# Patient Record
Sex: MALE | Race: WHITE | NOT HISPANIC OR LATINO | Employment: OTHER | URBAN - METROPOLITAN AREA
[De-identification: names, ages, dates, MRNs, and addresses within clinical notes are randomized per-mention and may not be internally consistent; named-entity substitution may affect disease eponyms.]

---

## 2017-04-18 ENCOUNTER — LAB CONVERSION - ENCOUNTER (OUTPATIENT)
Dept: OTHER | Facility: OTHER | Age: 57
End: 2017-04-18

## 2017-04-24 ENCOUNTER — GENERIC CONVERSION - ENCOUNTER (OUTPATIENT)
Dept: OTHER | Facility: OTHER | Age: 57
End: 2017-04-24

## 2017-04-24 LAB
A/G RATIO (HISTORICAL): 1.7 (CALC) (ref 1–2.5)
ALBUMIN SERPL BCP-MCNC: 4.4 G/DL (ref 3.6–5.1)
ALP SERPL-CCNC: 78 U/L (ref 40–115)
AST SERPL W P-5'-P-CCNC: 28 U/L (ref 10–35)
BASOPHILS # BLD AUTO: 0.2 %
BASOPHILS # BLD AUTO: 11 CELLS/UL (ref 0–200)
BILIRUB SERPL-MCNC: 0.6 MG/DL (ref 0.2–1.2)
BUN SERPL-MCNC: 17 MG/DL (ref 7–25)
BUN/CREA RATIO (HISTORICAL): ABNORMAL (CALC) (ref 6–22)
CALCIUM SERPL-MCNC: 9 MG/DL (ref 8.6–10.3)
CHLORIDE SERPL-SCNC: 107 MMOL/L (ref 98–110)
CHOLEST SERPL-MCNC: 232 MG/DL (ref 125–200)
CHOLEST/HDLC SERPL: 6.1 (CALC)
CO2 SERPL-SCNC: 26 MMOL/L (ref 20–31)
CREAT SERPL-MCNC: 0.97 MG/DL (ref 0.7–1.33)
DEPRECATED RDW RBC AUTO: 15.6 % (ref 11–15)
EGFR AFRICAN AMERICAN (HISTORICAL): 101 ML/MIN/1.73M2
EGFR-AMERICAN CALC (HISTORICAL): 87 ML/MIN/1.73M2
EOSINOPHIL # BLD AUTO: 0.7 %
EOSINOPHIL # BLD AUTO: 38 CELLS/UL (ref 15–500)
GAMMA GLOBULIN (HISTORICAL): 2.6 G/DL (CALC) (ref 1.9–3.7)
GLUCOSE (HISTORICAL): 102 MG/DL (ref 65–99)
HCT VFR BLD AUTO: 38.5 % (ref 38.5–50)
HDLC SERPL-MCNC: 38 MG/DL
HGB BLD-MCNC: 12.8 G/DL (ref 13.2–17.1)
LDL CHOLESTEROL (HISTORICAL): 148 MG/DL (CALC)
LYMPHOCYTES # BLD AUTO: 1798 CELLS/UL (ref 850–3900)
LYMPHOCYTES # BLD AUTO: 33.3 %
MCH RBC QN AUTO: 35.3 PG (ref 27–33)
MCHC RBC AUTO-ENTMCNC: 33.2 G/DL (ref 32–36)
MCV RBC AUTO: 106.2 FL (ref 80–100)
MONOCYTES # BLD AUTO: 362 CELLS/UL (ref 200–950)
MONOCYTES (HISTORICAL): 6.7 %
NEUTROPHILS # BLD AUTO: 3191 CELLS/UL (ref 1500–7800)
NEUTROPHILS # BLD AUTO: 59.1 %
NON-HDL-CHOL (CHOL-HDL) (HISTORICAL): 194 MG/DL (CALC)
PLATELET # BLD AUTO: 75 THOUSAND/UL (ref 140–400)
PMV BLD AUTO: 8 FL (ref 7.5–12.5)
POTASSIUM SERPL-SCNC: 4 MMOL/L (ref 3.5–5.3)
PROSTATE SPECIFIC ANTIGEN TOTAL (HISTORICAL): 0.7 NG/ML
RBC # BLD AUTO: 3.63 MILLION/UL (ref 4.2–5.8)
RBC MORPHOLOGY (HISTORICAL): ABNORMAL
SODIUM SERPL-SCNC: 142 MMOL/L (ref 135–146)
TOTAL PROTEIN (HISTORICAL): 7 G/DL (ref 6.1–8.1)
TRIGL SERPL-MCNC: 232 MG/DL
TSH SERPL DL<=0.05 MIU/L-ACNC: 1.74 MIU/L (ref 0.4–4.5)
WBC # BLD AUTO: 5.4 THOUSAND/UL (ref 3.8–10.8)

## 2017-05-11 ENCOUNTER — ALLSCRIPTS OFFICE VISIT (OUTPATIENT)
Dept: OTHER | Facility: OTHER | Age: 57
End: 2017-05-11

## 2017-05-11 DIAGNOSIS — R16.1 SPLENOMEGALY, NOT ELSEWHERE CLASSIFIED: ICD-10-CM

## 2017-05-20 ENCOUNTER — HOSPITAL ENCOUNTER (OUTPATIENT)
Dept: ULTRASOUND IMAGING | Facility: HOSPITAL | Age: 57
Discharge: HOME | End: 2017-05-20
Payer: COMMERCIAL

## 2017-05-20 DIAGNOSIS — R16.1 SPLENOMEGALY, NOT ELSEWHERE CLASSIFIED: ICD-10-CM

## 2017-05-20 PROCEDURE — 76700 US EXAM ABDOM COMPLETE: CPT

## 2017-05-22 ENCOUNTER — GENERIC CONVERSION - ENCOUNTER (OUTPATIENT)
Dept: OTHER | Facility: OTHER | Age: 57
End: 2017-05-22

## 2017-06-14 ENCOUNTER — ALLSCRIPTS OFFICE VISIT (OUTPATIENT)
Dept: OTHER | Facility: OTHER | Age: 57
End: 2017-06-14

## 2017-07-31 ENCOUNTER — HOSPITAL ENCOUNTER (OUTPATIENT)
Dept: CT IMAGING | Facility: HOSPITAL | Age: 57
Discharge: HOME | End: 2017-07-31
Attending: INTERNAL MEDICINE | Admitting: RADIOLOGY
Payer: COMMERCIAL

## 2017-07-31 ENCOUNTER — LAB CONVERSION - ENCOUNTER (OUTPATIENT)
Dept: OTHER | Facility: OTHER | Age: 57
End: 2017-07-31

## 2017-07-31 ENCOUNTER — GENERIC CONVERSION - ENCOUNTER (OUTPATIENT)
Dept: OTHER | Facility: OTHER | Age: 57
End: 2017-07-31

## 2017-07-31 VITALS
RESPIRATION RATE: 16 BRPM | HEART RATE: 56 BPM | DIASTOLIC BLOOD PRESSURE: 80 MMHG | BODY MASS INDEX: 31.44 KG/M2 | SYSTOLIC BLOOD PRESSURE: 131 MMHG | OXYGEN SATURATION: 93 % | HEIGHT: 74 IN | TEMPERATURE: 98.8 F | WEIGHT: 245 LBS

## 2017-07-31 DIAGNOSIS — D64.9 ANEMIA, UNSPECIFIED: ICD-10-CM

## 2017-07-31 LAB
LYME IGG/IGM AB (HISTORICAL): <0.9 INDEX
TSH SERPL DL<=0.05 MIU/L-ACNC: 34.23 MIU/L (ref 0.4–4.5)

## 2017-07-31 PROCEDURE — 88280 CHROMOSOME KARYOTYPE STUDY: CPT | Performed by: INTERNAL MEDICINE

## 2017-07-31 PROCEDURE — 38221 DX BONE MARROW BIOPSIES: CPT

## 2017-07-31 PROCEDURE — 88342 IMHCHEM/IMCYTCHM 1ST ANTB: CPT | Performed by: INTERNAL MEDICINE

## 2017-07-31 PROCEDURE — 88237 TISSUE CULTURE BONE MARROW: CPT | Performed by: INTERNAL MEDICINE

## 2017-07-31 PROCEDURE — 81406 MOPATH PROCEDURE LEVEL 7: CPT | Performed by: INTERNAL MEDICINE

## 2017-07-31 PROCEDURE — 88305 TISSUE EXAM BY PATHOLOGIST: CPT | Performed by: INTERNAL MEDICINE

## 2017-07-31 PROCEDURE — 99152 MOD SED SAME PHYS/QHP 5/>YRS: CPT

## 2017-07-31 PROCEDURE — 88313 SPECIAL STAINS GROUP 2: CPT | Performed by: INTERNAL MEDICINE

## 2017-07-31 PROCEDURE — 85097 BONE MARROW INTERPRETATION: CPT | Performed by: INTERNAL MEDICINE

## 2017-07-31 PROCEDURE — 38220 DX BONE MARROW ASPIRATIONS: CPT

## 2017-07-31 PROCEDURE — 88311 DECALCIFY TISSUE: CPT | Performed by: INTERNAL MEDICINE

## 2017-07-31 PROCEDURE — 81450 HL NEO GSAP 5-50DNA/DNA&RNA: CPT | Performed by: INTERNAL MEDICINE

## 2017-07-31 PROCEDURE — 77012 CT SCAN FOR NEEDLE BIOPSY: CPT

## 2017-07-31 PROCEDURE — 88280 CHROMOSOME KARYOTYPE STUDY: CPT

## 2017-07-31 PROCEDURE — 99153 MOD SED SAME PHYS/QHP EA: CPT

## 2017-07-31 PROCEDURE — 88341 IMHCHEM/IMCYTCHM EA ADD ANTB: CPT | Performed by: INTERNAL MEDICINE

## 2017-07-31 PROCEDURE — 88237 TISSUE CULTURE BONE MARROW: CPT

## 2017-07-31 RX ORDER — FENTANYL CITRATE 50 UG/ML
INJECTION, SOLUTION INTRAMUSCULAR; INTRAVENOUS CODE/TRAUMA/SEDATION MEDICATION
Status: COMPLETED | OUTPATIENT
Start: 2017-07-31 | End: 2017-07-31

## 2017-07-31 RX ORDER — TRAMADOL HYDROCHLORIDE 50 MG/1
50 TABLET ORAL EVERY 6 HOURS PRN
Status: ON HOLD | COMMUNITY
End: 2017-11-06

## 2017-07-31 RX ORDER — MIDAZOLAM HYDROCHLORIDE 1 MG/ML
INJECTION INTRAMUSCULAR; INTRAVENOUS CODE/TRAUMA/SEDATION MEDICATION
Status: COMPLETED | OUTPATIENT
Start: 2017-07-31 | End: 2017-07-31

## 2017-07-31 RX ORDER — SODIUM CHLORIDE 9 MG/ML
75 INJECTION, SOLUTION INTRAVENOUS CONTINUOUS
Status: DISCONTINUED | OUTPATIENT
Start: 2017-07-31 | End: 2017-08-01 | Stop reason: HOSPADM

## 2017-07-31 RX ORDER — ASCORBIC ACID 500 MG
500 TABLET ORAL DAILY
COMMUNITY
End: 2020-01-08 | Stop reason: ALTCHOICE

## 2017-07-31 RX ADMIN — MIDAZOLAM HYDROCHLORIDE 2 MG: 1 INJECTION, SOLUTION INTRAMUSCULAR; INTRAVENOUS at 08:40

## 2017-07-31 RX ADMIN — FENTANYL CITRATE 50 MCG: 50 INJECTION, SOLUTION INTRAMUSCULAR; INTRAVENOUS at 08:40

## 2017-07-31 RX ADMIN — FENTANYL CITRATE 50 MCG: 50 INJECTION, SOLUTION INTRAMUSCULAR; INTRAVENOUS at 08:35

## 2017-07-31 RX ADMIN — MIDAZOLAM HYDROCHLORIDE 2 MG: 1 INJECTION, SOLUTION INTRAMUSCULAR; INTRAVENOUS at 08:35

## 2017-08-05 LAB — MISCELLANEOUS LAB TEST RESULT: NORMAL

## 2017-08-08 LAB
MISCELLANEOUS LAB TEST RESULT: NORMAL
SCAN RESULT: NORMAL
SCAN RESULT: NORMAL

## 2017-08-09 ENCOUNTER — LAB CONVERSION - ENCOUNTER (OUTPATIENT)
Dept: OTHER | Facility: OTHER | Age: 57
End: 2017-08-09

## 2017-08-09 LAB — FERRITIN SERPL-MCNC: 139 NG/ML (ref 20–380)

## 2017-08-11 ENCOUNTER — LAB CONVERSION - ENCOUNTER (OUTPATIENT)
Dept: OTHER | Facility: OTHER | Age: 57
End: 2017-08-11

## 2017-08-11 DIAGNOSIS — D64.9 ANEMIA: ICD-10-CM

## 2017-08-11 LAB
A/G RATIO (HISTORICAL): 1.8 (CALC) (ref 1–2.5)
ALBUMIN SERPL BCP-MCNC: 4.4 G/DL (ref 3.6–5.1)
ALP SERPL-CCNC: 63 U/L (ref 40–115)
ALT SERPL W P-5'-P-CCNC: 35 U/L (ref 9–46)
AST SERPL W P-5'-P-CCNC: 30 U/L (ref 10–35)
BASOPHILS # BLD AUTO: 0.2 %
BASOPHILS # BLD AUTO: 9 CELLS/UL (ref 0–200)
BILIRUB SERPL-MCNC: 0.4 MG/DL (ref 0.2–1.2)
BUN SERPL-MCNC: 17 MG/DL (ref 7–25)
BUN/CREA RATIO (HISTORICAL): ABNORMAL (CALC) (ref 6–22)
CALCIUM SERPL-MCNC: 9.5 MG/DL (ref 8.6–10.3)
CHLORIDE SERPL-SCNC: 107 MMOL/L (ref 98–110)
CO2 SERPL-SCNC: 27 MMOL/L (ref 20–31)
CREAT SERPL-MCNC: 1.12 MG/DL (ref 0.7–1.33)
DEPRECATED RDW RBC AUTO: 14.1 % (ref 11–15)
EGFR AFRICAN AMERICAN (HISTORICAL): 85 ML/MIN/1.73M2
EGFR-AMERICAN CALC (HISTORICAL): 73 ML/MIN/1.73M2
EOSINOPHIL # BLD AUTO: 0.4 %
EOSINOPHIL # BLD AUTO: 18 CELLS/UL (ref 15–500)
FERRITIN SERPL-MCNC: 139 NG/ML (ref 20–380)
GAMMA GLOBULIN (HISTORICAL): 2.5 G/DL (CALC) (ref 1.9–3.7)
GLUCOSE (HISTORICAL): 112 MG/DL (ref 65–99)
HCT VFR BLD AUTO: 35.3 % (ref 38.5–50)
HGB BLD-MCNC: 11.8 G/DL (ref 13.2–17.1)
IRON SATN MFR SERPL: 28 % (CALC) (ref 15–60)
IRON SERPL-MCNC: 101 MCG/DL (ref 50–180)
LYMPHOCYTES # BLD AUTO: 1886 CELLS/UL (ref 850–3900)
LYMPHOCYTES # BLD AUTO: 41.9 %
MCH RBC QN AUTO: 35.5 PG (ref 27–33)
MCHC RBC AUTO-ENTMCNC: 33.4 G/DL (ref 32–36)
MCV RBC AUTO: 106.3 FL (ref 80–100)
METHYLMALONIC ACID (HISTORICAL): 127 NMOL/L (ref 87–318)
MONOCYTES # BLD AUTO: 329 CELLS/UL (ref 200–950)
MONOCYTES (HISTORICAL): 7.3 %
NEUTROPHILS # BLD AUTO: 2259 CELLS/UL (ref 1500–7800)
NEUTROPHILS # BLD AUTO: 50.2 %
PLATELET # BLD AUTO: 84 THOUSAND/UL (ref 140–400)
PMV BLD AUTO: 10.2 FL (ref 7.5–12.5)
POTASSIUM SERPL-SCNC: 3.8 MMOL/L (ref 3.5–5.3)
RBC # BLD AUTO: 3.32 MILLION/UL (ref 4.2–5.8)
SODIUM SERPL-SCNC: 143 MMOL/L (ref 135–146)
TIBC SERPL-MCNC: 359 MCG/DL (CALC) (ref 250–425)
TOTAL PROTEIN (HISTORICAL): 6.9 G/DL (ref 6.1–8.1)
WBC # BLD AUTO: 4.5 THOUSAND/UL (ref 3.8–10.8)

## 2017-08-21 ENCOUNTER — ALLSCRIPTS OFFICE VISIT (OUTPATIENT)
Dept: OTHER | Facility: OTHER | Age: 57
End: 2017-08-21

## 2017-08-25 RX ORDER — SODIUM CHLORIDE 9 MG/ML
20 INJECTION, SOLUTION INTRAVENOUS ONCE
Status: COMPLETED | OUTPATIENT
Start: 2017-09-20 | End: 2017-09-20

## 2017-08-25 RX ORDER — SODIUM CHLORIDE 9 MG/ML
20 INJECTION, SOLUTION INTRAVENOUS ONCE
Status: COMPLETED | OUTPATIENT
Start: 2017-08-30 | End: 2017-08-30

## 2017-08-25 RX ORDER — SODIUM CHLORIDE 9 MG/ML
20 INJECTION, SOLUTION INTRAVENOUS ONCE
Status: DISCONTINUED | OUTPATIENT
Start: 2017-09-07 | End: 2017-09-06

## 2017-08-25 RX ORDER — SODIUM CHLORIDE 9 MG/ML
20 INJECTION, SOLUTION INTRAVENOUS ONCE
Status: COMPLETED | OUTPATIENT
Start: 2017-09-13 | End: 2017-09-13

## 2017-08-30 ENCOUNTER — HOSPITAL ENCOUNTER (OUTPATIENT)
Dept: INFUSION CENTER | Facility: HOSPITAL | Age: 57
Discharge: HOME | End: 2017-08-30
Payer: COMMERCIAL

## 2017-08-30 VITALS
OXYGEN SATURATION: 99 % | DIASTOLIC BLOOD PRESSURE: 81 MMHG | TEMPERATURE: 98 F | SYSTOLIC BLOOD PRESSURE: 139 MMHG | RESPIRATION RATE: 18 BRPM | HEART RATE: 67 BPM

## 2017-08-30 PROCEDURE — 96365 THER/PROPH/DIAG IV INF INIT: CPT

## 2017-08-30 PROCEDURE — 96372 THER/PROPH/DIAG INJ SC/IM: CPT

## 2017-08-30 RX ORDER — CYANOCOBALAMIN 1000 UG/ML
1000 INJECTION INTRAMUSCULAR; SUBCUTANEOUS ONCE
Status: COMPLETED | OUTPATIENT
Start: 2017-08-30 | End: 2017-08-30

## 2017-08-30 RX ADMIN — IRON SUCROSE 200 MG: 20 INJECTION, SOLUTION INTRAVENOUS at 14:54

## 2017-08-30 RX ADMIN — CYANOCOBALAMIN 1000 MCG: 1000 INJECTION, SOLUTION INTRAMUSCULAR at 15:22

## 2017-08-30 RX ADMIN — SODIUM CHLORIDE 20 ML/HR: 9 INJECTION, SOLUTION INTRAVENOUS at 14:50

## 2017-08-30 NOTE — PROGRESS NOTES
Rec'd pt in good spirits, VSS, c/o fatigue  Made comfortable in recliner for PIV, Venofer then infused and B12 inj to R deltoid both tolerated well  PIV then dc'd and pt d/c'd to home with steady gait

## 2017-09-06 RX ORDER — SODIUM CHLORIDE 9 MG/ML
20 INJECTION, SOLUTION INTRAVENOUS CONTINUOUS
Status: DISCONTINUED | OUTPATIENT
Start: 2017-09-07 | End: 2017-09-10 | Stop reason: HOSPADM

## 2017-09-06 RX ORDER — CYANOCOBALAMIN 1000 UG/ML
1000 INJECTION INTRAMUSCULAR; SUBCUTANEOUS ONCE
Status: COMPLETED | OUTPATIENT
Start: 2017-09-07 | End: 2017-09-07

## 2017-09-07 ENCOUNTER — HOSPITAL ENCOUNTER (OUTPATIENT)
Dept: INFUSION CENTER | Facility: HOSPITAL | Age: 57
Discharge: HOME | End: 2017-09-07
Payer: COMMERCIAL

## 2017-09-07 VITALS
TEMPERATURE: 97 F | SYSTOLIC BLOOD PRESSURE: 147 MMHG | DIASTOLIC BLOOD PRESSURE: 87 MMHG | HEART RATE: 75 BPM | RESPIRATION RATE: 18 BRPM | OXYGEN SATURATION: 97 %

## 2017-09-07 PROCEDURE — 96365 THER/PROPH/DIAG IV INF INIT: CPT

## 2017-09-07 PROCEDURE — 96372 THER/PROPH/DIAG INJ SC/IM: CPT

## 2017-09-07 RX ADMIN — CYANOCOBALAMIN 1000 MCG: 1000 INJECTION, SOLUTION INTRAMUSCULAR at 14:49

## 2017-09-07 RX ADMIN — IRON SUCROSE 200 MG: 20 INJECTION, SOLUTION INTRAVENOUS at 14:45

## 2017-09-07 NOTE — PROGRESS NOTES
Pt tolerated all treatment today with no adverse reactions  Left unit ambulatory with a steady gait

## 2017-09-13 ENCOUNTER — ALLSCRIPTS OFFICE VISIT (OUTPATIENT)
Dept: OTHER | Facility: OTHER | Age: 57
End: 2017-09-13

## 2017-09-13 ENCOUNTER — HOSPITAL ENCOUNTER (OUTPATIENT)
Dept: INFUSION CENTER | Facility: HOSPITAL | Age: 57
Discharge: HOME | End: 2017-09-13
Payer: COMMERCIAL

## 2017-09-13 VITALS
RESPIRATION RATE: 18 BRPM | DIASTOLIC BLOOD PRESSURE: 78 MMHG | SYSTOLIC BLOOD PRESSURE: 138 MMHG | TEMPERATURE: 97.7 F | HEART RATE: 65 BPM | OXYGEN SATURATION: 97 %

## 2017-09-13 DIAGNOSIS — K76.0 FATTY (CHANGE OF) LIVER, NOT ELSEWHERE CLASSIFIED: ICD-10-CM

## 2017-09-13 DIAGNOSIS — D64.9 ANEMIA: ICD-10-CM

## 2017-09-13 PROCEDURE — 96365 THER/PROPH/DIAG IV INF INIT: CPT

## 2017-09-13 PROCEDURE — 96372 THER/PROPH/DIAG INJ SC/IM: CPT

## 2017-09-13 RX ORDER — CYANOCOBALAMIN 1000 UG/ML
1000 INJECTION INTRAMUSCULAR; SUBCUTANEOUS ONCE
Status: COMPLETED | OUTPATIENT
Start: 2017-09-13 | End: 2017-09-13

## 2017-09-13 RX ADMIN — IRON SUCROSE 200 MG: 20 INJECTION, SOLUTION INTRAVENOUS at 15:04

## 2017-09-13 RX ADMIN — CYANOCOBALAMIN 1000 MCG: 1000 INJECTION, SOLUTION INTRAMUSCULAR at 16:12

## 2017-09-13 RX ADMIN — SODIUM CHLORIDE 20 ML/HR: 9 INJECTION, SOLUTION INTRAVENOUS at 15:04

## 2017-09-13 NOTE — PROGRESS NOTES
Pt tolerated venofer infusion and B12 injection without any adverse reactions  Pt left unit ambulating and with a steady gait

## 2017-09-19 RX ORDER — CYANOCOBALAMIN 1000 UG/ML
1000 INJECTION INTRAMUSCULAR; SUBCUTANEOUS ONCE
Status: COMPLETED | OUTPATIENT
Start: 2017-09-20 | End: 2017-09-20

## 2017-09-20 ENCOUNTER — HOSPITAL ENCOUNTER (OUTPATIENT)
Dept: INFUSION CENTER | Facility: HOSPITAL | Age: 57
Discharge: HOME | End: 2017-09-20
Payer: COMMERCIAL

## 2017-09-20 VITALS
SYSTOLIC BLOOD PRESSURE: 139 MMHG | DIASTOLIC BLOOD PRESSURE: 71 MMHG | HEART RATE: 60 BPM | RESPIRATION RATE: 18 BRPM | OXYGEN SATURATION: 97 % | TEMPERATURE: 97.6 F

## 2017-09-20 PROCEDURE — 96365 THER/PROPH/DIAG IV INF INIT: CPT

## 2017-09-20 PROCEDURE — 96372 THER/PROPH/DIAG INJ SC/IM: CPT

## 2017-09-20 RX ADMIN — SODIUM CHLORIDE 20 ML/HR: 9 INJECTION, SOLUTION INTRAVENOUS at 14:48

## 2017-09-20 RX ADMIN — IRON SUCROSE 200 MG: 20 INJECTION, SOLUTION INTRAVENOUS at 14:48

## 2017-09-20 RX ADMIN — CYANOCOBALAMIN 1000 MCG: 1000 INJECTION, SOLUTION INTRAMUSCULAR at 14:53

## 2017-10-19 NOTE — CONSULTS
Assessment  1  Anemia (285 9) (D64 9)   2  Blood in stool (578 1) (K92 1)   3  Encounter for screening colonoscopy (V76 51) (Z12 11)   4  Thrombocytopenia (287 5) (D69 6)   5  Fatty liver (571 8) (K76 0)    Plan  Anemia    · (1) COMPREHENSIVE METABOLIC PANEL; Status:Active; Requested for:82Rbo4366;    Perform:[facility]; Due:82Hkg0076; Ordered; Selene Richards; Ordered By:Kayla Hinton;  Blood in stool    · Suprep Bowel Prep Kit 17 5-3 13-1 6 GM/180ML Oral Solution; DILUTE CONTENTS  AND USE AS DIRECTED FOR BOWEL PREP   Rx By: Henry Jones; Dispense: 0 Days ; #:1 ML; Refill: 0;For: Blood in stool; STEVEN = N; Verified Transmission to [facility]/PHARMACY# [id number]; Last Updated By: System, [employer]; 9/13/2017 8:30:46 AM  Fatty liver    · (1) ALPHA 1 ANTITRYPSIN; Status:Active; Requested for:34Ruv9321;    Perform:[facility]; Due:96Ljg1804; Ordered; For:Fatty liver; Ordered By:Tavia Hinton;   · (1) CERULOPLASMIN; Status:Active; Requested for:06Mhc6166;    Perform:[facility]; Due:29Eea1218; Ordered; For:Fatty liver; Ordered By:Tavia Hinton;   · (1) CHRONIC HEPATITIS PANEL; Status:Active; Requested for:59Xlk8926;    Perform:[facility]; Due:99Lse0664; Ordered; For:Fatty liver; Ordered By:Tavia Hinton;   · (1) IRON PANEL; Status:Active; Requested for:09Bvv1389;    Perform:[facility]; Due:53Fmw9088; Ordered; For:Fatty liver; Ordered By:Tavia Hinton;   · (1) MITOCHONDRIAL ANTIBODY; Status:Active; Requested for:72Ojl6717;    Perform:[facility]; Due:15Izd6630; Ordered; For:Fatty liver; Ordered By:Tavia Hinton;   · (1) PT WITH INR; Status:Active; Requested for:17Jbm0961;    Perform:[facility]; Due:58Rtz8178; Ordered; For:Fatty liver; Ordered By:Tavia Hinton;   · (LC) TUNDE w/Reflex if Positive; Status:Active; Requested for:40Lhi3104;    Perform:[facility]; Due:81Vqy5010; Ordered; For:Fatty liver; Ordered By:Tavia Hinton;   · (LC) Anti-Smooth Muscle/Mitochond ; Status:Active; Requested for:50Vlf7550;    Perform:[facility]; Due:65Ruq9998; Ordered; For:Fatty liver; Ordered By:Tavia Hinton;   · (Q) ENDOMYSIAL ANTIBODY SCR (IGA) W/REFL TO TITER; Status:Active; Requested  for:23Qgv9325;    Perform:[facility]; Due:66Hnf1197; Ordered; For:Fatty liver; Ordered By:Tavia Hinton;   · * CT ABDOMEN PELVIS W CONTRAST; Status:Need Information - Financial Authorization; Requested for:80Kwo4921;    Perform:[facility]; Due:83Dsc8319; Ordered; For:Fatty liver; Ordered By:Lolis Hinton; Thrombocytopenia    · COLONOSCOPY (GI, SURG); Status:Hold For - Scheduling; Requested for:15Spp8055;    Perform:[facility]; Due:96Wii4297; Ordered; For:Thrombocytopenia; Ordered By:Lolis Hinton;   · EGD; Status:Hold For - Scheduling; Requested for:17Ucj6972;    Perform:[facility]; Due:07Ezi3835;Ordered; For:Thrombocytopenia; Ordered By:Tavia Hinton;    Discussion/Summary  Discussion Summary: 1  Fatty liver and hepatomegaly: normal liver function tests, although blood work shows thrombocytopenia, there is no evidence of cirrhosis on ultrasound or on exam  He is overweight, would recommend weight loss  We will check for other causes of chronic liver disease including autoimmune processes, check chronic hepatitis panel  Check TUNDE, AMA, anti-smooth muscle antibody, ceruloplasmin and alpha-1 antitrypsin antibody  We'll obtain CT of abdomen and pelvis with triple phase  He does get iron infusions, will plan for EGD and colonoscopy to rule out any GI causes of anemia  Colon cancer screening: as colonoscopy was in her early 45s, will repeat colonoscopy at this time  Patient was explained about the risks and benefits of the procedure  Risks including but not limited to bleeding, infection, perforation were explained in detail  Also explained about less than 100% sensitivity with the exam and other alternatives        Chief Complaint  Chief Complaint Free Text Note Form: Fatty liver and macrocytic anemia      History of Present Illness  HPI: This is a 71-year-old male with history of hypothyroidism, who presents for evaluation of fatty liver  Patient has been seeing hematology for thrombocytopenia and macrocytic anemia  He recently underwent abdominal ultrasound which showed diffuse increased echogenicity with smooth Echo texture and severe hepatic steatosis  Patient states that he drinks alcohol socially, denies any prior history of IV drug use, does not recall being diagnosed with hepatitis C, no family history of liver problems  He denies jaundice, pruritus, abdominal distention or lower extremity edema  He denies prior history of GI bleed, denies hematemesis, melena or hematochezia  He states that he had a colonoscopy in his early 45s, unsure of the results  Review of Systems  Complete-Male GI Adult:   Constitutional: No fever or chills, feels well, no tiredness, no recent weight gain or weight loss  Eyes: No complaints of eye pain, no red eyes, no discharge from eyes, no itchy eyes  ENT: no complaints of earache, no hearing loss, no nosebleeds, no nasal discharge, no sore throat, no hoarseness  Cardiovascular: No complaints of slow heart rate, no fast heart rate, no chest pain, no palpitations, no leg claudication, no lower extremity  Respiratory: No complaints of shortness of breath, no wheezing, no cough, no SOB on exertion, no orthopnea or PND  Gastrointestinal: as noted in HPI  Genitourinary: No complaints of dysuria, no incontinence, no hesitancy, no nocturia, no genital lesion, no testicular pain  Musculoskeletal: No complaints of arthralgia, no myalgias, no joint swelling or stiffness, no limb pain or swelling  Integumentary: No complaints of skin rash or skin lesions, no itching, no skin wound, no dry skin     Neurological: No compliants of headache, no confusion, no convulsions, no numbness or tingling, no dizziness or fainting, no limb weakness, no difficulty walking  Psychiatric: Is not suicidal, no sleep disturbances, no anxiety or depression, no change in personality, no emotional problems  Endocrine: No complaints of proptosis, no hot flashes, no muscle weakness, no erectile dysfunction, no deepening of the voice, no feelings of weakness  Hematologic/Lymphatic: No complaints of swollen glands, no swollen glands in the neck, does not bleed easily, no easy bruising  ROS Reviewed:   ROS reviewed  Active Problems  1  Acute pain (338 19) (R52)   2  Anemia (285 9) (D64 9)   3  Claudication (443 9) (I73 9)   4  Encounter for screening colonoscopy (V76 51) (Z12 11)   5  Hyperlipidemia (272 4) (E78 5)   6  Hypothyroidism (244 9) (E03 9)   7  Leucopenia (288 50) (D72 819)   8  Macrocytic anemia (281 9) (D53 9)   9  Myalgia And Myositis (729 1)   10  Need for diphtheria-tetanus-pertussis (Tdap) vaccine, adult/adolescent (V06 1) (Z23)   11  Need for prophylactic vaccination and inoculation against influenza (V04 81) (Z23)   12  Need for vaccination for DTP (V06 1) (Z23)   13  Osteoarthritis of both hips (715 95) (M16 0)   14  Screening for depression (V79 0) (Z13 89)   15  Splenomegaly (789 2) (R16 1)   16  Testicular hypogonadism (257 2) (E29 1)   17  Testicular hypogonadism (257 2) (E29 1)   18  Thrombocytopenia (287 5) (D69 6)   19  Ureteric stone (592 1) (N20 1)   20  Urinary tract infection (599 0) (N39 0)   21  Vitamin D deficiency (268 9) (E55 9)    Past Medical History  1  History of Bilateral hip pain (719 45) (M25 551,M25 552)   2  History of anemia (V12 3) (Z86 2)   3  History of cholelithiasis (V12 79) (Z87 19)   4  History of gastroesophageal reflux (GERD) (V12 79) (Z87 19)   5  History of hypersecretion of testicular hormones (V12 29) (Z86 39)   6  History of insomnia (V13 89) (Z87 898)   7  History of Male erectile dysfunction (607 84) (N52 9)   8  Need for prophylactic vaccination and inoculation against influenza (V04 81) (Z23)   9   Need for vaccination for DTP (V06 1) (Z23)   10  History of Thrombosed external hemorrhoids (455 4) (K64 5)  Active Problems And Past Medical History Reviewed: The active problems and past medical history were reviewed and updated today  Surgical History  1  History of Cholecystectomy  Surgical History Reviewed: The surgical history was reviewed and updated today  Family History  Father    1  Family history of Asthma (V17 5)   2  Family history of Diabetes Mellitus (V18 0)  Family History    3  Family history of Cancer   4  Family history of Heart Disease (V17 49)  Family History Reviewed: The family history was reviewed and updated today  Social History   · Being A Social Drinker   · Former smoker (B29 37) (L14 390)  Social History Reviewed: The social history was reviewed and updated today  Current Meds   1  EQL Fish Oil 1000 MG Oral Capsule; TAKE 1 CAPSULE DAILY; Therapy: 03Sep2015 to Recorded   2  Nature-Throid 130 MG Oral Tablet; TAKE 1 TABLET BY MOUTH EVERY MORNING   BEFORE BREAKFAST ON EMPTY STOMACH  Requested for: 04Aug2017; Last   Rx:04Aug2017 Ordered   3  Nature-Throid 81 25 MG Oral Tablet; TAKE 1 TABLET BY MOUTH EVERY MORNING   BEFORE BREAKFAST ON EMPTY STOMACH  Requested for: 04Aug2017; Last   Rx:04Aug2017 Ordered   4  TraMADol HCl - 50 MG Oral Tablet; TAKE 1 TO 2 TABLETS EVERY 6 HOURS AS NEEDED   FOR PAIN;   Therapy: 79BNZ8943 to (Evaluate:92Vye6854); Last Rx:09Jan2017; Status: ACTIVE -   Renewal Voided Ordered   5  Vitamin C 1000 MG Oral Tablet; Therapy: 03Sep2015 to Recorded   6  Vitamin D3 2000 UNIT Oral Tablet; Therapy: 41OPQ5059 to Recorded  Medication List Reviewed: The medication list was reviewed and updated today  Allergies  1   No Known Drug Allergies    Vitals  Vital Signs    Recorded: 13Sep2017 08:17AM   Temperature 97 9 F   Heart Rate 80   Systolic 615   Diastolic 74   Weight 832 lb    BMI Calculated 34 16   BSA Calculated 2 37   O2 Saturation 98 Physical Exam    Constitutional   General appearance: No acute distress, well appearing and well nourished  Eyes   Conjunctiva and lids: No swelling, erythema, or discharge  Ears, Nose, Mouth, and Throat   Oropharynx: Normal with no erythema, edema, exudate or lesions  Pulmonary   Respiratory effort: No increased work of breathing or signs of respiratory distress  Auscultation of lungs: Clear to auscultation, equal breath sounds bilaterally, no wheezes, no rales, no rhonci  Cardiovascular   Palpation of heart: Normal PMI, no thrills  Auscultation of heart: Normal rate and rhythm, normal S1 and S2, without murmurs  Examination of extremities for edema and/or varicosities: Normal     Abdomen   Abdomen: Non-tender, no masses  Liver and spleen: No hepatomegaly or splenomegaly  Lymphatic   Palpation of lymph nodes in neck: No lymphadenopathy  Skin   Skin and subcutaneous tissue: Normal without rashes or lesions      Psychiatric   Orientation to person, place and time: Normal     Mood and affect: Normal          Results/Data  (1) CBC/PLT/DIFF [id number] 02:45PM [facility]     Test Name Result Flag Reference   WHITE BLOOD CELL COUNT 4 5 Thousand/uL  3 8-10 8   RED BLOOD CELL COUNT 3 32 Million/uL L 4 20-5 80   HEMOGLOBIN 11 8 g/dL L 13 2-17 1   HEMATOCRIT 35 3 % L 38 5-50 0    3 fL H 80 0-100 0   MCH 35 5 pg H 27 0-33 0   MCHC 33 4 g/dL  32 0-36 0   RDW 14 1 %  11 0-15 0   PLATELET COUNT 84 Thousand/uL L 140-400   ABSOLUTE NEUTROPHILS 2259 cells/uL  4178-1186   ABSOLUTE LYMPHOCYTES 1886 cells/uL  850-3900   ABSOLUTE MONOCYTES 329 cells/uL  200-950   ABSOLUTE EOSINOPHILS 18 cells/uL     ABSOLUTE BASOPHILS 9 cells/uL  0-200   NEUTROPHILS 50 2 %     LYMPHOCYTES 41 9 %     MONOCYTES 7 3 %     EOSINOPHILS 0 4 %     BASOPHILS 0 2 %     MPV 10 2 fL  7 5-12 5     (1) COMPREHENSIVE METABOLIC PANEL [id number] 77:91ZH [facility]     Test Name Result Flag Reference   GLUCOSE 112 mg/dL H 65-99 Fasting reference interval     For someone without known diabetes, a glucose value  between 100 and 125 mg/dL is consistent with  prediabetes and should be confirmed with a  follow-up test    UREA NITROGEN (BUN) 17 mg/dL  7-25   CREATININE 1 12 mg/dL  0 70-1 33   For patients >52years of age, the reference limit  for Creatinine is approximately 13% higher for people  identified as -American  eGFR NON-AFR  AMERICAN 73 mL/min/1 73m2  > OR = 60   eGFR AFRICAN AMERICAN 85 mL/min/1 73m2  > OR = 60   BUN/CREATININE RATIO   7-70   NOT APPLICABLE (calc)   SODIUM 143 mmol/L  135-146   POTASSIUM 3 8 mmol/L  3 5-5 3   CHLORIDE 107 mmol/L     CARBON DIOXIDE 27 mmol/L  20-31   CALCIUM 9 5 mg/dL  8 6-10 3   PROTEIN, TOTAL 6 9 g/dL  6 1-8 1   ALBUMIN 4 4 g/dL  3 6-5 1   GLOBULIN 2 5 g/dL (calc)  1 9-3 7   ALBUMIN/GLOBULIN RATIO 1 8 (calc)  1 0-2 5   BILIRUBIN, TOTAL 0 4 mg/dL  0 2-1 2   ALKALINE PHOSPHATASE 63 U/L     AST 30 U/L  10-35   ALT 35 U/L  9-46     (1) IRON SATURATION %, TIBC [id number] 02:45PM Scott Santos     Test Name Result Flag Reference   IRON, TOTAL 101 mcg/dL     IRON BINDING CAPACITY 359 mcg/dL (calc)  250-425   % SATURATION 28 % (calc)  15-60     (Q) METHYLMALONIC ACID [id number] 02:45PM Scott Santos     Test Name Result Flag Reference   METHYLMALONIC ACID 127 nmol/L       (Q) TSH, 3RD GENERATION [id number] 11:31AM Yoav Howell     Test Name Result Flag Reference   TSH 34 23 mIU/L H 0 40-4 50     * US ABDOMEN COMPLETE [id number] 07:48AM [facility] Order Number: [id number]    - Patient Instructions: To schedule this appointment, please contact Central Scheduling at [phone]  Test Name Result Flag Reference   US ABDOMEN COMPLETE (Report)     ABDOMEN ULTRASOUND, COMPLETE     INDICATION: R16 1: Splenomegaly, not elsewhere classified (this clinical history is taken directly from the electronic ordering system)  COMPARISON: None       TECHNIQUE:  Real-time ultrasound of the abdomen was performed with a curvilinear transducer with both volumetric sweeps and still imaging techniques  FINDINGS:     PANCREAS: Portions of the pancreas are obscured by bowel gas  Visualized portions of the pancreas are unremarkable  AORTA AND IVC: Visualized portions are normal for patient age  LIVER:   Size: Enlarged  The liver measures 21 cm in the midclavicular line  Contour: Surface contour is smooth  Parenchyma: There is marked diffuse increased echogenicity with smooth echotexture and significant beam attenuation with loss of periportal echogenicity  Most consistent with severe hepatic steatosis  No evidence of suspicious sonographically detectable solid hepatic mass  The main portal vein is patent and hepatopetal       BILIARY:   The gallbladder is surgically absent  No intrahepatic biliary dilatation  CBD measures 8 mm which is within normal limits in a patient who has undergone prior cholecystectomy  No choledocholithiasis  KIDNEY:    Right kidney measures 12 7 cm  Within normal limits  Left kidney measures 12 6 cm  Within normal limits  SPLEEN:    Measures 13 4 cm  No sonographically detectable suspicious splenic lesion  There is a 13 mm splenule in the splenic hilum  ASCITES: None  IMPRESSION:     Hepatomegaly and profound hepatic steatosis  Mild splenomegaly  Workstation performed: [id number]     Signed by:   Zackary Pollard MD   5/22/17     Future Appointments    Date/Time Provider Specialty Site   11/06/2017 03:20 PM Lauren Eisenmenger, M D   Hematology Oncology [facility]   12/14/2017 01:00 PM [facility] Gastroenterology Adult [facility]     Signatures   Electronically signed by : DANIEL Fuentes ; Sep 13 2017  9:30AM EST                       (Author)

## 2017-10-20 DIAGNOSIS — D53.9 NUTRITIONAL ANEMIA: ICD-10-CM

## 2017-11-01 ENCOUNTER — GENERIC CONVERSION - ENCOUNTER (OUTPATIENT)
Dept: OTHER | Facility: OTHER | Age: 57
End: 2017-11-01

## 2017-11-01 ENCOUNTER — LAB CONVERSION - ENCOUNTER (OUTPATIENT)
Dept: OTHER | Facility: OTHER | Age: 57
End: 2017-11-01

## 2017-11-01 ENCOUNTER — ANESTHESIA EVENT (OUTPATIENT)
Dept: PERIOP | Facility: AMBULARY SURGERY CENTER | Age: 57
End: 2017-11-01
Payer: COMMERCIAL

## 2017-11-01 LAB — TSH SERPL DL<=0.05 MIU/L-ACNC: 9.49 MIU/L (ref 0.4–4.5)

## 2017-11-03 ENCOUNTER — GENERIC CONVERSION - ENCOUNTER (OUTPATIENT)
Dept: OTHER | Facility: OTHER | Age: 57
End: 2017-11-03

## 2017-11-03 ENCOUNTER — LAB CONVERSION - ENCOUNTER (OUTPATIENT)
Dept: OTHER | Facility: OTHER | Age: 57
End: 2017-11-03

## 2017-11-03 LAB
CONTACT: (HISTORICAL): NORMAL
HEPATITIS A IGM ANTIBODY (HISTORICAL): NORMAL
HEPATITIS B CORE TOTAL ANTIBODY (HISTORICAL): NORMAL
HEPATITIS B SURFACE ANTIGEN (HISTORICAL): NORMAL
HEPATITIS C ANTIBODY (HISTORICAL): NORMAL
SIGNAL TO CUT-OFF (HISTORICAL): 0.25
TEST INFORMATION (HISTORICAL): NORMAL
TEST NAME (HISTORICAL): NORMAL
TSH SERPL DL<=0.05 MIU/L-ACNC: 9.49 MIU/L (ref 0.4–4.5)

## 2017-11-06 ENCOUNTER — HOSPITAL ENCOUNTER (OUTPATIENT)
Facility: AMBULARY SURGERY CENTER | Age: 57
Setting detail: OUTPATIENT SURGERY
Discharge: HOME | End: 2017-11-06
Attending: INTERNAL MEDICINE | Admitting: INTERNAL MEDICINE
Payer: COMMERCIAL

## 2017-11-06 ENCOUNTER — GENERIC CONVERSION - ENCOUNTER (OUTPATIENT)
Dept: OTHER | Facility: OTHER | Age: 57
End: 2017-11-06

## 2017-11-06 ENCOUNTER — ANESTHESIA (OUTPATIENT)
Dept: PERIOP | Facility: AMBULARY SURGERY CENTER | Age: 57
End: 2017-11-06
Payer: COMMERCIAL

## 2017-11-06 VITALS
DIASTOLIC BLOOD PRESSURE: 70 MMHG | TEMPERATURE: 98 F | BODY MASS INDEX: 31.06 KG/M2 | HEIGHT: 74 IN | RESPIRATION RATE: 16 BRPM | SYSTOLIC BLOOD PRESSURE: 134 MMHG | OXYGEN SATURATION: 98 % | HEART RATE: 62 BPM | WEIGHT: 242 LBS

## 2017-11-06 DIAGNOSIS — K92.1 BLOOD IN STOOL: ICD-10-CM

## 2017-11-06 DIAGNOSIS — D69.6 THROMBOCYTOPENIA (HCC): ICD-10-CM

## 2017-11-06 DIAGNOSIS — K76.0 FATTY LIVER: ICD-10-CM

## 2017-11-06 DIAGNOSIS — Z12.11 ENCOUNTER FOR SCREENING FOR MALIGNANT NEOPLASM OF COLON: ICD-10-CM

## 2017-11-06 DIAGNOSIS — D64.9 ANEMIA: ICD-10-CM

## 2017-11-06 PROCEDURE — 88305 TISSUE EXAM BY PATHOLOGIST: CPT | Performed by: INTERNAL MEDICINE

## 2017-11-06 PROCEDURE — 88342 IMHCHEM/IMCYTCHM 1ST ANTB: CPT | Performed by: INTERNAL MEDICINE

## 2017-11-06 RX ORDER — SODIUM CHLORIDE, SODIUM LACTATE, POTASSIUM CHLORIDE, CALCIUM CHLORIDE 600; 310; 30; 20 MG/100ML; MG/100ML; MG/100ML; MG/100ML
125 INJECTION, SOLUTION INTRAVENOUS CONTINUOUS
Status: DISCONTINUED | OUTPATIENT
Start: 2017-11-06 | End: 2017-11-06 | Stop reason: HOSPADM

## 2017-11-06 RX ORDER — MEPERIDINE HYDROCHLORIDE 25 MG/ML
12.5 INJECTION INTRAMUSCULAR; INTRAVENOUS; SUBCUTANEOUS AS NEEDED
Status: CANCELLED | OUTPATIENT
Start: 2017-11-06

## 2017-11-06 RX ORDER — ONDANSETRON 2 MG/ML
4 INJECTION INTRAMUSCULAR; INTRAVENOUS ONCE AS NEEDED
Status: CANCELLED | OUTPATIENT
Start: 2017-11-06

## 2017-11-06 RX ORDER — SODIUM CHLORIDE, SODIUM LACTATE, POTASSIUM CHLORIDE, CALCIUM CHLORIDE 600; 310; 30; 20 MG/100ML; MG/100ML; MG/100ML; MG/100ML
INJECTION, SOLUTION INTRAVENOUS CONTINUOUS PRN
Status: DISCONTINUED | OUTPATIENT
Start: 2017-11-06 | End: 2017-11-06 | Stop reason: SURG

## 2017-11-06 RX ORDER — ALBUTEROL SULFATE 2.5 MG/3ML
2.5 SOLUTION RESPIRATORY (INHALATION) ONCE AS NEEDED
Status: CANCELLED | OUTPATIENT
Start: 2017-11-06

## 2017-11-06 RX ORDER — PROPOFOL 10 MG/ML
INJECTION, EMULSION INTRAVENOUS AS NEEDED
Status: DISCONTINUED | OUTPATIENT
Start: 2017-11-06 | End: 2017-11-06 | Stop reason: SURG

## 2017-11-06 RX ORDER — LIDOCAINE HYDROCHLORIDE 10 MG/ML
INJECTION, SOLUTION INFILTRATION; PERINEURAL AS NEEDED
Status: DISCONTINUED | OUTPATIENT
Start: 2017-11-06 | End: 2017-11-06 | Stop reason: SURG

## 2017-11-06 RX ADMIN — PROPOFOL 50 MG: 10 INJECTION, EMULSION INTRAVENOUS at 13:44

## 2017-11-06 RX ADMIN — LIDOCAINE HYDROCHLORIDE 50 MG: 10 INJECTION, SOLUTION INFILTRATION; PERINEURAL at 13:40

## 2017-11-06 RX ADMIN — PROPOFOL 100 MG: 10 INJECTION, EMULSION INTRAVENOUS at 13:40

## 2017-11-06 RX ADMIN — PROPOFOL 50 MG: 10 INJECTION, EMULSION INTRAVENOUS at 13:43

## 2017-11-06 RX ADMIN — PROPOFOL 50 MG: 10 INJECTION, EMULSION INTRAVENOUS at 13:52

## 2017-11-06 RX ADMIN — PROPOFOL 50 MG: 10 INJECTION, EMULSION INTRAVENOUS at 14:00

## 2017-11-06 RX ADMIN — PROPOFOL 50 MG: 10 INJECTION, EMULSION INTRAVENOUS at 13:55

## 2017-11-06 RX ADMIN — PROPOFOL 50 MG: 10 INJECTION, EMULSION INTRAVENOUS at 13:42

## 2017-11-06 RX ADMIN — PROPOFOL 50 MG: 10 INJECTION, EMULSION INTRAVENOUS at 14:04

## 2017-11-06 RX ADMIN — PROPOFOL 50 MG: 10 INJECTION, EMULSION INTRAVENOUS at 13:50

## 2017-11-06 RX ADMIN — PROPOFOL 50 MG: 10 INJECTION, EMULSION INTRAVENOUS at 14:08

## 2017-11-06 RX ADMIN — SODIUM CHLORIDE, SODIUM LACTATE, POTASSIUM CHLORIDE, AND CALCIUM CHLORIDE: .6; .31; .03; .02 INJECTION, SOLUTION INTRAVENOUS at 12:11

## 2017-11-06 RX ADMIN — PROPOFOL 50 MG: 10 INJECTION, EMULSION INTRAVENOUS at 13:48

## 2017-11-06 NOTE — OP NOTE
COLONOSCOPY    PROCEDURE: Colonoscopy/ Biopsy    INDICATIONS: Screening for Colon Cancer    POST-OP DIAGNOSIS: See the impression below    SEDATION: Monitored anesthesia care, check anesthesia records    PHYSICAL EXAM:    /78   Pulse 78 Comment: nsr  Temp 97 8 °F (36 6 °C) (Temporal)   Resp 18   Ht 6' 2" (1 88 m)   Wt 110 kg (242 lb)   BMI 31 07 kg/m²   Body mass index is 31 07 kg/m²  General: NAD  Heart: S1 & S2 normal, RRR  Lungs: CTA, No rales or rhonchi  Abdomen: Soft, nontender, nondistended, good bowel sounds    CONSENT:  Informed consent was obtained for the procedure, including sedation after explaining the risks and benefits of the procedure  Risks including but not limited to bleeding, perforation, infection, aspiration were discussed in detail  Also explained about less than 100%$ sensitivity with the exam and other alternatives  PREPARATION:   EKG tracing, pulse oximetry, blood pressure were monitored throughout the procedure  Patient was identified by myself both verbally and by visual inspection of ID band  DESCRIPTION:   Patient was placed in the left lateral decubitus position and was sedated with the above medication  Digital rectal examination was performed  The colonoscope was introduced in to the anal canal and advanced up to cecum, which was identified by the appendiceal orifice and IC valve  A careful inspection was made as the colonoscope was withdrawn, including a retroflexed view of the rectum; findings and interventions are described below  Appropriate photodocumentation was obtained  The quality of the colonic preparation was good  Start time 1:58 p m  Cecum time 2:01 p m  End time 2:17 p m  FINDINGS:    1   Mild diverticulosis within the sigmoid and descending colon  2   Diminutive polyp noted in the rectum, removed using biopsy forceps  3   Isolated Area of mild inflammation within the rectosigmoid region, could be prep related    3   Small internal hemorrhoids noted on retroflexion  5   Large external hemorrhoids  IMPRESSIONS:      1  Diminutive polyp  2   Mild diverticulosis noted in the mode and descending colon  3   Small internal hemorrhoids  4   Large external hemorrhoids  RECOMMENDATIONS:    1  Follow-up biopsy results in 2-3 weeks  2   If this polyp removed is hyperplastic, will need repeat colonoscopy in 5 years as right side of the colon had adherent thick material which could not be completely cleared, therefore any polyps less than 5 mm in the right-sided may have been missed  3   High-fiber diet  4   Discharge home when stable  5   Monitor hemoglobin and hematocrit, if patient continues to have iron deficiency anemia, will need PillCam for further evaluation    COMPLICATIONS:  None; patient tolerated the procedure well      DISPOSITION: PACU           CONDITION: Stable

## 2017-11-06 NOTE — OP NOTE
ESOPHAGOGASTRODUODENOSCOPY    PROCEDURE: EGD/ Biopsy    INDICATIONS: Iron Deficiency Anaemia    POST-OP DIAGNOSIS: See the impression below    SEDATION: Monitored anesthesia care, check anesthesia records    PHYSICAL EXAM:    Vitals:    11/06/17 1209   BP: 144/78   Pulse: 78   Resp: 18   Temp: 97 8 °F (36 6 °C)    Body mass index is 31 07 kg/m²  General: NAD  Heart: S1 & S2 normal, RRR  Lungs: CTA, No rales or rhonchi  Abdomen: Soft, nontender, nondistended, good bowel sounds    CONSENT:  Informed consent was obtained for the procedure, including sedation after explaining the risks and benefits of the procedure  Risks including but not limited to bleeding, perforation, infection, aspiration were discussed in detail  Also explained about less than 100% sensitivity with the exam and other alternatives  PREPARATION:   EKG tracing, pulse oximetry, blood pressure were monitored throughout the procedure  Patient was identified by myself both verbally and by visual inspection of ID band  DESCRIPTION:   Patient was placed in the left lateral decubitus position and was sedated with the above medication  The gastroscope was introduced in to the oropharynx and the esophagus was intubated under direct visualization  Scope was passed down the esophagus up to 2nd part of the duodenum  A careful inspection was made as the gastroscope was withdrawn, including a retroflexed view of the stomach; findings and interventions are described below  FINDINGS:    #1  Esophagus and GEJ-irregular squamocolumnar junction noted at 42-44 cm, suggestive of short-segment Owen's esophagus-random biopsies taken to assess for Owen's  No hiatal hernia noted  #2  Stomach-erythema noted within the gastric body and antrum  There was moderate cobblestoning within the gastric body and fundus is well, biopsies were taken to assess for H pylori    Retroflexion was performed and was normal     #3  Duodenum-normal appearing duodenal mucosa within the duodenal bulb, duodenal sweep and D2  Nonetheless biopsies were taken given history of iron deficiency anemia  IMPRESSIONS:      1  Irregular squamocolumnar junction suggestive of short-segment Owen's esophagus  2   Moderate gastritis  RECOMMENDATIONS:     1   Start on pantoprazole 40 mg on daily basis, given endoscopic suggestion of Owen's esophagus  2   Avoid NSAIDs  3   Follow up biopsy results in 2-3 weeks  Eradicate H pylori if positive  4   Avoid fatty foods, chocolates, caffeine, alcohol and any other triggering foods  Avoid eating for at least 3 hours before going to bed  5   Anti-reflux diet  6   Will proceed with colonoscopy  COMPLICATIONS:  None; patient tolerated the procedure well            DISPOSITION: PACU           CONDITION: Stable

## 2017-11-06 NOTE — ANESTHESIA POSTPROCEDURE EVALUATION
Post-Op Assessment Note      CV Status:  Stable    Mental Status:  Alert and awake    Hydration Status:  Euvolemic    PONV Controlled:  Controlled    Airway Patency:  Patent    Post Op Vitals Reviewed: Yes          Staff: CRNA           /73 (11/06/17 1419)    Temp      Pulse 89 (11/06/17 1419)   Resp 14 (11/06/17 1419)    SpO2 98 % (11/06/17 1419)

## 2017-11-06 NOTE — ANESTHESIA PREPROCEDURE EVALUATION
Review of Systems/Medical History  Patient summary reviewed        Cardiovascular  Negative cardio ROS    Pulmonary  Negative pulmonary ROS ,        GI/Hepatic  Negative GI/hepatic ROS   GERD well controlled,        Negative  ROS Kidney stones,        Endo/Other  Negative endo/other ROS History of thyroid disease , hypothyroidism, Arthritis     GYN  Negative gynecology ROS          Hematology  Negative hematology ROS      Musculoskeletal  Negative musculoskeletal ROS        Neurology  Negative neurology ROS      Psychology   Negative psychology ROS            Physical Exam    Airway    Mallampati score: II  TM Distance: >3 FB  Neck ROM: full     Dental   No notable dental hx     Cardiovascular  Comment: Negative ROS,     Pulmonary      Other Findings        Anesthesia Plan  ASA Score- 2       Anesthesia Type- IV sedation with anesthesia with ASA Monitors  Additional Monitors:   Airway Plan:     Comment: I have seen the patient and reviewed the history  Patient to receive IV sedation with full ASA monitors  Risks discussed with the patient, consent signed          Induction- intravenous  Informed Consent- Anesthetic plan and risks discussed with patient  I personally reviewed this patient with the CRNA  Discussed and agreed on the Anesthesia Plan with the CRNA  Nik Posada

## 2017-11-06 NOTE — H&P
History and Physical -  [facility]  Kia Will 65 y o  male MRN: [id number]    HPI: Kia Will is a 65y o  year old male who presents for evaluation of iron-deficiency anemia  Review of Systems    Historical Information   Past Medical History:   Diagnosis Date    Disease of thyroid gland     GERD (gastroesophageal reflux disease)     Kidney stone     Osteoarthritis, hip, bilateral      Past Surgical History:   Procedure Laterality Date    CHOLECYSTECTOMY       Social History   History   Alcohol Use    Yes     Comment: 6pack/month     History   Drug Use No     History   Smoking Status    Former Smoker   Smokeless Tobacco    Never Used     Family History   Problem Relation Age of Onset    Arthritis Mother     Heart disease Father     Hypertension Father     Diabetes Father     Cancer Paternal Grandmother        Meds/Allergies     Prescriptions Prior to Admission   Medication    ascorbic acid (VITAMIN C) 500 mg tablet    Omega-3 Fatty Acids (FISH OIL PO)       No Known Allergies    Objective     Blood pressure 144/78, pulse 78, temperature 97 8 °F (36 6 °C), temperature source Temporal, resp  rate 18, height 6' 2" (1 88 m), weight 110 kg (242 lb)  PHYSICAL EXAM    Gen: NAD  CV: RRR  CHEST: Clear  ABD: soft, NT/ND  EXT: no edema  Neuro: AAO      ASSESSMENT/PLAN:  This is a 65y o  year old male here for evaluation of iron deficiency anemia      PLAN:   Procedure:  EGD and colonoscopy

## 2017-11-20 ENCOUNTER — GENERIC CONVERSION - ENCOUNTER (OUTPATIENT)
Dept: OTHER | Facility: OTHER | Age: 57
End: 2017-11-20

## 2017-12-02 ENCOUNTER — LAB CONVERSION - ENCOUNTER (OUTPATIENT)
Dept: OTHER | Facility: OTHER | Age: 57
End: 2017-12-02

## 2017-12-02 LAB
A/G RATIO (HISTORICAL): 1.6 (CALC) (ref 1–2.5)
ALBUMIN SERPL BCP-MCNC: 4.1 G/DL (ref 3.6–5.1)
ALP SERPL-CCNC: 63 U/L (ref 40–115)
ALT SERPL W P-5'-P-CCNC: 36 U/L (ref 9–46)
AST SERPL W P-5'-P-CCNC: 24 U/L (ref 10–35)
BASOPHILS # BLD AUTO: 0.3 %
BASOPHILS # BLD AUTO: 11 CELLS/UL (ref 0–200)
BILIRUB SERPL-MCNC: 0.4 MG/DL (ref 0.2–1.2)
BUN SERPL-MCNC: 16 MG/DL (ref 7–25)
BUN/CREA RATIO (HISTORICAL): ABNORMAL (CALC) (ref 6–22)
CALCIUM SERPL-MCNC: 9.1 MG/DL (ref 8.6–10.3)
CHLORIDE SERPL-SCNC: 105 MMOL/L (ref 98–110)
CO2 SERPL-SCNC: 26 MMOL/L (ref 20–31)
CREAT SERPL-MCNC: 0.9 MG/DL (ref 0.7–1.33)
DEPRECATED RDW RBC AUTO: 13.2 % (ref 11–15)
EGFR AFRICAN AMERICAN (HISTORICAL): 110 ML/MIN/1.73M2
EGFR-AMERICAN CALC (HISTORICAL): 95 ML/MIN/1.73M2
EOSINOPHIL # BLD AUTO: 0.8 %
EOSINOPHIL # BLD AUTO: 28 CELLS/UL (ref 15–500)
FERRITIN SERPL-MCNC: 341 NG/ML (ref 20–380)
GAMMA GLOBULIN (HISTORICAL): 2.5 G/DL (CALC) (ref 1.9–3.7)
GLUCOSE (HISTORICAL): 162 MG/DL (ref 65–99)
HCT VFR BLD AUTO: 35.9 % (ref 38.5–50)
HGB BLD-MCNC: 12.2 G/DL (ref 13.2–17.1)
IRON SATN MFR SERPL: 45 % (CALC) (ref 15–60)
IRON SERPL-MCNC: 143 MCG/DL (ref 50–180)
LYMPHOCYTES # BLD AUTO: 1512 CELLS/UL (ref 850–3900)
LYMPHOCYTES # BLD AUTO: 43.2 %
MCH RBC QN AUTO: 35.8 PG (ref 27–33)
MCHC RBC AUTO-ENTMCNC: 34 G/DL (ref 32–36)
MCV RBC AUTO: 105.3 FL (ref 80–100)
METHYLMALONIC ACID (HISTORICAL): 143 NMOL/L (ref 87–318)
MONOCYTES # BLD AUTO: 277 CELLS/UL (ref 200–950)
MONOCYTES (HISTORICAL): 7.9 %
NEUTROPHILS # BLD AUTO: 1673 CELLS/UL (ref 1500–7800)
NEUTROPHILS # BLD AUTO: 47.8 %
PLATELET # BLD AUTO: 78 THOUSAND/UL (ref 140–400)
PMV BLD AUTO: 10.7 FL (ref 7.5–12.5)
POTASSIUM SERPL-SCNC: 3.7 MMOL/L (ref 3.5–5.3)
RBC # BLD AUTO: 3.41 MILLION/UL (ref 4.2–5.8)
RBC MORPHOLOGY (HISTORICAL): ABNORMAL
SODIUM SERPL-SCNC: 139 MMOL/L (ref 135–146)
TIBC SERPL-MCNC: 318 MCG/DL (CALC) (ref 250–425)
TOTAL PROTEIN (HISTORICAL): 6.6 G/DL (ref 6.1–8.1)
WBC # BLD AUTO: 3.5 THOUSAND/UL (ref 3.8–10.8)

## 2017-12-04 ENCOUNTER — GENERIC CONVERSION - ENCOUNTER (OUTPATIENT)
Dept: OTHER | Facility: OTHER | Age: 57
End: 2017-12-04

## 2017-12-08 RX ORDER — SODIUM CHLORIDE 9 MG/ML
20 INJECTION, SOLUTION INTRAVENOUS CONTINUOUS
Status: DISCONTINUED | OUTPATIENT
Start: 2017-12-12 | End: 2017-12-15 | Stop reason: HOSPADM

## 2017-12-08 RX ORDER — CYANOCOBALAMIN 1000 UG/ML
1000 INJECTION INTRAMUSCULAR; SUBCUTANEOUS ONCE
Status: COMPLETED | OUTPATIENT
Start: 2017-12-12 | End: 2017-12-12

## 2017-12-12 ENCOUNTER — HOSPITAL ENCOUNTER (OUTPATIENT)
Dept: INFUSION CENTER | Facility: HOSPITAL | Age: 57
Discharge: HOME | End: 2017-12-12
Payer: COMMERCIAL

## 2017-12-12 ENCOUNTER — GENERIC CONVERSION - ENCOUNTER (OUTPATIENT)
Dept: OTHER | Facility: OTHER | Age: 57
End: 2017-12-12

## 2017-12-12 VITALS
HEART RATE: 68 BPM | RESPIRATION RATE: 18 BRPM | DIASTOLIC BLOOD PRESSURE: 78 MMHG | SYSTOLIC BLOOD PRESSURE: 143 MMHG | TEMPERATURE: 97.4 F | OXYGEN SATURATION: 97 %

## 2017-12-12 PROCEDURE — 96365 THER/PROPH/DIAG IV INF INIT: CPT

## 2017-12-12 PROCEDURE — 96372 THER/PROPH/DIAG INJ SC/IM: CPT

## 2017-12-12 RX ADMIN — SODIUM CHLORIDE 20 ML/HR: 9 INJECTION, SOLUTION INTRAVENOUS at 10:26

## 2017-12-12 RX ADMIN — CYANOCOBALAMIN 1000 MCG: 1000 INJECTION, SOLUTION INTRAMUSCULAR at 10:29

## 2017-12-12 RX ADMIN — IRON SUCROSE 200 MG: 20 INJECTION, SOLUTION INTRAVENOUS at 10:26

## 2017-12-14 RX ORDER — SODIUM CHLORIDE 9 MG/ML
20 INJECTION, SOLUTION INTRAVENOUS CONTINUOUS
Status: DISCONTINUED | OUTPATIENT
Start: 2017-12-15 | End: 2017-12-18 | Stop reason: HOSPADM

## 2017-12-15 ENCOUNTER — HOSPITAL ENCOUNTER (OUTPATIENT)
Dept: INFUSION CENTER | Facility: HOSPITAL | Age: 57
Discharge: HOME | End: 2017-12-15
Payer: COMMERCIAL

## 2017-12-15 VITALS
SYSTOLIC BLOOD PRESSURE: 137 MMHG | RESPIRATION RATE: 18 BRPM | DIASTOLIC BLOOD PRESSURE: 49 MMHG | OXYGEN SATURATION: 99 % | HEART RATE: 67 BPM | TEMPERATURE: 95.7 F

## 2017-12-15 PROCEDURE — 96365 THER/PROPH/DIAG IV INF INIT: CPT

## 2017-12-15 RX ORDER — SODIUM CHLORIDE 9 MG/ML
20 INJECTION, SOLUTION INTRAVENOUS CONTINUOUS
Status: DISCONTINUED | OUTPATIENT
Start: 2017-12-18 | End: 2017-12-21 | Stop reason: HOSPADM

## 2017-12-15 RX ORDER — CYANOCOBALAMIN 1000 UG/ML
1000 INJECTION INTRAMUSCULAR; SUBCUTANEOUS ONCE
Status: COMPLETED | OUTPATIENT
Start: 2017-12-18 | End: 2017-12-18

## 2017-12-15 RX ADMIN — IRON SUCROSE 200 MG: 20 INJECTION, SOLUTION INTRAVENOUS at 13:04

## 2017-12-15 RX ADMIN — SODIUM CHLORIDE 20 ML/HR: 9 INJECTION, SOLUTION INTRAVENOUS at 13:04

## 2017-12-18 ENCOUNTER — HOSPITAL ENCOUNTER (OUTPATIENT)
Dept: INFUSION CENTER | Facility: HOSPITAL | Age: 57
Discharge: HOME | End: 2017-12-18
Payer: COMMERCIAL

## 2017-12-18 VITALS
HEART RATE: 64 BPM | DIASTOLIC BLOOD PRESSURE: 74 MMHG | SYSTOLIC BLOOD PRESSURE: 135 MMHG | RESPIRATION RATE: 18 BRPM | TEMPERATURE: 98.3 F

## 2017-12-18 PROCEDURE — 96365 THER/PROPH/DIAG IV INF INIT: CPT

## 2017-12-18 PROCEDURE — 96372 THER/PROPH/DIAG INJ SC/IM: CPT

## 2017-12-18 RX ADMIN — IRON SUCROSE 200 MG: 20 INJECTION, SOLUTION INTRAVENOUS at 10:35

## 2017-12-18 RX ADMIN — SODIUM CHLORIDE 20 ML/HR: 9 INJECTION, SOLUTION INTRAVENOUS at 10:35

## 2017-12-18 RX ADMIN — CYANOCOBALAMIN 1000 MCG: 1000 INJECTION, SOLUTION INTRAMUSCULAR at 10:38

## 2017-12-21 RX ORDER — SODIUM CHLORIDE 9 MG/ML
20 INJECTION, SOLUTION INTRAVENOUS CONTINUOUS
Status: DISCONTINUED | OUTPATIENT
Start: 2017-12-22 | End: 2017-12-25 | Stop reason: HOSPADM

## 2017-12-22 ENCOUNTER — HOSPITAL ENCOUNTER (OUTPATIENT)
Dept: INFUSION CENTER | Facility: HOSPITAL | Age: 57
Discharge: HOME | End: 2017-12-24
Payer: COMMERCIAL

## 2017-12-22 VITALS
RESPIRATION RATE: 18 BRPM | TEMPERATURE: 97.4 F | DIASTOLIC BLOOD PRESSURE: 76 MMHG | SYSTOLIC BLOOD PRESSURE: 136 MMHG | HEART RATE: 59 BPM

## 2017-12-22 PROCEDURE — 96365 THER/PROPH/DIAG IV INF INIT: CPT

## 2017-12-22 RX ADMIN — SODIUM CHLORIDE 20 ML/HR: 9 INJECTION, SOLUTION INTRAVENOUS at 10:31

## 2017-12-22 RX ADMIN — IRON SUCROSE 200 MG: 20 INJECTION, SOLUTION INTRAVENOUS at 10:31

## 2018-01-11 NOTE — RESULT NOTES
Discussion/Summary   Platelet count is low and lipids are elevated  Thyroid is stable  Continue current Rx and come see me to discuss workup  Verified Results  (Q) CBC (INCLUDES DIFF/PLT) (REFL) 22Apr2017 08:01AM Carito Go     Test Name Result Flag Reference   WHITE BLOOD CELL COUNT 5 4 Thousand/uL  3 8-10 8   RED BLOOD CELL COUNT 3 63 Million/uL L 4 20-5 80   HEMOGLOBIN 12 8 g/dL L 13 2-17 1   HEMATOCRIT 38 5 %  38 5-50 0    2 fL H 80 0-100 0   MCH 35 3 pg H 27 0-33 0   MCHC 33 2 g/dL  32 0-36 0   RDW 15 6 % H 11 0-15 0   PLATELET COUNT 75 Thousand/uL L 140-400   MPV 8 0 fL  7 5-12 5   ABSOLUTE NEUTROPHILS 3191 cells/uL  8541-5064   ABSOLUTE LYMPHOCYTES 1798 cells/uL  850-3900   ABSOLUTE MONOCYTES 362 cells/uL  200-950   ABSOLUTE EOSINOPHILS 38 cells/uL     ABSOLUTE BASOPHILS 11 cells/uL  0-200   NEUTROPHILS 59 1 %     LYMPHOCYTES 33 3 %     MONOCYTES 6 7 %     EOSINOPHILS 0 7 %     BASOPHILS 0 2 %     CBC MORPHOLOGY   NORMAL   Macrocytosis 1 +  Polychromasia 1 +     (Q) COMPREHENSIVE METABOLIC PANEL W/O ALT [id number] 08:01AM Carito Go     Test Name Result Flag Reference   GLUCOSE 102 mg/dL H 65-99   Fasting reference interval     For someone without known diabetes, a glucose value  between 100 and 125 mg/dL is consistent with  prediabetes and should be confirmed with a  follow-up test    UREA NITROGEN (BUN) 17 mg/dL  7-25   CREATININE 0 97 mg/dL  0 70-1 33   For patients >52years of age, the reference limit  for Creatinine is approximately 13% higher for people  identified as -American  eGFR NON-AFR   AMERICAN 87 mL/min/1 73m2  > OR = 60   eGFR AFRICAN AMERICAN 101 mL/min/1 73m2  > OR = 60   BUN/CREATININE RATIO   9-05   NOT APPLICABLE (calc)   SODIUM 142 mmol/L  135-146   POTASSIUM 4 0 mmol/L  3 5-5 3   CHLORIDE 107 mmol/L     CARBON DIOXIDE 26 mmol/L  20-31   CALCIUM 9 0 mg/dL  8 6-10 3   PROTEIN, TOTAL 7 0 g/dL  6 1-8 1   ALBUMIN 4 4 g/dL  3 6-5 1   GLOBULIN 2 6 g/dL (calc)  1 9-3 7   ALBUMIN/GLOBULIN RATIO 1 7 (calc)  1 0-2 5   BILIRUBIN, TOTAL 0 6 mg/dL  0 2-1 2   ALKALINE PHOSPHATASE 78 U/L     AST 28 U/L  10-35     (Q) LIPID PANEL WITH REFLEX TO DIRECT LDL 22Apr2017 08:01AM Tomasz Novak     Test Name Result Flag Reference   CHOLESTEROL, TOTAL 232 mg/dL H 125-200   HDL CHOLESTEROL 38 mg/dL L > OR = 40   TRIGLICERIDES 596 mg/dL H <150   LDL-CHOLESTEROL 148 mg/dL (calc) H <130   Desirable range <100 mg/dL for patients with CHD or  diabetes and <70 mg/dL for diabetic patients with  known heart disease  CHOL/HDLC RATIO 6 1 (calc) H < OR = 5 0   NON HDL CHOLESTEROL 194 mg/dL (calc) H    Target for non-HDL cholesterol is 30 mg/dL higher than   LDL cholesterol target  (Q) PSA, TOTAL WITH REFLEX TO PSA, FREE 22Apr2017 08:01AM Tomasz Novak     Test Name Result Flag Reference   TOTAL PSA 0 7 ng/mL  < OR = 4 0   This test was performed using the [employer]  immunoassay method  Values obtained with other assay  methods cannot be used interchangeably  PSA levels,  regardless of value, should not be interpreted as  absolute evidence of the presence or absence of disease       (Q) TSH, 3RD GENERATION W/REFLEX TO FT4 22Apr2017 08:01AM Tomasz Novak   REPORT COMMENT:  FASTING:YES     Test Name Result Flag Reference   TSH W/REFLEX TO FT4 1 74 mIU/L  0 40-4 50

## 2018-01-11 NOTE — PROGRESS NOTES
Assessment   1  Former smoker (V15 82) (F14 832)  2  Need for diphtheria-tetanus-pertussis (Tdap) vaccine, adult/adolescent (V06 1) (Z23)  3  Screening for depression (V79 0) (Z13 89)  4  Leucopenia (288 50) (D72 819)  5  Macrocytic anemia (281 9) (D53 9)  6  Thrombocytopenia (287 5) (D69 6)  7  Hypothyroidism (244 9) (E03 9)  8  Hyperlipidemia (272 4) (E78 5)  9  Splenomegaly (789 2) (R16 1)    Plan  Encounter for screening colonoscopy    · COLONOSCOPY; Status:Active - Retrospective By Protocol Authorization; Requested  for:[id number];   Hypothyroidism    · Start: Nature-Throid 260 MG Oral Tablet; TAKE 1 TABLET BY MOUTH EVERY MORNING  BEFORE BREAKFAST ON EMPTY STOMACH  Leucopenia, Macrocytic anemia, Thrombocytopenia    · 1 - Jonathon Tomlinson MD, Yoshi Chavez (Hematology/Oncology) Co-Management  *  Status: Active   Requested for: 02ZJJ7552  () Care Summary provided  : Yes  Need for diphtheria-tetanus-pertussis (Tdap) vaccine, adult/adolescent    · Administered: Tdap (Adacel)  Screening for depression    · *VB-Depression Screening; Status:Complete - Retrospective By Protocol Authorization;    Done: 53HMD9197 05:03PM   · *VB-Depression Screening ; every 1 year; Last 44ZOX6780; Next 01WIR3070;  Status:Active  Splenomegaly    · * US ABDOMEN COMPLETE; Status:Hold For - Scheduling; Requested for:89Trf6030;     Discussion/Summary  Impression: health maintenance visit  Currently, he eats a poor diet, has an inadequate exercise regimen and Due to pain from severe degenerative joint disease of the hip  Prostate cancer screening: prostate cancer screening is current  Testicular cancer screening: testicular cancer screening is not indicated  Colorectal cancer screening: colonoscopy has been ordered  The patient declines immunizations  He was advised to be evaluated by Hematology  Advice and education were given regarding weight loss  I've asked him to get an ultrasound of the abdomen to evaluate the splenomegaly   He is strongly encouraged to see the hematologist for further workup of his anemia thrombocytopenia and relative leukopenia  He wants to try a different preparation of the thyroid supplementation  He's been intolerant of multiple statins in the past  He's given an order to get his colonoscopy completed  He received an update of his Adacel here today  1        1 Amended By: Jose Miguel Huynh; May 11 2017 5:24 PM EST    Chief Complaint  PT HERE HIS YEARLY PE AND TO REVIEW HIS RECENT BW  PT WILL GET AN UPDATED ADACEL TODAY  Advance Directives  Advance Directive [facility]:   The patient is not in agreement to receive the Advance Care Planning service    NO - Patient does not have an advance health care directive  Summary of Advance Directive Conversation  Paperwork is given  History of Present Illness  HM, Adult Male: The patient is being seen for a health maintenance evaluation  General Health: The patient's health since the last visit is described as fair   He reports significant fatigue  He has regular dental visits  He denies vision problems  He denies hearing loss  Immunizations status: up to date  Lifestyle:  He consumes a diverse and healthy diet  He has weight concerns  He does not exercise regularly  He does not use tobacco  He consumes alcohol  He denies drug use  Screening: cancer screening reviewed and current  metabolic screening reviewed and current  risk screening reviewed and current  Additional History:  Needs colonoscopy  Recently had blood work  Never followed up on last year's recommendations about seeing the hematologist for thrombocytopenia and anemia and leukopenia  HPI: Here for well exam  Wants to review his labs  Has had significant amount of fatigue  Denies night sweats or weight loss  He does get some bruising on his legs but has no epistaxis or hematuria      Review of Systems    Constitutional: No fever or chills, feels well, no tiredness, no recent weight gain or weight loss     Eyes: No complaints of eye pain, no red eyes, no discharge from eyes, no itchy eyes  ENT: no complaints of earache, no hearing loss, no nosebleeds, no nasal discharge, no sore throat, no hoarseness  Cardiovascular: No complaints of slow heart rate, no fast heart rate, no chest pain, no palpitations, no leg claudication, no lower extremity  Respiratory: No complaints of shortness of breath, no wheezing, no cough, no SOB on exertion, no orthopnea or PND  Gastrointestinal: No complaints of abdominal pain, no constipation, no nausea or vomiting, no diarrhea or bloody stools  Genitourinary: No complaints of dysuria, no incontinence, no hesitancy, no nocturia, no genital lesion, no testicular pain  Musculoskeletal: No complaints of arthralgia, no myalgias, no joint swelling or stiffness, no limb pain or swelling  Integumentary: No complaints of skin rash or skin lesions, no itching, no skin wound, no dry skin  Neurological: No compliants of headache, no confusion, no convulsions, no numbness or tingling, no dizziness or fainting, no limb weakness, no difficulty walking  Psychiatric: Is not suicidal, no sleep disturbances, no anxiety or depression, no change in personality, no emotional problems  Endocrine: muscle weakness, erectile dysfunction and feelings of weakness  Hematologic/Lymphatic: No complaints of swollen glands, no swollen glands in the neck, does not bleed easily, no easy bruising  Active Problems   1  Acute pain (338 19) (R52)  2  Claudication (443 9) (I73 9)  3  Encounter for screening colonoscopy (V76 51) (Z12 11)  4  Hyperlipidemia (272 4) (E78 5)  5  Hypothyroidism (244 9) (E03 9)  6  Leucopenia (288 50) (D72 819)  7  Macrocytic anemia (281 9) (D53 9)  8  Myalgia And Myositis (729 1)  9  Need for prophylactic vaccination and inoculation against influenza (V04 81) (Z23)  10  Need for vaccination for DTP (V06 1) (Z23)  11  Osteoarthritis of both hips (715 95) (M16 0)  12   Testicular hypogonadism (257 2) (E29 1)  13  Testicular hypogonadism (257 2) (E29 1)  14  Thrombocytopenia (287 5) (D69 6)  15  Ureteric stone (592 1) (N20 1)  16  Urinary tract infection (599 0) (N39 0)  17  Vitamin D deficiency (268 9) (E55 9)    Past Medical History    · History of Bilateral hip pain (719 45) (M25 551,M25 552)   · History of anemia (V12 3) (Z86 2)   · History of cholelithiasis (V12 79) (Z87 19)   · History of gastroesophageal reflux (GERD) (V12 79) (Z87 19)   · History of hypersecretion of testicular hormones (V12 29) (Z86 39)   · History of insomnia (V13 89) (U42 180)   · History of Male erectile dysfunction (607 84) (N52 9)   · Need for prophylactic vaccination and inoculation against influenza (V04 81) (Z23)   · Need for vaccination for DTP (V06 1) (Z23)   · History of Thrombosed external hemorrhoids (455 4) (K64 5)    Surgical History    · History of Cholecystectomy    Family History  Father    · Family history of Asthma (V17 5)   · Family history of Diabetes Mellitus (V18 0)  Family History    · Family history of Cancer   · Family history of Heart Disease (V17 49)    Social History    · Being A Social Drinker   · Former smoker (V15 82) (Q11 926)    Current Meds  1  EQL Fish Oil 1000 MG Oral Capsule; TAKE 1 CAPSULE DAILY; Therapy: 39Blg4324 to Recorded  2  Levothyroxine Sodium 112 MCG Oral Tablet; Take 2 tablets daily; Therapy: 23QTG4028 to (Evaluate:09Oct2017)  Requested for: 14Oct2016; Last   Rx:14Oct2016 Ordered  3  TraMADol HCl - 50 MG Oral Tablet; TAKE 1 TO 2 TABLETS EVERY 6 HOURS AS   NEEDED FOR PAIN;   Therapy: 23FJS1600 to (Evaluate:11Apr2017); Last Rx:09Jan2017; Status: ACTIVE -   Renewal Voided Ordered  4  Vitamin C 1000 MG Oral Tablet; Therapy: 56Mmp0581 to Recorded  5  Vitamin D3 2000 UNIT Oral Tablet; Therapy: 72Scm0836 to Recorded    Allergies   1   No Known Drug Allergies    Vitals   Recorded: 11JNU5819 04:49PM   Temperature 97 7 F   Heart Rate 69   Systolic 235   Diastolic 84 Height 5 ft 1 5 in   Weight 246 lb    BMI Calculated 45 73   BSA Calculated 2 08   O2 Saturation 98     Physical Exam    Constitutional   General appearance: No acute distress, well appearing and well nourished  Eyes   Conjunctiva and lids: No swelling, erythema, or discharge  Pupils and irises: Equal, round and reactive to light  Ears, Nose, Mouth, and Throat   External inspection of ears and nose: Normal     Otoscopic examination: Tympanic membrance translucent with normal light reflex  Canals patent without erythema  Oropharynx: Normal with no erythema, edema, exudate or lesions  Pulmonary   Respiratory effort: No increased work of breathing or signs of respiratory distress  Auscultation of lungs: Clear to auscultation  Cardiovascular   Auscultation of heart: Normal rate and rhythm, normal S1 and S2, without murmurs  Examination of extremities for edema and/or varicosities: Normal     Abdomen   Abdomen: Abnormal   Palpable splenomegaly noted  Liver and spleen: No hepatomegaly or splenomegaly  Lymphatic   Palpation of lymph nodes in neck: No lymphadenopathy  Musculoskeletal   Gait and station: Normal     Digits and nails: Normal without clubbing or cyanosis  Inspection/palpation of joints, bones, and muscles: Normal     Skin   Skin and subcutaneous tissue: Normal without rashes or lesions  Neurologic   Cranial nerves: Cranial nerves 2-12 intact  Reflexes: 2+ and symmetric  Sensation: No sensory loss      Psychiatric   Orientation to person, place and time: Normal     Mood and affect: Normal        Signatures   Electronically signed by : Tawana Wilburn DO; May 11 2017  5:24PM EST                       (Author)

## 2018-01-12 NOTE — RESULT NOTES
Discussion/Summary   thyroid dose should increase   recheck 3 months     Verified Results  (Q) TSH, 3RD GENERATION 31Oct2017 01:41PM Fide Faulkner   REPORT COMMENT:  FASTING:NO     Test Name Result Flag Reference   TSH 9 49 mIU/L H 0 40-4 50

## 2018-01-12 NOTE — RESULT NOTES
Verified Results  (1) TISSUE EXAM [id number] 01:45PM Alexandra Gupta     Test Name Result Flag Reference   LAB AP CASE REPORT (Report)     Surgical Pathology Report             Case: [id number]                   Authorizing Provider: Sadiq Olivas MD    Collected:      11/06/2017 1345        Ordering Location:   [facility]    Received:      11/07/2017 1516 [facility]                            Pathologist:      Ilda Perez MD                                 Specimens:  A) - Duodenum, duodenum bx                                       B) - Stomach, gastric bx                                        C) - Esophagus, esophagus bx                                      D) - Polyp, Colorectal, rectal polyp cold forcep   LAB AP FINAL DIAGNOSIS (Report)     A  Duodenum (biopsy):  - Duodenal mucosa with focal gastric metaplasia and reactive changes  - No Chu lesion  - Negative for dysplasia     B  Stomach (biopsy):  - Chronic inactive oxyntic and antral gastritis  - Foveolar hyperplasia   - No H pylori identified on immunohistochemistry stain  - No intestinal metaplasia     C  Esophagus (biopsy):  - Cardiac-type mucosa with moderate chronic inflammation  - Esophageal mucosa with reactive changes  - No intestinal metaplasia     D  Rectal polyp (biopsy):  - Fragments of hyperplastic polyp  - Negative for dysplasia     Interpretation performed at , [address]     Electronically signed by Ilda Perez MD on 11/9/2017 at 11:06 AM   LAB AP SURGICAL ADDITIONAL INFORMATION (Report)     All controls performed with the immunohistochemical stains reported above   reacted appropriately  These tests were developed and their performance   characteristics determined by [facility] or   [facility]  They may not be cleared or approved by the [place]     Food and Drug Administration  The FDA has determined that such clearance   or approval is not necessary  These tests are used for clinical purposes  They should not be regarded as investigational or for research  This   laboratory has been approved by CLIA 88, designated as a high-complexity   laboratory and is qualified to perform these tests  LAB AP GROSS DESCRIPTION (Report)     A  The specimen is received in formalin, labeled with the patient's name   and medical record number, and is designated duodenum biopsy, are   multiple irregularly shaped fragments of tan soft tissue measuring 0 6 x   0 5 x 0 1 cm in aggregate  Entirely submitted  One cassette  B  The specimen is received in formalin, labeled with the patient's name   and medical recordl number, and is designated Gastric biopsy, are two   irregularly shaped fragments of tan soft tissue measuring 0 4 and 0 3 cm   in greatest dimension  Entirely submitted  One cassette  C  The specimen is received in formalin, labeled with the patient's name   and medical record number, and is designated Esophagus biopsy, are   multiple irregularly shaped fragments of tan soft tissue measuring 0 8 x   0 4 x 0 1 cm in aggregate  Entirely submitted  One cassette  D  The specimen is received in formalin, labeled with the patient's name   and medical record number, and is designated Rectal polyp cold forceps,   are two irregularly shaped fragments of tan soft tissue measuring 0 4 and   0 3 cm in greatest dimension  Entirely submitted  One cassette  Note: The estimated total formalin fixation time based upon information   provided by the submitting clinician and the standard processing schedule   is less than 72 hours  P[place]eto   LAB AP CLINICAL INFORMATION (Report)     R/o celiac    EGD  FINDINGS:    #1  Esophagus and GEJ-irregular squamocolumnar junction noted at 42-44 cm, suggestive of short-segment Owen's esophagus-random biopsies taken to assess for Owen's   No hiatal hernia noted  #2  Stomach-erythema noted within the gastric body and antrum  There was moderate cobblestoning within the gastric body and fundus is well, biopsies were taken to assess for H pylori  Retroflexion was performed and was normal     #3  Duodenum-normal appearing duodenal mucosa within the duodenal bulb, duodenal sweep and D2  Nonetheless biopsies were taken given history of iron deficiency anemia     R/o celiac

## 2018-01-13 VITALS
HEIGHT: 72 IN | DIASTOLIC BLOOD PRESSURE: 84 MMHG | OXYGEN SATURATION: 98 % | BODY MASS INDEX: 34.4 KG/M2 | WEIGHT: 254 LBS | SYSTOLIC BLOOD PRESSURE: 132 MMHG | TEMPERATURE: 97.6 F | RESPIRATION RATE: 16 BRPM | HEART RATE: 72 BPM

## 2018-01-13 VITALS
DIASTOLIC BLOOD PRESSURE: 74 MMHG | WEIGHT: 255 LBS | TEMPERATURE: 97.9 F | OXYGEN SATURATION: 98 % | HEART RATE: 80 BPM | BODY MASS INDEX: 34.16 KG/M2 | SYSTOLIC BLOOD PRESSURE: 128 MMHG

## 2018-01-13 VITALS
HEIGHT: 73 IN | DIASTOLIC BLOOD PRESSURE: 69 MMHG | WEIGHT: 251.38 LBS | SYSTOLIC BLOOD PRESSURE: 138 MMHG | OXYGEN SATURATION: 99 % | TEMPERATURE: 97.8 F | RESPIRATION RATE: 16 BRPM | BODY MASS INDEX: 33.31 KG/M2 | HEART RATE: 62 BPM

## 2018-01-13 NOTE — RESULT NOTES
Discussion/Summary   lyme is negative  Has he been taking his thyroid meds? Verified Results  (Q) LYME DISEASE AB, TOTAL W/REFL WB (IGG, IGM) 62VEC2174 11:31AM Denis Torres     Test Name Result Flag Reference   LYME AB SCREEN <0 90 index     Index                Interpretation                     -----                --------------                     < 0 90               Negative                     0  90-1 09            Equivocal                     > 1 09               Positive      As recommended by the Food and Drug Administration   (FDA), all samples with positive or equivocal   results in a Borrelia burgdorferi antibody screen  will be tested using a blot method  Positive or   equivocal screening test results should not be   interpreted as truly positive until verified as such   using a supplemental assay (e g , B  burgdorferi blot)  The screening test and/or blot for B  burgdorferi   antibodies may be falsely negative in early stages  of Lyme disease, including the period when erythema   migrans is apparent       (Q) TSH, 3RD GENERATION [id number] 11:31AM Denis Torres     Test Name Result Flag Reference   TSH 34 23 mIU/L H 0 40-4 50

## 2018-01-14 VITALS
DIASTOLIC BLOOD PRESSURE: 84 MMHG | SYSTOLIC BLOOD PRESSURE: 130 MMHG | OXYGEN SATURATION: 98 % | HEIGHT: 62 IN | WEIGHT: 246 LBS | HEART RATE: 69 BPM | TEMPERATURE: 97.7 F | BODY MASS INDEX: 45.27 KG/M2

## 2018-01-14 NOTE — RESULT NOTES
Discussion/Summary   Ultrasound shows very mild enlargement of the spleen and the presence of fatty infiltration of the liver  He should continue to be following a low-fat diet  Verified Results  * US ABDOMEN COMPLETE [id number] 07:48AM Sendy Nicole Order Number: [id number]    - Patient Instructions: To schedule this appointment, please contact Central Scheduling at [phone]  Test Name Result Flag Reference   US ABDOMEN COMPLETE (Report)     ABDOMEN ULTRASOUND, COMPLETE     INDICATION: R16 1: Splenomegaly, not elsewhere classified (this clinical history is taken directly from the electronic ordering system)  COMPARISON: None  TECHNIQUE:  Real-time ultrasound of the abdomen was performed with a curvilinear transducer with both volumetric sweeps and still imaging techniques  FINDINGS:     PANCREAS: Portions of the pancreas are obscured by bowel gas  Visualized portions of the pancreas are unremarkable  AORTA AND IVC: Visualized portions are normal for patient age  LIVER:   Size: Enlarged  The liver measures 21 cm in the midclavicular line  Contour: Surface contour is smooth  Parenchyma: There is marked diffuse increased echogenicity with smooth echotexture and significant beam attenuation with loss of periportal echogenicity  Most consistent with severe hepatic steatosis  No evidence of suspicious sonographically detectable solid hepatic mass  The main portal vein is patent and hepatopetal       BILIARY:   The gallbladder is surgically absent  No intrahepatic biliary dilatation  CBD measures 8 mm which is within normal limits in a patient who has undergone prior cholecystectomy  No choledocholithiasis  KIDNEY:    Right kidney measures 12 7 cm  Within normal limits  Left kidney measures 12 6 cm  Within normal limits  SPLEEN:    Measures 13 4 cm  No sonographically detectable suspicious splenic lesion   There is a 13 mm splenule in the splenic hilum  ASCITES: None  IMPRESSION:     Hepatomegaly and profound hepatic steatosis  Mild splenomegaly         Workstation performed: [id number]     Signed by:   Kim Cloud MD   5/22/17

## 2018-01-15 NOTE — RESULT NOTES
Discussion/Summary   hepatitis panel is ok     Verified Results  (Q) HEPATITIS PANEL, ACUTE W/REFLEX TO CONFIRMATION 31Oct2017 01:41PM Farideh Benítezbri     Test Name Result Flag Reference   HEPATITIS A IGM NON-REACTIVE  NON-REACTIVE   HEPATITIS B SURFACE ANTIGEN NON-REACTIVE  NON-REACTIVE   HEPATITIS B CORE$ANTIBODY (IGM) NON-REACTIVE  NON-REACTIVE   HEPATITIS C ANTIBODY NON-REACTIVE  NON-REACTIVE   SIGNAL TO CUT-OFF 0 25  <1 00     (Q) TEST AUTHORIZATION 31Oct2017 01:41PM Farideh Benítezbri     Test Name Result Flag Reference   TEST(S) ORDERED ON$REQUISITION      HEPATITIS PANEL, ACUTE W/REFL   TEST CODE: [id number]     CLIENT CONTACT: OG LORENZ     REPORT ALWAYS MESSAGE$SIGNATURE See Below     The laboratory testing on this patient was verbally requested  or confirmed by the ordering physician or his or her authorized  representative after contact with an employee of [employer]  Federal regulations require that we maintain on file written  authorization for all laboratory testing  Accordingly we are asking  that the ordering physician or his or her authorized representative  sign a copy of this report and promptly return it to the client            Signature:____________________________________________________     Shavon Arellano) TSH, 3RD GENERATION 31Oct2017 01:41PM Farideh Hernandezramon   REPORT COMMENT:  FASTING:NO     Test Name Result Flag Reference   TSH 9 49 mIU/L H 0 40-4 50

## 2018-01-22 VITALS
WEIGHT: 248 LBS | DIASTOLIC BLOOD PRESSURE: 86 MMHG | OXYGEN SATURATION: 98 % | HEART RATE: 77 BPM | BODY MASS INDEX: 33.59 KG/M2 | TEMPERATURE: 98.3 F | HEIGHT: 72 IN | RESPIRATION RATE: 18 BRPM | SYSTOLIC BLOOD PRESSURE: 150 MMHG

## 2018-01-23 ENCOUNTER — ALLSCRIPTS OFFICE VISIT (OUTPATIENT)
Dept: OTHER | Facility: OTHER | Age: 58
End: 2018-01-23

## 2018-01-23 NOTE — MISCELLANEOUS
Attempted to contact you regarding results  Please give the office a call at your convenience      We can be reached at [phone]    Thank you,    Dr Fern Zuniga and Staff       Electronically signed by:Sujey Barrientos   Dec 12 2017  8:23AM EST

## 2018-01-24 NOTE — PROGRESS NOTES
Assessment    1  CAP (community acquired pneumonia) (5) (J18 9)    Plan  CAP (community acquired pneumonia)    · Benzonatate 200 MG Oral Capsule; TAKE 1 CAPSULE 3 times daily PRN cough   · LevoFLOXacin 500 MG Oral Tablet; TAKE 1 TABLET DAILY AS DIRECTED   · Promethazine-Codeine 6 25-10 MG/5ML Oral Syrup; TAKE 5 ML EVERY 4 HOURS  AS NEEDED    The patient does not have asthma or emphysema and has no reason to be short of breath-he has abnormal findings at the right lower lung base-I think he has pneumonia  I am going to treat him with an antibiotic and I gave him 2 medications that he can take for cough  I advised that if he is not feeling better over the next 24-48 hours, or if at any point he gets worse especially with the shortness of breath, he should go to the emergency room  Discussion/Summary  Possible side effects of new medications were reviewed with the patient/guardian today  The treatment plan was reviewed with the patient/guardian  The patient/guardian understands and agrees with the treatment plan      Chief Complaint  PT STARTED 2 WEEKS AGO WITH HEAD / SINUS PRESSURE, CHEST CONGESTION , COUGH WITH COLORED, BLOODY MUCOUS AND NOW SOB  PT HAS DIFFICULTY TAKING A DEEP BREATH OR WALKING FAR  History of Present Illness  HPI: pt is here today c/o sinus sx and new onset SOB  he has been sick for 2 weeks  + cough  lots of congestion but can't get it up  started feeling SOB the past few days  + sinus pain/pressure  + headache  + fatigue  no f/c - felt like he had one but took his temp and it was normal  all is just getting worse  he feels terrible, run down         Active Problems    1  Anemia (285 9) (D64 9)   2  Blood in stool (578 1) (K92 1)   3  Claudication (443 9) (I73 9)   4  External hemorrhoids (455 3) (K64 4)   5  Fatty liver (571 8) (K76 0)   6  Hyperlipidemia (272 4) (E78 5)   7  Hypothyroidism (244 9) (E03 9)   8  Leucopenia (288 50) (D72 819)   9   Macrocytic anemia (281 9) (D53 9) 10  Osteoarthritis of both hips (715 95) (M16 0)   11  Splenomegaly (789 2) (R16 1)   12  Testicular hypogonadism (257 2) (E29 1)   13  Thrombocytopenia (287 5) (D69 6)   14  Vitamin D deficiency (268 9) (E55 9)    Past Medical History    1  History of Bilateral hip pain (719 45) (M25 551,M25 552)   2  History of anemia (V12 3) (Z86 2)   3  History of cholelithiasis (V12 79) (Z87 19)   4  History of gastroesophageal reflux (GERD) (V12 79) (Z87 19)   5  History of hypersecretion of testicular hormones (V12 29) (Z86 39)   6  History of insomnia (V13 89) (Z87 898)   7  History of Male erectile dysfunction (607 84) (N52 9)   8  History of Thrombosed external hemorrhoids (455 4) (K64 5)  Active Problems And Past Medical History Reviewed: The active problems and past medical history were reviewed and updated today  Family History  Mother    1  Denied: Family history of colon cancer   2  Denied: Family history of colonic polyps   3  Denied: Family history of liver disease  Father    4  Family history of Asthma (V17 5)   5  Family history of Diabetes Mellitus (V18 0)   6  Denied: Family history of colon cancer   7  Denied: Family history of colonic polyps   8  Denied: Family history of liver disease  Family History    9  Family history of Cancer   10  Family history of Heart Disease (V17 49)  Family History Reviewed: The family history was reviewed and updated today  Social History    · Being A Social Drinker   · Former smoker (G15 05) (Z87 884)  The social history was reviewed and updated today  Surgical History    1  History of Cholecystectomy  Surgical History Reviewed: The surgical history was reviewed and updated today  Current Meds   1  EQL Fish Oil 1000 MG Oral Capsule; TAKE 1 CAPSULE DAILY; Therapy: 29Rqp9715 to Recorded   2   Nature-Throid 260 MG Oral Tablet; TAKE 1 TABLET BY MOUTH EVERY MORNING   BEFORE BREAKFAST ON EMPTY STOMACH  Requested for: 31FTD7138; Last   Rx:10Nov2017 Ordered 3  Omeprazole 40 MG Oral Capsule Delayed Release; take 1 capsule daily; Therapy: 24MCV6896 to ([facility])  Requested for: 12MGU0244; Last   Rx:06Nov2017 Ordered   4  TraMADol HCl - 50 MG Oral Tablet; TAKE 1 TO 2 TABLETS EVERY 6 HOURS AS   NEEDED FOR PAIN;   Therapy: 44RBM7593 to (Evaluate:25Amn7870); Last Rx:09Jan2017; Status: ACTIVE -   Renewal Voided Ordered   5  Vitamin C 1000 MG Oral Tablet; Therapy: 92Rnl0905 to Recorded   6  Vitamin D3 2000 UNIT Oral Tablet; Therapy: 03Bkf2102 to Recorded    The medication list was reviewed and updated today  Allergies    1  No Known Drug Allergies    Vitals   Recorded: 46HDH4374 10:28AM   Temperature 97 3 F   Heart Rate 74   Systolic 658   Diastolic 78   Height 6 ft    Weight 250 lb    BMI Calculated 33 91   BSA Calculated 2 34   O2 Saturation 98     Physical Exam    Constitutional   General appearance: Abnormal   acutely ill, comfortable, appears tired and well hydrated  Eyes   Conjunctiva and lids: No swelling, erythema, or discharge  Ears, Nose, Mouth, and Throat   External inspection of ears and nose: Normal     Otoscopic examination: Tympanic membrance translucent with normal light reflex  Canals patent without erythema  Nasal mucosa, septum, and turbinates: Abnormal   There was clear rhinorrhea from both nares  The bilateral nasal mucosa was boggy, but not red  Oropharynx: Abnormal   The posterior pharynx was erythematous, but did not have an exudate  Pulmonary   Respiratory effort: No increased work of breathing or signs of respiratory distress  Auscultation of lungs: Abnormal   rales/crackles over the right base  rhonchi over the right base  no wheezing  Cardiovascular   Auscultation of heart: Normal rate and rhythm, normal S1 and S2, without murmurs  Lymphatic   Palpation of lymph nodes in neck: Abnormal   bilateral anterior cervical node enlargement  Skin   Skin and subcutaneous tissue: Normal without rashes or lesions  Psychiatric   Orientation to person, place and time: Normal     Mood and affect: Normal          Future Appointments    Date/Time Provider Specialty Site   04/16/2018 02:40 PM DANIEL Dang   Hematology Oncology [facility] MEDICAL ONCOLOGY     Signatures   Electronically signed by : DANIEL Philippe ; Jan 23 2018 10:56AM EST                       (Author)

## 2018-02-01 ENCOUNTER — TELEPHONE (OUTPATIENT)
Dept: FAMILY MEDICINE CLINIC | Facility: CLINIC | Age: 58
End: 2018-02-01

## 2018-02-01 DIAGNOSIS — R05.9 COUGH: Primary | ICD-10-CM

## 2018-02-01 PROBLEM — K92.1 HEMATOCHEZIA: Status: ACTIVE | Noted: 2017-09-13

## 2018-02-01 PROBLEM — R16.1 SPLENOMEGALY: Status: ACTIVE | Noted: 2017-05-11

## 2018-02-01 PROBLEM — K76.0 FATTY LIVER: Status: ACTIVE | Noted: 2017-09-13

## 2018-02-01 RX ORDER — PYRITHIONE ZINC 1 G/ML
1 SHAMPOO TOPICAL DAILY
COMMUNITY
Start: 2015-09-03 | End: 2020-05-15 | Stop reason: ALTCHOICE

## 2018-02-01 RX ORDER — ACETAMINOPHEN 160 MG
TABLET,DISINTEGRATING ORAL
COMMUNITY
Start: 2015-09-03 | End: 2020-01-08 | Stop reason: ALTCHOICE

## 2018-02-01 RX ORDER — OMEPRAZOLE 40 MG/1
1 CAPSULE, DELAYED RELEASE ORAL DAILY
COMMUNITY
Start: 2017-11-06 | End: 2018-02-02

## 2018-02-01 RX ORDER — MULTIVIT WITH MINERALS/LUTEIN
TABLET ORAL
COMMUNITY
Start: 2015-09-03 | End: 2020-01-08 | Stop reason: ALTCHOICE

## 2018-02-01 NOTE — TELEPHONE ENCOUNTER
He has not had an x-ray yet, correct? I am going to order 1  If he any better at all? I feel if the antibiotics did not make this better this was more than likely a viral illness and can take many weeks for the cough to completely resolve but he can certainly get the x-ray just to be sure there is no pneumonia

## 2018-02-01 NOTE — TELEPHONE ENCOUNTER
Patient is calling because antibiotic has finished and is still experiencing symptoms  Please advise on what to do next

## 2018-02-02 ENCOUNTER — APPOINTMENT (OUTPATIENT)
Dept: RADIOLOGY | Facility: CLINIC | Age: 58
End: 2018-02-02
Payer: COMMERCIAL

## 2018-02-02 ENCOUNTER — HOSPITAL ENCOUNTER (EMERGENCY)
Facility: HOSPITAL | Age: 58
Discharge: HOME | End: 2018-02-02
Attending: EMERGENCY MEDICINE
Payer: COMMERCIAL

## 2018-02-02 ENCOUNTER — OFFICE VISIT (OUTPATIENT)
Dept: URGENT CARE | Facility: CLINIC | Age: 58
End: 2018-02-02
Payer: COMMERCIAL

## 2018-02-02 ENCOUNTER — APPOINTMENT (EMERGENCY)
Dept: CT IMAGING | Facility: HOSPITAL | Age: 58
End: 2018-02-02
Payer: COMMERCIAL

## 2018-02-02 VITALS
RESPIRATION RATE: 18 BRPM | BODY MASS INDEX: 33.24 KG/M2 | HEIGHT: 73 IN | TEMPERATURE: 98.9 F | WEIGHT: 250.8 LBS | OXYGEN SATURATION: 98 % | SYSTOLIC BLOOD PRESSURE: 153 MMHG | DIASTOLIC BLOOD PRESSURE: 92 MMHG

## 2018-02-02 VITALS
OXYGEN SATURATION: 93 % | RESPIRATION RATE: 27 BRPM | TEMPERATURE: 98.5 F | WEIGHT: 250 LBS | SYSTOLIC BLOOD PRESSURE: 140 MMHG | BODY MASS INDEX: 33.13 KG/M2 | HEART RATE: 79 BPM | HEIGHT: 73 IN | DIASTOLIC BLOOD PRESSURE: 65 MMHG

## 2018-02-02 DIAGNOSIS — R06.02 SHORTNESS OF BREATH: Primary | ICD-10-CM

## 2018-02-02 DIAGNOSIS — N20.0 NEPHROLITHIASIS: ICD-10-CM

## 2018-02-02 DIAGNOSIS — R91.8 OPACITY OF LUNG ON IMAGING STUDY: ICD-10-CM

## 2018-02-02 DIAGNOSIS — J84.10 LUNG GRANULOMA (HCC): ICD-10-CM

## 2018-02-02 DIAGNOSIS — J90 PLEURAL EFFUSION ON LEFT: Primary | ICD-10-CM

## 2018-02-02 DIAGNOSIS — R07.89 CHEST TIGHTNESS: ICD-10-CM

## 2018-02-02 LAB
ALBUMIN SERPL BCP-MCNC: 3.8 G/DL (ref 3.5–5)
ALP SERPL-CCNC: 82 U/L (ref 46–116)
ALT SERPL W P-5'-P-CCNC: 48 U/L (ref 12–78)
ANION GAP SERPL CALCULATED.3IONS-SCNC: 10 MMOL/L (ref 4–13)
APTT PPP: 31 SECONDS (ref 23–35)
AST SERPL W P-5'-P-CCNC: 28 U/L (ref 5–45)
BASOPHILS # BLD AUTO: 0.01 THOUSANDS/ΜL (ref 0–0.1)
BASOPHILS NFR BLD AUTO: 0 % (ref 0–1)
BILIRUB SERPL-MCNC: 0.7 MG/DL (ref 0.2–1)
BUN SERPL-MCNC: 15 MG/DL (ref 5–25)
CALCIUM SERPL-MCNC: 8.8 MG/DL (ref 8.3–10.1)
CHLORIDE SERPL-SCNC: 102 MMOL/L (ref 100–108)
CO2 SERPL-SCNC: 29 MMOL/L (ref 21–32)
CREAT SERPL-MCNC: 0.94 MG/DL (ref 0.6–1.3)
EOSINOPHIL # BLD AUTO: 0.01 THOUSAND/ΜL (ref 0–0.61)
EOSINOPHIL NFR BLD AUTO: 0 % (ref 0–6)
ERYTHROCYTE [DISTWIDTH] IN BLOOD BY AUTOMATED COUNT: 14.6 % (ref 11.6–15.1)
GFR SERPL CREATININE-BSD FRML MDRD: 90 ML/MIN/1.73SQ M
GLUCOSE SERPL-MCNC: 131 MG/DL (ref 65–140)
HCT VFR BLD AUTO: 35.9 % (ref 36.5–49.3)
HGB BLD-MCNC: 12.7 G/DL (ref 12–17)
INR PPP: 1.03 (ref 0.86–1.16)
LACTATE SERPL-SCNC: 1.2 MMOL/L (ref 0.5–2)
LYMPHOCYTES # BLD AUTO: 1.77 THOUSANDS/ΜL (ref 0.6–4.47)
LYMPHOCYTES NFR BLD AUTO: 33 % (ref 14–44)
MCH RBC QN AUTO: 37.2 PG (ref 26.8–34.3)
MCHC RBC AUTO-ENTMCNC: 35.4 G/DL (ref 31.4–37.4)
MCV RBC AUTO: 105 FL (ref 82–98)
MONOCYTES # BLD AUTO: 0.67 THOUSAND/ΜL (ref 0.17–1.22)
MONOCYTES NFR BLD AUTO: 12 % (ref 4–12)
NEUTROPHILS # BLD AUTO: 2.93 THOUSANDS/ΜL (ref 1.85–7.62)
NEUTS SEG NFR BLD AUTO: 55 % (ref 43–75)
PLATELET # BLD AUTO: 74 THOUSANDS/UL (ref 149–390)
PMV BLD AUTO: 10.3 FL (ref 8.9–12.7)
POTASSIUM SERPL-SCNC: 3.6 MMOL/L (ref 3.5–5.3)
PROT SERPL-MCNC: 8 G/DL (ref 6.4–8.2)
PROTHROMBIN TIME: 13.3 SECONDS (ref 12.1–14.4)
RBC # BLD AUTO: 3.41 MILLION/UL (ref 3.88–5.62)
SODIUM SERPL-SCNC: 141 MMOL/L (ref 136–145)
TROPONIN I SERPL-MCNC: <0.02 NG/ML
WBC # BLD AUTO: 5.39 THOUSAND/UL (ref 4.31–10.16)

## 2018-02-02 PROCEDURE — 93005 ELECTROCARDIOGRAM TRACING: CPT

## 2018-02-02 PROCEDURE — 85730 THROMBOPLASTIN TIME PARTIAL: CPT

## 2018-02-02 PROCEDURE — 99285 EMERGENCY DEPT VISIT HI MDM: CPT

## 2018-02-02 PROCEDURE — 85610 PROTHROMBIN TIME: CPT

## 2018-02-02 PROCEDURE — 99203 OFFICE O/P NEW LOW 30 MIN: CPT | Performed by: FAMILY MEDICINE

## 2018-02-02 PROCEDURE — 87040 BLOOD CULTURE FOR BACTERIA: CPT

## 2018-02-02 PROCEDURE — 94640 AIRWAY INHALATION TREATMENT: CPT | Performed by: FAMILY MEDICINE

## 2018-02-02 PROCEDURE — 71046 X-RAY EXAM CHEST 2 VIEWS: CPT

## 2018-02-02 PROCEDURE — 96361 HYDRATE IV INFUSION ADD-ON: CPT

## 2018-02-02 PROCEDURE — 87798 DETECT AGENT NOS DNA AMP: CPT | Performed by: EMERGENCY MEDICINE

## 2018-02-02 PROCEDURE — 84484 ASSAY OF TROPONIN QUANT: CPT

## 2018-02-02 PROCEDURE — 80053 COMPREHEN METABOLIC PANEL: CPT

## 2018-02-02 PROCEDURE — 96365 THER/PROPH/DIAG IV INF INIT: CPT

## 2018-02-02 PROCEDURE — 36415 COLL VENOUS BLD VENIPUNCTURE: CPT

## 2018-02-02 PROCEDURE — 83605 ASSAY OF LACTIC ACID: CPT

## 2018-02-02 PROCEDURE — 71260 CT THORAX DX C+: CPT

## 2018-02-02 PROCEDURE — 85025 COMPLETE CBC W/AUTO DIFF WBC: CPT

## 2018-02-02 RX ORDER — ACETAMINOPHEN 325 MG/1
975 TABLET ORAL ONCE
Status: COMPLETED | OUTPATIENT
Start: 2018-02-02 | End: 2018-02-02

## 2018-02-02 RX ORDER — ALBUTEROL SULFATE 90 UG/1
2 AEROSOL, METERED RESPIRATORY (INHALATION) EVERY 6 HOURS PRN
Qty: 18 G | Refills: 0 | Status: SHIPPED | OUTPATIENT
Start: 2018-02-02 | End: 2020-01-08 | Stop reason: ALTCHOICE

## 2018-02-02 RX ORDER — IPRATROPIUM BROMIDE AND ALBUTEROL SULFATE 2.5; .5 MG/3ML; MG/3ML
3 SOLUTION RESPIRATORY (INHALATION)
Status: SHIPPED | OUTPATIENT
Start: 2018-02-02

## 2018-02-02 RX ORDER — AZITHROMYCIN 250 MG/1
TABLET, FILM COATED ORAL
Qty: 6 TABLET | Refills: 0 | Status: SHIPPED | OUTPATIENT
Start: 2018-02-02 | End: 2018-02-08

## 2018-02-02 RX ADMIN — IPRATROPIUM BROMIDE AND ALBUTEROL SULFATE 3 ML: 2.5; .5 SOLUTION RESPIRATORY (INHALATION) at 09:25

## 2018-02-02 RX ADMIN — ACETAMINOPHEN 975 MG: 325 TABLET, FILM COATED ORAL at 14:51

## 2018-02-02 RX ADMIN — IOHEXOL 85 ML: 350 INJECTION, SOLUTION INTRAVENOUS at 15:47

## 2018-02-02 RX ADMIN — SODIUM CHLORIDE 1000 ML: 0.9 INJECTION, SOLUTION INTRAVENOUS at 14:50

## 2018-02-02 RX ADMIN — CEFTRIAXONE 1000 MG: 1 INJECTION, SOLUTION INTRAVENOUS at 14:54

## 2018-02-02 NOTE — TELEPHONE ENCOUNTER
I reviewed the patient's CXR results - they are as follows:  Left basilar opacity partially obscuring the left hemidiaphragm  Subsegmental atelectasis versus scarring within the right lung base  So it looks like there is something in the left lower lung but since they're calling it an opacity they don't know what it is  It could be pneumonia  It could be a shadow  In the right lower lung it looks as if he has either scarring or he is not taking deep breaths (that's atelectasis)  He has already been treated with antibiotics for pneumonia so at this time I think he needs to go to the ER for a CT scan - we need to further characterize what's going on do that we can treat it and the ER is best place to do this so it can be figured out today  Thanks

## 2018-02-02 NOTE — PROGRESS NOTES
Assessment/Plan:    No problem-specific Assessment & Plan notes found for this encounter  Diagnoses and all orders for this visit:    Shortness of breath  -     ipratropium-albuterol (DUO-NEB) 0 5-2 5 mg/3 mL inhalation solution 3 mL; Take 3 mL by nebulization every 6 (six) hours   -     XR chest pa & lateral  -     albuterol (PROVENTIL HFA,VENTOLIN HFA) 90 mcg/act inhaler; Inhale 2 puffs every 6 (six) hours as needed for wheezing  -     azithromycin (ZITHROMAX) 250 mg tablet; 2 tablets today then 1 tablet for the next 4 days    Chest tightness  -     XR chest pa & lateral  -     albuterol (PROVENTIL HFA,VENTOLIN HFA) 90 mcg/act inhaler; Inhale 2 puffs every 6 (six) hours as needed for wheezing  -     azithromycin (ZITHROMAX) 250 mg tablet; 2 tablets today then 1 tablet for the next 4 days    Opacity of lung on imaging study          Subjective:      Patient ID: Kerry Gordillo is a 65 y o  male  Patient presents with chest tightness and shortness of breath over the past few days  He has had 3 weeks of symptoms of sinus congestion, cough, chest tightness  He was seen by his PCP and diagnosed with pneumonia and started on Levaquin which she completed yesterday  He has had some symptomatic improvement but continued with chest tightness difficulty taking a deep breath and feeling like he is unable to clear secretions  He denies fevers or chills  No chest pain or palpitations no lightheadedness, dizziness, syncope  He denies any lung disease history he is a former smoker  He denies calf tenderness or swelling  He has left-sided anterior chest wall discomfort and left side of posterior thoracic paravertebral muscle pain with deep inspiration    No hemoptysis        The following portions of the patient's history were reviewed and updated as appropriate: current medications, past family history, past medical history, past social history, past surgical history and problem list     Review of Systems Constitutional: Negative  HENT: Positive for congestion and rhinorrhea  Negative for sore throat  Eyes: Negative  Respiratory: Positive for cough, chest tightness and shortness of breath  Cardiovascular: Negative  Musculoskeletal: Positive for myalgias  Objective:     Physical Exam   Constitutional: He appears well-developed and well-nourished  HENT:   Head: Normocephalic  Mouth/Throat: No oropharyngeal exudate  Eyes: Conjunctivae are normal    Neck: Neck supple  Cardiovascular: Normal rate and regular rhythm  Pulmonary/Chest: Effort normal and breath sounds normal  No respiratory distress  He has no wheezes  He has no rales  Lymphadenopathy:     He has no cervical adenopathy

## 2018-02-02 NOTE — ED PROVIDER NOTES
History  Chief Complaint   Patient presents with    Chest Pain     Pt sent by PCP for abnormal chest xray  Pt c/o chest pain and SOB that started 3 weeks ago  Patient complains of sob cough, left chest pain 2 weeks duration  Had course of levaquin without relief  Took first does of azithromycin today  Associated runny nose sore throat but no fevers  Pain worse with movement and deep breaths  Prior to Admission Medications   Prescriptions Last Dose Informant Patient Reported? Taking? Ascorbic Acid (VITAMIN C) 1000 MG tablet   Yes Yes   Sig: Take by mouth   Cholecalciferol (VITAMIN D3) 2000 units capsule   Yes Yes   Sig: Take by mouth   Omega-3 Fatty Acids (EQL FISH OIL) 1000 MG CAPS   Yes Yes   Sig: Take 1 capsule by mouth daily   Omega-3 Fatty Acids (FISH OIL PO)   Yes Yes   Sig: Take by mouth   Thyroid (NATURE-THROID) 260 MG TABS 2018 at Unknown time  Yes Yes   Sig: Take by mouth Daily   albuterol (PROVENTIL HFA,VENTOLIN HFA) 90 mcg/act inhaler   No Yes   Sig: Inhale 2 puffs every 6 (six) hours as needed for wheezing   ascorbic acid (VITAMIN C) 500 mg tablet   Yes Yes   Sig: Take 500 mg by mouth daily   azithromycin (ZITHROMAX) 250 mg tablet   No Yes   Si tablets today then 1 tablet for the next 4 days      Facility-Administered Medications Last Administration Doses Remaining   ipratropium-albuterol (DUO-NEB) 0 5-2 5 mg/3 mL inhalation solution 3 mL 2018  9:25 AM           Past Medical History:   Diagnosis Date    Disease of thyroid gland     GERD (gastroesophageal reflux disease)     Kidney stone     Osteoarthritis, hip, bilateral        Past Surgical History:   Procedure Laterality Date    CHOLECYSTECTOMY      TX COLONOSCOPY FLX DX W/COLLJ SPEC WHEN PFRMD N/A 2017    Procedure: EGD AND COLONOSCOPY;  Surgeon: Rigo Pinto MD;  [facility];   Service: Gastroenterology       Family History   Problem Relation Age of Onset    Arthritis Mother     Heart disease Father     Hypertension Father     Diabetes Father     Cancer Paternal Grandmother      I have reviewed and agree with the history as documented  Social History   Substance Use Topics    Smoking status: Former Smoker    Smokeless tobacco: Never Used    Alcohol use No        Review of Systems   All other systems reviewed and are negative  Physical Exam  ED Triage Vitals [02/02/18 1419]   Temperature Pulse Respirations Blood Pressure SpO2   98 5 °F (36 9 °C) 103 18 (!) 181/76 94 %      Temp Source Heart Rate Source Patient Position - Orthostatic VS BP Location FiO2 (%)   Temporal Monitor Sitting Right arm --      Pain Score       7           Orthostatic Vital Signs  Vitals:    02/02/18 1600 02/02/18 1615 02/02/18 1630 02/02/18 1645   BP: 158/87 (!) 179/79 149/70 140/65   Pulse: 79 83 78 79   Patient Position - Orthostatic VS:           Physical Exam   Constitutional: He is oriented to person, place, and time  He appears well-developed and well-nourished  HENT:   Mouth/Throat: Oropharynx is clear and moist    Eyes: Conjunctivae and EOM are normal  Pupils are equal, round, and reactive to light  Neck: Normal range of motion  Neck supple  No spinous process tenderness present  Cardiovascular: Normal rate, regular rhythm, normal heart sounds and intact distal pulses  Pulmonary/Chest: Effort normal  No respiratory distress  He has no wheezes  He has rhonchi in the left lower field  He exhibits tenderness  Abdominal: Soft  Bowel sounds are normal  He exhibits no distension  There is no tenderness  Musculoskeletal: Normal range of motion  Neurological: He is alert and oriented to person, place, and time  He has normal strength  No sensory deficit  GCS eye subscore is 4  GCS verbal subscore is 5  GCS motor subscore is 6  Skin: Skin is warm and dry  No rash noted  Psychiatric: He has a normal mood and affect  Nursing note and vitals reviewed        ED Medications  Medications   sodium chloride 0 9 % bolus 1,000 mL (0 mL Intravenous Stopped 2/2/18 1556)   cefTRIAXone (ROCEPHIN) IVPB (premix) 1,000 mg (0 mg Intravenous Stopped 2/2/18 1521)   acetaminophen (TYLENOL) tablet 975 mg (975 mg Oral Given 2/2/18 1451)   iohexol (OMNIPAQUE) 350 MG/ML injection (MULTI-DOSE) 85 mL (85 mL Intravenous Given 2/2/18 1547)       Diagnostic Studies  Results Reviewed     Procedure Component Value Units Date/Time    Comprehensive metabolic panel [[id number]] Collected:  02/02/18 1434    Lab Status:  Final result Specimen:  Blood from Arm, Left Updated:  02/02/18 1525     Sodium 141 mmol/L      Potassium 3 6 mmol/L      Chloride 102 mmol/L      CO2 29 mmol/L      Anion Gap 10 mmol/L      BUN 15 mg/dL      Creatinine 0 94 mg/dL      Glucose 131 mg/dL      Calcium 8 8 mg/dL      AST 28 U/L      ALT 48 U/L      Alkaline Phosphatase 82 U/L      Total Protein 8 0 g/dL      Albumin 3 8 g/dL      Total Bilirubin 0 70 mg/dL      eGFR 90 ml/min/1 73sq m     Narrative:         National Kidney Disease Education Program recommendations are as follows:  GFR calculation is accurate only with a steady state creatinine  Chronic Kidney disease less than 60 ml/min/1 73 sq  meters  Kidney failure less than 15 ml/min/1 73 sq  meters      CBC and differential [[id number]]  (Abnormal) Collected:  02/02/18 1434    Lab Status:  Final result Specimen:  Blood from Arm, Left Updated:  02/02/18 1521     WBC 5 39 Thousand/uL      RBC 3 41 (L) Million/uL      Hemoglobin 12 7 g/dL      Hematocrit 35 9 (L) %       (H) fL      MCH 37 2 (H) pg      MCHC 35 4 g/dL      RDW 14 6 %      MPV 10 3 fL      Platelets 74 (L) Thousands/uL      Neutrophils Relative 55 %      Lymphocytes Relative 33 %      Monocytes Relative 12 %      Eosinophils Relative 0 %      Basophils Relative 0 %      Neutrophils Absolute 2 93 Thousands/µL      Lymphocytes Absolute 1 77 Thousands/µL      Monocytes Absolute 0 67 Thousand/µL      Eosinophils Absolute 0 01 Thousand/µL Basophils Absolute 0 01 Thousands/µL     APTT [[id number]]  (Normal) Collected:  02/02/18 1436    Lab Status:  Final result Specimen:  Blood from Arm, Left Updated:  02/02/18 1518     PTT 31 seconds     Narrative: Therapeutic Heparin Range = 60-90 seconds    Protime-INR [[id number]]  (Normal) Collected:  02/02/18 1436    Lab Status:  Final result Specimen:  Blood from Arm, Left Updated:  02/02/18 1518     Protime 13 3 seconds      INR 1 03    Troponin I [[id number]]  (Normal) Collected:  02/02/18 1434    Lab Status:  Final result Specimen:  Blood from Arm, Left Updated:  02/02/18 1518     Troponin I <0 02 ng/mL     Narrative:         [employer] Chemistry analyzer 99% cutoff is > 0 04 ng/mL in network labs    o cTnI 99% cutoff is useful only when applied to patients in the clinical setting of myocardial ischemia  o cTnI 99% cutoff should be interpreted in the context of clinical history, ECG findings and possibly cardiac imaging to establish correct diagnosis  o cTnI 99% cutoff may be suggestive but clearly not indicative of a coronary event without the clinical setting of myocardial ischemia  Lactic Acid x2 [[id number]]  (Normal) Collected:  02/02/18 1436    Lab Status:  Final result Specimen:  Blood from Arm, Left Updated:  02/02/18 1518     LACTIC ACID 1 2 mmol/L     Narrative:         Result may be elevated if tourniquet was used during collection  Influenza A/B and RSV by PCR (indicated for patients >2 mo of age) [[id number]] Collected:  02/02/18 1445    Lab Status: In process Specimen:  Nasopharyngeal from Nasopharyngeal Swab Updated:  02/02/18 1456    Blood culture #2 [[id number]] Collected:  02/02/18 1436    Lab Status: In process Specimen:  Blood from Arm, Left Updated:  02/02/18 1455    Blood culture #1 [[id number]] Collected:  02/02/18 1436    Lab Status:   In process Specimen:  Blood from Arm, Left Updated:  02/02/18 1455                 CT chest with contrast   Final Result by Tessy Romero MD (02/02 1601)   Small left layering pleural effusion with associated atelectasis  No evidence for loculation           Workstation performed: [id number]                    Procedures  ECG 12 Lead Documentation  Date/Time: 2/2/2018 2:31 PM  Performed by: Topher Hardy by: Dennis Baker     Indications / Diagnosis:  Sob  ECG reviewed by me, the ED Provider: yes    Patient location:  ED  Previous ECG:     Previous ECG:  Unavailable  Interpretation:     Interpretation: normal    Rate:     ECG rate:  89    ECG rate assessment: normal    Rhythm:     Rhythm: sinus rhythm    Ectopy:     Ectopy: none    QRS:     QRS axis:  Normal    QRS intervals:  Normal  Conduction:     Conduction: abnormal      Abnormal conduction: 1st degree    ST segments:     ST segments:  Normal  T waves:     T waves: normal               Phone Contacts  ED Phone Contact    ED Course  ED Course                          Wells' Criteria for PE    Flowsheet Row Most Recent Value   Wells' Criteria for PE   Clinical signs and symptoms of DVT  0 Filed at: 02/02/2018 1449   PE is primary diagnosis or equally likely  0 Filed at: 02/02/2018 1449   HR >100  1 5 Filed at: 02/02/2018 1449   Immobilization at least 3 days or Surgery in the previous 4 weeks  0 Filed at: 02/02/2018 1449   Previous, objectively diagnosed PE or DVT  0 Filed at: 02/02/2018 1449   Hemoptysis  0 Filed at: 02/02/2018 1449   Malignancy with treatment within 6 months or palliative  0 Filed at: 02/02/2018 1449   Wells' Criteria Total  1 5 Filed at: 02/02/2018 1449            MDM  Number of Diagnoses or Management Options  Lung granuloma ([facility] [address] ): new and requires workup  Nephrolithiasis: new and requires workup  Pleural effusion on left: new and requires workup     Amount and/or Complexity of Data Reviewed  Clinical lab tests: ordered and reviewed  Tests in the radiology section of CPT®: ordered and reviewed  Obtain history from someone other than the patient: yes    Patient Progress  Patient progress: improved    CritCare Time    Disposition  Final diagnoses:   Pleural effusion on left - acute small with atelectasis   Nephrolithiasis - acute right   Lung granuloma ([address] )     Time reflects when diagnosis was documented in both MDM as applicable and the Disposition within this note     Time User Action Codes Description Comment    2/2/2018  4:16 PM Arslan Reel Add [J90] Pleural effusion on left     2/2/2018  4:16 PM Arslan Reel Modify [J90] Pleural effusion on left acute small with atelectasis    2/2/2018  4:16 PM Arslan Reel Add [N20 0] Nephrolithiasis     2/2/2018  4:16 PM Arslan Reel Modify [N20 0] Nephrolithiasis acute right    2/2/2018  4:17 PM Arslan Reel Add [J84 10] Lung granuloma [facility])       ED Disposition     ED Disposition Condition Comment    Discharge  Wil Christiansen discharge to home/self care  Condition at discharge: Good        Follow-up Information     Follow up With Specialties Details Why Contact Info    Lenord Claude, DO Pulmonary Disease, Pulmonology In 3 days  [address] [address]  [phone]          Discharge Medication List as of 2/2/2018  4:18 PM      CONTINUE these medications which have NOT CHANGED    Details   albuterol (PROVENTIL HFA,VENTOLIN HFA) 90 mcg/act inhaler Inhale 2 puffs every 6 (six) hours as needed for wheezing, Starting Fri 2/2/2018, Normal      !!  Ascorbic Acid (VITAMIN C) 1000 MG tablet Take by mouth, Starting Thu 9/3/2015, Historical Med      !! ascorbic acid (VITAMIN C) 500 mg tablet Take 500 mg by mouth daily, Historical Med      azithromycin (ZITHROMAX) 250 mg tablet 2 tablets today then 1 tablet for the next 4 days, Normal      Cholecalciferol (VITAMIN D3) 2000 units capsule Take by mouth, Starting Thu 9/3/2015, Historical Med      !! Omega-3 Fatty Acids (EQL FISH OIL) 1000 MG CAPS Take 1 capsule by mouth daily, Starting Thu 9/3/2015, Historical Med      !! Omega-3 Fatty Acids (FISH OIL PO) Take by mouth, Historical Med Thyroid (NATURE-THROID) 260 MG TABS Take by mouth Daily, Historical Med       !! - Potential duplicate medications found  Please discuss with provider  No discharge procedures on file      ED Provider  Electronically Signed by           Elle Bullock DO  02/02/18 1944

## 2018-02-02 NOTE — TELEPHONE ENCOUNTER
Patient did get X-ray today 2/2/2018 and he was informed as soon as results are available a call back would be given if appointment needed but he may be experiencing a viral illness

## 2018-02-02 NOTE — PATIENT INSTRUCTIONS
Patient with opacity left lower lung field obscuring the diaphragm, recommend follow-up with PCP 1-2 days for evaluation and need for chest CT  Ventolin inhaler as needed for chest tightness  Chest and back pain likely strain from coughing  The Mucinex twice daily over the counter

## 2018-02-02 NOTE — ED NOTES
Pt reports discomfort when breathing  Feels chest tightness related to possible case of pneumonia  L  Chest feels more tender  Increased discomfort on inspiration       Radha Max RN  02/02/18 [address], RN  02/02/18 9916

## 2018-02-02 NOTE — DISCHARGE INSTRUCTIONS
Kidney Stones   AMBULATORY CARE:   Kidney stones  form in the urinary system when the water and waste in your urine are out of balance  When this happens, certain types of waste crystals separate from the urine  The crystals build up and form kidney stones  Kidney stones can be made of uric acid, calcium, phosphate, or oxalate crystals  You may have 1 or more kidney stones  Common symptoms include the following:   · Pain in the middle of your back that moves across to your side or that may spread to your groin    · Nausea and vomiting    · Urge to urinate often, burning feeling when you urinate, or pink or red urine    · Tenderness in your lower back, side, or stomach  Seek care immediately if:   · You have vomiting that is not relieved by medicine  Contact your healthcare provider if:   · You have a fever  · You have trouble passing urine  · You see blood in your urine  · You have severe pain  · You have any questions or concerns about your condition or care  Treatment for kidney stones  may include any of the following:  · NSAIDs , such as ibuprofen, help decrease swelling, pain, and fever  This medicine is available with or without a doctor's order  NSAIDs can cause stomach bleeding or kidney problems in certain people  If you take blood thinner medicine, always ask your healthcare provider if NSAIDs are safe for you  Always read the medicine label and follow directions  · Prescription medicine  may be given  Ask how to take this medicine safely  · Medicines  to balance your electrolytes may be needed  · A procedure or surgery  to remove the kidney stones may be needed if they do not pass on their own  Your treatment will depend on the size and location of your kidney stones  Manage your symptoms:   · Drink plenty of liquids  Your healthcare provider may tell you to drink at least 8 to 12 (eight-ounce) cups of liquids each day   This helps flush out the kidney stones when you urinate  Water is the best liquid to drink  · Strain your urine every time you go to the bathroom  Urinate through a strainer or a piece of thin cloth to catch the stones  Take the stones to your healthcare provider so they can be sent to the lab for tests  This will help your healthcare providers plan the best treatment for you  · Eat a variety of healthy foods  Healthy foods include fruits, vegetables, whole-grain breads, low-fat dairy products, beans, and fish  You may need to limit how much sodium (salt) or protein you eat  Ask for information about the best foods for you  · Exercise regularly  Your stones may pass more easily if you stay active  Ask about the best activities for you  After you pass your kidney stones:  Once you have passed your kidney stones, your healthcare provider may  order a 24-hour urine test  Results from a 24-hour urine test will help your healthcare provider plan ways to prevent more stones from forming  If you are told to do a 24-hour test, your healthcare provider will give you more instructions  Follow up with your healthcare provider as directed:  Write down your questions so you remember to ask them during your visits  © 2017 [address] [facility] Information is for End User's use only and may not be sold, redistributed or otherwise used for commercial purposes  All illustrations and images included in CareNotes® are the copyrighted property of A D A M [employer]  or [employer]  The above information is an  only  It is not intended as medical advice for individual conditions or treatments  Talk to your doctor, nurse or pharmacist before following any medical regimen to see if it is safe and effective for you  Pleural Effusion   WHAT YOU NEED TO KNOW:   What is pleural effusion? Pleural effusion is fluid buildup in the space between the layers of the pleura   The pleura are thin layers of tissue that form a 2-layered lining around the lungs  One layer of the pleura rests directly on the lungs  The other layer rests on the chest wall  There is normally a small amount of fluid called pleural fluid between these layers  This fluid helps your lungs move easily when you breathe  What causes pleural effusion? · Heart failure or other heart and lung problems such as a pulmonary embolism (blockage of a blood vessel in the lungs)    · Lung infections such as pneumonia or tuberculosis (TB)    · Inflammation of the pleura, called pleurisy    · Cancer, injury, or problems with other organs in your chest or abdomen, such as cirrhosis or pancreatitis  What are the signs and symptoms of pleural effusion? You may have no symptoms  A pleural effusion may cause you to cough or feel short of breath  You may breathe faster than usual  You may have mild to severe chest pain that starts or gets worse when you breathe in or cough  Depending on the cause of your pleural effusion, you may have other symptoms, such as a fever  How is pleural effusion diagnosed? Your healthcare provider will examine you and listen to your heart and lungs through a stethoscope  You may need any of the following:  · Blood tests  may show infection, or they may provide information about your overall health  · A chest x-ray  may show fluid around your lungs or signs of infection  · A CT scan , or CAT scan, takes pictures of your lungs  The pictures may show the cause of your pleural effusion  You may be given a dye before the pictures are taken to help healthcare providers see your lungs better  Tell the healthcare provider if you have ever had an allergic reaction to contrast dye  · An ultrasound of the chest  uses sound waves to show pictures of your lungs on a monitor  An ultrasound may help healthcare providers find extra pleural fluid or the cause of it       · A thoracentesis  is a procedure to take fluid out of your chest  You are given numbing medicine, and then a needle is put between your ribs  The extra pleural fluid is removed through the needle  This fluid may be sent to a lab for tests  These tests may help healthcare providers find the cause of your pleural effusion and the best way to treat it  You may need a thoracentesis more than once  How is pleural effusion treated? Treatment depends on the cause of your pleural effusion and how bad your symptoms are  You may need any of the following:  · Diuretics  may help you lose extra fluid caused by heart failure or other problems  · Antibiotics  help prevent or treat an infection caused by bacteria  · Prescription pain medicine  may be given  Ask your healthcare provider how to take this medicine safely  · NSAIDs  help decrease swelling and pain or fever  This medicine is available with or without a doctor's order  NSAIDs can cause stomach bleeding or kidney problems in certain people  If you take blood thinner medicine, always ask your healthcare provider if NSAIDs are safe for you  Always read the medicine label and follow directions  · Steroids  or other types of medicines may be given to decrease swelling  · Drainage  of extra pleural fluid may be done using thoracentesis or a chest tube  A chest tube may stay in your chest for days or weeks  This allows the extra fluid around your lungs to drain over time  You may need medicines put directly into your chest if the fluid does not drain out easily  · Surgery  may be needed if your pleural effusion keeps coming back or if it increases your risk for other problems  When should I contact my healthcare provider? · You have a fever  · Your breathing problems do not go away or get worse  · Your pain does not go away or gets worse  · You cough up yellow, green, gray, or bloody mucus  · You have questions or concerns about your condition or care  When should I seek immediate care or call 911?    · You feel faint, or you cannot think clearly  · Your lips or fingernails turn blue  · You find it very hard to breathe  CARE AGREEMENT:   You have the right to help plan your care  Learn about your health condition and how it may be treated  Discuss treatment options with your caregivers to decide what care you want to receive  You always have the right to refuse treatment  The above information is an  only  It is not intended as medical advice for individual conditions or treatments  Talk to your doctor, nurse or pharmacist before following any medical regimen to see if it is safe and effective for you  © 2017 [facility] Information is for End User's use only and may not be sold, redistributed or otherwise used for commercial purposes  All illustrations and images included in CareNotes® are the copyrighted property of A D A M , Inc  or [employer]re Dumont

## 2018-02-03 LAB
FLUAV AG SPEC QL: NORMAL
FLUBV AG SPEC QL: NORMAL
RSV B RNA SPEC QL NAA+PROBE: NORMAL

## 2018-02-05 LAB
ATRIAL RATE: 89 BPM
P AXIS: 27 DEGREES
PR INTERVAL: 226 MS
QRS AXIS: 2 DEGREES
QRSD INTERVAL: 108 MS
QT INTERVAL: 364 MS
QTC INTERVAL: 442 MS
T WAVE AXIS: 58 DEGREES
VENTRICULAR RATE: 89 BPM

## 2018-02-05 PROCEDURE — 93010 ELECTROCARDIOGRAM REPORT: CPT | Performed by: INTERNAL MEDICINE

## 2018-02-07 LAB
BACTERIA BLD CULT: NORMAL
BACTERIA BLD CULT: NORMAL

## 2018-02-19 ENCOUNTER — TELEPHONE (OUTPATIENT)
Dept: FAMILY MEDICINE CLINIC | Facility: CLINIC | Age: 58
End: 2018-02-19

## 2018-02-19 ENCOUNTER — TRANSCRIBE ORDERS (OUTPATIENT)
Dept: FAMILY MEDICINE CLINIC | Facility: CLINIC | Age: 58
End: 2018-02-19

## 2018-02-19 DIAGNOSIS — E03.9 HYPOTHYROIDISM, UNSPECIFIED TYPE: Primary | ICD-10-CM

## 2018-02-24 LAB
T4 FREE SERPL-MCNC: 1.3 NG/DL (ref 0.8–1.8)
TSH SERPL-ACNC: 0.02 MIU/L (ref 0.4–4.5)

## 2018-03-08 ENCOUNTER — OFFICE VISIT (OUTPATIENT)
Dept: FAMILY MEDICINE CLINIC | Facility: CLINIC | Age: 58
End: 2018-03-08
Payer: COMMERCIAL

## 2018-03-08 VITALS
SYSTOLIC BLOOD PRESSURE: 142 MMHG | WEIGHT: 253 LBS | DIASTOLIC BLOOD PRESSURE: 72 MMHG | TEMPERATURE: 97.5 F | BODY MASS INDEX: 33.53 KG/M2 | HEART RATE: 67 BPM | HEIGHT: 73 IN | OXYGEN SATURATION: 98 %

## 2018-03-08 DIAGNOSIS — E03.9 HYPOTHYROIDISM, UNSPECIFIED TYPE: Primary | ICD-10-CM

## 2018-03-08 PROCEDURE — 99213 OFFICE O/P EST LOW 20 MIN: CPT | Performed by: FAMILY MEDICINE

## 2018-03-08 RX ORDER — LEVOFLOXACIN 500 MG/1
TABLET, FILM COATED ORAL
COMMUNITY
Start: 2018-01-23 | End: 2020-01-08 | Stop reason: ALTCHOICE

## 2018-03-08 RX ORDER — LIOTHYRONINE SODIUM 5 UG/1
5 TABLET ORAL DAILY
Qty: 60 TABLET | Refills: 0 | Status: SHIPPED | OUTPATIENT
Start: 2018-03-08 | End: 2018-05-05 | Stop reason: SDUPTHER

## 2018-03-08 RX ORDER — LEVOTHYROXINE SODIUM 0.2 MG/1
200 TABLET ORAL DAILY
Qty: 60 TABLET | Refills: 0 | Status: SHIPPED | OUTPATIENT
Start: 2018-03-08 | End: 2018-05-05 | Stop reason: SDUPTHER

## 2018-03-08 RX ORDER — PROMETHAZINE HYDROCHLORIDE AND CODEINE PHOSPHATE 6.25; 1 MG/5ML; MG/5ML
SYRUP ORAL
COMMUNITY
Start: 2018-01-23 | End: 2020-01-08 | Stop reason: ALTCHOICE

## 2018-03-08 RX ORDER — BENZONATATE 200 MG/1
CAPSULE ORAL
COMMUNITY
Start: 2018-01-23 | End: 2020-01-08 | Stop reason: ALTCHOICE

## 2018-03-08 NOTE — PATIENT INSTRUCTIONS
Fatigue   WHAT YOU NEED TO KNOW:   Fatigue is mental and physical exhaustion that does not get better with rest  Fatigue may make daily activities difficult or cause extreme sleepiness  It is normal to feel tired sometimes, but long-term fatigue may be a sign of serious illness  DISCHARGE INSTRUCTIONS:   Return to the emergency department if:   · You have chest pain  · You have difficulty breathing  Contact your healthcare provider if:   · You have a cough that gets worse, or does not go away  · You see blood in your urine or bowel movement  · You have numbness or tingling around your mouth or in an arm or leg  · You faint, feel dizzy, or have vision changes  · You have swelling in your lymph nodes  · You are a woman and have vaginal bleeding that is not normal for you, or is not expected  · You lose weight without trying, or you have trouble eating  · You feel weak or have muscle pain  · You have pain or swelling in your joints  · You have questions or concerns about your condition or care  Follow up with your healthcare provider as directed: You may need more tests  Your healthcare provider may also refer you to a specialist  Write down your questions so you remember to ask them during your visits  Manage fatigue:   · Keep a fatigue diary  Include anything that makes you feel more tired or less tired  Bring the diary with you to follow-up visits with your provider  · Exercise as directed  Exercise can help you feel more alert  Exercise can also help you manage stress or relieve depression  Try to get at least 30 minutes of exercise most days of the week  · Keep a regular sleep schedule  Go to bed and wake up at the same times every day  Limit naps to 1 hour each day  A nap can improve fatigue, but a long nap may make it harder to go to sleep at night  · Plan and limit your activities    Limit the number of activities such as shopping and cleaning you do each day  If possible, try to spread out your trips throughout the week  Plan ahead so you are not rushing to get something done  Only do activities that you have the energy to complete  Take breaks between activities  Ask for help if you need it  Another person may be able to drive you or help with daily activities  · Eat a variety of healthy foods  Healthy foods include fruits, vegetables, whole-grain breads, low-fat dairy products, beans, lean meats, and fish  Good nutrition can help manage fatigue  · Limit caffeine and alcohol  These can make it difficult to fall or stay asleep  Women should limit alcohol to 1 drink a day  Men should limit alcohol to 2 drinks a day  A drink of alcohol is 12 ounces of beer, 5 ounces of wine, or 1½ ounces of liquor  Ask our healthcare provider how much caffeine is safe for you  · Do not smoke  Nicotine and other chemicals in cigarettes and cigars can cause lung damage and increase fatigue  Ask your healthcare provider for information if you currently smoke and need help to quit  E-cigarettes or smokeless tobacco still contain nicotine  Talk to your healthcare provider before you use these products  © 2017 [facility] Information is for End User's use only and may not be sold, redistributed or otherwise used for commercial purposes  All illustrations and images included in CareNotes® are the copyrighted property of [employer]  or [address]  The above information is an  only  It is not intended as medical advice for individual conditions or treatments  Talk to your doctor, nurse or pharmacist before following any medical regimen to see if it is safe and effective for you

## 2018-03-08 NOTE — PROGRESS NOTES
Assessment/Plan:    No problem-specific Assessment & Plan notes found for this encounter  Diagnoses and all orders for this visit:    Hypothyroidism, unspecified type  -     levothyroxine 200 mcg tablet; Take 1 tablet (200 mcg total) by mouth daily  -     liothyronine (CYTOMEL) 5 mcg tablet; Take 1 tablet (5 mcg total) by mouth daily  -     T4, free; Future  -     TSH, 3rd generation; Future  -     T4, free  -     TSH, 3rd generation         WE HAVE STARTED LEVOTHYROXINE AND T3   HE WILL HAVE BLOOD WORK AGAIN IN 6 WEEKS  Subjective:      Patient ID: Ioana Davis is a 65 y o  male  PATIENT IS HERE TO REVIEW THE RESULTS OF HIS THYROID TESTING  HE REPORTS HE FEELS FATIGUED AND SLUGGISH  HE HAS NO BAKER TO HIS MUSCLE TONE   HE HAS PREVIOUSLY TRIED LEVOTHYROXINE  MCG WITHOUT SIDE EFFECTS  HE TRIED NATURE THYROID BUT DID NOT FEEL LIKE IT WAS WORKING FOR HIM  HE NOTES DRY MOUTH DRY HAIR COURSE SKIN  DENIES CONSTIPATION        The following portions of the patient's history were reviewed and updated as appropriate: allergies, current medications, past family history, past medical history, past social history, past surgical history and problem list     Review of Systems   Constitutional: Positive for fatigue and unexpected weight change  Negative for activity change, appetite change, chills and diaphoresis  HENT: Negative for congestion, ear discharge, ear pain, hearing loss, nosebleeds and rhinorrhea  Eyes: Negative for pain, redness, itching and visual disturbance  Respiratory: Negative for cough, choking, chest tightness and shortness of breath  Cardiovascular: Negative for chest pain and leg swelling  Gastrointestinal: Positive for constipation  Negative for abdominal pain, blood in stool, diarrhea and nausea  Endocrine: Negative for cold intolerance, polydipsia and polyphagia  Genitourinary: Negative for dysuria, frequency, hematuria and urgency     Musculoskeletal: Negative for arthralgias, back pain, gait problem, joint swelling, neck pain and neck stiffness  Skin: Positive for rash  Negative for color change  Allergic/Immunologic: Negative for environmental allergies and food allergies  Neurological: Negative for dizziness, tremors, seizures, speech difficulty, numbness and headaches  Hematological: Negative for adenopathy  Does not bruise/bleed easily  Psychiatric/Behavioral: Negative for behavioral problems, dysphoric mood, hallucinations and self-injury  Objective:  Vitals:    03/08/18 1608   BP: 142/72   Pulse: 67   Temp: 97 5 °F (36 4 °C)   SpO2: 98%   Weight: 115 kg (253 lb)   Height: 6' 1" (1 854 m)      Physical Exam   Constitutional: He is oriented to person, place, and time  He appears well-developed and well-nourished  No distress  HENT:   Head: Normocephalic and atraumatic  Right Ear: External ear normal    Left Ear: External ear normal    Nose: Nose normal    Mouth/Throat: Oropharynx is clear and moist  No oropharyngeal exudate  Eyes: Conjunctivae and EOM are normal  Pupils are equal, round, and reactive to light  Right eye exhibits no discharge  Left eye exhibits no discharge  No scleral icterus  Neck: Normal range of motion  Neck supple  No thyromegaly present  Cardiovascular: Normal rate, regular rhythm and normal heart sounds  No murmur heard  Pulmonary/Chest: Effort normal and breath sounds normal  He has no wheezes  He has no rales  Abdominal: Soft  Bowel sounds are normal  He exhibits no mass  There is no tenderness  There is no guarding  Musculoskeletal: Normal range of motion  He exhibits no edema or tenderness  Lymphadenopathy:     He has no cervical adenopathy  Neurological: He is alert and oriented to person, place, and time  He has normal reflexes  Skin: Skin is warm and dry  He is not diaphoretic  Psychiatric: He has a normal mood and affect   Judgment and thought content normal

## 2018-04-02 DIAGNOSIS — D64.9 ANEMIA: ICD-10-CM

## 2018-04-12 LAB
T4 FREE SERPL-MCNC: 1.39 NG/DL (ref 0.82–1.77)
TSH SERPL DL<=0.005 MIU/L-ACNC: 4.48 UIU/ML (ref 0.45–4.5)

## 2018-04-13 LAB
ALBUMIN SERPL-MCNC: 4.4 G/DL (ref 3.5–5.5)
ALBUMIN/GLOB SERPL: 1.8 {RATIO} (ref 1.2–2.2)
ALP SERPL-CCNC: 72 IU/L (ref 39–117)
ALT SERPL-CCNC: 44 IU/L (ref 0–44)
AMBIG ABBREV DEFAULT: NORMAL
AST SERPL-CCNC: 36 IU/L (ref 0–40)
BASOPHILS # BLD AUTO: 0 X10E3/UL (ref 0–0.2)
BASOPHILS NFR BLD AUTO: 0 %
BILIRUB SERPL-MCNC: 0.3 MG/DL (ref 0–1.2)
BUN SERPL-MCNC: 20 MG/DL (ref 6–24)
BUN/CREAT SERPL: 20 (ref 9–20)
CALCIUM SERPL-MCNC: 8.8 MG/DL (ref 8.7–10.2)
CHLORIDE SERPL-SCNC: 103 MMOL/L (ref 96–106)
CO2 SERPL-SCNC: 23 MMOL/L (ref 18–29)
CREAT SERPL-MCNC: 1 MG/DL (ref 0.76–1.27)
EOSINOPHIL # BLD AUTO: 0 X10E3/UL (ref 0–0.4)
EOSINOPHIL NFR BLD AUTO: 0 %
ERYTHROCYTE [DISTWIDTH] IN BLOOD BY AUTOMATED COUNT: 16 % (ref 12.3–15.4)
FERRITIN SERPL-MCNC: 640 NG/ML (ref 30–400)
GLOBULIN SER-MCNC: 2.5 G/DL (ref 1.5–4.5)
GLUCOSE SERPL-MCNC: 164 MG/DL (ref 65–99)
HCT VFR BLD AUTO: 36.1 % (ref 37.5–51)
HGB BLD-MCNC: 12.6 G/DL (ref 13–17.7)
IMM GRANULOCYTES # BLD: 0 X10E3/UL (ref 0–0.1)
IMM GRANULOCYTES NFR BLD: 0 %
IRON SATN MFR SERPL: 39 % (ref 15–55)
IRON SERPL-MCNC: 113 UG/DL (ref 38–169)
LYMPHOCYTES # BLD AUTO: 2.2 X10E3/UL (ref 0.7–3.1)
LYMPHOCYTES NFR BLD AUTO: 51 %
MCH RBC QN AUTO: 37 PG (ref 26.6–33)
MCHC RBC AUTO-ENTMCNC: 34.9 G/DL (ref 31.5–35.7)
MCV RBC AUTO: 106 FL (ref 79–97)
METHYLMALONATE SERPL-SCNC: 193 NMOL/L (ref 0–378)
MONOCYTES # BLD AUTO: 0.2 X10E3/UL (ref 0.1–0.9)
MONOCYTES NFR BLD AUTO: 5 %
MORPHOLOGY BLD-IMP: ABNORMAL
NEUTROPHILS # BLD AUTO: 1.9 X10E3/UL (ref 1.4–7)
NEUTROPHILS NFR BLD AUTO: 44 %
PLATELET # BLD AUTO: 68 X10E3/UL (ref 150–379)
POTASSIUM SERPL-SCNC: 3.4 MMOL/L (ref 3.5–5.2)
PROT SERPL-MCNC: 6.9 G/DL (ref 6–8.5)
RBC # BLD AUTO: 3.41 X10E6/UL (ref 4.14–5.8)
SL AMB EGFR AFRICAN AMERICAN: 96 ML/MIN/1.73
SL AMB EGFR NON AFRICAN AMERICAN: 83 ML/MIN/1.73
SODIUM SERPL-SCNC: 142 MMOL/L (ref 134–144)
TIBC SERPL-MCNC: 289 UG/DL (ref 250–450)
UIBC SERPL-MCNC: 176 UG/DL (ref 111–343)
WBC # BLD AUTO: 4.3 X10E3/UL (ref 3.4–10.8)

## 2018-04-16 ENCOUNTER — OFFICE VISIT (OUTPATIENT)
Dept: HEMATOLOGY ONCOLOGY | Facility: HOSPITAL | Age: 58
End: 2018-04-16
Payer: COMMERCIAL

## 2018-04-16 VITALS
HEART RATE: 64 BPM | RESPIRATION RATE: 16 BRPM | DIASTOLIC BLOOD PRESSURE: 84 MMHG | WEIGHT: 263 LBS | OXYGEN SATURATION: 97 % | HEIGHT: 73 IN | TEMPERATURE: 97.8 F | SYSTOLIC BLOOD PRESSURE: 144 MMHG | BODY MASS INDEX: 34.85 KG/M2

## 2018-04-16 DIAGNOSIS — D69.6 THROMBOCYTOPENIA (HCC): ICD-10-CM

## 2018-04-16 DIAGNOSIS — D50.9 IRON DEFICIENCY ANEMIA, UNSPECIFIED: Primary | ICD-10-CM

## 2018-04-16 PROCEDURE — 99214 OFFICE O/P EST MOD 30 MIN: CPT | Performed by: INTERNAL MEDICINE

## 2018-04-16 NOTE — PROGRESS NOTES
Hematology/Oncology Outpatient Follow- up Note  Florina Steiner 63 y o  male MRN: @ Encounter: [id number]        Date:  4/16/2018    Presenting Complaint/Diagnosis : Macrocytosis and anemia and thrombocytopenia for at least 4 years      Previous Hematologic/ Oncologic History:    Venofer and B12    Current Hematologic/ Oncologic Treatment:    Observation    Interval History:      Patient returns for follow-up visit  His hemoglobin is 12 6  He still has macrocytosis  Iron studies and B12 studies showed adequate stores  Platelet count is 68 IE still running above 50  As far as symptoms are concerned he still feels a little fatigued  His thyroid medication is being adjusted  The rest of his 14 point review of systems today was negative  Test Results:    Imaging: No results found  Labs:   Lab Results   Component Value Date    WBC 5 39 02/02/2018    HGB 12 6 (L) 04/11/2018    HCT 36 1 (L) 04/11/2018     (H) 04/11/2018    PLT 68 (LL) 04/11/2018     Lab Results   Component Value Date     02/02/2018    K 3 6 02/02/2018     02/02/2018    CO2 29 02/02/2018    ANIONGAP 10 02/02/2018    BUN 20 04/11/2018    CREATININE 1 00 04/11/2018    GLUCOSE 131 02/02/2018    CALCIUM 8 8 02/02/2018    AST 28 02/02/2018    ALT 48 02/02/2018    ALKPHOS 82 02/02/2018    PROT 8 0 02/02/2018    BILITOT 0 70 02/02/2018    EGFR 90 02/02/2018         Lab Results   Component Value Date    IRON 113 04/11/2018    TIBC 289 04/11/2018    FERRITIN 640 (H) 04/11/2018       No results found for: [id number]      ROS: As stated in the history of present illness otherwise his 14 point review of systems today was negative        Active Problems:   Patient Active Problem List   Diagnosis    Hematochezia    Fatty liver    Hyperlipidemia    Hypothyroidism    Macrocytic anemia    Osteoarthritis of both hips    Splenomegaly    Testicular hypogonadism    Thrombocytopenia ([address] )    Ureteric stone    Vitamin D deficiency       Past Medical History:   Past Medical History:   Diagnosis Date    Anemia     last assessed: 09/03/15    Cholelithiasis     Disease of thyroid gland     GERD (gastroesophageal reflux disease)     Hypersecretion of testicular hormones     Insomnia     Kidney stone     Male erectile dysfunction     Osteoarthritis, hip, bilateral     Thrombosed external hemorrhoids        Surgical History:   Past Surgical History:   Procedure Laterality Date    CHOLECYSTECTOMY      WA COLONOSCOPY FLX DX W/COLLJ SPEC WHEN PFRMD N/A 11/6/2017    Procedure: EGD AND COLONOSCOPY;  Surgeon: Sarah Carranza MD;  Location[facility]; Service: Gastroenterology       Family History:    Family History   Problem Relation Age of Onset    Arthritis Mother     Heart disease Father     Hypertension Father     Diabetes Father     Asthma Father     Cancer Paternal Grandmother     Cancer Family     Heart disease Family        Cancer-related family history includes Cancer in his family and paternal grandmother  Social History:   Social History     Social History    Marital status: /Civil Union     Spouse name: N/A    Number of children: N/A    Years of education: N/A     Occupational History    Not on file       Social History Main Topics    Smoking status: Former Smoker    Smokeless tobacco: Never Used      Comment: QUIT 2002    Alcohol use No    Drug use: No    Sexual activity: Not on file     Other Topics Concern    Not on file     Social History Narrative    No narrative on file       Current Medications:   Current Outpatient Prescriptions   Medication Sig Dispense Refill    albuterol (PROVENTIL HFA,VENTOLIN HFA) 90 mcg/act inhaler Inhale 2 puffs every 6 (six) hours as needed for wheezing 18 g 0    Ascorbic Acid (VITAMIN C) 1000 MG tablet Take by mouth      ascorbic acid (VITAMIN C) 500 mg tablet Take 500 mg by mouth daily      benzonatate (TESSALON) 200 MG capsule       Cholecalciferol (VITAMIN D3) 2000 units capsule Take by mouth      levofloxacin (LEVAQUIN) 500 mg tablet       levothyroxine 200 mcg tablet Take 1 tablet (200 mcg total) by mouth daily 60 tablet 0    liothyronine (CYTOMEL) 5 mcg tablet Take 1 tablet (5 mcg total) by mouth daily 60 tablet 0    Omega-3 Fatty Acids (EQL FISH OIL) 1000 MG CAPS Take 1 capsule by mouth daily      Omega-3 Fatty Acids (FISH OIL PO) Take by mouth      promethazine-codeine (PHENERGAN WITH CODEINE) 6 25-10 mg/5 mL syrup        Current Facility-Administered Medications   Medication Dose Route Frequency Provider Last Rate Last Dose    ipratropium-albuterol (DUO-NEB) 0 5-2 5 mg/3 mL inhalation solution 3 mL  3 mL Nebulization Q6H Anna Marie Ponce, DO   3 mL at 02/02/18 5072       Allergies: No Known Allergies    Physical Exam:    Body surface area is 2 41 meters squared  Wt Readings from Last 3 Encounters:   04/16/18 119 kg (263 lb)   03/08/18 115 kg (253 lb)   02/02/18 113 kg (250 lb)        Temp Readings from Last 3 Encounters:   04/16/18 97 8 °F (36 6 °C) (Tympanic)   03/08/18 97 5 °F (36 4 °C)   02/02/18 98 5 °F (36 9 °C) (Temporal)        BP Readings from Last 3 Encounters:   04/16/18 144/84   03/08/18 142/72   02/02/18 140/65         Pulse Readings from Last 3 Encounters:   04/16/18 64   03/08/18 67   02/02/18 79       Physical Exam     Constitutional   General appearance: No acute distress, well appearing and well nourished  Eyes   Conjunctiva and lids: No swelling, erythema or discharge  Pupils and irises: Equal, round and reactive to light  Ears, Nose, Mouth, and Throat   External inspection of ears and nose: Normal     Nasal mucosa, septum, and turbinates: Normal without edema or erythema  Oropharynx: Normal with no erythema, edema, exudate or lesions  Pulmonary   Respiratory effort: No increased work of breathing or signs of respiratory distress  Auscultation of lungs: Clear to auscultation      Cardiovascular   Palpation of heart: Normal PMI, no thrills  Auscultation of heart: Normal rate and rhythm, normal S1 and S2, without murmurs  Examination of extremities for edema and/or varicosities: Normal     Carotid pulses: Normal     Abdomen   Abdomen: Non-tender, no masses  Liver and spleen: No hepatomegaly or splenomegaly  Lymphatic   Palpation of lymph nodes in neck: No lymphadenopathy  Musculoskeletal   Gait and station: Normal     Digits and nails: Normal without clubbing or cyanosis  Inspection/palpation of joints, bones, and muscles: Normal     Skin   Skin and subcutaneous tissue: Normal without rashes or lesions  Neurologic   Cranial nerves: Cranial nerves 2-12 intact  Sensation: No sensory loss  Psychiatric   Orientation to person, place, and time: Normal     Mood and affect: Normal         Assessment / Plan:      The patient is a pleasant 50-year-old male who has had macrocytosis and thrombocytopenia varying degree over the last 4 years  I suspect this thrombocytopenia is secondary to portal hypertension from his fatty liver  His bone marrow biopsy did not show any ring sideroblasts or any evidence of MDS  It was a normocellular marrow  I suspect his blood counts are related to his fatty liver  We have given him Venofer in the past for low iron levels  His most recent blood work shows a hemoglobin of 12 6 platelet count 68 white count was normal at4 3  He has macrocytic indices with an MCV of 36  1  MMA is normal  Iron studies are normal  I will just continue him on observation  I'll see him back in 6 months  Goals and Barriers:  Current Goal:  Prolong Survival from Thrombocytopenia and iron deficiency anemia  Barriers: None  Patient's Capacity to Self Care:  Patient able to self care      Portions of the record may have been created with voice recognition software   Occasional wrong word or "sound a like" substitutions may have occurred due to the inherent limitations of voice recognition software   Read the chart carefully and recognize, using context, where substitutions have occurred

## 2018-05-05 DIAGNOSIS — E03.9 HYPOTHYROIDISM, UNSPECIFIED TYPE: ICD-10-CM

## 2018-05-05 RX ORDER — LIOTHYRONINE SODIUM 5 UG/1
5 TABLET ORAL DAILY
Qty: 90 TABLET | Refills: 1 | Status: SHIPPED | OUTPATIENT
Start: 2018-05-05 | End: 2018-11-03 | Stop reason: SDUPTHER

## 2018-05-05 RX ORDER — LEVOTHYROXINE SODIUM 0.2 MG/1
200 TABLET ORAL DAILY
Qty: 90 TABLET | Refills: 1 | Status: SHIPPED | OUTPATIENT
Start: 2018-05-05 | End: 2018-05-24 | Stop reason: DRUGHIGH

## 2018-05-24 ENCOUNTER — OFFICE VISIT (OUTPATIENT)
Dept: FAMILY MEDICINE CLINIC | Facility: CLINIC | Age: 58
End: 2018-05-24
Payer: COMMERCIAL

## 2018-05-24 VITALS
TEMPERATURE: 97.1 F | OXYGEN SATURATION: 96 % | SYSTOLIC BLOOD PRESSURE: 122 MMHG | BODY MASS INDEX: 33.33 KG/M2 | DIASTOLIC BLOOD PRESSURE: 74 MMHG | HEIGHT: 73 IN | WEIGHT: 251.5 LBS | HEART RATE: 62 BPM

## 2018-05-24 DIAGNOSIS — E03.9 HYPOTHYROIDISM, UNSPECIFIED TYPE: Primary | ICD-10-CM

## 2018-05-24 PROCEDURE — 99213 OFFICE O/P EST LOW 20 MIN: CPT | Performed by: FAMILY MEDICINE

## 2018-05-24 PROCEDURE — 3008F BODY MASS INDEX DOCD: CPT | Performed by: FAMILY MEDICINE

## 2018-05-24 PROCEDURE — 1036F TOBACCO NON-USER: CPT | Performed by: FAMILY MEDICINE

## 2018-05-24 RX ORDER — LEVOTHYROXINE SODIUM 112 UG/1
TABLET ORAL
Qty: 180 TABLET | Refills: 1 | Status: SHIPPED | OUTPATIENT
Start: 2018-05-24 | End: 2018-11-20 | Stop reason: SDUPTHER

## 2018-05-24 NOTE — PROGRESS NOTES
Assessment/Plan:    No problem-specific Assessment & Plan notes found for this encounter  Diagnoses and all orders for this visit:    Hypothyroidism, unspecified type  -     levothyroxine 112 mcg tablet; TAKE 2 TABLETS DAILY         RECHECK TSH AND FREE T4 IN 6 MONTHS  Subjective:      Patient ID: Florina Steiner is a 63 y o  male  PATIENT IS HERE FOR FOLLOW-UP REGARDING HIS THYROID  HE HAS BEEN TAKING 200 MCG OF LEVOTHYROXINE ALONG WITH 5 OF T3 AS LIE 0 THYROID KNEE  HE HAS RECENTLY LOST 12 LB BECAUSE OF A CARBOHYDRATE RESTRICTED DIET AND EXERCISE PROGRAM   HE IS FEELING MUCH BETTER  HE WANTED TO KNOW IF THERE WAS ANYTHING THAT COULD BE DONE WITH HIS MEDICATION  HIS TSH IS MARGINALLY ELEVATED SO WE WILL INCREASE HIM  MCG DAILY        The following portions of the patient's history were reviewed and updated as appropriate: allergies, current medications, past family history, past medical history, past social history, past surgical history and problem list     Review of Systems   Constitutional: Negative for activity change, appetite change, chills, diaphoresis, fatigue and unexpected weight change  HENT: Negative for congestion, ear discharge, ear pain, hearing loss, nosebleeds and rhinorrhea  Eyes: Negative for pain, redness, itching and visual disturbance  Respiratory: Negative for cough, choking, chest tightness and shortness of breath  Cardiovascular: Negative for chest pain and leg swelling  Gastrointestinal: Negative for abdominal pain, blood in stool, constipation, diarrhea and nausea  Endocrine: Negative for cold intolerance, polydipsia and polyphagia  Genitourinary: Negative for dysuria, frequency, hematuria and urgency  Musculoskeletal: Negative for arthralgias, back pain, gait problem, joint swelling, neck pain and neck stiffness  Skin: Negative for color change and rash  Allergic/Immunologic: Negative for environmental allergies and food allergies     Neurological: Negative for dizziness, tremors, seizures, speech difficulty, numbness and headaches  Hematological: Negative for adenopathy  Does not bruise/bleed easily  Psychiatric/Behavioral: Negative for behavioral problems, dysphoric mood, hallucinations and self-injury  Objective:  Vitals:    05/24/18 1651   BP: 122/74   Pulse: 62   Temp: (!) 97 1 °F (36 2 °C)   SpO2: 96%   Weight: 114 kg (251 lb 8 oz)   Height: 6' 1" (1 854 m)      Physical Exam   Constitutional: He is oriented to person, place, and time  He appears well-developed and well-nourished  No distress  HENT:   Head: Normocephalic and atraumatic  Right Ear: External ear normal    Left Ear: External ear normal    Nose: Nose normal    Mouth/Throat: Oropharynx is clear and moist  No oropharyngeal exudate  Eyes: Conjunctivae and EOM are normal  Pupils are equal, round, and reactive to light  Right eye exhibits no discharge  Left eye exhibits no discharge  No scleral icterus  Neck: Normal range of motion  Neck supple  No thyromegaly present  Cardiovascular: Normal rate, regular rhythm and normal heart sounds  No murmur heard  Pulmonary/Chest: Effort normal and breath sounds normal  He has no wheezes  He has no rales  Abdominal: Soft  Bowel sounds are normal  He exhibits no mass  There is no tenderness  There is no guarding  Musculoskeletal: Normal range of motion  He exhibits no edema or tenderness  Lymphadenopathy:     He has no cervical adenopathy  Neurological: He is alert and oriented to person, place, and time  He has normal reflexes  Skin: Skin is warm and dry  He is not diaphoretic  Psychiatric: He has a normal mood and affect   Judgment and thought content normal

## 2018-05-24 NOTE — PATIENT INSTRUCTIONS
Hypothyroidism   AMBULATORY CARE:   Hypothyroidism  is a condition that develops when the thyroid gland does not make enough thyroid hormone  Thyroid hormones help control body temperature, heart rate, growth, and weight  Common signs and symptoms include the following: The signs and symptoms may develop slowly, sometimes over several years  · Exhaustion    · Sensitivity to cold    · Headaches or decreased concentration    · Muscle aches or weakness    · Constipation     · Dry, flaky skin or brittle nails    · Thinning hair    · Heavy or irregular monthly periods    · Depression or irritability  Call 911 for any of the following:   · You have sudden chest pain or shortness of breath  · You have a seizure  · You feel like you are going to faint  Seek care immediately if:   · You have diarrhea, tremors, or trouble sleeping  · Your legs, ankles, or feet are swollen  Contact your healthcare provider if:   · You have a fever  · You have chills, a cough, or feel weak and achy  · You have pain and swelling in your muscles and joints  · Your skin is itchy, swollen, or you have a rash  · Your signs and symptoms return or get worse, even after treatment  · You have questions or concerns about your condition or care  Treatment:  Thyroid hormone replacement medicine may bring your thyroid hormone level back to normal  Ask your healthcare provider for more information on other medicines you may need  Follow up with your healthcare provider as directed: You may need to return for more blood tests to check your thyroid hormone level  This will show if you are getting the right amount of thyroid medicine  Write down your questions so you remember to ask them during your visits  © 2017 [facility] Information is for End User's use only and may not be sold, redistributed or otherwise used for commercial purposes   All illustrations and images included in CareNotes® are the copyrighted property of [employer]  or Jere Dumont  The above information is an  only  It is not intended as medical advice for individual conditions or treatments  Talk to your doctor, nurse or pharmacist before following any medical regimen to see if it is safe and effective for you

## 2018-11-03 DIAGNOSIS — E03.9 HYPOTHYROIDISM, UNSPECIFIED TYPE: ICD-10-CM

## 2018-11-03 RX ORDER — LIOTHYRONINE SODIUM 5 UG/1
5 TABLET ORAL DAILY
Qty: 90 TABLET | Refills: 1 | Status: SHIPPED | OUTPATIENT
Start: 2018-11-03 | End: 2019-04-23 | Stop reason: SDUPTHER

## 2018-11-13 ENCOUNTER — APPOINTMENT (OUTPATIENT)
Dept: LAB | Facility: HOSPITAL | Age: 58
End: 2018-11-13
Attending: INTERNAL MEDICINE
Payer: COMMERCIAL

## 2018-11-13 LAB
ALBUMIN SERPL BCP-MCNC: 3.9 G/DL (ref 3.5–5)
ALP SERPL-CCNC: 71 U/L (ref 46–116)
ALT SERPL W P-5'-P-CCNC: 58 U/L (ref 12–78)
ANION GAP SERPL CALCULATED.3IONS-SCNC: 7 MMOL/L (ref 4–13)
AST SERPL W P-5'-P-CCNC: 37 U/L (ref 5–45)
BASOPHILS # BLD AUTO: 0.01 THOUSANDS/ΜL (ref 0–0.1)
BASOPHILS NFR BLD AUTO: 0 % (ref 0–1)
BILIRUB SERPL-MCNC: 0.3 MG/DL (ref 0.2–1)
BUN SERPL-MCNC: 18 MG/DL (ref 5–25)
CALCIUM SERPL-MCNC: 9 MG/DL (ref 8.3–10.1)
CHLORIDE SERPL-SCNC: 106 MMOL/L (ref 100–108)
CO2 SERPL-SCNC: 29 MMOL/L (ref 21–32)
CREAT SERPL-MCNC: 0.96 MG/DL (ref 0.6–1.3)
EOSINOPHIL # BLD AUTO: 0.02 THOUSAND/ΜL (ref 0–0.61)
EOSINOPHIL NFR BLD AUTO: 1 % (ref 0–6)
ERYTHROCYTE [DISTWIDTH] IN BLOOD BY AUTOMATED COUNT: 13.7 % (ref 11.6–15.1)
FERRITIN SERPL-MCNC: 403 NG/ML (ref 8–388)
GFR SERPL CREATININE-BSD FRML MDRD: 87 ML/MIN/1.73SQ M
GLUCOSE SERPL-MCNC: 103 MG/DL (ref 65–140)
HCT VFR BLD AUTO: 35.1 % (ref 36.5–49.3)
HGB BLD-MCNC: 12 G/DL (ref 12–17)
IMM GRANULOCYTES # BLD AUTO: 0 THOUSAND/UL (ref 0–0.2)
IMM GRANULOCYTES NFR BLD AUTO: 0 % (ref 0–2)
IRON SATN MFR SERPL: 39 %
IRON SERPL-MCNC: 123 UG/DL (ref 65–175)
LYMPHOCYTES # BLD AUTO: 1.83 THOUSANDS/ΜL (ref 0.6–4.47)
LYMPHOCYTES NFR BLD AUTO: 44 % (ref 14–44)
MCH RBC QN AUTO: 37 PG (ref 26.8–34.3)
MCHC RBC AUTO-ENTMCNC: 34.2 G/DL (ref 31.4–37.4)
MCV RBC AUTO: 108 FL (ref 82–98)
MONOCYTES # BLD AUTO: 0.38 THOUSAND/ΜL (ref 0.17–1.22)
MONOCYTES NFR BLD AUTO: 9 % (ref 4–12)
NEUTROPHILS # BLD AUTO: 1.97 THOUSANDS/ΜL (ref 1.85–7.62)
NEUTS SEG NFR BLD AUTO: 46 % (ref 43–75)
NRBC BLD AUTO-RTO: 0 /100 WBCS
PLATELET # BLD AUTO: 65 THOUSANDS/UL (ref 149–390)
PMV BLD AUTO: 11 FL (ref 8.9–12.7)
POTASSIUM SERPL-SCNC: 3.9 MMOL/L (ref 3.5–5.3)
PROT SERPL-MCNC: 7.8 G/DL (ref 6.4–8.2)
RBC # BLD AUTO: 3.24 MILLION/UL (ref 3.88–5.62)
SODIUM SERPL-SCNC: 142 MMOL/L (ref 136–145)
TIBC SERPL-MCNC: 317 UG/DL (ref 250–450)
WBC # BLD AUTO: 4.21 THOUSAND/UL (ref 4.31–10.16)

## 2018-11-13 PROCEDURE — 82728 ASSAY OF FERRITIN: CPT | Performed by: INTERNAL MEDICINE

## 2018-11-13 PROCEDURE — 83540 ASSAY OF IRON: CPT | Performed by: INTERNAL MEDICINE

## 2018-11-13 PROCEDURE — 83550 IRON BINDING TEST: CPT | Performed by: INTERNAL MEDICINE

## 2018-11-13 PROCEDURE — 80053 COMPREHEN METABOLIC PANEL: CPT | Performed by: INTERNAL MEDICINE

## 2018-11-13 PROCEDURE — 83918 ORGANIC ACIDS TOTAL QUANT: CPT | Performed by: INTERNAL MEDICINE

## 2018-11-13 PROCEDURE — 85025 COMPLETE CBC W/AUTO DIFF WBC: CPT | Performed by: INTERNAL MEDICINE

## 2018-11-13 PROCEDURE — 36415 COLL VENOUS BLD VENIPUNCTURE: CPT | Performed by: INTERNAL MEDICINE

## 2018-11-14 ENCOUNTER — OFFICE VISIT (OUTPATIENT)
Dept: HEMATOLOGY ONCOLOGY | Facility: HOSPITAL | Age: 58
End: 2018-11-14
Payer: COMMERCIAL

## 2018-11-14 VITALS
SYSTOLIC BLOOD PRESSURE: 132 MMHG | HEART RATE: 79 BPM | TEMPERATURE: 98.8 F | OXYGEN SATURATION: 97 % | HEIGHT: 73 IN | WEIGHT: 262 LBS | RESPIRATION RATE: 16 BRPM | BODY MASS INDEX: 34.72 KG/M2 | DIASTOLIC BLOOD PRESSURE: 84 MMHG

## 2018-11-14 DIAGNOSIS — D69.6 THROMBOCYTOPENIA (HCC): Primary | ICD-10-CM

## 2018-11-14 PROCEDURE — 99214 OFFICE O/P EST MOD 30 MIN: CPT | Performed by: INTERNAL MEDICINE

## 2018-11-14 NOTE — PROGRESS NOTES
Hematology/Oncology Outpatient Follow- up Note  Dede Lundborg 63 y o  male MRN: @ Encounter: [id number]        Date:  11/14/2018    Presenting Complaint/Diagnosis :     Macrocytosis and anemia and thrombocytopenia for at least 4 years      Previous Hematologic/ Oncologic History:      Venofer and B12    Current Hematologic/ Oncologic Treatment:      Observation    Interval History:      The patient returns for follow-up visit  His hemoglobin is still normal at 12  He still has macrocytosis  Iron studies were normal  Platelet count is running in the same range I e  50-75  As far as symptoms are concerned he is at baseline  Denies any nausea denies any vomiting denies any diarrhea  Denies any bleeding  The rest of his 14 point review of systems today was negative  Test Results:    Imaging: No results found  Labs:   Lab Results   Component Value Date    WBC 4 21 (L) 11/13/2018    HGB 12 0 11/13/2018    HCT 35 1 (L) 11/13/2018     (H) 11/13/2018    PLT 65 (L) 11/13/2018     Lab Results   Component Value Date     11/29/2017    K 3 9 11/13/2018     11/13/2018    CO2 29 11/13/2018    BUN 18 11/13/2018    CREATININE 0 96 11/13/2018    CALCIUM 9 0 11/13/2018    AST 37 11/13/2018    ALT 58 11/13/2018    ALKPHOS 71 11/13/2018    PROT 6 6 11/29/2017    BILITOT 0 4 11/29/2017    EGFR 87 11/13/2018       Lab Results   Component Value Date    IRON 123 11/13/2018    TIBC 317 11/13/2018    FERRITIN 403 (H) 11/13/2018       ROS: As stated in the history of present illness otherwise his 14 point review of systems today was negative        Active Problems:   Patient Active Problem List   Diagnosis    Hematochezia    Fatty liver    Hyperlipidemia    Hypothyroidism    Macrocytic anemia    Osteoarthritis of both hips    Splenomegaly    Testicular hypogonadism    Thrombocytopenia ([address] )    Ureteric stone    Vitamin D deficiency       Past Medical History:   Past Medical History:   Diagnosis Date    Anemia     last assessed: 09/03/15    Cholelithiasis     Disease of thyroid gland     GERD (gastroesophageal reflux disease)     Hypersecretion of testicular hormones     Insomnia     Kidney stone     Male erectile dysfunction     Osteoarthritis, hip, bilateral     Thrombosed external hemorrhoids        Surgical History:   Past Surgical History:   Procedure Laterality Date    CHOLECYSTECTOMY      AL COLONOSCOPY FLX DX W/COLLJ SPEC WHEN PFRMD N/A 11/6/2017    Procedure: EGD AND COLONOSCOPY;  Surgeon: Tara Condon MD;  Location: [facility]; Service: Gastroenterology       Family History:    Family History   Problem Relation Age of Onset    Arthritis Mother     Heart disease Father     Hypertension Father     Diabetes Father     Asthma Father     Cancer Paternal Grandmother     Cancer Family     Heart disease Family        Cancer-related family history includes Cancer in his family and paternal grandmother  Social History:   Social History     Social History    Marital status: /Civil Union     Spouse name: N/A    Number of children: N/A    Years of education: N/A     Occupational History    Not on file       Social History Main Topics    Smoking status: Former Smoker    Smokeless tobacco: Never Used      Comment: QUIT 2002    Alcohol use No    Drug use: No    Sexual activity: Not on file     Other Topics Concern    Not on file     Social History Narrative    No narrative on file       Current Medications:   Current Outpatient Prescriptions   Medication Sig Dispense Refill    albuterol (PROVENTIL HFA,VENTOLIN HFA) 90 mcg/act inhaler Inhale 2 puffs every 6 (six) hours as needed for wheezing 18 g 0    Ascorbic Acid (VITAMIN C) 1000 MG tablet Take by mouth      ascorbic acid (VITAMIN C) 500 mg tablet Take 500 mg by mouth daily      benzonatate (TESSALON) 200 MG capsule       Cholecalciferol (VITAMIN D3) 2000 units capsule Take by mouth      levofloxacin (LEVAQUIN) 500 mg tablet       levothyroxine 112 mcg tablet TAKE 2 TABLETS DAILY 180 tablet 1    liothyronine (CYTOMEL) 5 mcg tablet TAKE 1 TABLET (5 MCG TOTAL) BY MOUTH DAILY 90 tablet 1    Omega-3 Fatty Acids (EQL FISH OIL) 1000 MG CAPS Take 1 capsule by mouth daily      Omega-3 Fatty Acids (FISH OIL PO) Take by mouth      promethazine-codeine (PHENERGAN WITH CODEINE) 6 25-10 mg/5 mL syrup        Current Facility-Administered Medications   Medication Dose Route Frequency Provider Last Rate Last Dose    ipratropium-albuterol (DUO-NEB) 0 5-2 5 mg/3 mL inhalation solution 3 mL  3 mL Nebulization Q6H [facility] [place]   3 mL at 02/02/18 7960       Allergies: No Known Allergies    Physical Exam:    There is no height or weight on file to calculate BSA  Wt Readings from Last 3 Encounters:   05/24/18 114 kg (251 lb 8 oz)   04/16/18 119 kg (263 lb)   03/08/18 115 kg (253 lb)        Temp Readings from Last 3 Encounters:   05/24/18 (!) 97 1 °F (36 2 °C)   04/16/18 97 8 °F (36 6 °C) (Tympanic)   03/08/18 97 5 °F (36 4 °C)        BP Readings from Last 3 Encounters:   05/24/18 122/74   04/16/18 144/84   03/08/18 142/72         Pulse Readings from Last 3 Encounters:   05/24/18 62   04/16/18 64   03/08/18 67        Physical Exam     Constitutional   General appearance: No acute distress, well appearing and well nourished  Eyes   Conjunctiva and lids: No swelling, erythema or discharge  Pupils and irises: Equal, round and reactive to light  Ears, Nose, Mouth, and Throat   External inspection of ears and nose: Normal     Nasal mucosa, septum, and turbinates: Normal without edema or erythema  Oropharynx: Normal with no erythema, edema, exudate or lesions  Pulmonary   Respiratory effort: No increased work of breathing or signs of respiratory distress  Auscultation of lungs: Clear to auscultation  Cardiovascular   Palpation of heart: Normal PMI, no thrills      Auscultation of heart: Normal rate and rhythm, normal S1 and S2, without murmurs  Examination of extremities for edema and/or varicosities: Normal     Carotid pulses: Normal     Abdomen   Abdomen: Non-tender, no masses  Liver and spleen: No hepatomegaly or splenomegaly  Lymphatic   Palpation of lymph nodes in neck: No lymphadenopathy  Musculoskeletal   Gait and station: Normal     Digits and nails: Normal without clubbing or cyanosis  Inspection/palpation of joints, bones, and muscles: Normal     Skin   Skin and subcutaneous tissue: Normal without rashes or lesions  Neurologic   Cranial nerves: Cranial nerves 2-12 intact  Sensation: No sensory loss  Psychiatric   Orientation to person, place, and time: Normal     Mood and affect: Normal         Assessment / Plan:      The patient is a pleasant 36-year-old male who has had macrocytosis and thrombocytopenia varying degree over the last 4 years  I suspect this thrombocytopenia is secondary to portal hypertension from his fatty liver  His bone marrow biopsy did not show any ring sideroblasts or any evidence of MDS  It was a normocellular marrow  I suspect his blood counts are related to his fatty liver  We have given him Venofer in the past for low iron levels  His most recent blood work shows a hemoglobin of 12 platelet count 65 white count was Slightly low at 4 2  He has macrocytic indices with an MCV of 35  1Iron studies are normal  I will continue him on observation  See him back in 6 months  I advised him he does need to go back to see our colleagues in GI so they can follow him for his fatty liver  He has obvious portal hypertension which is leading to these low platelets  We will set him up to see them back as he did have a colonoscopy but for some reason he states his appointment for follow-up was canceled and he never went back to see them  Goals and Barriers:  Current Goal:  Prolong Survival from Low platelet count  Barriers: None        Patient's Capacity to Self Care:  Patient able to self care     Portions of the record may have been created with voice recognition software   Occasional wrong word or "sound a like" substitutions may have occurred due to the inherent limitations of voice recognition software   Read the chart carefully and recognize, using context, where substitutions have occurred

## 2018-11-15 LAB
METHYLMALONATE SERPL-SCNC: 219 NMOL/L (ref 0–378)
SL AMB DISCLAIMER: NORMAL

## 2018-11-20 DIAGNOSIS — E03.9 HYPOTHYROIDISM, UNSPECIFIED TYPE: ICD-10-CM

## 2018-11-20 RX ORDER — LEVOTHYROXINE SODIUM 112 UG/1
TABLET ORAL
Qty: 180 TABLET | Refills: 1 | Status: SHIPPED | OUTPATIENT
Start: 2018-11-20 | End: 2019-05-12 | Stop reason: SDUPTHER

## 2019-04-23 DIAGNOSIS — E03.9 HYPOTHYROIDISM, UNSPECIFIED TYPE: ICD-10-CM

## 2019-04-23 RX ORDER — LIOTHYRONINE SODIUM 5 UG/1
5 TABLET ORAL DAILY
Qty: 90 TABLET | Refills: 1 | Status: SHIPPED | OUTPATIENT
Start: 2019-04-23 | End: 2019-10-13 | Stop reason: SDUPTHER

## 2019-05-12 DIAGNOSIS — E03.9 HYPOTHYROIDISM, UNSPECIFIED TYPE: ICD-10-CM

## 2019-05-12 RX ORDER — LEVOTHYROXINE SODIUM 112 UG/1
TABLET ORAL
Qty: 180 TABLET | Refills: 1 | Status: SHIPPED | OUTPATIENT
Start: 2019-05-12 | End: 2019-11-03 | Stop reason: SDUPTHER

## 2019-05-13 ENCOUNTER — TELEPHONE (OUTPATIENT)
Dept: HEMATOLOGY ONCOLOGY | Facility: HOSPITAL | Age: 59
End: 2019-05-13

## 2019-06-20 ENCOUNTER — APPOINTMENT (OUTPATIENT)
Dept: LAB | Facility: HOSPITAL | Age: 59
End: 2019-06-20
Attending: INTERNAL MEDICINE
Payer: COMMERCIAL

## 2019-06-20 ENCOUNTER — TELEPHONE (OUTPATIENT)
Dept: HEMATOLOGY ONCOLOGY | Facility: CLINIC | Age: 59
End: 2019-06-20

## 2019-06-20 DIAGNOSIS — D69.6 THROMBOCYTOPENIA (HCC): ICD-10-CM

## 2019-06-20 LAB
ALBUMIN SERPL BCP-MCNC: 3.8 G/DL (ref 3.5–5)
ALP SERPL-CCNC: 77 U/L (ref 46–116)
ALT SERPL W P-5'-P-CCNC: 44 U/L (ref 12–78)
ANION GAP SERPL CALCULATED.3IONS-SCNC: 8 MMOL/L (ref 4–13)
AST SERPL W P-5'-P-CCNC: 31 U/L (ref 5–45)
BASOPHILS # BLD AUTO: 0.01 THOUSANDS/ΜL (ref 0–0.1)
BASOPHILS NFR BLD AUTO: 0 % (ref 0–1)
BILIRUB SERPL-MCNC: 0.3 MG/DL (ref 0.2–1)
BUN SERPL-MCNC: 24 MG/DL (ref 5–25)
CALCIUM SERPL-MCNC: 9.2 MG/DL (ref 8.3–10.1)
CHLORIDE SERPL-SCNC: 108 MMOL/L (ref 100–108)
CO2 SERPL-SCNC: 28 MMOL/L (ref 21–32)
CREAT SERPL-MCNC: 1.13 MG/DL (ref 0.6–1.3)
EOSINOPHIL # BLD AUTO: 0.01 THOUSAND/ΜL (ref 0–0.61)
EOSINOPHIL NFR BLD AUTO: 0 % (ref 0–6)
ERYTHROCYTE [DISTWIDTH] IN BLOOD BY AUTOMATED COUNT: 14.4 % (ref 11.6–15.1)
FERRITIN SERPL-MCNC: 334 NG/ML (ref 8–388)
GFR SERPL CREATININE-BSD FRML MDRD: 71 ML/MIN/1.73SQ M
GLUCOSE SERPL-MCNC: 104 MG/DL (ref 65–140)
HCT VFR BLD AUTO: 33.7 % (ref 36.5–49.3)
HGB BLD-MCNC: 11.5 G/DL (ref 12–17)
IMM GRANULOCYTES # BLD AUTO: 0.01 THOUSAND/UL (ref 0–0.2)
IMM GRANULOCYTES NFR BLD AUTO: 0 % (ref 0–2)
IRON SATN MFR SERPL: 29 %
IRON SERPL-MCNC: 92 UG/DL (ref 65–175)
LYMPHOCYTES # BLD AUTO: 2.37 THOUSANDS/ΜL (ref 0.6–4.47)
LYMPHOCYTES NFR BLD AUTO: 52 % (ref 14–44)
MCH RBC QN AUTO: 37.3 PG (ref 26.8–34.3)
MCHC RBC AUTO-ENTMCNC: 34.1 G/DL (ref 31.4–37.4)
MCV RBC AUTO: 109 FL (ref 82–98)
MONOCYTES # BLD AUTO: 0.4 THOUSAND/ΜL (ref 0.17–1.22)
MONOCYTES NFR BLD AUTO: 9 % (ref 4–12)
NEUTROPHILS # BLD AUTO: 1.82 THOUSANDS/ΜL (ref 1.85–7.62)
NEUTS SEG NFR BLD AUTO: 39 % (ref 43–75)
NRBC BLD AUTO-RTO: 0 /100 WBCS
PLATELET # BLD AUTO: 68 THOUSANDS/UL (ref 149–390)
PMV BLD AUTO: 11 FL (ref 8.9–12.7)
POTASSIUM SERPL-SCNC: 3.8 MMOL/L (ref 3.5–5.3)
PROT SERPL-MCNC: 7.6 G/DL (ref 6.4–8.2)
RBC # BLD AUTO: 3.08 MILLION/UL (ref 3.88–5.62)
SODIUM SERPL-SCNC: 144 MMOL/L (ref 136–145)
TIBC SERPL-MCNC: 320 UG/DL (ref 250–450)
WBC # BLD AUTO: 4.62 THOUSAND/UL (ref 4.31–10.16)

## 2019-06-20 PROCEDURE — 83550 IRON BINDING TEST: CPT

## 2019-06-20 PROCEDURE — 83540 ASSAY OF IRON: CPT

## 2019-06-20 PROCEDURE — 85025 COMPLETE CBC W/AUTO DIFF WBC: CPT

## 2019-06-20 PROCEDURE — 80053 COMPREHEN METABOLIC PANEL: CPT

## 2019-06-20 PROCEDURE — 82728 ASSAY OF FERRITIN: CPT

## 2019-06-20 PROCEDURE — 83918 ORGANIC ACIDS TOTAL QUANT: CPT

## 2019-06-20 PROCEDURE — 36415 COLL VENOUS BLD VENIPUNCTURE: CPT

## 2019-06-24 ENCOUNTER — TELEPHONE (OUTPATIENT)
Dept: GASTROENTEROLOGY | Facility: MEDICAL CENTER | Age: 59
End: 2019-06-24

## 2019-06-24 ENCOUNTER — OFFICE VISIT (OUTPATIENT)
Dept: HEMATOLOGY ONCOLOGY | Facility: HOSPITAL | Age: 59
End: 2019-06-24
Payer: COMMERCIAL

## 2019-06-24 VITALS
BODY MASS INDEX: 34.33 KG/M2 | SYSTOLIC BLOOD PRESSURE: 130 MMHG | WEIGHT: 259 LBS | RESPIRATION RATE: 16 BRPM | HEIGHT: 73 IN | OXYGEN SATURATION: 98 % | TEMPERATURE: 97.8 F | DIASTOLIC BLOOD PRESSURE: 82 MMHG | HEART RATE: 67 BPM

## 2019-06-24 DIAGNOSIS — K76.0 FATTY LIVER: ICD-10-CM

## 2019-06-24 DIAGNOSIS — K76.0 FATTY LIVER: Primary | ICD-10-CM

## 2019-06-24 DIAGNOSIS — R16.1 SPLENOMEGALY: ICD-10-CM

## 2019-06-24 DIAGNOSIS — D69.6 THROMBOCYTOPENIA (HCC): Primary | ICD-10-CM

## 2019-06-24 DIAGNOSIS — D53.9 MACROCYTIC ANEMIA: ICD-10-CM

## 2019-06-24 DIAGNOSIS — D69.6 THROMBOCYTOPENIA (HCC): ICD-10-CM

## 2019-06-24 LAB
METHYLMALONATE SERPL-SCNC: 254 NMOL/L (ref 0–378)
SL AMB DISCLAIMER: NORMAL

## 2019-06-24 PROCEDURE — 99214 OFFICE O/P EST MOD 30 MIN: CPT | Performed by: INTERNAL MEDICINE

## 2019-07-08 ENCOUNTER — HOSPITAL ENCOUNTER (OUTPATIENT)
Dept: ULTRASOUND IMAGING | Facility: HOSPITAL | Age: 59
Discharge: HOME | End: 2019-07-08
Attending: INTERNAL MEDICINE
Payer: COMMERCIAL

## 2019-07-08 DIAGNOSIS — D69.6 THROMBOCYTOPENIA (HCC): ICD-10-CM

## 2019-07-08 DIAGNOSIS — R16.1 SPLENOMEGALY: ICD-10-CM

## 2019-07-08 DIAGNOSIS — D53.9 MACROCYTIC ANEMIA: ICD-10-CM

## 2019-07-08 DIAGNOSIS — K76.0 FATTY LIVER: ICD-10-CM

## 2019-07-08 PROCEDURE — 76700 US EXAM ABDOM COMPLETE: CPT

## 2019-10-13 DIAGNOSIS — E03.9 HYPOTHYROIDISM, UNSPECIFIED TYPE: ICD-10-CM

## 2019-10-14 ENCOUNTER — TELEPHONE (OUTPATIENT)
Dept: HEMATOLOGY ONCOLOGY | Facility: HOSPITAL | Age: 59
End: 2019-10-14

## 2019-10-14 RX ORDER — LIOTHYRONINE SODIUM 5 UG/1
5 TABLET ORAL DAILY
Qty: 90 TABLET | Refills: 1 | Status: SHIPPED | OUTPATIENT
Start: 2019-10-14 | End: 2020-04-09

## 2019-10-14 NOTE — TELEPHONE ENCOUNTER
Called and [facility] for pt to see Darrall Brothers on 12/30 instead of Dr Trino Suazo because he will not be in that day

## 2019-11-03 DIAGNOSIS — E03.9 HYPOTHYROIDISM, UNSPECIFIED TYPE: ICD-10-CM

## 2019-11-03 RX ORDER — LEVOTHYROXINE SODIUM 112 UG/1
TABLET ORAL
Qty: 180 TABLET | Refills: 1 | Status: SHIPPED | OUTPATIENT
Start: 2019-11-03 | End: 2020-05-14 | Stop reason: SDUPTHER

## 2020-01-07 PROBLEM — IMO0001 MYALGIA AND MYOSITIS: Status: ACTIVE | Noted: 2020-01-07

## 2020-01-07 PROBLEM — D72.819 LEUCOPENIA: Status: ACTIVE | Noted: 2020-01-07

## 2020-01-08 ENCOUNTER — OFFICE VISIT (OUTPATIENT)
Dept: FAMILY MEDICINE CLINIC | Facility: CLINIC | Age: 60
End: 2020-01-08
Payer: COMMERCIAL

## 2020-01-08 VITALS
WEIGHT: 255 LBS | HEART RATE: 76 BPM | SYSTOLIC BLOOD PRESSURE: 124 MMHG | HEIGHT: 73 IN | BODY MASS INDEX: 33.8 KG/M2 | TEMPERATURE: 97.7 F | DIASTOLIC BLOOD PRESSURE: 80 MMHG | OXYGEN SATURATION: 95 %

## 2020-01-08 DIAGNOSIS — J01.00 ACUTE NON-RECURRENT MAXILLARY SINUSITIS: Primary | ICD-10-CM

## 2020-01-08 PROCEDURE — 3008F BODY MASS INDEX DOCD: CPT | Performed by: FAMILY MEDICINE

## 2020-01-08 PROCEDURE — 1036F TOBACCO NON-USER: CPT | Performed by: FAMILY MEDICINE

## 2020-01-08 PROCEDURE — 99213 OFFICE O/P EST LOW 20 MIN: CPT | Performed by: FAMILY MEDICINE

## 2020-01-08 RX ORDER — CEFUROXIME AXETIL 250 MG/1
250 TABLET ORAL EVERY 12 HOURS SCHEDULED
Qty: 20 TABLET | Refills: 0 | Status: SHIPPED | OUTPATIENT
Start: 2020-01-08 | End: 2020-01-18

## 2020-01-08 NOTE — PATIENT INSTRUCTIONS
Sinusitis   WHAT YOU NEED TO KNOW:   Sinusitis is inflammation or infection of your sinuses  It is most often caused by a virus  Acute sinusitis may last up to 12 weeks  Chronic sinusitis lasts longer than 12 weeks  Recurrent sinusitis means you have 4 or more times in 1 year  DISCHARGE INSTRUCTIONS:   Return to the emergency department if:   · Your eye and eyelid are red, swollen, and painful  · You cannot open your eye  · You have vision changes, such as double vision  · Your eyeball bulges out or you cannot move your eye  · You are more sleepy than normal, or you notice changes in your ability to think, move, or talk  · You have a stiff neck, a fever, or a bad headache  · You have swelling of your forehead or scalp  Contact your healthcare provider if:   · Your symptoms do not improve after 3 days  · Your symptoms do not go away after 10 days  · You have nausea and are vomiting  · Your nose is bleeding  · You have questions or concerns about your condition or care  Medicines: Your symptoms may go away on their own  Your healthcare provider may recommend watchful waiting for up to 10 days before starting antibiotics  You may  need any of the following:  · Acetaminophen  decreases pain and fever  It is available without a doctor's order  Ask how much to take and how often to take it  Follow directions  Read the labels of all other medicines you are using to see if they also contain acetaminophen, or ask your doctor or pharmacist  Acetaminophen can cause liver damage if not taken correctly  Do not use more than 4 grams (4,000 milligrams) total of acetaminophen in one day  · NSAIDs , such as ibuprofen, help decrease swelling, pain, and fever  This medicine is available with or without a doctor's order  NSAIDs can cause stomach bleeding or kidney problems in certain people  If you take blood thinner medicine, always ask your healthcare provider if NSAIDs are safe for you  Always read the medicine label and follow directions  · Nasal steroid sprays  may help decrease inflammation in your nose and sinuses  · Decongestants  help reduce swelling and drain mucus in the nose and sinuses  They may help you breathe easier  · Antihistamines  help dry mucus in the nose and relieve sneezing  · Antibiotics  help treat or prevent a bacterial infection  · Take your medicine as directed  Contact your healthcare provider if you think your medicine is not helping or if you have side effects  Tell him or her if you are allergic to any medicine  Keep a list of the medicines, vitamins, and herbs you take  Include the amounts, and when and why you take them  Bring the list or the pill bottles to follow-up visits  Carry your medicine list with you in case of an emergency  Self-care:   · Rinse your sinuses  Use a sinus rinse device to rinse your nasal passages with a saline (salt water) solution or distilled water  Do not use tap water  This will help thin the mucus in your nose and rinse away pollen and dirt  It will also help reduce swelling so you can breathe normally  Ask your healthcare provider how often to do this  · Breathe in steam   Heat a bowl of water until you see steam  Lean over the bowl and make a tent over your head with a large towel  Breathe deeply for about 20 minutes  Be careful not to get too close to the steam or burn yourself  Do this 3 times a day  You can also breathe deeply when you take a hot shower  · Sleep with your head elevated  Place an extra pillow under your head before you go to sleep to help your sinuses drain  · Drink liquids as directed  Ask your healthcare provider how much liquid to drink each day and which liquids are best for you  Liquids will thin the mucus in your nose and help it drain  Avoid drinks that contain alcohol or caffeine  · Do not smoke, and avoid secondhand smoke    Nicotine and other chemicals in cigarettes and cigars can make your symptoms worse  Ask your healthcare provider for information if you currently smoke and need help to quit  E-cigarettes or smokeless tobacco still contain nicotine  Talk to your healthcare provider before you use these products  Prevent the spread of germs that cause sinusitis:  Wash your hands often with soap and water  Wash your hands after you use the bathroom, change a child's diaper, or sneeze  Wash your hands before you prepare or eat food  Follow up with your healthcare provider as directed: You may be referred to an ear, nose, and throat specialist  Write down your questions so you remember to ask them during your visits  © 2017 [address] Information is for End User's use only and may not be sold, redistributed or otherwise used for commercial purposes  All illustrations and images included in CareNotes® are the copyrighted property of A D A M [employer]  or [employer]re Dum[employer]  The above information is an  only  It is not intended as medical advice for individual conditions or treatments  Talk to your doctor, nurse or pharmacist before following any medical regimen to see if it is safe and effective for you  Weight Management   AMBULATORY CARE:   Why it is important to manage your weight:  Being overweight increases your risk of health conditions such as heart disease, high blood pressure, type 2 diabetes, and certain types of cancer  It can also increase your risk for osteoarthritis, sleep apnea, and other respiratory problems  Aim for a slow, steady weight loss  Even a small amount of weight loss can lower your risk of health problems  How to lose weight safely:  A safe and healthy way to lose weight is to eat fewer calories and get regular exercise  You can lose up about 1 pound a week by decreasing the number of calories you eat by 500 calories each day   You can decrease calories by eating smaller portion sizes or by cutting out high-calorie foods  Read labels to find out how many calories are in the foods you eat  You can also burn calories with exercise such as walking, swimming, or biking  You will be more likely to keep weight off if you make these changes part of your lifestyle  Healthy meal plan for weight management:  A healthy meal plan includes a variety of foods, contains fewer calories, and helps you stay healthy  A healthy meal plan includes the following:  · Eat whole-grain foods more often  A healthy meal plan should contain fiber  Fiber is the part of grains, fruits, and vegetables that is not broken down by your body  Whole-grain foods are healthy and provide extra fiber in your diet  Some examples of whole-grain foods are whole-wheat breads and pastas, oatmeal, brown rice, and bulgur  · Eat a variety of vegetables every day  Include dark, leafy greens such as spinach, kale, ricardo greens, and mustard greens  Eat yellow and orange vegetables such as carrots, sweet potatoes, and winter squash  · Eat a variety of fruits every day  Choose fresh or canned fruit (canned in its own juice or light syrup) instead of juice  Fruit juice has very little or no fiber  · Eat low-fat dairy foods  Drink fat-free (skim) milk or 1% milk  Eat fat-free yogurt and low-fat cottage cheese  Try low-fat cheeses such as mozzarella and other reduced-fat cheeses  · Choose meat and other protein foods that are low in fat  Choose beans or other legumes such as split peas or lentils  Choose fish, skinless poultry (chicken or turkey), or lean cuts of red meat (beef or pork)  Before you cook meat or poultry, cut off any visible fat  · Use less fat and oil  Try baking foods instead of frying them  Add less fat, such as margarine, sour cream, regular salad dressing and mayonnaise to foods  Eat fewer high-fat foods  Some examples of high-fat foods include french fries, doughnuts, ice cream, and cakes  · Eat fewer sweets    Limit foods and drinks that are high in sugar  This includes candy, cookies, regular soda, and sweetened drinks  Ways to decrease calories:   · Eat smaller portions  ¨ Use a small plate with smaller servings  ¨ Do not eat second helpings  ¨ When you eat at a restaurant, ask for a box and place half of your meal in the box before you eat  ¨ Share an entrée with someone else  · Replace high-calorie snacks with healthy, low-calorie snacks  ¨ Choose fresh fruit, vegetables, fat-free rice cakes, or air-popped popcorn instead of potato chips, nuts, or chocolate  ¨ Choose water or calorie-free drinks instead of soda or sweetened drinks  · Eat regular meals  Skipping meals can lead to overeating later in the day  Eat a healthy snack in place of a meal if you do not have time to eat a regular meal      · Do not shop for groceries when you are hungry  You may be more likely to make unhealthy food choices  Take a grocery list of healthy foods and shop after you have eaten  Exercise:  Exercise at least 30 minutes per day on most days of the week  Some examples of exercise include walking, biking, dancing, and swimming  You can also fit in more physical activity by taking the stairs instead of the elevator or parking farther away from stores  Ask your healthcare provider about the best exercise plan for you  Other things to consider as you try to lose weight:   · Be aware of situations that may give you the urge to overeat, such as eating while watching television  Find ways to avoid these situations  For example, read a book, go for a walk, or do crafts  · Meet with a weight loss support group or friends who are also trying to lose weight  This may help you stay motivated to continue working on your weight loss goals  © 2017 [employer] Information is for End User's use only and may not be sold, redistributed or otherwise used for commercial purposes   All illustrations and images included in fsboWOW 605 are the copyrighted property of [employer]  or Jere Dumont  The above information is an  only  It is not intended as medical advice for individual conditions or treatments  Talk to your doctor, nurse or pharmacist before following any medical regimen to see if it is safe and effective for you

## 2020-01-08 NOTE — PROGRESS NOTES
Subjective:   Chief Complaint   Patient presents with    Cold Like Symptoms     pt c/o head pressure , HA,  cough,  & chest & head congestion, pt stated it started ryan day and lasted untill new years, pt was taking advil cold and sinus that helped,  pt states his symptoms went away for a couple days and now are back         Patient ID: Maynor Ellison is a 63 y o  male  Patient is here with a 2 week history of sinus pressure and headache  He has discomfort into his teeth and gums  It is draining into his chest causing him to cough  He has had flushing and night sweats  The following portions of the patient's history were reviewed and updated as appropriate: allergies, current medications, past family history, past medical history, past social history, past surgical history and problem list     Review of Systems   Constitutional: Negative for activity change, appetite change, chills, diaphoresis, fatigue and unexpected weight change  HENT: Positive for postnasal drip, sinus pressure and sinus pain  Negative for congestion, ear discharge, ear pain, hearing loss, nosebleeds and rhinorrhea  Eyes: Negative for pain, redness, itching and visual disturbance  Respiratory: Positive for cough  Negative for choking, chest tightness and shortness of breath  Cardiovascular: Negative for chest pain and leg swelling  Gastrointestinal: Negative for abdominal pain, blood in stool, constipation, diarrhea and nausea  Endocrine: Negative for cold intolerance, polydipsia and polyphagia  Genitourinary: Negative for dysuria, frequency, hematuria and urgency  Musculoskeletal: Negative for arthralgias, back pain, gait problem, joint swelling, neck pain and neck stiffness  Skin: Negative for color change and rash  Allergic/Immunologic: Negative for environmental allergies and food allergies  Neurological: Negative for dizziness, tremors, seizures, speech difficulty, numbness and headaches  Hematological: Negative for adenopathy  Does not bruise/bleed easily  Psychiatric/Behavioral: Negative for behavioral problems, dysphoric mood, hallucinations and self-injury  Objective:  Vitals:    01/08/20 0949   BP: 124/80   Pulse: 76   Temp: 97 7 °F (36 5 °C)   SpO2: 95%   Weight: 116 kg (255 lb)   Height: 6' 1" (1 854 m)      Physical Exam   Constitutional: He is oriented to person, place, and time  He appears well-developed and well-nourished  No distress  HENT:   Head: Normocephalic and atraumatic  Right Ear: External ear normal    Left Ear: External ear normal    Nose: Nose normal    Mouth/Throat: Oropharynx is clear and moist  No oropharyngeal exudate  Sinus tenderness   Eyes: Pupils are equal, round, and reactive to light  Conjunctivae and EOM are normal  Right eye exhibits no discharge  Left eye exhibits no discharge  No scleral icterus  Neck: Normal range of motion  Neck supple  No thyromegaly present  Cardiovascular: Normal rate, regular rhythm and normal heart sounds  No murmur heard  Pulmonary/Chest: Effort normal and breath sounds normal  He has no wheezes  He has no rales  Abdominal: Soft  Bowel sounds are normal  He exhibits no mass  There is no tenderness  There is no guarding  Musculoskeletal: Normal range of motion  He exhibits no edema or tenderness  Lymphadenopathy:     He has no cervical adenopathy  Neurological: He is alert and oriented to person, place, and time  He has normal reflexes  Skin: Skin is warm and dry  He is not diaphoretic  Psychiatric: He has a normal mood and affect  Judgment and thought content normal          Assessment/Plan:    No problem-specific Assessment & Plan notes found for this encounter  Diagnoses and all orders for this visit:    Acute non-recurrent maxillary sinusitis  -     cefuroxime (CEFTIN) 250 mg tablet;  Take 1 tablet (250 mg total) by mouth every 12 (twelve) hours for 10 days    BMI 33 0-33 9,adult Patient is also given a weaning dose of prednisone provided as samples  BMI Counseling: Body mass index is 33 64 kg/m²  The BMI is above normal  Nutrition recommendations include reducing fast food intake and consuming healthier snacks  Exercise recommendations include exercising 3-5 times per week

## 2020-01-15 DIAGNOSIS — J01.00 ACUTE NON-RECURRENT MAXILLARY SINUSITIS: Primary | ICD-10-CM

## 2020-01-15 RX ORDER — LEVOFLOXACIN 500 MG/1
500 TABLET, FILM COATED ORAL EVERY 24 HOURS
Qty: 10 TABLET | Refills: 0 | Status: SHIPPED | OUTPATIENT
Start: 2020-01-15 | End: 2020-01-25

## 2020-01-15 RX ORDER — LEVOFLOXACIN 500 MG/1
500 TABLET, FILM COATED ORAL EVERY 24 HOURS
Qty: 10 TABLET | Refills: 0 | Status: SHIPPED | OUTPATIENT
Start: 2020-01-15 | End: 2020-01-15 | Stop reason: SDUPTHER

## 2020-01-15 NOTE — TELEPHONE ENCOUNTER
Saw you last wed ? His sinus infection is not getting any better with the meds you RX for him  Can you change the medication?     [phone]

## 2020-01-16 NOTE — TELEPHONE ENCOUNTER
Wife called because patient still did not get rx and is concerned regarding repeat pneumo  I cancelled at [facility] and phoned into [facility]

## 2020-04-09 ENCOUNTER — TELEPHONE (OUTPATIENT)
Dept: FAMILY MEDICINE CLINIC | Facility: CLINIC | Age: 60
End: 2020-04-09

## 2020-04-09 DIAGNOSIS — E29.1 HYPOGONADISM IN MALE: Primary | ICD-10-CM

## 2020-04-09 DIAGNOSIS — E03.9 HYPOTHYROIDISM, UNSPECIFIED TYPE: ICD-10-CM

## 2020-04-09 RX ORDER — LIOTHYRONINE SODIUM 5 UG/1
5 TABLET ORAL DAILY
Qty: 90 TABLET | Refills: 1 | Status: SHIPPED | OUTPATIENT
Start: 2020-04-09 | End: 2020-05-14 | Stop reason: SDUPTHER

## 2020-05-14 RX ORDER — LIOTHYRONINE SODIUM 5 UG/1
5 TABLET ORAL DAILY
Qty: 90 TABLET | Refills: 1 | Status: SHIPPED | OUTPATIENT
Start: 2020-05-14 | End: 2021-03-15

## 2020-05-14 RX ORDER — LEVOTHYROXINE SODIUM 112 UG/1
224 TABLET ORAL DAILY
Qty: 180 TABLET | Refills: 1 | Status: SHIPPED | OUTPATIENT
Start: 2020-05-14 | End: 2020-11-06

## 2020-05-15 ENCOUNTER — TELEMEDICINE (OUTPATIENT)
Dept: FAMILY MEDICINE CLINIC | Facility: CLINIC | Age: 60
End: 2020-05-15
Payer: COMMERCIAL

## 2020-05-15 VITALS — BODY MASS INDEX: 33.8 KG/M2 | HEIGHT: 73 IN | WEIGHT: 255 LBS

## 2020-05-15 DIAGNOSIS — H60.332 ACUTE SWIMMER'S EAR OF LEFT SIDE: ICD-10-CM

## 2020-05-15 DIAGNOSIS — H65.02 NON-RECURRENT ACUTE SEROUS OTITIS MEDIA OF LEFT EAR: Primary | ICD-10-CM

## 2020-05-15 PROBLEM — I73.9 CLAUDICATION (HCC): Status: ACTIVE | Noted: 2020-05-15

## 2020-05-15 PROCEDURE — 99213 OFFICE O/P EST LOW 20 MIN: CPT | Performed by: FAMILY MEDICINE

## 2020-05-15 RX ORDER — NEOMYCIN SULFATE, POLYMYXIN B SULFATE AND HYDROCORTISONE 10; 3.5; 1 MG/ML; MG/ML; [USP'U]/ML
4 SUSPENSION/ DROPS AURICULAR (OTIC) EVERY 8 HOURS SCHEDULED
Qty: 10 ML | Refills: 0 | Status: SHIPPED | OUTPATIENT
Start: 2020-05-15 | End: 2021-03-19 | Stop reason: ALTCHOICE

## 2020-05-15 RX ORDER — CEFUROXIME AXETIL 250 MG/1
250 TABLET ORAL EVERY 12 HOURS SCHEDULED
Qty: 14 TABLET | Refills: 0 | Status: SHIPPED | OUTPATIENT
Start: 2020-05-15 | End: 2020-05-22

## 2020-05-16 PROBLEM — H60.332 ACUTE SWIMMER'S EAR OF LEFT SIDE: Status: ACTIVE | Noted: 2020-05-16

## 2020-05-16 PROBLEM — H65.02 NON-RECURRENT ACUTE SEROUS OTITIS MEDIA OF LEFT EAR: Status: ACTIVE | Noted: 2020-05-16

## 2020-06-09 LAB
ALBUMIN SERPL-MCNC: 4.2 G/DL (ref 3.6–5.1)
ALBUMIN/GLOB SERPL: 1.7 (CALC) (ref 1–2.5)
ALP SERPL-CCNC: 64 U/L (ref 35–144)
ALT SERPL-CCNC: 28 U/L (ref 9–46)
AST SERPL-CCNC: 22 U/L (ref 10–35)
BASOPHILS # BLD AUTO: 11 CELLS/UL (ref 0–200)
BASOPHILS NFR BLD AUTO: 0.3 %
BILIRUB SERPL-MCNC: 0.5 MG/DL (ref 0.2–1.2)
BUN SERPL-MCNC: 14 MG/DL (ref 7–25)
BUN/CREAT SERPL: NORMAL (CALC) (ref 6–22)
CALCIUM SERPL-MCNC: 9.1 MG/DL (ref 8.6–10.3)
CHLORIDE SERPL-SCNC: 108 MMOL/L (ref 98–110)
CO2 SERPL-SCNC: 26 MMOL/L (ref 20–32)
CREAT SERPL-MCNC: 0.89 MG/DL (ref 0.7–1.33)
EOSINOPHIL # BLD AUTO: 21 CELLS/UL (ref 15–500)
EOSINOPHIL NFR BLD AUTO: 0.6 %
ERYTHROCYTE [DISTWIDTH] IN BLOOD BY AUTOMATED COUNT: 14 % (ref 11–15)
GLOBULIN SER CALC-MCNC: 2.5 G/DL (CALC) (ref 1.9–3.7)
GLUCOSE SERPL-MCNC: 96 MG/DL (ref 65–99)
HCT VFR BLD AUTO: 36 % (ref 38.5–50)
HGB BLD-MCNC: 12 G/DL (ref 13.2–17.1)
LYMPHOCYTES # BLD AUTO: 1435 CELLS/UL (ref 850–3900)
LYMPHOCYTES NFR BLD AUTO: 41 %
MCH RBC QN AUTO: 35.9 PG (ref 27–33)
MCHC RBC AUTO-ENTMCNC: 33.3 G/DL (ref 32–36)
MCV RBC AUTO: 107.8 FL (ref 80–100)
MONOCYTES # BLD AUTO: 270 CELLS/UL (ref 200–950)
MONOCYTES NFR BLD AUTO: 7.7 %
NEUTROPHILS # BLD AUTO: 1764 CELLS/UL (ref 1500–7800)
NEUTROPHILS NFR BLD AUTO: 50.4 %
PLATELET # BLD AUTO: 87 THOUSAND/UL (ref 140–400)
PMV BLD REES-ECKER: 10.8 FL (ref 7.5–12.5)
POTASSIUM SERPL-SCNC: 4.1 MMOL/L (ref 3.5–5.3)
PROT SERPL-MCNC: 6.7 G/DL (ref 6.1–8.1)
RBC # BLD AUTO: 3.34 MILLION/UL (ref 4.2–5.8)
SL AMB EGFR AFRICAN AMERICAN: 108 ML/MIN/1.73M2
SL AMB EGFR NON AFRICAN AMERICAN: 94 ML/MIN/1.73M2
SODIUM SERPL-SCNC: 142 MMOL/L (ref 135–146)
WBC # BLD AUTO: 3.5 THOUSAND/UL (ref 3.8–10.8)

## 2020-06-11 ENCOUNTER — OFFICE VISIT (OUTPATIENT)
Dept: FAMILY MEDICINE CLINIC | Facility: CLINIC | Age: 60
End: 2020-06-11
Payer: COMMERCIAL

## 2020-06-11 VITALS
BODY MASS INDEX: 34.06 KG/M2 | DIASTOLIC BLOOD PRESSURE: 72 MMHG | TEMPERATURE: 98.6 F | WEIGHT: 257 LBS | HEIGHT: 73 IN | SYSTOLIC BLOOD PRESSURE: 136 MMHG | OXYGEN SATURATION: 97 % | HEART RATE: 63 BPM

## 2020-06-11 DIAGNOSIS — M16.0 PRIMARY OSTEOARTHRITIS OF BOTH HIPS: ICD-10-CM

## 2020-06-11 DIAGNOSIS — E03.9 HYPOTHYROIDISM, UNSPECIFIED TYPE: ICD-10-CM

## 2020-06-11 DIAGNOSIS — Z00.00 WELL ADULT EXAM: Primary | ICD-10-CM

## 2020-06-11 DIAGNOSIS — E29.1 TESTICULAR HYPOGONADISM: ICD-10-CM

## 2020-06-11 LAB
T4 FREE SERPL-MCNC: 1.5 NG/DL (ref 0.8–1.8)
TESTOST FREE SERPL-MCNC: 45.2 PG/ML (ref 35–155)
TESTOST SERPL-MCNC: 266 NG/DL (ref 250–1100)
TSH SERPL-ACNC: 1.36 MIU/L (ref 0.4–4.5)

## 2020-06-11 PROCEDURE — 3008F BODY MASS INDEX DOCD: CPT | Performed by: FAMILY MEDICINE

## 2020-06-11 PROCEDURE — 99396 PREV VISIT EST AGE 40-64: CPT | Performed by: FAMILY MEDICINE

## 2020-10-17 ENCOUNTER — IMMUNIZATIONS (OUTPATIENT)
Dept: FAMILY MEDICINE CLINIC | Facility: CLINIC | Age: 60
End: 2020-10-17
Payer: COMMERCIAL

## 2020-10-17 DIAGNOSIS — Z23 NEED FOR INFLUENZA VACCINATION: Primary | ICD-10-CM

## 2020-10-17 PROCEDURE — 90682 RIV4 VACC RECOMBINANT DNA IM: CPT

## 2020-10-17 PROCEDURE — 90471 IMMUNIZATION ADMIN: CPT

## 2020-10-26 ENCOUNTER — OFFICE VISIT (OUTPATIENT)
Dept: FAMILY MEDICINE CLINIC | Facility: CLINIC | Age: 60
End: 2020-10-26
Payer: COMMERCIAL

## 2020-10-26 VITALS
DIASTOLIC BLOOD PRESSURE: 81 MMHG | OXYGEN SATURATION: 98 % | BODY MASS INDEX: 34.59 KG/M2 | TEMPERATURE: 97.5 F | HEART RATE: 42 BPM | SYSTOLIC BLOOD PRESSURE: 140 MMHG | WEIGHT: 261 LBS | HEIGHT: 73 IN

## 2020-10-26 DIAGNOSIS — H61.22 HEARING LOSS OF LEFT EAR DUE TO CERUMEN IMPACTION: Primary | ICD-10-CM

## 2020-10-26 PROCEDURE — 99213 OFFICE O/P EST LOW 20 MIN: CPT | Performed by: FAMILY MEDICINE

## 2020-10-26 PROCEDURE — 69210 REMOVE IMPACTED EAR WAX UNI: CPT | Performed by: FAMILY MEDICINE

## 2020-11-04 ENCOUNTER — TELEPHONE (OUTPATIENT)
Dept: HEMATOLOGY ONCOLOGY | Facility: CLINIC | Age: 60
End: 2020-11-04

## 2020-11-06 ENCOUNTER — TELEPHONE (OUTPATIENT)
Dept: HEMATOLOGY ONCOLOGY | Facility: HOSPITAL | Age: 60
End: 2020-11-06

## 2020-11-06 ENCOUNTER — TELEPHONE (OUTPATIENT)
Dept: HEMATOLOGY ONCOLOGY | Facility: CLINIC | Age: 60
End: 2020-11-06

## 2020-11-06 ENCOUNTER — APPOINTMENT (OUTPATIENT)
Dept: LAB | Facility: HOSPITAL | Age: 60
End: 2020-11-06
Attending: INTERNAL MEDICINE
Payer: COMMERCIAL

## 2020-11-06 DIAGNOSIS — D50.9 IRON DEFICIENCY ANEMIA, UNSPECIFIED IRON DEFICIENCY ANEMIA TYPE: ICD-10-CM

## 2020-11-06 DIAGNOSIS — E03.9 HYPOTHYROIDISM, UNSPECIFIED TYPE: ICD-10-CM

## 2020-11-06 DIAGNOSIS — D69.6 THROMBOCYTOPENIA (HCC): ICD-10-CM

## 2020-11-06 DIAGNOSIS — D69.6 THROMBOCYTOPENIA (HCC): Primary | ICD-10-CM

## 2020-11-06 LAB
BASOPHILS # BLD AUTO: 0.01 THOUSANDS/ΜL (ref 0–0.1)
BASOPHILS NFR BLD AUTO: 0 % (ref 0–1)
EOSINOPHIL # BLD AUTO: 0.02 THOUSAND/ΜL (ref 0–0.61)
EOSINOPHIL NFR BLD AUTO: 0 % (ref 0–6)
ERYTHROCYTE [DISTWIDTH] IN BLOOD BY AUTOMATED COUNT: 14.4 % (ref 11.6–15.1)
FERRITIN SERPL-MCNC: 315 NG/ML (ref 8–388)
HCT VFR BLD AUTO: 32.7 % (ref 36.5–49.3)
HGB BLD-MCNC: 11.1 G/DL (ref 12–17)
IMM GRANULOCYTES # BLD AUTO: 0.01 THOUSAND/UL (ref 0–0.2)
IMM GRANULOCYTES NFR BLD AUTO: 0 % (ref 0–2)
IRON SATN MFR SERPL: 13 %
IRON SERPL-MCNC: 40 UG/DL (ref 65–175)
LYMPHOCYTES # BLD AUTO: 2 THOUSANDS/ΜL (ref 0.6–4.47)
LYMPHOCYTES NFR BLD AUTO: 40 % (ref 14–44)
MCH RBC QN AUTO: 36.5 PG (ref 26.8–34.3)
MCHC RBC AUTO-ENTMCNC: 33.9 G/DL (ref 31.4–37.4)
MCV RBC AUTO: 108 FL (ref 82–98)
MONOCYTES # BLD AUTO: 0.42 THOUSAND/ΜL (ref 0.17–1.22)
MONOCYTES NFR BLD AUTO: 8 % (ref 4–12)
NEUTROPHILS # BLD AUTO: 2.59 THOUSANDS/ΜL (ref 1.85–7.62)
NEUTS SEG NFR BLD AUTO: 52 % (ref 43–75)
NRBC BLD AUTO-RTO: 0 /100 WBCS
PLATELET # BLD AUTO: 69 THOUSANDS/UL (ref 149–390)
PMV BLD AUTO: 11.6 FL (ref 8.9–12.7)
RBC # BLD AUTO: 3.04 MILLION/UL (ref 3.88–5.62)
TIBC SERPL-MCNC: 319 UG/DL (ref 250–450)
WBC # BLD AUTO: 5.05 THOUSAND/UL (ref 4.31–10.16)

## 2020-11-06 PROCEDURE — 83550 IRON BINDING TEST: CPT

## 2020-11-06 PROCEDURE — 83540 ASSAY OF IRON: CPT

## 2020-11-06 PROCEDURE — 83918 ORGANIC ACIDS TOTAL QUANT: CPT

## 2020-11-06 PROCEDURE — 36415 COLL VENOUS BLD VENIPUNCTURE: CPT

## 2020-11-06 PROCEDURE — 82728 ASSAY OF FERRITIN: CPT

## 2020-11-06 PROCEDURE — 85025 COMPLETE CBC W/AUTO DIFF WBC: CPT

## 2020-11-06 RX ORDER — LEVOTHYROXINE SODIUM 112 UG/1
224 TABLET ORAL DAILY
Qty: 180 TABLET | Refills: 1 | Status: SHIPPED | OUTPATIENT
Start: 2020-11-06 | End: 2021-05-03

## 2020-11-11 ENCOUNTER — TELEPHONE (OUTPATIENT)
Dept: HEMATOLOGY ONCOLOGY | Facility: CLINIC | Age: 60
End: 2020-11-11

## 2020-11-11 ENCOUNTER — OFFICE VISIT (OUTPATIENT)
Dept: HEMATOLOGY ONCOLOGY | Facility: HOSPITAL | Age: 60
End: 2020-11-11
Payer: COMMERCIAL

## 2020-11-11 VITALS
TEMPERATURE: 98.7 F | WEIGHT: 262 LBS | HEART RATE: 79 BPM | DIASTOLIC BLOOD PRESSURE: 78 MMHG | RESPIRATION RATE: 16 BRPM | HEIGHT: 73 IN | OXYGEN SATURATION: 99 % | BODY MASS INDEX: 34.72 KG/M2 | SYSTOLIC BLOOD PRESSURE: 148 MMHG

## 2020-11-11 DIAGNOSIS — R16.1 SPLENOMEGALY: ICD-10-CM

## 2020-11-11 DIAGNOSIS — D50.0 IRON DEFICIENCY ANEMIA DUE TO CHRONIC BLOOD LOSS: Primary | ICD-10-CM

## 2020-11-11 DIAGNOSIS — D69.6 THROMBOCYTOPENIA (HCC): ICD-10-CM

## 2020-11-11 DIAGNOSIS — D50.9 IRON DEFICIENCY ANEMIA, UNSPECIFIED IRON DEFICIENCY ANEMIA TYPE: ICD-10-CM

## 2020-11-11 PROCEDURE — 1036F TOBACCO NON-USER: CPT | Performed by: INTERNAL MEDICINE

## 2020-11-11 PROCEDURE — 99214 OFFICE O/P EST MOD 30 MIN: CPT | Performed by: INTERNAL MEDICINE

## 2020-11-11 PROCEDURE — 3008F BODY MASS INDEX DOCD: CPT | Performed by: INTERNAL MEDICINE

## 2020-11-11 RX ORDER — SODIUM CHLORIDE 9 MG/ML
20 INJECTION, SOLUTION INTRAVENOUS ONCE
Status: CANCELLED | OUTPATIENT
Start: 2021-01-04

## 2020-11-13 LAB
METHYLMALONATE SERPL-SCNC: 198 NMOL/L (ref 0–378)
SL AMB DISCLAIMER: NORMAL

## 2020-11-23 ENCOUNTER — TELEPHONE (OUTPATIENT)
Dept: FAMILY MEDICINE CLINIC | Facility: CLINIC | Age: 60
End: 2020-11-23

## 2021-01-26 ENCOUNTER — EVALUATION (OUTPATIENT)
Dept: PHYSICAL THERAPY | Facility: CLINIC | Age: 61
End: 2021-01-26
Payer: COMMERCIAL

## 2021-01-26 ENCOUNTER — TRANSCRIBE ORDERS (OUTPATIENT)
Dept: PHYSICAL THERAPY | Facility: CLINIC | Age: 61
End: 2021-01-26

## 2021-01-26 DIAGNOSIS — M25.552 LEFT HIP PAIN: ICD-10-CM

## 2021-01-26 DIAGNOSIS — Z96.642 HISTORY OF TOTAL LEFT HIP REPLACEMENT: ICD-10-CM

## 2021-01-26 DIAGNOSIS — M25.551 RIGHT HIP PAIN: Primary | ICD-10-CM

## 2021-01-26 DIAGNOSIS — M62.81 MUSCLE WEAKNESS (GENERALIZED): ICD-10-CM

## 2021-01-26 DIAGNOSIS — R26.2 DIFFICULTY WALKING: ICD-10-CM

## 2021-01-26 PROCEDURE — 97110 THERAPEUTIC EXERCISES: CPT | Performed by: PHYSICAL THERAPIST

## 2021-01-26 PROCEDURE — 97140 MANUAL THERAPY 1/> REGIONS: CPT | Performed by: PHYSICAL THERAPIST

## 2021-01-26 PROCEDURE — 97162 PT EVAL MOD COMPLEX 30 MIN: CPT | Performed by: PHYSICAL THERAPIST

## 2021-01-26 NOTE — LETTER
2021    Loni Burt PA-C  [address] [facility] [address]    Patient: Florin Alexander   YOB: 1960   Date of Visit: 2021     Encounter Diagnosis     ICD-10-CM    1  Right hip pain  M25 551    2  Left hip pain  M25 552    3  Difficulty walking  R26 2    4  Muscle weakness (generalized)  M62 81    5  History of total left hip replacement  [id number] [id number]        Dear Dr Joshua Harley:    Thank you for your recent referral of Florin Alexander  Please review the attached evaluation summary from Nacho's recent visit  Please verify that you agree with the plan of care by signing the attached order  If you have any questions or concerns, please do not hesitate to call  I sincerely appreciate the opportunity to share in the care of one of your patients and hope to have another opportunity to work with you in the near future  Sincerely,    Valentino Sarkar, PT      Referring Provider:      I certify that I have read the below Plan of Care and certify the need for these services furnished under this plan of treatment while under my care  Loni Burt PA-C  [address] [facility] [address]  Via Fax: [phone]          PT Evaluation     Today's date: 2021  Patient name: Florin Alexander  : 1960  MRN: [id number]  Referring provider: Shanice Roblero PA-C  Dx:   Encounter Diagnosis     ICD-10-CM    1  Right hip pain  M25 551    2  Left hip pain  M25 552    3  Difficulty walking  R26 2    4  Muscle weakness (generalized)  M62 81    5   History of total left hip replacement  [id number]          Assessment  Assessment details: Florin Alexander is a 62 y o  male presenting to outpatient physical therapy at [place] [address] with complaints of L hip pain s/p THR on 2020, right hip pain, and left knee pain   He presents with decreased range of motion, decreased strength, limited flexibility, poor postural awareness, poor body mechanics, poor balance, decreased tolerance to activity and decreased functional mobility due to Right hip pain  (primary encounter diagnosis), left hip pain, difficulty walking, muscle weakness (generalized), history of total left hip replacement  Therapist reviewed proper responses to exercises, diagnosis, prognosis, plan of care, HEP, and modalities to use at home   Dave Kumar would benefit from skilled PT services in order to address these deficits and reach maximum level of function   Thank you for the referral!  Impairments: abnormal coordination, abnormal gait, abnormal muscle firing, abnormal muscle tone, abnormal or restricted ROM, abnormal movement, activity intolerance, impaired balance, impaired physical strength, lacks appropriate home exercise program, pain with function, poor posture  and poor body mechanics    Symptom irritability: moderateUnderstanding of Dx/Px/POC: good   Prognosis: good    Goals  STGs (in 4 weeks):  1  Pt will report having at least a 50% improvement since I E   2  Pt will report having at most a 2/10 pain level with functional mobility  3  Pt will be independent with HEP  4  Pt will demonstrate full AROM of L hip without onset of pain  LTGs (in 12 weeks):  1  Pt will report having at least a 75% improvement since I E   2  Pt will demonstrate symmetrical gait pattern without AD over even and uneven terrain  3  Pt will be able to ascend/descend stairs reciprocally with good eccentric quad control       Plan  Patient would benefit from: PT eval  Planned modality interventions: unattended electrical stimulation, ultrasound and TENS  Planned therapy interventions: joint mobilization, manual therapy, neuromuscular re-education, patient education, self care, balance, balance/weight bearing training, home exercise program, therapeutic exercise, therapeutic activities, stretching, strengthening and flexibility  Frequency: 3x week  Plan of Care beginning date: 1/26/2021  Plan of Care expiration date: 2021  Treatment plan discussed with: patient        Subjective Evaluation    History of Present Illness  Mechanism of injury: Pt is a 62year old male who had his L THR 2020  He reports that he did not receive physical therapy pos op however he has experienced progressive bilateral hip pain as well as left knee pain  He reports that he is very limited functionally  Now referred to skilled OP PT     Quality of life: good    Pain  Current pain ratin  At best pain ratin  At worst pain ratin  Quality: tight, throbbing, squeezing, discomfort, cramping and dull ache  Relieving factors: relaxation, rest and medications  Aggravating factors: lifting, stair climbing, standing and walking (performing transfers after prolonged sitting, unable to kneel)  Progression: worsening    Patient Goals  Patient goals for therapy: decreased pain, improved balance, increased motion, increased strength, independence with ADLs/IADLs, return to sport/leisure activities and return to work          Objective    Flowsheet Rows      Most Recent Value   PT/OT G-Codes   Current Score  37   Projected Score  58        Observation: incision intact, atrophy noted in L glutes and L quadriceps especially distally, L knee bursitis noted anteriorly     Hip A/PROM: deg     R  L  IR (sitting):    ER (sitting):    Flexion:  60  105  Extension:  TBA  TBA  Abduction:  10*/15* 15/18    Knee A/PROM: deg     R  L  Extension:  TBA  TBA  Flexion:  TBA  TBA    Lumbar Screen  AROM: TBA    Strength: MMT  Hip   R  L  IR:   3-/5  3-/5  ER:   3-/5  3-/5  Flexion:  3-/5  3-/5  Extension:  2+/5  2+/5  Abduction:  2+/5  3-/5    Knee   R  L  Extension:  3-/5  3/5  Flexion:  3-/5  3/5    Flexibility: decreased bilateral hamstring, hip flexor, TFL, glute/piriformis, quadriceps    Special Tests  VIPUL: (+) R hip  FADDIR: (+) R hip  Scour: (R) R hip    Tenderness/Palpation: TTP along incision, L TFL/gluet med, proximal quad, ITB    Function  Gait: Pt demonstrates trendelenburg gait pattern (L worse than R) with lateral trunk flexion to the left with hyperextension of LLE     Squat: unable to perform  Stairs: non reciprocal leading with RLE      Precautions: standard, post op L THR on 11/20/2020    HEP: LTR, PPT, quad sets    Specialty Daily Treatment Diary       Manual 1/26       L Hip: IASTM around incision/ITB SMF       Tiger tail SMF       Cupping        L H' johnny stretch, HS stretch, piriformis stretch SMF                       R H: LAD        R H' lateral glides using blue strap                Exercise Diary         Quad sets c towel underneath knee 5 sec x 10 (MH on L hip       Ecc SAQ        SLR (flex)        Sidelying H' ABD        Prone H' Ext        Prone quad stretch c SOS        Ball Squeezes in hooklying -> Ball c Bridge        B H' ABD in hooklying -> TB c Bridge        Clamshell        LTR 5 sec x 10 ea       PPT 5 sec x 10       Johnny stretch                LAQ c ecc        Seated H' IR/ER ecc c towel roll underneath knee        Seated HS stretch        Piriformis stretch                Mini Squats -> Chair Squats c TB around knees        3 way H' TB        Lat Band Walks        Step ups        Lat Step ups        Step Downs        Gastroc stretch on Slant Board        Airex: SLS        TKE        Hip Hikes        Lunges                                HEP/EDU 25 mins       Modalities         10 mins                          Skin checks performed pre and post application: intact

## 2021-01-26 NOTE — PROGRESS NOTES
PT Evaluation     Today's date: 2021  Patient name: Jace Rodrigez  : 1960  MRN: [id number]  Referring provider: Tres Dean PA-C  Dx:   Encounter Diagnosis     ICD-10-CM    1  Right hip pain  M25 551    2  Left hip pain  M25 552    3  Difficulty walking  R26 2    4  Muscle weakness (generalized)  M62 81    5  History of total left hip replacement  Z96 642          Assessment  Assessment details: Jace Rodrigez is a 62 y o  male presenting to outpatient physical therapy at [place] [address] with complaints of L hip pain s/p THR on 2020, right hip pain, and left knee pain   He presents with decreased range of motion, decreased strength, limited flexibility, poor postural awareness, poor body mechanics, poor balance, decreased tolerance to activity and decreased functional mobility due to Right hip pain  (primary encounter diagnosis), left hip pain, difficulty walking, muscle weakness (generalized), history of total left hip replacement  Therapist reviewed proper responses to exercises, diagnosis, prognosis, plan of care, HEP, and modalities to use at home   Greta Portillo would benefit from skilled PT services in order to address these deficits and reach maximum level of function   Thank you for the referral!  Impairments: abnormal coordination, abnormal gait, abnormal muscle firing, abnormal muscle tone, abnormal or restricted ROM, abnormal movement, activity intolerance, impaired balance, impaired physical strength, lacks appropriate home exercise program, pain with function, poor posture  and poor body mechanics    Symptom irritability: moderateUnderstanding of Dx/Px/POC: good   Prognosis: good    Goals  STGs (in 4 weeks):  1  Pt will report having at least a 50% improvement since I E   2  Pt will report having at most a 2/10 pain level with functional mobility  3  Pt will be independent with HEP  4  Pt will demonstrate full AROM of L hip without onset of pain  LTGs (in 12 weeks):  1   Pt will report having at least a 75% improvement since I E   2  Pt will demonstrate symmetrical gait pattern without AD over even and uneven terrain  3  Pt will be able to ascend/descend stairs reciprocally with good eccentric quad control  Plan  Patient would benefit from: PT eval  Planned modality interventions: unattended electrical stimulation, ultrasound and TENS  Planned therapy interventions: joint mobilization, manual therapy, neuromuscular re-education, patient education, self care, balance, balance/weight bearing training, home exercise program, therapeutic exercise, therapeutic activities, stretching, strengthening and flexibility  Frequency: 3x week  Plan of Care beginning date: 2021  Plan of Care expiration date: 2021  Treatment plan discussed with: patient        Subjective Evaluation    History of Present Illness  Mechanism of injury: Pt is a 62year old male who had his L THR 2020  He reports that he did not receive physical therapy pos op however he has experienced progressive bilateral hip pain as well as left knee pain  He reports that he is very limited functionally  Now referred to skilled OP PT     Quality of life: good    Pain  Current pain ratin  At best pain ratin  At worst pain ratin  Quality: tight, throbbing, squeezing, discomfort, cramping and dull ache  Relieving factors: relaxation, rest and medications  Aggravating factors: lifting, stair climbing, standing and walking (performing transfers after prolonged sitting, unable to kneel)  Progression: worsening    Patient Goals  Patient goals for therapy: decreased pain, improved balance, increased motion, increased strength, independence with ADLs/IADLs, return to sport/leisure activities and return to work          Objective    Flowsheet Rows      Most Recent Value   PT/OT G-Codes   Current Score  37   Projected Score  58        Observation: incision intact, atrophy noted in L glutes and L quadriceps especially distally, L knee bursitis noted anteriorly     Hip A/PROM: deg     R  L  IR (sitting):  12/18 20/25  ER (sitting):  22/25 25/27  Flexion:  60  105  Extension:  TBA  TBA  Abduction:  10*/15* 15/18    Knee A/PROM: deg     R  L  Extension:  TBA  TBA  Flexion:  TBA  TBA    Lumbar Screen  AROM: TBA    Strength: MMT  Hip   R  L  IR:   3-/5  3-/5  ER:   3-/5  3-/5  Flexion:  3-/5  3-/5  Extension:  2+/5  2+/5  Abduction:  2+/5  3-/5    Knee   R  L  Extension:  3-/5  3/5  Flexion:  3-/5  3/5    Flexibility: decreased bilateral hamstring, hip flexor, TFL, glute/piriformis, quadriceps    Special Tests  VIPUL: (+) R hip  FADDIR: (+) R hip  Scour: (R) R hip    Tenderness/Palpation: TTP along incision, L TFL/gluet med, proximal quad, ITB    Function  Gait: Pt demonstrates trendelenburg gait pattern (L worse than R) with lateral trunk flexion to the left with hyperextension of LLE     Squat: unable to perform  Stairs: non reciprocal leading with RLE      Precautions: standard, post op L THR on 11/20/2020    HEP: LTR, PPT, quad sets    Specialty Daily Treatment Diary       Manual 1/26       L Hip: IASTM around incision/ITB SMF       Tiger tail SMF       Cupping        L H' johnny stretch, HS stretch, piriformis stretch SMF                       R H: LAD        R H' lateral glides using blue strap                Exercise Diary         Quad sets c towel underneath knee 5 sec x 10 (MH on L hip       Ecc SAQ        SLR (flex)        Sidelying H' ABD        Prone H' Ext        Prone quad stretch c SOS        Ball Squeezes in hooklying -> Ball c Bridge        B H' ABD in hooklying -> TB c Bridge        Clamshell        LTR 5 sec x 10 ea       PPT 5 sec x 10       Johnny stretch                LAQ c ecc        Seated H' IR/ER ecc c towel roll underneath knee        Seated HS stretch        Piriformis stretch                Mini Squats -> Chair Squats c TB around knees        3 way H' TB        Lat Band Walks        Step ups        Lat Step ups Step Downs        Gastroc stretch on Slant Board        Airex: SLS        TKE        Hip Hikes        Lunges                                HEP/EDU 25 mins       Modalities        MH 10 mins                          Skin checks performed pre and post application: intact

## 2021-01-29 ENCOUNTER — OFFICE VISIT (OUTPATIENT)
Dept: PHYSICAL THERAPY | Facility: CLINIC | Age: 61
End: 2021-01-29
Payer: COMMERCIAL

## 2021-01-29 DIAGNOSIS — M25.552 LEFT HIP PAIN: Primary | ICD-10-CM

## 2021-01-29 DIAGNOSIS — M62.81 MUSCLE WEAKNESS (GENERALIZED): ICD-10-CM

## 2021-01-29 DIAGNOSIS — R26.2 DIFFICULTY WALKING: ICD-10-CM

## 2021-01-29 DIAGNOSIS — M25.551 RIGHT HIP PAIN: ICD-10-CM

## 2021-01-29 DIAGNOSIS — Z96.642 HISTORY OF TOTAL LEFT HIP REPLACEMENT: ICD-10-CM

## 2021-01-29 PROCEDURE — 97110 THERAPEUTIC EXERCISES: CPT

## 2021-01-29 PROCEDURE — 97112 NEUROMUSCULAR REEDUCATION: CPT

## 2021-01-29 PROCEDURE — 97140 MANUAL THERAPY 1/> REGIONS: CPT

## 2021-01-29 NOTE — PROGRESS NOTES
Daily Note     Today's date: 2021  Patient name: Lasha Zhao  : 1960  MRN: [id number]  Referring provider: Helen Almaraz PA-C  Dx:   Encounter Diagnosis     ICD-10-CM    1  Left hip pain  M25 552    2  Right hip pain  M25 551    3  Difficulty walking  R26 2    4  Muscle weakness (generalized)  M62 81    5  History of total left hip replacement  Z96 642                   Subjective: Patient states he is feeling okay today  Reports that he didn't have any soreness from I E        Objective: See treatment diary below      Assessment: Tolerated treatment well  Initiated POC in supine with MHP while performing exercises  Introduced exercises as able  He demonstrates fatigue quickly but is able to complete all reps  Only able to complete 10 reps of SLR secondary to fatigue  He responded well to manuals  Patient demonstrated fatigue post treatment and would benefit from continued PT      Plan: Continue per plan of care        Precautions: standard, post op L THR on 2020    HEP: LTR, PPT, quad sets    Specialty Daily Treatment Diary       Manual       L Hip: IASTM around incision/ITB SMF       Tiger tail SMF       Cupping        L H' johnny stretch, HS stretch, piriformis stretch SMF RA                      R H: LAD        R H' lateral glides using blue strap                Exercise Diary         Quad sets c towel underneath knee 5 sec x 10 (MH on L hip 5 sec x 15 (MH on L hip)       Ecc SAQ        SLR (flex)  10x       Sidelying H' ABD        Prone H' Ext        Prone quad stretch c SOS        Ball Squeezes in hooklying -> Ball c Bridge  5" x 15       B H' ABD in hooklying -> TB c Bridge  5" x 15      Clamshell        LTR 5 sec x 10 ea 5 sec x 10 ea       PPT 5 sec x 10 5 sec x 10      Johnny stretch                LAQ c ecc        Seated H' IR/ER ecc c towel roll underneath knee        Seated HS stretch        Piriformis stretch                Mini Squats -> Chair Squats c TB around knees 3 way H' TB        Lat Band Walks        Step ups        Lat Step ups        Step Downs        Gastroc stretch on Slant Board        Airex: SLS        TKE        Hip Hikes        Lunges                                HEP/EDU 25 mins       Modalities        MH 10 mins 10 min with stretching                         Skin checks performed pre and post application: intact

## 2021-02-01 ENCOUNTER — APPOINTMENT (OUTPATIENT)
Dept: PHYSICAL THERAPY | Facility: CLINIC | Age: 61
End: 2021-02-01
Payer: COMMERCIAL

## 2021-02-03 ENCOUNTER — OFFICE VISIT (OUTPATIENT)
Dept: PHYSICAL THERAPY | Facility: CLINIC | Age: 61
End: 2021-02-03
Payer: COMMERCIAL

## 2021-02-03 DIAGNOSIS — M25.551 RIGHT HIP PAIN: ICD-10-CM

## 2021-02-03 DIAGNOSIS — M25.552 LEFT HIP PAIN: ICD-10-CM

## 2021-02-03 DIAGNOSIS — M62.81 MUSCLE WEAKNESS (GENERALIZED): ICD-10-CM

## 2021-02-03 DIAGNOSIS — Z96.642 HISTORY OF TOTAL LEFT HIP REPLACEMENT: Primary | ICD-10-CM

## 2021-02-03 DIAGNOSIS — R26.2 DIFFICULTY WALKING: ICD-10-CM

## 2021-02-03 PROCEDURE — 97110 THERAPEUTIC EXERCISES: CPT | Performed by: PHYSICAL THERAPIST

## 2021-02-03 PROCEDURE — 97140 MANUAL THERAPY 1/> REGIONS: CPT | Performed by: PHYSICAL THERAPIST

## 2021-02-03 PROCEDURE — 97112 NEUROMUSCULAR REEDUCATION: CPT | Performed by: PHYSICAL THERAPIST

## 2021-02-03 NOTE — PROGRESS NOTES
Daily Note     Today's date: 2/3/2021  Patient name: Miguel Angel Barnhart  : 1960  MRN: [id number]  Referring provider: Ceci Felix*  Dx:   Encounter Diagnosis     ICD-10-CM    1  History of total left hip replacement  Z96 642    2  Left hip pain  M25 552    3  Right hip pain  M25 551    4  Difficulty walking  R26 2    5  Muscle weakness (generalized)  M62 81                   Subjective: Pt reports that he feels okay today; denies having pain however continues to be very stiff  Objective: See treatment diary below      Assessment: Tolerated treatment well; initiated POC with MT after receiving consent from pt  Tried performing cups along his L ITB in sidelying  He ambulates with decreased hip extension and decreased TKE (R worse than L)  Due to his continued hip tightness, session focused on initiating prone exercises  Encouraged patient to lie in prone with heat on his hips with performing glute sets  Patient demonstrated fatigue post treatment and would benefit from continued PT      Plan: Continue per plan of care        Precautions: standard, post op L THR on 2020    HEP: LTR, PPT, quad sets    Specialty Daily Treatment Diary       Manual 1/26 1/29 2/3     L Hip: IASTM around incision/ITB SMF  SMF also along quad     Tiger tail SMF       Cupping   SMF along ITB     L H' laura stretch, HS stretch, piriformis stretch SMF RA                      R H: LAD        R H' lateral glides using blue strap                Exercise Diary         Quad sets c towel underneath knee 5 sec x 10 (MH on L hip 5 sec x 15 (MH on L hip)  5 sec x 20 (ea)     Ecc SAQ        SLR (flex)  10x       Sidelying H' ABD        Prone Glute sets c MH   5 sec x 20     Prone H' Ext   0# 10 ea c MH on quads     Prone quad stretch c SOS   20 sec x 5 ea     MARIS c MH on B quads   5 sec x 10      Ball Squeezes in hooklying -> Ball c Bridge  5" x 15  10 sec x 10; 5 sec x 20 (bridge)     B H' ABD in hooklying -> TB c Bridge  5" x 15 BTB 10"x20 (hooklying H' ABD)     Clamshell        LTR 5 sec x 10 ea 5 sec x 10 ea  NV     PPT 5 sec x 10 5 sec x 10 NV     Johnny stretch                LAQ c ecc        Seated H' IR/ER ecc c towel roll underneath knee        Seated HS stretch        Piriformis stretch                Mini Squats -> Chair Squats c TB around knees        3 way H' TB        Lat Band Walks        Step ups        Lat Step ups        Step Downs        Gastroc stretch on Slant Board        Airex: SLS        TKE        Hip Hikes        Lunges                                HEP/EDU 25 mins       Modalities        MH 10 mins 10 min with stretching 10 min with stretching                        Skin checks performed pre and post application: intact

## 2021-02-05 ENCOUNTER — OFFICE VISIT (OUTPATIENT)
Dept: PHYSICAL THERAPY | Facility: CLINIC | Age: 61
End: 2021-02-05
Payer: COMMERCIAL

## 2021-02-05 DIAGNOSIS — M25.552 LEFT HIP PAIN: ICD-10-CM

## 2021-02-05 DIAGNOSIS — M62.81 MUSCLE WEAKNESS (GENERALIZED): ICD-10-CM

## 2021-02-05 DIAGNOSIS — M25.551 RIGHT HIP PAIN: ICD-10-CM

## 2021-02-05 DIAGNOSIS — Z96.642 HISTORY OF TOTAL LEFT HIP REPLACEMENT: Primary | ICD-10-CM

## 2021-02-05 DIAGNOSIS — R26.2 DIFFICULTY WALKING: ICD-10-CM

## 2021-02-05 PROCEDURE — 97112 NEUROMUSCULAR REEDUCATION: CPT

## 2021-02-05 PROCEDURE — 97110 THERAPEUTIC EXERCISES: CPT

## 2021-02-05 PROCEDURE — 97140 MANUAL THERAPY 1/> REGIONS: CPT

## 2021-02-05 NOTE — PROGRESS NOTES
Daily Note     Today's date: 2021  Patient name: Romulo Banda  : 1960  MRN: [id number]  Referring provider: Miranda Corral*  Dx:   Encounter Diagnosis     ICD-10-CM    1  History of total left hip replacement  Z96 642    2  Left hip pain  M25 552    3  Right hip pain  M25 551    4  Difficulty walking  R26 2    5  Muscle weakness (generalized)  M62 81                   Subjective: Patient states he is doing well today  Reports he felt relief for last visit  Objective: See treatment diary below      Assessment: Tolerated treatment well  Continued with POC as able this visit  He continued to respond well to therapy and noted relief leaving  He did demonstrate fatigue with prone hip extension requiring breaks in between reps  Continue to progress as able  Patient demonstrated fatigue post treatment and would benefit from continued PT      Plan: Continue per plan of care        Precautions: standard, post op L THR on 2020    HEP: LTR, PPT, quad sets    Specialty Daily Treatment Diary       Manual 1/26 1/29 2/3 2/4    L Hip: IASTM around incision/ITB SMF  SMF also along quad RA also along quad    Tiger tail SMF       Cupping   SMF along ITB RA along ITB    L H' laura stretch, HS stretch, piriformis stretch SMF RA                      R H: LAD        R H' lateral glides using blue strap                Exercise Diary         Quad sets c towel underneath knee 5 sec x 10 (MH on L hip 5 sec x 15 (MH on L hip)  5 sec x 20 (ea) 5 sec x 20 ea    Ecc SAQ        SLR (flex)  10x       Sidelying H' ABD        Prone Glute sets c MH   5 sec x 20 5 sec x 20    Prone H' Ext   0# 10 ea c MH on quads 0# 10 ea c MH on quads    Prone quad stretch c SOS   20 sec x 5 ea 20 sec x 5 ea    MARIS c MH on B quads   5 sec x 10  5 sec x 20    Ball Squeezes in hooklying -> Ball c Bridge  5" x 15  10 sec x 10; 5 sec x 20 (bridge) 10 sec x 10; 5 sec x 20 (bridge)    B H' ABD in hooklying -> TB c Bridge  5" x 15 BTB 10"x20 (hooklying H' ABD) BTB 10"x20 (hooklying H' ABD)    Clamshell        LTR 5 sec x 10 ea 5 sec x 10 ea  NV NV    PPT 5 sec x 10 5 sec x 10 NV NV    Johnny stretch                LAQ c ecc        Seated H' IR/ER ecc c towel roll underneath knee        Seated HS stretch        Piriformis stretch                Mini Squats -> Chair Squats c TB around knees        3 way H' TB        Lat Band Walks        Step ups        Lat Step ups        Step Downs        Gastroc stretch on Slant Board        Airex: SLS        TKE        Hip Hikes        Lunges                                HEP/EDU 25 mins       Modalities        MH 10 mins 10 min with stretching 10 min with stretching 10 min with stretching                       Skin checks performed pre and post application: intact

## 2021-02-08 ENCOUNTER — OFFICE VISIT (OUTPATIENT)
Dept: PHYSICAL THERAPY | Facility: CLINIC | Age: 61
End: 2021-02-08
Payer: COMMERCIAL

## 2021-02-08 DIAGNOSIS — M25.551 RIGHT HIP PAIN: ICD-10-CM

## 2021-02-08 DIAGNOSIS — M25.552 LEFT HIP PAIN: ICD-10-CM

## 2021-02-08 DIAGNOSIS — R26.2 DIFFICULTY WALKING: ICD-10-CM

## 2021-02-08 DIAGNOSIS — Z96.642 HISTORY OF TOTAL LEFT HIP REPLACEMENT: Primary | ICD-10-CM

## 2021-02-08 DIAGNOSIS — M62.81 MUSCLE WEAKNESS (GENERALIZED): ICD-10-CM

## 2021-02-08 PROCEDURE — 97110 THERAPEUTIC EXERCISES: CPT

## 2021-02-08 PROCEDURE — 97112 NEUROMUSCULAR REEDUCATION: CPT

## 2021-02-08 PROCEDURE — 97140 MANUAL THERAPY 1/> REGIONS: CPT

## 2021-02-08 NOTE — PROGRESS NOTES
Daily Note     Today's date: 2021  Patient name: Miguel Angel Barnhart  : 1960  MRN: [id number]  Referring provider: Ceci Felix*  Dx:   Encounter Diagnosis     ICD-10-CM    1  History of total left hip replacement  Z96 642    2  Left hip pain  M25 552    3  Right hip pain  M25 551    4  Difficulty walking  R26 2    5  Muscle weakness (generalized)  M62 81                   Subjective: Patient states he is feeling soreness today  Objective: See treatment diary below      Assessment: Tolerated treatment well  Initiated POC in supine with MHP while performing TE  He is demonstrating improvements with tolerance to exercises  He requires cuing for correct sequencing with exercises  Noted relief following cupping today  Added SLR back into POC with moderate fatigue  Patient demonstrated fatigue post treatment and would benefit from continued PT      Plan: Continue per plan of care        Precautions: standard, post op L THR on 2020    HEP: LTR, PPT, quad sets    Specialty Daily Treatment Diary       Manual 1/26 1/29 2/3 2/4 2/8   L Hip: IASTM around incision/ITB SMF  SMF also along quad RA also along quad RA    [place] tail SMF       Cupping   SMF along ITB RA along ITB RA along ITB   L H' laura stretch, HS stretch, piriformis stretch SMF RA                      R H: LAD        R H' lateral glides using blue strap                Exercise Diary         Quad sets c towel underneath knee 5 sec x 10 (MH on L hip 5 sec x 15 (MH on L hip)  5 sec x 20 (ea) 5 sec x 20 ea 5 sec x 20   Ecc SAQ        SLR (flex)  10x    10x    Sidelying H' ABD        Prone Glute sets c MH   5 sec x 20 5 sec x 20 5 sec x 20   Prone H' Ext   0# 10 ea c MH on quads 0# 10 ea c MH on quads 0# 10 ea   Prone quad stretch c SOS   20 sec x 5 ea 20 sec x 5 ea 20 sec x 5    MARIS c MH on B quads   5 sec x 10  5 sec x 20 5 sec x 20    Ball Squeezes in hooklying -> Ball c Bridge  5" x 15  10 sec x 10; 5 sec x 20 (bridge) 10 sec x 10; 5 sec x 20 (bridge) 10 sec x 10; 5 sec x 20 (bridge)   B H' ABD in hooklying -> TB c Bridge  5" x 15 BTB 10"x20 (hooklying H' ABD) BTB 10"x20 (hooklying H' ABD) BTB 10"x20 (hooklying H' ABD)   Clamshell        LTR 5 sec x 10 ea 5 sec x 10 ea  NV NV 5 sec x 10   PPT 5 sec x 10 5 sec x 10 NV NV 5 sec x 10   Johnny stretch                LAQ c ecc        Seated H' IR/ER ecc c towel roll underneath knee        Seated HS stretch        Piriformis stretch                Mini Squats -> Chair Squats c TB around knees        3 way H' TB        Lat Band Walks        Step ups        Lat Step ups        Step Downs        Gastroc stretch on Slant Board        Airex: SLS        TKE        Hip Hikes        Lunges                                HEP/EDU 25 mins       Modalities        MH 10 mins 10 min with stretching 10 min with stretching 10 min with stretching 10 min with stretching                      Skin checks performed pre and post application: intact

## 2021-02-10 ENCOUNTER — OFFICE VISIT (OUTPATIENT)
Dept: PHYSICAL THERAPY | Facility: CLINIC | Age: 61
End: 2021-02-10
Payer: COMMERCIAL

## 2021-02-10 DIAGNOSIS — Z96.642 HISTORY OF TOTAL LEFT HIP REPLACEMENT: Primary | ICD-10-CM

## 2021-02-10 DIAGNOSIS — M25.551 RIGHT HIP PAIN: ICD-10-CM

## 2021-02-10 DIAGNOSIS — R26.2 DIFFICULTY WALKING: ICD-10-CM

## 2021-02-10 DIAGNOSIS — M25.552 LEFT HIP PAIN: ICD-10-CM

## 2021-02-10 PROCEDURE — 97112 NEUROMUSCULAR REEDUCATION: CPT

## 2021-02-10 PROCEDURE — 97110 THERAPEUTIC EXERCISES: CPT

## 2021-02-10 PROCEDURE — 97140 MANUAL THERAPY 1/> REGIONS: CPT

## 2021-02-10 NOTE — PROGRESS NOTES
Daily Note     Today's date: 2/10/2021  Patient name: Maynor Ellison  : 1960  MRN: [id number]  Referring provider: Jesus Maharaj*  Dx:   Encounter Diagnosis     ICD-10-CM    1  History of total left hip replacement  Z96 642    2  Left hip pain  M25 552    3  Right hip pain  M25 551    4  Difficulty walking  R26 2                   Subjective: Patient states he is doing well today  Reports cupping has been really working well for him  Objective: See treatment diary below      Assessment: Tolerated treatment well  Initiated POC in supine with MHP while performing stretches and TE  Progressed patient through reps this visit with good tolerance  Added in manual stretching and LAD and noted relief  He responds well to manual therapy  Continue to add in new exercises next visit  Patient demonstrated fatigue post treatment and would benefit from continued PT      Plan: Continue per plan of care        Precautions: standard, post op L THR on 2020    HEP: LTR, PPT, quad sets    Specialty Daily Treatment Diary       Manual 2/10 1/29 2/3 2/4 2/8   L Hip: IASTM around incision/ITB RA  SMF also along quad RA also along quad RA    Tiger tail        Cupping RA along ITB  SMF along ITB RA along ITB RA along ITB   L H' laura stretch, HS stretch, piriformis stretch RA RA                      R H: LAD RA       R H' lateral glides using blue strap                Exercise Diary         Quad sets c towel underneath knee 5 sec x 20 5 sec x 15 (MH on L hip)  5 sec x 20 (ea) 5 sec x 20 ea 5 sec x 20   Ecc SAQ        SLR (flex) 15x  10x    10x    Sidelying H' ABD        Prone Glute sets c MH 5 sec x 20  5 sec x 20 5 sec x 20 5 sec x 20   Prone H' Ext 0# 15 ea  0# 10 ea c MH on quads 0# 10 ea c MH on quads 0# 10 ea   Prone quad stretch c SOS 20 sec x 5   20 sec x 5 ea 20 sec x 5 ea 20 sec x 5    MARIS c MH on B quads 5 sec x 20  5 sec x 10  5 sec x 20 5 sec x 20    Ball Squeezes in hooklying -> Ball c Bridge 5 sec x 30 5" x 15  10 sec x 10; 5 sec x 20 (bridge) 10 sec x 10; 5 sec x 20 (bridge) 10 sec x 10; 5 sec x 20 (bridge)   B H' ABD in hooklying -> TB c Bridge BTB 10"x20 (hooklying H' ABD) 5" x 15 BTB 10"x20 (hooklying H' ABD) BTB 10"x20 (hooklying H' ABD) BTB 10"x20 (hooklying H' ABD)   Clamshell        LTR 5 sec x 15  5 sec x 10 ea  NV NV 5 sec x 10   PPT 5 sec x 15 5 sec x 10 NV NV 5 sec x 10   Johnny stretch                LAQ c ecc        Seated H' IR/ER ecc c towel roll underneath knee        Seated HS stretch        Piriformis stretch                Mini Squats -> Chair Squats c TB around knees        3 way H' TB        Lat Band Walks        Step ups        Lat Step ups        Step Downs        Gastroc stretch on Slant Board        Airex: SLS        TKE        Hip Hikes        Lunges                                HEP/EDU        Modalities        MH 10 min stretching  10 min with stretching 10 min with stretching 10 min with stretching 10 min with stretching                      Skin checks performed pre and post application: intact

## 2021-02-12 ENCOUNTER — OFFICE VISIT (OUTPATIENT)
Dept: PHYSICAL THERAPY | Facility: CLINIC | Age: 61
End: 2021-02-12
Payer: COMMERCIAL

## 2021-02-12 DIAGNOSIS — Z96.642 HISTORY OF TOTAL LEFT HIP REPLACEMENT: Primary | ICD-10-CM

## 2021-02-12 DIAGNOSIS — M25.552 LEFT HIP PAIN: ICD-10-CM

## 2021-02-12 DIAGNOSIS — M25.551 RIGHT HIP PAIN: ICD-10-CM

## 2021-02-12 DIAGNOSIS — M62.81 MUSCLE WEAKNESS (GENERALIZED): ICD-10-CM

## 2021-02-12 DIAGNOSIS — R26.2 DIFFICULTY WALKING: ICD-10-CM

## 2021-02-12 PROCEDURE — 97110 THERAPEUTIC EXERCISES: CPT

## 2021-02-12 PROCEDURE — 97140 MANUAL THERAPY 1/> REGIONS: CPT

## 2021-02-12 NOTE — PROGRESS NOTES
Daily Note     Today's date: 2021  Patient name: Michael Zamudio  : 1960  MRN: [id number]  Referring provider: Ulisses Crandall*  Dx:   Encounter Diagnosis     ICD-10-CM    1  History of total left hip replacement  Z96 642    2  Left hip pain  M25 552    3  Right hip pain  M25 551    4  Difficulty walking  R26 2    5  Muscle weakness (generalized)  M62 81                   Subjective: Patient states he is feeling good this AM        Objective: See treatment diary below      Assessment: Tolerated treatment well  Continued to progress patient through exercises this visit  Added in seated LAQ, seated IR, and standing gastroc stretch  He continues to get fatigued with current POC  Provided patient with updated HEP  Patient continues to note that he feels much better leaving therapy  Patient demonstrated fatigue post treatment and would benefit from continued PT      Plan: Continue per plan of care        Precautions: standard, post op L THR on 2020    HEP: LTR, PPT, quad sets    Specialty Daily Treatment Diary       Manual 2/10 2/12 2/3 2/4 2/8   L Hip: IASTM around incision/ITB RA RA SMF also along quad RA also along quad RA    Tiger tail        Cupping RA along ITB RA along ITB  SMF along ITB RA along ITB RA along ITB   L H' laura stretch, HS stretch, piriformis stretch RA RA                      R H: LAD RA RA      R H' lateral glides using blue strap                Exercise Diary         Quad sets c towel underneath knee 5 sec x 20 5 sec x 20  5 sec x 20 (ea) 5 sec x 20 ea 5 sec x 20   Ecc SAQ        SLR (flex) 15x  15x    10x    Sidelying H' ABD        Prone Glute sets c MH 5 sec x 20 DC to HEP 5 sec x 20 5 sec x 20 5 sec x 20   Prone H' Ext 0# 15 ea 0# 15 ea  0# 10 ea c MH on quads 0# 10 ea c MH on quads 0# 10 ea   Prone quad stretch c SOS 20 sec x 5  20 sec x  20 sec x 5 ea 20 sec x 5 ea 20 sec x 5    MARIS c MH on B quads 5 sec x 20 5 sec x 20  5 sec x 10  5 sec x 20 5 sec x 20    Ball Squeezes in hooklying -> Ball c Bridge 5 sec x 30 5 sec x 30  10 sec x 10; 5 sec x 20 (bridge) 10 sec x 10; 5 sec x 20 (bridge) 10 sec x 10; 5 sec x 20 (bridge)   B H' ABD in hooklying -> TB c Bridge BTB 10"x20 (hooklying H' ABD) BTB x 30 hooklying  BTB 10"x20 (hooklying H' ABD) BTB 10"x20 (hooklying H' ABD) BTB 10"x20 (hooklying H' ABD)   Bridge  BTB 15x       LTR 5 sec x 15  5 sec x 20 NV NV 5 sec x 10   PPT 5 sec x 15 DC to HEP NV NV 5 sec x 10   Johnny stretch                LAQ c ecc  20x ea       Seated H' IR/ER ecc c towel roll underneath knee  20x ea       Seated HS stretch        Piriformis stretch                Mini Squats -> Chair Squats c TB around knees        3 way H' TB        Lat Band Walks        Step ups        Lat Step ups        Step Downs        Gastroc stretch on Slant Board        Airex: SLS        TKE        Hip Hikes        Lunges                                HEP/EDU        Modalities        MH 10 min stretching  10 min with stretching 10 min with stretching 10 min with stretching 10 min with stretching                      Skin checks performed pre and post application: intact

## 2021-02-15 ENCOUNTER — OFFICE VISIT (OUTPATIENT)
Dept: PHYSICAL THERAPY | Facility: CLINIC | Age: 61
End: 2021-02-15
Payer: COMMERCIAL

## 2021-02-15 DIAGNOSIS — M25.552 LEFT HIP PAIN: ICD-10-CM

## 2021-02-15 DIAGNOSIS — Z96.642 HISTORY OF TOTAL LEFT HIP REPLACEMENT: Primary | ICD-10-CM

## 2021-02-15 DIAGNOSIS — R26.2 DIFFICULTY WALKING: ICD-10-CM

## 2021-02-15 DIAGNOSIS — M62.81 MUSCLE WEAKNESS (GENERALIZED): ICD-10-CM

## 2021-02-15 DIAGNOSIS — M25.551 RIGHT HIP PAIN: ICD-10-CM

## 2021-02-15 PROCEDURE — 97110 THERAPEUTIC EXERCISES: CPT

## 2021-02-15 PROCEDURE — 97140 MANUAL THERAPY 1/> REGIONS: CPT

## 2021-02-15 NOTE — PROGRESS NOTES
Daily Note     Today's date: 2/15/2021  Patient name: Narda Jeronimo  : 1960  MRN: [id number]  Referring provider: Flip Lanza*  Dx:   Encounter Diagnosis     ICD-10-CM    1  History of total left hip replacement  Z96 642    2  Left hip pain  M25 552    3  Right hip pain  M25 551    4  Difficulty walking  R26 2    5  Muscle weakness (generalized)  M62 81                   Subjective: Patient states he continues to feel good  Objective: See treatment diary below      Assessment: Tolerated treatment well  Initiated POC in supine with MHP while performing TE  Progressed patient through reps this visit with good tolerance  Introduced mini squats to POC and demonstrated good form  He continues to demonstrate strides toward goals and making improvements with strength  Patient demonstrated fatigue post treatment, exhibited good technique with therapeutic exercises and would benefit from continued PT      Plan: Continue per plan of care        Precautions: standard, post op L THR on 2020    HEP: LTR, PPT, quad sets    Specialty Daily Treatment Diary       Manual 2/10 2/12 2/15 2/4 2/8   L Hip: IASTM around incision/ITB RA RA RA RA also along quad RA    Tiger tail        Cupping RA along ITB RA along ITB  RA along ITB RA along ITB RA along ITB   L H' laura stretch, HS stretch, piriformis stretch RA RA                      R H: LAD RA RA RA     R H' lateral glides using blue strap                Exercise Diary         Quad sets c towel underneath knee 5 sec x 20 5 sec x 20  5 sec x 20  5 sec x 20 ea 5 sec x 20   Ecc SAQ        SLR (flex) 15x  15x  20x   10x    Sidelying H' ABD        Prone Glute sets c MH 5 sec x 20 DC to HEP  5 sec x 20 5 sec x 20   Prone H' Ext 0# 15 ea 0# 15 ea  0# 20 0# 10 ea c MH on quads 0# 10 ea   Prone quad stretch c SOS 20 sec x 5  20 sec x  20 sec x 5 20 sec x 5 ea 20 sec x 5    MARIS c MH on B quads 5 sec x 20 5 sec x 20  5 sec x 20 5 sec x 20 5 sec x 20    Ball Squeezes in hooklying -> Ball c Bridge 5 sec x 30 5 sec x 30  5 sec x 30  10 sec x 10; 5 sec x 20 (bridge) 10 sec x 10; 5 sec x 20 (bridge)   B H' ABD in hooklying -> TB c Bridge BTB 10"x20 (hooklying H' ABD) BTB x 30 hooklying  BTB x 30 hooklying  BTB 10"x20 (hooklying H' ABD) BTB 10"x20 (hooklying H' ABD)   Bridge  BTB 15x  BTB 15x      LTR 5 sec x 15  5 sec x 20 5 sec x 20  NV 5 sec x 10   PPT 5 sec x 15 DC to HEP  NV 5 sec x 10   Johnny stretch                LAQ c ecc  20x ea  20 ea     Seated H' IR/ER ecc c towel roll underneath knee  20x ea  20 ea      Seated HS stretch        Piriformis stretch                Mini Squats -> Chair Squats c TB around knees   15x      3 way H' TB        Lat Band Walks        Step ups        Lat Step ups        Step Downs        Gastroc stretch on Slant Board        Airex: SLS        TKE        Hip Hikes        Lunges                                HEP/EDU        Modalities        MH 10 min stretching  10 min with stretching 10 min with stretching 10 min with stretching 10 min with stretching                      Skin checks performed pre and post application: intact

## 2021-02-17 ENCOUNTER — OFFICE VISIT (OUTPATIENT)
Dept: PHYSICAL THERAPY | Facility: CLINIC | Age: 61
End: 2021-02-17
Payer: COMMERCIAL

## 2021-02-17 DIAGNOSIS — R26.2 DIFFICULTY WALKING: ICD-10-CM

## 2021-02-17 DIAGNOSIS — Z96.642 HISTORY OF TOTAL LEFT HIP REPLACEMENT: Primary | ICD-10-CM

## 2021-02-17 DIAGNOSIS — M25.552 LEFT HIP PAIN: ICD-10-CM

## 2021-02-17 DIAGNOSIS — M25.551 RIGHT HIP PAIN: ICD-10-CM

## 2021-02-17 DIAGNOSIS — M62.81 MUSCLE WEAKNESS (GENERALIZED): ICD-10-CM

## 2021-02-17 PROCEDURE — 97140 MANUAL THERAPY 1/> REGIONS: CPT

## 2021-02-17 PROCEDURE — 97110 THERAPEUTIC EXERCISES: CPT

## 2021-02-17 NOTE — PROGRESS NOTES
Daily Note     Today's date: 2021  Patient name: Kevin Aguilar  : 1960  MRN: [id number]  Referring provider: Jelly Quesada*  Dx:   Encounter Diagnosis     ICD-10-CM    1  History of total left hip replacement  Z96 642    2  Left hip pain  M25 552    3  Right hip pain  M25 551    4  Difficulty walking  R26 2    5  Muscle weakness (generalized)  M62 81                   Subjective: Patient states he continues to feel good  Objective: See treatment diary below      Assessment: Tolerated treatment well  Continued to make progressions this visit  Introduced standing TE, theraband 3 way, sidestepping, monster walks with good tolerance  Patient demonstrate fatigue with new exercises  Encouraged patient to stretch at home and ice if he is feeling sore  Patient demonstrated fatigue post treatment and would benefit from continued PT      Plan: Continue per plan of care        Precautions: standard, post op L THR on 2020    HEP: LTR, PPT, quad sets    Specialty Daily Treatment Diary       Manual 2/10 2/12 2/15 2/17 2/8   L Hip: IASTM around incision/ITB RA RA RA  RA    Bowers tail        Cupping RA along ITB RA along ITB  RA along ITB RA along ITB RA along ITB   L H' laura stretch, HS stretch, piriformis stretch RA RA                      R H: LAD RA RA RA     R H' lateral glides using blue strap                Exercise Diary         Quad sets c towel underneath knee 5 sec x 20 5 sec x 20  5 sec x 20  5 sec x 20 ea 5 sec x 20   Ecc SAQ        SLR (flex) 15x  15x  20x  30x  10x    Sidelying H' ABD        Prone Glute sets c MH 5 sec x 20 DC to HEP   5 sec x 20   Prone H' Ext 0# 15 ea 0# 15 ea  0# 20  0# 10 ea   Prone quad stretch c SOS 20 sec x 5  20 sec x  20 sec x 5 20 sec x 5 ea 20 sec x 5    MARIS c MH on B quads 5 sec x 20 5 sec x 20  5 sec x 20 5 sec x 20 5 sec x 20    Ball Squeezes in hooklying -> Ball c Bridge 5 sec x 30 5 sec x 30  5 sec x 30  5 sec x 30  10 sec x 10; 5 sec x 20 (bridge) B H' ABD in hooklying -> TB c Bridge BTB 10"x20 (hooklying H' ABD) BTB x 30 hooklying  BTB x 30 hooklying  BTB 30 hooklying BTB 10"x20 (hooklying H' ABD)   Bridge  BTB 15x  BTB 15x  BTB 30x    LTR 5 sec x 15  5 sec x 20 5 sec x 20  5 sec x 30  5 sec x 10   PPT 5 sec x 15 DC to HEP   5 sec x 10   Johnny stretch                LAQ c ecc  20x ea  20 ea 30 2#    Seated H' IR/ER ecc c towel roll underneath knee  20x ea  20 ea  30 2#    Seated HS stretch        Piriformis stretch                Mini Squats -> Chair Squats c TB around knees   15x  20     3 way H' TB    OTB 2x10 ea way     Lat Band Walks    OTB 2x10     Step ups        Lat Step ups        Step Downs        Step Ups        Gastroc stretch on Slant Board    30 sec x 3    Airex: SLS        TKE        Hip Hikes        Lunges                                HEP/EDU        Modalities        MH 10 min stretching  10 min with stretching 10 min with stretching 10 min with stretching 10 min with stretching                      Skin checks performed pre and post application: intact

## 2021-02-19 ENCOUNTER — OFFICE VISIT (OUTPATIENT)
Dept: PHYSICAL THERAPY | Facility: CLINIC | Age: 61
End: 2021-02-19
Payer: COMMERCIAL

## 2021-02-19 DIAGNOSIS — M62.81 MUSCLE WEAKNESS (GENERALIZED): ICD-10-CM

## 2021-02-19 DIAGNOSIS — Z96.642 HISTORY OF TOTAL LEFT HIP REPLACEMENT: Primary | ICD-10-CM

## 2021-02-19 DIAGNOSIS — M25.551 RIGHT HIP PAIN: ICD-10-CM

## 2021-02-19 DIAGNOSIS — R26.2 DIFFICULTY WALKING: ICD-10-CM

## 2021-02-19 DIAGNOSIS — M25.552 LEFT HIP PAIN: ICD-10-CM

## 2021-02-19 PROCEDURE — 97140 MANUAL THERAPY 1/> REGIONS: CPT

## 2021-02-19 PROCEDURE — 97110 THERAPEUTIC EXERCISES: CPT

## 2021-02-19 NOTE — PROGRESS NOTES
Daily Note     Today's date: 2021  Patient name: Namrata Fofana  : 1960  MRN: [id number]  Referring provider: Matthew Stratton*  Dx:   Encounter Diagnosis     ICD-10-CM    1  History of total left hip replacement  Z96 642    2  Left hip pain  M25 552    3  Right hip pain  M25 551    4  Difficulty walking  R26 2    5  Muscle weakness (generalized)  M62 81                   Subjective: Patient states he is doing well  Objective: See treatment diary below  3  Assessment: Tolerated treatment well  Continued to progress patient through exercises, provided MTB with good tolerance  Demonstrated petichi with cupping  He continues to walk with abnormal gait, encouraged patient to walk with straight trunk  Patient demonstrated fatigue post treatment and would benefit from continued PT      Plan: Continue per plan of care        Precautions: standard, post op L THR on 2020    HEP: LTR, PPT, quad sets    Specialty Daily Treatment Diary       Manual 2/10 2/12 2/15 2/17 2/19   L Hip: IASTM around incision/ITB RA RA RA     Steen tail        Cupping RA along ITB RA along ITB  RA along ITB RA along ITB RA along ITB    L H' laura stretch, HS stretch, piriformis stretch RA RA                      R H: LAD RA RA RA     R H' lateral glides using blue strap                Exercise Diary         Quad sets c towel underneath knee 5 sec x 20 5 sec x 20  5 sec x 20  5 sec x 20 ea 5 sec x 30   Ecc SAQ        SLR (flex) 15x  15x  20x  30x  30    Sidelying H' ABD        Prone Glute sets c MH 5 sec x 20 DC to HEP      Prone H' Ext 0# 15 ea 0# 15 ea  0# 20     Prone quad stretch c SOS 20 sec x 5  20 sec x  20 sec x 5 20 sec x 5 ea 20 sec x 5   MARIS c MH on B quads 5 sec x 20 5 sec x 20  5 sec x 20 5 sec x 20 5 sec x 20   Ball Squeezes in hooklying -> Ball c Bridge 5 sec x 30 5 sec x 30  5 sec x 30  5 sec x 30  5 sec x 30    B H' ABD in hooklying -> TB c Bridge BTB 10"x20 (hooklying H' ABD) BTB x 30 hooklying  BTB x 30 hooklying  BTB 30 hooklying MTB 30 hooklying    Bridge  BTB 15x  BTB 15x  BTB 30x MTB 30   LTR 5 sec x 15  5 sec x 20 5 sec x 20  5 sec x 30  5 sec x 30   PPT 5 sec x 15 DC to HEP      Johnny stretch                LAQ c ecc  20x ea  20 ea 30 2# 30 2#   Seated H' IR/ER ecc c towel roll underneath knee  20x ea  20 ea  30 2# 30 2#    Seated HS stretch        Piriformis stretch                Mini Squats -> Chair Squats c TB around knees   15x  20  20    3 way H' TB    OTB 2x10 ea way  GTB 2x10 ea way   Lat Band Walks    OTB 2x10  GTB 3 laps    Step ups        Lat Step ups        Step Downs        Step Ups        Gastroc stretch on Slant Board    30 sec x 3 30 sec x 3    Airex: SLS        TKE        Hip Hikes        Lunges                                HEP/EDU        Modalities        MH 10 min stretching  10 min with stretching 10 min with stretching 10 min with stretching 10 min with stretching                      Skin checks performed pre and post application: intact

## 2021-02-22 ENCOUNTER — APPOINTMENT (OUTPATIENT)
Dept: PHYSICAL THERAPY | Facility: CLINIC | Age: 61
End: 2021-02-22
Payer: COMMERCIAL

## 2021-02-24 ENCOUNTER — OFFICE VISIT (OUTPATIENT)
Dept: PHYSICAL THERAPY | Facility: CLINIC | Age: 61
End: 2021-02-24
Payer: COMMERCIAL

## 2021-02-24 DIAGNOSIS — M62.81 MUSCLE WEAKNESS (GENERALIZED): ICD-10-CM

## 2021-02-24 DIAGNOSIS — R26.2 DIFFICULTY WALKING: ICD-10-CM

## 2021-02-24 DIAGNOSIS — M25.551 RIGHT HIP PAIN: ICD-10-CM

## 2021-02-24 DIAGNOSIS — M25.552 LEFT HIP PAIN: ICD-10-CM

## 2021-02-24 DIAGNOSIS — Z96.642 HISTORY OF TOTAL LEFT HIP REPLACEMENT: Primary | ICD-10-CM

## 2021-02-24 PROCEDURE — 97530 THERAPEUTIC ACTIVITIES: CPT

## 2021-02-24 PROCEDURE — 97110 THERAPEUTIC EXERCISES: CPT

## 2021-02-24 PROCEDURE — 97112 NEUROMUSCULAR REEDUCATION: CPT

## 2021-02-24 PROCEDURE — 97140 MANUAL THERAPY 1/> REGIONS: CPT

## 2021-02-24 NOTE — PROGRESS NOTES
Daily Note     Today's date: 2021  Patient name: Cristian Humphrey  : 1960  MRN: [id number]  Referring provider: Karol Harley  Dx:   Encounter Diagnosis     ICD-10-CM    1  History of total left hip replacement  Z96 642    2  Left hip pain  M25 552    3  Right hip pain  M25 551    4  Difficulty walking  R26 2    5  Muscle weakness (generalized)  M62 81                   Subjective: Patient states he is still doing well  Objective: See treatment diary below      Assessment: Tolerated treatment well  Initiated POC in supine with performing TE  He is demonstrated good tolerance to progressions made  Increase weights with SLR, noted increase in challenge  He continues to present with difficulty with standing exercises due to having lengthier L leg  Encouraged patient to see the doctor about potential orthotic in order to improve, demonstrated understanding  Add in step ups next visit  Patient demonstrated fatigue post treatment, exhibited good technique with therapeutic exercises and would benefit from continued PT      Plan: Continue per plan of care        Precautions: standard, post op L THR on 2020    HEP: LTR, PPT, quad sets    Specialty Daily Treatment Diary       Manual 2/24 2/12 2/15 2/17 2/19   L Hip: IASTM around incision/ITB  RA RA     Tiger tail        Cupping RA along ITB RA along ITB  RA along ITB RA along ITB RA along ITB    L H' laura stretch, HS stretch, piriformis stretch  RA                      R H: LAD  RA RA     R H' lateral glides using blue strap                Exercise Diary         Quad sets c towel underneath knee 5 sec x 30  5 sec x 20  5 sec x 20  5 sec x 20 ea 5 sec x 30   Ecc SAQ        SLR (flex) 1 5# 15x b/l  15x  20x  30x  30    Sidelying H' ABD        Prone Glute sets c MH  DC to HEP      Prone H' Ext 0# 20  0# 15 ea  0# 20     Prone quad stretch c SOS 20 sec x 3  20 sec x  20 sec x 5 20 sec x 5 ea 20 sec x 5   MARIS c MH on B quads 5 sec x 20  5 sec x 20 5 sec x 20 5 sec x 20 5 sec x 20   Ball Squeezes in hooklying -> Ball c Bridge 5 sec x 30  5 sec x 30  5 sec x 30  5 sec x 30  5 sec x 30    B H' ABD in hooklying -> TB c Bridge MTB 30 hooklying  BTB x 30 hooklying  BTB x 30 hooklying  BTB 30 hooklying MTB 30 hooklying    Bridge MTB 30  BTB 15x  BTB 15x  BTB 30x MTB 30   LTR 5 sec 30  5 sec x 20 5 sec x 20  5 sec x 30  5 sec x 30   PPT  DC to HEP      Johnny stretch                LAQ c ecc 2 5# 30  20x ea  20 ea 30 2# 30 2#   Seated H' IR/ER ecc c towel roll underneath knee 2 5# 30 20x ea  20 ea  30 2# 30 2#    Seated HS stretch        Piriformis stretch                Mini Squats -> Chair Squats c TB around knees 30   15x  20  20    3 way H' TB GTB 2x10 ea way   OTB 2x10 ea way  GTB 2x10 ea way   Lat Band Walks GTB 3 laps   OTB 2x10  GTB 3 laps    Monster walks  GTB 2 laps               Lat Step ups NV       Step Downs  NV       Step Ups NV       Gastroc stretch on Slant Board 30 sec x 3    30 sec x 3 30 sec x 3    Airex: SLS        TKE        Hip Hikes        Lunges                                HEP/EDU        Modalities        MH 10 min stretching  10 min with stretching 10 min with stretching 10 min with stretching 10 min with stretching                      Skin checks performed pre and post application: intact

## 2021-02-26 ENCOUNTER — OFFICE VISIT (OUTPATIENT)
Dept: PHYSICAL THERAPY | Facility: CLINIC | Age: 61
End: 2021-02-26
Payer: COMMERCIAL

## 2021-02-26 DIAGNOSIS — R26.2 DIFFICULTY WALKING: ICD-10-CM

## 2021-02-26 DIAGNOSIS — M25.552 LEFT HIP PAIN: ICD-10-CM

## 2021-02-26 DIAGNOSIS — Z96.642 HISTORY OF TOTAL LEFT HIP REPLACEMENT: Primary | ICD-10-CM

## 2021-02-26 DIAGNOSIS — M62.81 MUSCLE WEAKNESS (GENERALIZED): ICD-10-CM

## 2021-02-26 DIAGNOSIS — M25.551 RIGHT HIP PAIN: ICD-10-CM

## 2021-02-26 PROCEDURE — 97140 MANUAL THERAPY 1/> REGIONS: CPT

## 2021-02-26 PROCEDURE — 97110 THERAPEUTIC EXERCISES: CPT

## 2021-02-26 NOTE — PROGRESS NOTES
Daily Note     Today's date: 2021  Patient name: Maty Crouch  : 1960  MRN: [id number]  Referring provider: Aida Bauer*  Dx:   Encounter Diagnosis     ICD-10-CM    1  History of total left hip replacement  Z96 642    2  Left hip pain  M25 552    3  Right hip pain  M25 551    4  Difficulty walking  R26 2    5  Muscle weakness (generalized)  M62 81                   Subjective: Priya Cochran reports increased tightness and pain present in L and R hip  He states he has been somewhat complaint with HEP and but not as often as he should be  Objective: See treatment diary below      Assessment: Nacho tolerated treatment well  Pt arrived to clinic displaying antalgic gait and rigid posture  PT session focused primarily on mobility and manuals today  Initiated session with  in prone c stretching  Manual stretch performed to b/l LEs, significant tightness present in b/l quads, hip flexors, and hamstrings  Pt noted improved mobility post manual work  Added PB to LTR and LINA to program to address lumbar tightness  Updated HEP and educated pt on performing 2x daily  Continue to focus on flexibility and progress strength training as able  Pt would benefit from continued PT to further address impairments and maximize functional level  Plan: Continue per plan of care        Precautions: standard, post op L THR on 2020    HEP: LTR, PPT, quad sets    Specialty Daily Treatment Diary       Manual    L Hip: IASTM around incision/ITB        Tiger tail        Cupping RA along ITB   RA along ITB RA along ITB    L H' laura stretch, HS stretch, piriformis stretch  JW                      R H: LAD        R H' lateral glides using blue strap                Exercise Diary         Quad sets c towel underneath knee 5 sec x 30    5 sec x 20 ea 5 sec x 30   Ecc SAQ        SLR (flex) 1 5# 15x b/l    30x  30    Sidelying H' ABD        Prone Glute sets c MH  20 sec x 3       Prone H' Ext 0# 20 Prone quad stretch c SOS 20 sec x 3  20 sec x 3   20 sec x 5 ea 20 sec x 5   MARIS c MH on B quads 5 sec x 20    5 sec x 20 5 sec x 20   Ball Squeezes in hooklying -> Ball c Bridge 5 sec x 30    5 sec x 30  5 sec x 30    B H' ABD in hooklying -> TB c Bridge MTB 30 hooklying    BTB 30 hooklying MTB 30 hooklying    Bridge MTB 30  MTB 30x  BTB 30x MTB 30   LTR 5 sec 30  PB 5 sec 30x  5 sec x 30  5 sec x 30   PPT        Johnny stretch                LAQ c ecc 2 5# 30    30 2# 30 2#   Seated H' IR/ER ecc c towel roll underneath knee 2 5# 30   30 2# 30 2#    Seated HS stretch        Piriformis stretch                Mini Squats -> Chair Squats c TB around knees 30    20  20    3 way H' TB GTB 2x10 ea way   OTB 2x10 ea way  GTB 2x10 ea way   Lat Band Walks GTB 3 laps   OTB 2x10  GTB 3 laps    Monster walks  GTB 2 laps               Lat Step ups NV       Step Downs  NV       Step Ups NV       Gastroc stretch on Slant Board 30 sec x 3  30 sec x 3   30 sec x 3 30 sec x 3    Airex: SLS        TKE        Hip Hikes        Lunges        LINA  5"x20                      HEP/EDU        Modalities        MH 10 min stretching  10 min c stretching  10 min with stretching 10 min with stretching                      Skin checks performed pre and post application: intact

## 2021-03-01 ENCOUNTER — OFFICE VISIT (OUTPATIENT)
Dept: PHYSICAL THERAPY | Facility: CLINIC | Age: 61
End: 2021-03-01
Payer: COMMERCIAL

## 2021-03-01 DIAGNOSIS — M25.551 RIGHT HIP PAIN: ICD-10-CM

## 2021-03-01 DIAGNOSIS — M62.81 MUSCLE WEAKNESS (GENERALIZED): ICD-10-CM

## 2021-03-01 DIAGNOSIS — R26.2 DIFFICULTY WALKING: ICD-10-CM

## 2021-03-01 DIAGNOSIS — M25.552 LEFT HIP PAIN: ICD-10-CM

## 2021-03-01 DIAGNOSIS — Z96.642 HISTORY OF TOTAL LEFT HIP REPLACEMENT: Primary | ICD-10-CM

## 2021-03-01 PROCEDURE — 97110 THERAPEUTIC EXERCISES: CPT

## 2021-03-01 PROCEDURE — 97530 THERAPEUTIC ACTIVITIES: CPT

## 2021-03-01 PROCEDURE — 97112 NEUROMUSCULAR REEDUCATION: CPT

## 2021-03-01 NOTE — PROGRESS NOTES
Daily Note     Today's date: 3/1/2021  Patient name: Sheela Lopez  : 1960  MRN: [id number]  Referring provider: Ac Moy*  Dx:   Encounter Diagnosis     ICD-10-CM    1  History of total left hip replacement  Z96 642    2  Left hip pain  M25 552    3  Right hip pain  M25 551    4  Difficulty walking  R26 2    5  Muscle weakness (generalized)  M62 81                   Subjective: Patient states he is feeling less stiffness today  Objective: See treatment diary below      Assessment: Tolerated treatment well  Initiated POC with MHP while performing stretching  Focused mainly on stretching as patient demonstrates stiffness  Introduced hamstring stretch and ball roll outs with good tolerance  Resumed with some strength trainining  Patient demonstrated fatigue post treatment and would benefit from continued PT      Plan: Continue per plan of care        Precautions: standard, post op L THR on 2020    HEP: LTR, PPT, quad sets    Specialty Daily Treatment Diary       Manual 2/24 2/26 3/1 2/17 2/19   L Hip: IASTM around incision/ITB        Tiger tail        Cupping RA along ITB   RA along ITB RA along ITB    L H' laura stretch, HS stretch, piriformis stretch  JW RA                      R H: LAD        R H' lateral glides using blue strap                Exercise Diary         Quad sets c towel underneath knee 5 sec x 30    5 sec x 20 ea 5 sec x 30   Ecc SAQ        SLR (flex) 1 5# 15x b/l   0# 3x10  30x  30    Sidelying H' ABD        Prone Glute sets c MH  20 sec x 3  20 sec x 3      Prone H' Ext 0# 20        Prone quad stretch c SOS 20 sec x 3  20 sec x 3  20 sec x 3  20 sec x 5 ea 20 sec x 5   MARIS c MH on B quads 5 sec x 20   5 sec x 20  5 sec x 20 5 sec x 20   Ball Squeezes in hooklying -> Ball c Bridge 5 sec x 30    5 sec x 30  5 sec x 30    B H' ABD in hooklying -> TB c Bridge MTB 30 hooklying   MTB 30 hooklying  BTB 30 hooklying MTB 30 hooklying    Bridge MTB 30  MTB 30x MTB 30  BTB 30x MTB 30   LTR 5 sec 30  PB 5 sec 30x 5 sec x 30  5 sec x 30  5 sec x 30   PPT        Johnny stretch                LAQ c ecc 2 5# 30   2 5# 30 30 2# 30 2#   Seated H' IR/ER ecc c towel roll underneath knee 2 5# 30  2 5# 30  30 2# 30 2#    Seated HS stretch   30 sec x 3      Piriformis stretch        Ball rollouts   10"x10      Mini Squats -> Chair Squats c TB around knees 30   30x  20  20    3 way H' TB GTB 2x10 ea way   OTB 2x10 ea way  GTB 2x10 ea way   Lat Band Walks GTB 3 laps   OTB 2x10  GTB 3 laps    Monster walks  GTB 2 laps               Lat Step ups NV       Step Downs  NV       Step Ups NV       Gastroc stretch on Slant Board 30 sec x 3  30 sec x 3   30 sec x 3 30 sec x 3    Airex: SLS        TKE        Hip Hikes        Lunges        LINA  5"x20                      HEP/EDU        Modalities        MH 10 min stretching  10 min c stretching 10 min stretching 10 min with stretching 10 min with stretching                      Skin checks performed pre and post application: intact

## 2021-03-03 ENCOUNTER — OFFICE VISIT (OUTPATIENT)
Dept: PHYSICAL THERAPY | Facility: CLINIC | Age: 61
End: 2021-03-03
Payer: COMMERCIAL

## 2021-03-03 DIAGNOSIS — M62.81 MUSCLE WEAKNESS (GENERALIZED): ICD-10-CM

## 2021-03-03 DIAGNOSIS — M25.552 LEFT HIP PAIN: ICD-10-CM

## 2021-03-03 DIAGNOSIS — R26.2 DIFFICULTY WALKING: ICD-10-CM

## 2021-03-03 DIAGNOSIS — Z96.642 HISTORY OF TOTAL LEFT HIP REPLACEMENT: Primary | ICD-10-CM

## 2021-03-03 DIAGNOSIS — M25.551 RIGHT HIP PAIN: ICD-10-CM

## 2021-03-03 PROCEDURE — 97110 THERAPEUTIC EXERCISES: CPT

## 2021-03-03 PROCEDURE — 97112 NEUROMUSCULAR REEDUCATION: CPT

## 2021-03-03 PROCEDURE — 97140 MANUAL THERAPY 1/> REGIONS: CPT

## 2021-03-03 NOTE — PROGRESS NOTES
Daily Note     Today's date: 3/3/2021  Patient name: Sheela Lopez  : 1960  MRN: [id number]  Referring provider: Ac Moy*  Dx:   Encounter Diagnosis     ICD-10-CM    1  History of total left hip replacement  Z96 642    2  Left hip pain  M25 552    3  Right hip pain  M25 551    4  Difficulty walking  R26 2    5  Muscle weakness (generalized)  M62 81                   Subjective: Patient states he is feeling good  Objective: See treatment diary below      Assessment: Tolerated treatment well  Patient was able to complete 3x10 of SLR for the first time since IE without fatigue  He is demonstrating improvements with quad strength  Resumed with cupping with good tolerance  Continue to add back in standing exercises as able  Patient demonstrated fatigue post treatment and would benefit from continued PT      Plan: Continue per plan of care        Precautions: standard, post op L THR on 2020    HEP: LTR, PPT, quad sets    Specialty Daily Treatment Diary       Manual 2/24 2/26 3/1 3/3 2/19   L Hip: IASTM around incision/ITB        Tiger tail        Cupping RA along ITB    RA along ITB    L H' laura stretch, HS stretch, piriformis stretch  JW RA  RA                    R H: LAD        R H' lateral glides using blue strap                Exercise Diary         Quad sets c towel underneath knee 5 sec x 30     5 sec x 30   Ecc SAQ        SLR (flex) 1 5# 15x b/l   0# 3x10  0# 3x10 30    Sidelying H' ABD        Prone Glute sets c MH  20 sec x 3  20 sec x 3  20 sec x 3    Prone H' Ext 0# 20        Prone quad stretch c SOS 20 sec x 3  20 sec x 3  20 sec x 3  20 sec x 3  20 sec x 5   MARIS c MH on B quads 5 sec x 20   5 sec x 20  5 sec x 20 5 sec x 20   Ball Squeezes in hooklying -> Ball c Bridge 5 sec x 30     5 sec x 30    B H' ABD in hooklying -> TB c Bridge MTB 30 hooklying   MTB 30 hooklying  MTB 30 hookyling MTB 30 hooklying    Bridge MTB 30  MTB 30x MTB 30  MTB 30 MTB 30   LTR 5 sec 30  PB 5 sec 30x 5 sec x 30  5 sec x 30 5 sec x 30   PPT        Johnny stretch                LAQ c ecc 2 5# 30   2 5# 30 3# 30 30 2#   Seated H' IR/ER ecc c towel roll underneath knee 2 5# 30  2 5# 30  # 30  30 2#    Seated HS stretch   30 sec x 3      Piriformis stretch        Ball rollouts   10"x10      Mini Squats -> Chair Squats c TB around knees 30   30x  30x  20    3 way H' TB GTB 2x10 ea way    GTB 2x10 ea way   Lat Band Walks GTB 3 laps    GTB 3 laps    Monster walks  GTB 2 laps               Lat Step ups NV       Step Downs  NV       Step Ups NV       Gastroc stretch on Slant Board 30 sec x 3  30 sec x 3    30 sec x 3    Airex: SLS        TKE        Hip Hikes        Lunges        LINA  5"x20                      HEP/EDU        Modalities        MH 10 min stretching  10 min c stretching 10 min stretching 10 min with stretching 10 min with stretching                      Skin checks performed pre and post application: intact

## 2021-03-05 ENCOUNTER — OFFICE VISIT (OUTPATIENT)
Dept: PHYSICAL THERAPY | Facility: CLINIC | Age: 61
End: 2021-03-05
Payer: COMMERCIAL

## 2021-03-05 DIAGNOSIS — M62.81 MUSCLE WEAKNESS (GENERALIZED): ICD-10-CM

## 2021-03-05 DIAGNOSIS — R26.2 DIFFICULTY WALKING: ICD-10-CM

## 2021-03-05 DIAGNOSIS — M25.552 LEFT HIP PAIN: ICD-10-CM

## 2021-03-05 DIAGNOSIS — M25.551 RIGHT HIP PAIN: ICD-10-CM

## 2021-03-05 DIAGNOSIS — Z96.642 HISTORY OF TOTAL LEFT HIP REPLACEMENT: Primary | ICD-10-CM

## 2021-03-05 PROCEDURE — 97140 MANUAL THERAPY 1/> REGIONS: CPT

## 2021-03-05 PROCEDURE — 97110 THERAPEUTIC EXERCISES: CPT

## 2021-03-05 NOTE — PROGRESS NOTES
Daily Note     Today's date: 3/5/2021  Patient name: Sheela Lopez  : 1960  MRN: [id number]  Referring provider: Ac Moy*  Dx:   Encounter Diagnosis     ICD-10-CM    1  History of total left hip replacement  Z96 642    2  Left hip pain  M25 552    3  Right hip pain  M25 551    4  Difficulty walking  R26 2    5  Muscle weakness (generalized)  M62 81                   Subjective: patient states he is doing well today  Objective: See treatment diary below      Assessment: Tolerated treatment well  Continued with POC as able  Patient continues to demonstrate improvements with strength  Patient did experience cramping with PQS and had to get up to walk around, diminished  Performed tiger tail to bilateral hamstrings  Encouraged patient to increase water and potassium intake  Patient demonstrated fatigue post treatment and would benefit from continued PT      Plan: Continue per plan of care        Precautions: standard, post op L THR on 2020    HEP: LTR, PPT, quad sets    Specialty Daily Treatment Diary       Manual 2/24 2/26 3/1 3/3 3   L Hip: IASTM around incision/ITB        Tiger tail        Cupping RA along ITB   RA RA   L H' laura stretch, HS stretch, piriformis stretch  JW RA  RA RA   [place] tail to Hamstring     RA           R H: LAD        R H' lateral glides using blue strap                Exercise Diary         Quad sets c towel underneath knee 5 sec x 30        Ecc SAQ        SLR (flex) 1 5# 15x b/l   0# 3x10  0# 3x10 0# 3x10    Sidelying H' ABD        Prone Glute sets c MH  20 sec x 3  20 sec x 3  20 sec x 3 20 sec x 3    Prone H' Ext 0# 20        Prone quad stretch c SOS 20 sec x 3  20 sec x 3  20 sec x 3  20 sec x 3  20 sec x 3   MARIS c MH on B quads 5 sec x 20   5 sec x 20  5 sec x 20 5 sec x 20   Ball Squeezes in hooklying -> [place] c Bridge 5 sec x 30        B H' ABD in hooklying -> TB c Bridge MTB 30 hooklying   MTB 30 hooklying  MTB 30 hookyling MTB 30 hookyling   Bridge MTB 30  MTB 30x MTB 30  MTB 30 MTB 30   LTR 5 sec 30  PB 5 sec 30x 5 sec x 30  5 sec x 30 5 sec x 30   PPT        Johnny stretch                LAQ c ecc 2 5# 30   2 5# 30 3# 30 3# 30   Seated H' IR/ER ecc c towel roll underneath knee 2 5# 30  2 5# 30  # 30  3# 30   Seated HS stretch   30 sec x 3   20 sec x 3    Piriformis stretch        Ball rollouts   10"x10      Mini Squats -> Chair Squats c TB around knees 30   30x  30x  30    3 way H' TB GTB 2x10 ea way    GTB 15x b/l abd only    Lat Band Walks GTB 3 laps       Monster walks  GTB 2 laps               Lat Step ups NV       Step Downs  NV       Step Ups NV       Gastroc stretch on Slant Board 30 sec x 3  30 sec x 3       Airex: SLS        TKE        Hip Hikes        Lunges        LINA  5"x20                      HEP/EDU        Modalities        MH 10 min stretching  10 min c stretching 10 min stretching 10 min with stretching 10 min with stretching                      Skin checks performed pre and post application: intact

## 2021-03-08 ENCOUNTER — OFFICE VISIT (OUTPATIENT)
Dept: PHYSICAL THERAPY | Facility: CLINIC | Age: 61
End: 2021-03-08
Payer: COMMERCIAL

## 2021-03-08 DIAGNOSIS — M25.551 RIGHT HIP PAIN: ICD-10-CM

## 2021-03-08 DIAGNOSIS — R26.2 DIFFICULTY WALKING: ICD-10-CM

## 2021-03-08 DIAGNOSIS — M25.552 LEFT HIP PAIN: ICD-10-CM

## 2021-03-08 DIAGNOSIS — M62.81 MUSCLE WEAKNESS (GENERALIZED): ICD-10-CM

## 2021-03-08 DIAGNOSIS — Z96.642 HISTORY OF TOTAL LEFT HIP REPLACEMENT: Primary | ICD-10-CM

## 2021-03-08 PROCEDURE — 97110 THERAPEUTIC EXERCISES: CPT

## 2021-03-08 PROCEDURE — 97140 MANUAL THERAPY 1/> REGIONS: CPT

## 2021-03-08 NOTE — PROGRESS NOTES
Daily Note     Today's date: 3/8/2021  Patient name: William Crenshaw  : 1960  MRN: [id number]  Referring provider: Amadeo Aguilera*  Dx:   Encounter Diagnosis     ICD-10-CM    1  History of total left hip replacement  Z96 642    2  Left hip pain  M25 552    3  Right hip pain  M25 551    4  Difficulty walking  R26 2    5  Muscle weakness (generalized)  M62 81                   Subjective: Patient states he is feeling good  Objective: See treatment diary below      Assessment: Tolerated treatment well  Patient continues to demonstrate better ambulation and less antalgic gait  He reports that his coworkers are noticing he is walking better  He is still lacking strength and full ROM, but overall is improving  Patient demonstrated fatigue post treatment and would benefit from continued PT      Plan: Continue per plan of care        Precautions: standard, post op L THR on 2020    HEP: LTR, PPT, quad sets    Specialty Daily Treatment Diary       Manual 3/8 2/26 3/1 3/3 3   L Hip: IASTM around incision/ITB        Tiger tail        Cupping RA   RA RA   L H' laura stretch, HS stretch, piriformis stretch RA JW RA  RA RA   [place] tail to Hamstring     RA           R H: LAD        R H' lateral glides using blue strap                Exercise Diary         Quad sets c towel underneath knee 5 sec x 30        Ecc SAQ        SLR (flex) 0# 3x10  0# 3x10  0# 3x10 0# 3x10    Sidelying H' ABD        Prone Glute sets c MH 20 sec x 3 20 sec x 3  20 sec x 3  20 sec x 3 20 sec x 3    Prone H' Ext        Prone quad stretch c SOS 20 sec x 3 20 sec x 3  20 sec x 3  20 sec x 3  20 sec x 3   MARIS c MH on B quads 5 sec x 20   5 sec x 20  5 sec x 20 5 sec x 20   Ball Squeezes in hooklying -> [place] c Bridge        B H' ABD in hooklying -> TB c Bridge MTB 30 ho  MTB 30 hooklying  MTB 30 hookyling MTB 30 hookyling   Bridge MTB 30 MTB 30x MTB 30  MTB 30 MTB 30   LTR 5 sec x 30 PB 5 sec 30x 5 sec x 30  5 sec x 30 5 sec x 30   PPT Johnny stretch                LAQ c ecc 3# 30  2 5# 30 3# 30 3# 30   Seated H' IR/ER ecc c towel roll underneath knee 3# 30  2 5# 30  # 30  3# 30   Seated HS stretch 30 sec x 3  30 sec x 3   20 sec x 3    Piriformis stretch        Ball rollouts   10"x10      Mini Squats -> Chair Squats c TB around knees 30x   30x  30x  30    3 way H' TB GTB 15x b/l abd and ext    GTB 15x b/l abd only    Lat Band Walks        Monster walks                 Lat Step ups        Step Downs        Step Ups        Gastroc stretch on Slant Board  30 sec x 3       Airex: SLS        TKE        Hip Hikes        Lunges        LINA  5"x20                      HEP/EDU        Modalities        MH 10 min stretching  10 min c stretching 10 min stretching 10 min with stretching 10 min with stretching                      Skin checks performed pre and post application: intact

## 2021-03-10 ENCOUNTER — OFFICE VISIT (OUTPATIENT)
Dept: PHYSICAL THERAPY | Facility: CLINIC | Age: 61
End: 2021-03-10
Payer: COMMERCIAL

## 2021-03-10 DIAGNOSIS — M62.81 MUSCLE WEAKNESS (GENERALIZED): ICD-10-CM

## 2021-03-10 DIAGNOSIS — R26.2 DIFFICULTY WALKING: ICD-10-CM

## 2021-03-10 DIAGNOSIS — Z96.642 HISTORY OF TOTAL LEFT HIP REPLACEMENT: Primary | ICD-10-CM

## 2021-03-10 DIAGNOSIS — M25.552 LEFT HIP PAIN: ICD-10-CM

## 2021-03-10 DIAGNOSIS — M25.551 RIGHT HIP PAIN: ICD-10-CM

## 2021-03-10 PROCEDURE — 97112 NEUROMUSCULAR REEDUCATION: CPT

## 2021-03-10 PROCEDURE — 97110 THERAPEUTIC EXERCISES: CPT

## 2021-03-10 NOTE — PROGRESS NOTES
Daily Note     Today's date: 3/10/2021  Patient name: Maty Crouch  : 1960  MRN: [id number]  Referring provider: Aida Lainez  Dx:   Encounter Diagnosis     ICD-10-CM    1  History of total left hip replacement  Z96 642    2  Left hip pain  M25 552    3  Right hip pain  M25 551    4  Difficulty walking  R26 2    5  Muscle weakness (generalized)  M62 81                   Subjective: Patient states he is feeling good today  Reports he always feels good after therapy  He also reports some days are better than others  Objective: See treatment diary below      Assessment: Tolerated treatment well  Progressed patient through exercises this visit  Introduced sidelying hip abduction with increase in challenge  Also introduced TKE with good tolerance  He noted he felt less stiff leaving therapy  Patient demonstrated fatigue post treatment and would benefit from continued PT      Plan: Continue per plan of care        Precautions: standard, post op L THR on 2020    HEP: LTR, PPT, quad sets    Specialty Daily Treatment Diary       Manual 3/8 3/10 3/1 3/3 3/4   L Hip: IASTM around incision/ITB        Tiger tail        Cupping RA   RA RA   L H' laura stretch, HS stretch, piriformis stretch RA  RA  RA RA   [place] tail to Hamstring     RA           R H: LAD        R H' lateral glides using blue strap                Exercise Diary         Quad sets c towel underneath knee 5 sec x 30        Ecc SAQ        SLR (flex) 0# 3x10 0# 3x10 0# 3x10  0# 3x10 0# 3x10    Sidelying H' ABD        Prone Glute sets c MH 20 sec x 3 20 sec x 3  20 sec x 3  20 sec x 3 20 sec x 3    Prone H' Ext        Prone quad stretch c SOS 20 sec x 3 20 sec x 3  20 sec x 3  20 sec x 3  20 sec x 3   MARIS c MH on B quads 5 sec x 20  5 sec x 20  5 sec x 20  5 sec x 20 5 sec x 20   Ball Squeezes in hooklying -> [place] c Bridge        B H' ABD in hooklying -> TB c Bridge MTB 30 ho MTB sidelying  MTB 30 hooklying  MTB 30 hookyling MTB 30 hookyling   Bridge MTB 30 MTB 30x MTB 30  MTB 30 MTB 30   LTR 5 sec x 30 PB 5 sec 30x 5 sec x 30  5 sec x 30 5 sec x 30   PPT        Johnny stretch                LAQ c ecc 3# 30 4# 30 2 5# 30 3# 30 3# 30   Seated H' IR/ER ecc c towel roll underneath knee 3# 30 4# 30 2 5# 30  # 30  3# 30   Seated HS stretch 30 sec x 3  30 sec x 3   20 sec x 3    Piriformis stretch        Ball rollouts   10"x10      Mini Squats -> Chair Squats c TB around knees 30x   30x  30x  30    3 way H' TB GTB 15x b/l abd and ext GTB 30x b/l  abd and ext    GTB 15x b/l abd only    Lat Band [employer] walks                 Lat Step ups        Step Downs        Step Ups        Gastroc stretch on Slant Board  30 sec x 3       Airex: SLS        TKE  GTB 5" x 10      Hip Hikes        Lunges        LINA  5"x20                      HEP/EDU        Modalities        MH 10 min stretching  10 min c stretching 10 min stretching 10 min with stretching 10 min with stretching                      Skin checks performed pre and post application: intact

## 2021-03-12 ENCOUNTER — OFFICE VISIT (OUTPATIENT)
Dept: PHYSICAL THERAPY | Facility: CLINIC | Age: 61
End: 2021-03-12
Payer: COMMERCIAL

## 2021-03-12 DIAGNOSIS — M25.552 LEFT HIP PAIN: ICD-10-CM

## 2021-03-12 DIAGNOSIS — Z96.642 HISTORY OF TOTAL LEFT HIP REPLACEMENT: Primary | ICD-10-CM

## 2021-03-12 DIAGNOSIS — M25.551 RIGHT HIP PAIN: ICD-10-CM

## 2021-03-12 DIAGNOSIS — R26.2 DIFFICULTY WALKING: ICD-10-CM

## 2021-03-12 DIAGNOSIS — M62.81 MUSCLE WEAKNESS (GENERALIZED): ICD-10-CM

## 2021-03-12 PROCEDURE — 97530 THERAPEUTIC ACTIVITIES: CPT

## 2021-03-12 PROCEDURE — 97110 THERAPEUTIC EXERCISES: CPT

## 2021-03-12 NOTE — PROGRESS NOTES
Daily Note     Today's date: 3/12/2021  Patient name: Aura South  : 1960  MRN: [id number]  Referring provider: Pa Sebastian  Dx:   Encounter Diagnosis     ICD-10-CM    1  History of total left hip replacement  Z96 642    2  Left hip pain  M25 552    3  Right hip pain  M25 551    4  Difficulty walking  R26 2    5  Muscle weakness (generalized)  M62 81                   Subjective: Patient states he is feeling good  Reports that he continues to notice improvements with therapy  Objective: See treatment diary below      Assessment: Tolerated treatment well  Initiated POC on treadmill this visit to further improve patients endurance as well as focus on straight posture with ambulation, demonstrated good tolerance  He is demonstrating improvements with strength with each visit  Continue to progress as able  Patient demonstrated fatigue post treatment and would benefit from continued PT      Plan: Continue per plan of care        Precautions: standard, post op L THR on 2020    HEP: LTR, PPT, quad sets    Specialty Daily Treatment Diary       Manual 3/8 3/10 3/12 3/3 3   L Hip: IASTM around incision/ITB        Tiger tail        Cupping RA   RA RA   L H' laura stretch, HS stretch, piriformis stretch RA   RA RA   [place] tail to Hamstring     RA           R H: LAD        R H' lateral glides using blue strap                Exercise Diary         TM   5 mins      Quad sets c towel underneath knee 5 sec x 30        Ecc SAQ        SLR (flex) 0# 3x10 0# 3x10 0# 3x10 0# 3x10 0# 3x10    Sidelying H' ABD        Prone Glute sets c MH 20 sec x 3 20 sec x 3  DC  20 sec x 3 20 sec x 3    Prone H' Ext        Prone quad stretch c SOS 20 sec x 3 20 sec x 3  20 sec x 3  20 sec x 3  20 sec x 3   MARIS c MH on B quads 5 sec x 20  5 sec x 20  5 sec x 20 5 sec x 20 5 sec x 20   Ball Squeezes in hooklying -> [place] c Bridge        B H' ABD in hooklying -> TB c Bridge MTB 30 ho MTB sidelying  MTB sidelying 30 MTB 30 hookyling MTB 30 hookyling   Bridge MTB 30 MTB 30x MTB 30 MTB 30 MTB 30   LTR 5 sec x 30 PB 5 sec 30x  5 sec x 30 5 sec x 30   PPT        Johnny stretch                LAQ c ecc 3# 30 4# 30 4# 30 3# 30 3# 30   Seated H' IR/ER ecc c towel roll underneath knee 3# 30 4# 30 4# 30 # 30  3# 30   Seated HS stretch 30 sec x 3    20 sec x 3    Piriformis stretch        Ball rollouts        Mini Squats -> Chair Squats c TB around knees 30x   30x  30x  30    3 way H' TB GTB 15x b/l abd and ext GTB 30x b/l  abd and ext  GTB 30x b/labd and ext  GTB 15x b/l abd only    Lat Band [employer] walks                 Lat Step ups        Step Downs        Step Ups        Gastroc stretch on Slant Board  30 sec x 3  30 secx 3      Airex: SLS        TKE  GTB 5" x 10 GTB 5" x 10     Hip Hikes        Lunges        LINA  5"x20                      HEP/EDU        Modalities        MH 10 min stretching  10 min c stretching DC  10 min with stretching 10 min with stretching                      Skin checks performed pre and post application: intact

## 2021-03-14 DIAGNOSIS — E03.9 HYPOTHYROIDISM, UNSPECIFIED TYPE: ICD-10-CM

## 2021-03-15 ENCOUNTER — OFFICE VISIT (OUTPATIENT)
Dept: PHYSICAL THERAPY | Facility: CLINIC | Age: 61
End: 2021-03-15
Payer: COMMERCIAL

## 2021-03-15 DIAGNOSIS — M62.81 MUSCLE WEAKNESS (GENERALIZED): ICD-10-CM

## 2021-03-15 DIAGNOSIS — M25.552 LEFT HIP PAIN: ICD-10-CM

## 2021-03-15 DIAGNOSIS — M25.551 RIGHT HIP PAIN: ICD-10-CM

## 2021-03-15 DIAGNOSIS — R26.2 DIFFICULTY WALKING: ICD-10-CM

## 2021-03-15 DIAGNOSIS — Z96.642 HISTORY OF TOTAL LEFT HIP REPLACEMENT: Primary | ICD-10-CM

## 2021-03-15 PROCEDURE — 97110 THERAPEUTIC EXERCISES: CPT

## 2021-03-15 PROCEDURE — 97112 NEUROMUSCULAR REEDUCATION: CPT

## 2021-03-15 RX ORDER — LIOTHYRONINE SODIUM 5 UG/1
TABLET ORAL
Qty: 90 TABLET | Refills: 1 | Status: SHIPPED | OUTPATIENT
Start: 2021-03-15 | End: 2021-09-10

## 2021-03-15 NOTE — PROGRESS NOTES
Daily Note     Today's date: 3/15/2021  Patient name: Ted Murphy  : 1960  MRN: [id number]  Referring provider: Chantel Lynn*  Dx:   Encounter Diagnosis     ICD-10-CM    1  History of total left hip replacement  Z96 642    2  Left hip pain  M25 552    3  Right hip pain  M25 551    4  Difficulty walking  R26 2    5  Muscle weakness (generalized)  M62 81                   Subjective: Patient states he is feeling good today  Objective: See treatment diary below      Assessment: Tolerated treatment well  Continued to progress patient through exercises this visit  He is demonstrating improvements with strength  Progressed to 5# with LAQ and ER  Patient is demonstrating great strides toward goals  Patient demonstrated fatigue post treatment and would benefit from continued PT      Plan: Continue per plan of care        Precautions: standard, post op L THR on 2020    HEP: LTR, PPT, quad sets    Specialty Daily Treatment Diary       Manual 3/8 3/10 3/12 3/15 3/4   L Hip: IASTM around incision/ITB        Tiger tail        Cupping RA    RA   L H' laura stretch, HS stretch, piriformis stretch RA    RA   [place] tail to Hamstring     RA           R H: LAD        R H' lateral glides using blue strap                Exercise Diary         TM   5 mins  5 mins    Quad sets c towel underneath knee 5 sec x 30        Ecc SAQ        SLR (flex) 0# 3x10 0# 3x10 0# 3x10 1# 3x10 0# 3x10    Sidelying H' ABD        Prone Glute sets c MH 20 sec x 3 20 sec x 3  DC   20 sec x 3    Prone H' Ext        Prone quad stretch c SOS 20 sec x 3 20 sec x 3  20 sec x 3  20 sec x 3 20 sec x 3   MARIS c MH on B quads 5 sec x 20  5 sec x 20  5 sec x 20 5 sec x 20 5 sec x 20   Ball Squeezes in hooklying -> [place] c Bridge        B H' ABD in hooklying -> TB c Bridge MTB 30 ho MTB sidelying  MTB sidelying 30 MTB sidelying 30  MTB 30 hookyling   Bridge MTB 30 MTB 30x MTB 30 MTB 30  MTB 30   LTR 5 sec x 30 PB 5 sec 30x   5 sec x 30   PPT Johnny stretch                LAQ c ecc 3# 30 4# 30 4# 30 5# 30 3# 30   Seated H' IR/ER ecc c towel roll underneath knee 3# 30 4# 30 4# 30 5# 30 3# 30   Seated HS stretch 30 sec x 3    20 sec x 3    Piriformis stretch        Ball rollouts        Mini Squats -> Chair Squats c TB around knees 30x   30x  30x  30    3 way H' TB GTB 15x b/l abd and ext GTB 30x b/l  abd and ext  GTB 30x b/labd and ext GTB 30 b/l abd and ext GTB 15x b/l abd only    Lat Band [employer] walks                 Lat Step ups        Step Downs        Step Ups        Gastroc stretch on Slant Board  30 sec x 3  30 secx 3  30 sec x 3     Airex: SLS        TKE  GTB 5" x 10 GTB 5" x 10 NV    Hip Hikes        Lunges        LINA  5"x20                      HEP/EDU        Modalities        MH 10 min stretching  10 min c stretching DC   10 min with stretching                      Skin checks performed pre and post application: intact

## 2021-03-17 ENCOUNTER — OFFICE VISIT (OUTPATIENT)
Dept: PHYSICAL THERAPY | Facility: CLINIC | Age: 61
End: 2021-03-17
Payer: COMMERCIAL

## 2021-03-17 DIAGNOSIS — R26.2 DIFFICULTY WALKING: ICD-10-CM

## 2021-03-17 DIAGNOSIS — Z96.642 HISTORY OF TOTAL LEFT HIP REPLACEMENT: Primary | ICD-10-CM

## 2021-03-17 DIAGNOSIS — M62.81 MUSCLE WEAKNESS (GENERALIZED): ICD-10-CM

## 2021-03-17 DIAGNOSIS — M25.551 RIGHT HIP PAIN: ICD-10-CM

## 2021-03-17 DIAGNOSIS — M25.552 LEFT HIP PAIN: ICD-10-CM

## 2021-03-17 PROCEDURE — 97110 THERAPEUTIC EXERCISES: CPT

## 2021-03-17 PROCEDURE — 97112 NEUROMUSCULAR REEDUCATION: CPT

## 2021-03-17 NOTE — PROGRESS NOTES
Daily Note     Today's date: 3/17/2021  Patient name: Narda Jeronimo  : 1960  MRN: [id number]  Referring provider: Flip Lanza*  Dx:   Encounter Diagnosis     ICD-10-CM    1  History of total left hip replacement  Z96 642    2  Left hip pain  M25 552    3  Right hip pain  M25 551    4  Difficulty walking  R26 2    5  Muscle weakness (generalized)  M62 81                   Subjective: Patient states he is doing well today  Reports he continues to feel soreness after exercises  Objective: See treatment diary below      Assessment: Tolerated treatment well  Patient self progressed on treadmill and increase incline he demonstrated good tolerance with no increase in any symptoms  Progressed through all other TE today, and continues to demonstrate strength improvements  Patient demonstrated fatigue post treatment and would benefit from continued PT      Plan: Continue per plan of care  Precautions: standard, post op L THR on 2020    HEP: LTR, PPT, quad sets    Specialty Daily Treatment Diary       Manual 3/8 3/10 3/12 3/15 3/17   L Hip: IASTM around incision/ITB        Tiger tail        Cupping RA       L H' laura stretch, HS stretch, piriformis stretch RA       [place] tail to Hamstring                R H: LAD        R H' lateral glides using blue strap                Exercise Diary         TM   5 mins  5 mins 5 min 4 incline 2  2mph   Quad sets c towel underneath knee 5 sec x 30        Ecc SAQ        SLR (flex) 0# 3x10 0# 3x10 0# 3x10 1# 3x10 1 5# 3x10   Sidelying H' ABD        Prone Glute sets c MH 20 sec x 3 20 sec x 3  DC      Prone H' Ext        Prone quad stretch c SOS 20 sec x 3 20 sec x 3  20 sec x 3  20 sec x 3 NV   MARIS c MH on B quads 5 sec x 20  5 sec x 20  5 sec x 20 5 sec x 20 5 sec x 20    Ball Squeezes in hooklying -> [place] c Bridge        B H' ABD in hooklying -> TB c [place] MTB 30 ho MTB sidelying  MTB sidelying 30 MTB sidelying 30  MTB sidelying 30   Bridge MTB 30 MTB 30x MTB 30 MTB 30  MTB 30   LTR 5 sec x 30 PB 5 sec 30x      PPT        Johnny stretch                LAQ c ecc 3# 30 4# 30 4# 30 5# 30 5# 30   Seated H' IR/ER ecc c towel roll underneath knee 3# 30 4# 30 4# 30 5# 30 5# 30   Seated HS stretch 30 sec x 3    20 sec x 3    Piriformis stretch        Ball rollouts        Mini Squats -> Chair Squats c TB around knees 30x   30x  30x  30    3 way H' TB GTB 15x b/l abd and ext GTB 30x b/l  abd and ext  GTB 30x b/labd and ext GTB 30 b/l abd and ext BTB 30x b/l abd and ext    Lat Band Walks        [place]ster walks                 Lat Step ups        Step Downs        Step Ups        Gastroc stretch on Slant Board  30 sec x 3  30 secx 3  30 sec x 3  30 sec x 3    Airex: SLS        TKE  GTB 5" x 10 GTB 5" x 10 NV BTB 5" x 15   Hip Hikes        Lunges        LINA  5"x20                      HEP/EDU        Modalities        MH 10 min stretching  10 min c stretching DC   10 min with stretching                      Skin checks performed pre and post application: intact

## 2021-03-19 ENCOUNTER — OFFICE VISIT (OUTPATIENT)
Dept: FAMILY MEDICINE CLINIC | Facility: CLINIC | Age: 61
End: 2021-03-19
Payer: COMMERCIAL

## 2021-03-19 ENCOUNTER — APPOINTMENT (OUTPATIENT)
Dept: PHYSICAL THERAPY | Facility: CLINIC | Age: 61
End: 2021-03-19
Payer: COMMERCIAL

## 2021-03-19 VITALS
BODY MASS INDEX: 36.45 KG/M2 | DIASTOLIC BLOOD PRESSURE: 72 MMHG | HEART RATE: 60 BPM | WEIGHT: 275 LBS | TEMPERATURE: 97.2 F | OXYGEN SATURATION: 98 % | SYSTOLIC BLOOD PRESSURE: 124 MMHG | HEIGHT: 73 IN

## 2021-03-19 DIAGNOSIS — M79.89 PAIN AND SWELLING OF TOE OF RIGHT FOOT: Primary | ICD-10-CM

## 2021-03-19 DIAGNOSIS — L03.031 CELLULITIS OF GREAT TOE OF RIGHT FOOT: ICD-10-CM

## 2021-03-19 DIAGNOSIS — M79.674 PAIN AND SWELLING OF TOE OF RIGHT FOOT: Primary | ICD-10-CM

## 2021-03-19 PROBLEM — H65.02 NON-RECURRENT ACUTE SEROUS OTITIS MEDIA OF LEFT EAR: Status: RESOLVED | Noted: 2020-05-16 | Resolved: 2021-03-19

## 2021-03-19 PROBLEM — IMO0001 MYALGIA AND MYOSITIS: Status: RESOLVED | Noted: 2020-01-07 | Resolved: 2021-03-19

## 2021-03-19 PROBLEM — H60.332 ACUTE SWIMMER'S EAR OF LEFT SIDE: Status: RESOLVED | Noted: 2020-05-16 | Resolved: 2021-03-19

## 2021-03-19 PROCEDURE — 3008F BODY MASS INDEX DOCD: CPT | Performed by: FAMILY MEDICINE

## 2021-03-19 PROCEDURE — 36415 COLL VENOUS BLD VENIPUNCTURE: CPT | Performed by: FAMILY MEDICINE

## 2021-03-19 PROCEDURE — 99214 OFFICE O/P EST MOD 30 MIN: CPT | Performed by: FAMILY MEDICINE

## 2021-03-19 PROCEDURE — 1036F TOBACCO NON-USER: CPT | Performed by: FAMILY MEDICINE

## 2021-03-19 RX ORDER — SULFAMETHOXAZOLE AND TRIMETHOPRIM 800; 160 MG/1; MG/1
1 TABLET ORAL EVERY 12 HOURS SCHEDULED
Qty: 14 TABLET | Refills: 0 | Status: SHIPPED | OUTPATIENT
Start: 2021-03-19 | End: 2021-03-26

## 2021-03-19 NOTE — PROGRESS NOTES
Subjective:   Chief Complaint   Patient presents with    Insect Bite     PT WOKE UP WEDNESDAY MORNING WITH PAIN IN RIGHT FOOT  OK YESTERDAY  TODAY  SEVERE PAIN, SWELLING AND FAISAL FROM BITE  RECENT LEFT HIP REPLACEMENT   CONCERNED OVER INFECTION  Patient ID: Romulo Banda is a 62 y o  male  The pt is here today because of pain, swelling and redness in his right foot  2 days ago it hurt  Catrina Mujica was a little worse  Today it is much worse, swollen, you can see a hole  He's pretty sure something bit him  It is so painful he can barely put any weight on it     He does not remember being bitten by anything but now that he sees a faisal on great toe he thinks it is a bite   He has never had gout before as far as he knows   He has never had anything like this before   He did recently have a right hip replacement   He is concerned about infection because of the right hip   no fevers or chills      The following portions of the patient's history were reviewed and updated as appropriate: allergies, current medications, past family history, past medical history, past social history, past surgical history and problem list     Review of Systems          Objective:  Vitals:    03/19/21 1122   BP: 124/72   Pulse: 60   Temp: (!) 97 2 °F (36 2 °C)   SpO2: 98%   Weight: 125 kg (275 lb)   Height: 6' 1" (1 854 m)      Physical Exam  Constitutional:       General: He is not in acute distress  Appearance: Normal appearance  He is not ill-appearing  Musculoskeletal:      Comments: The right great toe is inflamed with mild erythema, there is a darker area on the medial aspect of the toe, no fluctuance, not an abscess; the entire right foot is edematous but not erythematous, no warmth, significant decreased range of motion in the toe and foot   Skin:     General: Skin is warm and dry  Findings: No erythema or rash  Neurological:      General: No focal deficit present        Mental Status: He is alert and oriented to person, place, and time  Cranial Nerves: No cranial nerve deficit  Gait: Gait abnormal (can't bear weight on the right foot normally)  Psychiatric:         Mood and Affect: Mood normal          Behavior: Behavior normal          Thought Content: Thought content normal          Judgment: Judgment normal            Assessment/Plan:    No problem-specific Assessment & Plan notes found for this encounter  Diagnoses and all orders for this visit:    Pain and swelling of toe of right foot  -     Uric acid    Cellulitis of great toe of right foot  -     sulfamethoxazole-trimethoprim (BACTRIM DS) 800-160 mg per tablet; Take 1 tablet by mouth every 12 (twelve) hours for 7 days        The patient is concerned about infection, bite, cellulitis -I am going to treat with antibiotics just to be on the safe side but I suspect this might be gout as opposed to an infection  There is no warmth or redness  I am going to check a uric acid today  If the antibiotic is not working on the uric acid comes back elevated we will change treatment course to treat for gout

## 2021-03-19 NOTE — PATIENT INSTRUCTIONS
Gout   WHAT YOU NEED TO KNOW:   What is gout? Gout is a form of arthritis that causes severe joint pain and stiffness  Acute gout pain starts suddenly, gets worse quickly, and stops on its own  Acute gout can become chronic and cause permanent damage to your joints  What causes gout? Gout develops when uric acid builds up in your joints  Uric acid is made when your body breaks down purines  Purines are found in some medicines and foods  Your body gets rid of most uric acid through your urine  When your body cannot get rid of enough uric acid, it can build up and form crystals in your joints  The crystals cause your joints to become swollen and painful  This is called a gout attack  What increases my risk for gout? You may have been born with a decreased ability to break down and get rid of purines  Your body's ability to break down purines may be very slow  Gout is more common in men than in women  Any of the following can also increase your risk:  · A family history of gout    · Kidney disease or problems with how your kidneys work     · Eating foods that are high in purines, such as red meat    · Alcohol or tobacco use    · Diuretic medicine (water pills), or aspirin    · A medical condition such as diabetes, high blood pressure, or high cholesterol    · A condition such as an irregular heartbeat or a blood clot in your lungs    What are the stages of gout? · Hyperuricemia  is a high level of uric acid  Hyperuricemia is not gout, but it increases your risk for gout  You may have no symptoms at this stage, and it usually does not need treatment  · Acute gouty arthritis  starts with a sudden attack of pain and swelling, usually in 1 joint  This may be in your big toe  The attack may last from a few days to 2 weeks  · Intercritical gout  is the time between attacks  You may go months or years without another attack  You will not have joint pain or stiffness, but this does not mean your gout is cured  You will still need treatment to prevent chronic gout  · Chronic tophaceous gout  develops if gout is not treated  Large amounts of uric acid crystals, called tophi, collect around your joints  The crystals can destroy or deform the joints  Gout attacks occur more often, and last hours to weeks  More than 1 joint may be painful and swollen  At this stage, gout symptoms do not go away on their own  How is gout diagnosed? Your healthcare provider will ask about your medicines, health problems, and allergies  Tell him or her when your joint pain and swelling started  He or she will need to know if the swelling and pain were worst within 1 day or if got worse over time  He or she will check the skin over your joints for redness  You may also need any of the following:  · Blood tests  are used to check the level of uric acid  You may need to have blood tested more than once  · A synovial fluid test  is used to collect a sample of fluid from around your painful joint  The fluid is sent to a lab to check for uric acid crystals  Synovial fluid surrounds and protects your joints  · An x-ray, ultrasound, CT, or MRI  may show the gout or damage to bones caused by gout  You may be given contrast liquid to help your joints show up better in the pictures  Tell the healthcare provider if you have ever had an allergic reaction to contrast liquid  Do not enter the MRI room with anything metal  Metal can cause serious injury  Tell the healthcare provider if you have any metal in or on your body  How is gout treated? The following can make your symptoms stop sooner, prevent attacks, and decrease your risk for joint damage:  · Medicines:      ? NSAIDs , such as ibuprofen, help decrease swelling, pain, and fever  This medicine is available with or without a doctor's order  NSAIDs can cause stomach bleeding or kidney problems in certain people   If you take blood thinner medicine, always ask your healthcare provider if NSAIDs are safe for you  Always read the medicine label and follow directions  ? Gout medicine  decreases joint pain and swelling  It may also be given to prevent new gout attacks  ? Steroids  reduce inflammation and can help your joint stiffness and pain during gout attacks  ? Uric acid medicine  may be given to reduce the amount of uric acid your body makes  Some medicines may help you pass more uric acid when you urinate  · Surgery  called a bone graft may be needed for tophi that are painful or infected  Bone in the joint may be replaced with bone taken from another place in your body  Ask your healthcare provider for more information about bone graft surgery  How can I manage my symptoms? · Rest your painful joint so it can heal   Your healthcare provider may recommend crutches or a walker if the affected joint is in a leg  · Apply ice to your joint  Ice decreases pain and swelling  Use an ice pack, or put crushed ice in a plastic bag  Cover the ice pack or bag with a towel before you apply it to your painful joint  Apply ice for 15 to 20 minutes every hour, or as directed  · Elevate your joint  Elevation helps reduce swelling and pain  Raise your joint above the level of your heart as often as you can  Prop your painful joint on pillows to keep it above your heart comfortably  · Go to physical therapy if directed  A physical therapist can teach you exercises to improve flexibility and range of motion  How can I help prevent gout attacks? · Do not eat high-purine foods  These foods include meats, seafood, asparagus, spinach, cauliflower, and some types of beans  Healthcare providers may tell you to eat more low-fat milk products, such as yogurt  Milk products may decrease your risk for gout attacks  Vitamin C and coffee may also help  Your healthcare provider or dietitian can help you create a meal plan  · Drink liquids as directed    Liquids such as water help remove uric acid from your body  Ask how much liquid to drink each day and which liquids are best for you  · Maintain a healthy weight  Weight loss may decrease the amount of uric acid in your body  Ask your healthcare provider how much you should weigh  Ask him to help you create a weight loss plan if you are overweight  · Control your blood sugar level if you have diabetes  Keep your blood sugar level in a normal range  This can help prevent gout attacks  · Limit or do not drink alcohol as directed  Alcohol can trigger a gout attack  Alcohol also increases your risk for dehydration  Ask your healthcare provider if alcohol is safe for you  When should I seek immediate care? · You have severe joint pain that you cannot tolerate  · You have a fever or redness that spreads beyond the joint area  When should I call my doctor? · You have a fever, chills, or body aches  · You are confused or more tired than usual      · You have new symptoms, such as a rash, after you start gout treatment  · Your joint pain and swelling do not go away, even after treatment  · You are not urinating as much or as often as you usually do  · You have trouble taking your gout medicines  CARE AGREEMENT:   You have the right to help plan your care  Learn about your health condition and how it may be treated  Discuss treatment options with your healthcare providers to decide what care you want to receive  You always have the right to refuse treatment  The above information is an  only  It is not intended as medical advice for individual conditions or treatments  Talk to your doctor, nurse or pharmacist before following any medical regimen to see if it is safe and effective for you  © Copyright [address] Information is for End User's use only and may not be sold, redistributed or otherwise used for commercial purposes   All illustrations and images included in CareNotes® are the copyrighted property of [employer]  or [address] [place], [place] [address]

## 2021-03-20 LAB — URATE SERPL-MCNC: 7.5 MG/DL (ref 3.8–8.4)

## 2021-03-22 ENCOUNTER — OFFICE VISIT (OUTPATIENT)
Dept: PHYSICAL THERAPY | Facility: CLINIC | Age: 61
End: 2021-03-22
Payer: COMMERCIAL

## 2021-03-22 ENCOUNTER — TELEPHONE (OUTPATIENT)
Dept: FAMILY MEDICINE CLINIC | Facility: CLINIC | Age: 61
End: 2021-03-22

## 2021-03-22 DIAGNOSIS — Z96.642 HISTORY OF TOTAL LEFT HIP REPLACEMENT: Primary | ICD-10-CM

## 2021-03-22 DIAGNOSIS — M25.552 LEFT HIP PAIN: ICD-10-CM

## 2021-03-22 DIAGNOSIS — M25.551 RIGHT HIP PAIN: ICD-10-CM

## 2021-03-22 DIAGNOSIS — R26.2 DIFFICULTY WALKING: ICD-10-CM

## 2021-03-22 DIAGNOSIS — M62.81 MUSCLE WEAKNESS (GENERALIZED): ICD-10-CM

## 2021-03-22 PROCEDURE — 97112 NEUROMUSCULAR REEDUCATION: CPT

## 2021-03-22 PROCEDURE — 97110 THERAPEUTIC EXERCISES: CPT

## 2021-03-22 NOTE — TELEPHONE ENCOUNTER
----- Message from Jean-Claude Floyd MD sent at 3/22/2021 10:30 AM EDT -----  Please check and see how the patient is doing, his uric acid is within normal limits but definitely on the high end of normal

## 2021-03-22 NOTE — PROGRESS NOTES
Daily Note     Today's date: 3/22/2021  Patient name: Kevin Aguilar  : 1960  MRN: [id number]  Referring provider: Jelly Quesada*  Dx:   Encounter Diagnosis     ICD-10-CM    1  History of total left hip replacement  Z96 642    2  Left hip pain  M25 552    3  Right hip pain  M25 551    4  Difficulty walking  R26 2    5  Muscle weakness (generalized)  M62 81                   Subjective: Patient states he continues to get better  Reports that he is able to go down the stairs reciprocal without difficulty  Objective: See treatment diary below      Assessment: Tolerated treatment well  Continued to progress patient through TE  Introduced monster walks and step ups this visit  He noted that he was fatigued with lateral step ups  He continues to be challenged with quad strength and still fatigues quickly with SLR  Overall continues to make improvements with ambulation  Patient demonstrated fatigue post treatment and would benefit from continued PT      Plan: Continue per plan of care  Precautions: standard, post op L THR on 2020    HEP: LTR, PPT, quad sets    Specialty Daily Treatment Diary       Manual 3/22 3/10 3/12 3/15 3/17   Cupping        L H' laura stretch, HS stretch, piriformis stretch        Tiger tail to Hamstring                        Exercise Diary         TM 5 min 4 incline 2  4mph  5 mins  5 mins 5 min 4 incline 2  2mph   Quad sets c towel underneath knee        Ecc SAQ        SLR (flex) 1 5# 3x10 0# 3x10 0# 3x10 1# 3x10 1 5# 3x10   Sidelying H' ABD        Prone Glute sets c MH  20 sec x 3  DC      Prone H' Ext        Prone quad stretch c SOS 20 sec x  3 20 sec x 3  20 sec x 3  20 sec x 3 NV   MARIS c MH on B quads 5 sec x 20 5 sec x 20  5 sec x 20 5 sec x 20 5 sec x 20    Ball Squeezes in hooklying -> Ball c Bridge        B H' ABD in hooklying -> TB c Bridge MTB sidelying  MTB sidelying  MTB sidelying 30 MTB sidelying 30  MTB sidelying 30   Bridge MTB 30 MTB 30x MTB 30 MTB 30 MTB 30   LTR  PB 5 sec 30x      PPT        Johnny stretch                LAQ c ecc 5# 30 4# 30 4# 30 5# 30 5# 30   Seated H' IR/ER ecc c towel roll underneath knee 5# 30 4# 30 4# 30 5# 30 5# 30   Seated HS stretch 20 sec x 3     20 sec x 3    Piriformis stretch        Ball rollouts        Mini Squats -> Chair Squats c TB around knees 30x   30x  30x  30    3 way H' TB BTB 30 b/l abd and ext GTB 30x b/l  abd and ext  GTB 30x b/labd and ext GTB 30 b/l abd and ext BTB 30x b/l abd and ext    Lat Band Walks 4 laps        Monster walks  3 laps                Lat Step ups 8" x 15       Step Downs 8"x15        Step Ups        Gastroc stretch on Slant Board 30"x3  30 sec x 3  30 secx 3  30 sec x 3  30 sec x 3    Airex: SLS 20"x3       TKE BTB 5" x 15  GTB 5" x 10 GTB 5" x 10 NV BTB 5" x 15   Hip Hikes        Lunges        LINA  5"x20                      HEP/EDU        Modalities        MH                           Skin checks performed pre and post application: intact

## 2021-03-24 ENCOUNTER — OFFICE VISIT (OUTPATIENT)
Dept: PHYSICAL THERAPY | Facility: CLINIC | Age: 61
End: 2021-03-24
Payer: COMMERCIAL

## 2021-03-24 DIAGNOSIS — M25.551 RIGHT HIP PAIN: ICD-10-CM

## 2021-03-24 DIAGNOSIS — M25.552 LEFT HIP PAIN: ICD-10-CM

## 2021-03-24 DIAGNOSIS — R26.2 DIFFICULTY WALKING: ICD-10-CM

## 2021-03-24 DIAGNOSIS — Z96.642 HISTORY OF TOTAL LEFT HIP REPLACEMENT: Primary | ICD-10-CM

## 2021-03-24 DIAGNOSIS — M62.81 MUSCLE WEAKNESS (GENERALIZED): ICD-10-CM

## 2021-03-24 PROCEDURE — 97110 THERAPEUTIC EXERCISES: CPT

## 2021-03-24 PROCEDURE — 97112 NEUROMUSCULAR REEDUCATION: CPT

## 2021-03-24 NOTE — PROGRESS NOTES
Daily Note     Today's date: 3/24/2021  Patient name: Aura South  : 1960  MRN: [id number]  Referring provider: Pa Sebastian  Dx:   Encounter Diagnosis     ICD-10-CM    1  History of total left hip replacement  Z96 642    2  Left hip pain  M25 552    3  Right hip pain  M25 551    4  Difficulty walking  R26 2    5  Muscle weakness (generalized)  M62 81                   Subjective: Patient states he is really happy that he can finally go up an down the stairs with reciprocating gait  He is happy with his improvements with PT        Objective: See treatment diary below      Assessment: Tolerated treatment well  Continued to progress patient through exercises  Introduced sidelying hip abduction and prone hip extension, he demonstrated moderate challenge with this  He has good stablitiy with SLS and requires minimal HHA  Patient demonstrated fatigue post treatment and would benefit from continued PT      Plan: Continue per plan of care  Precautions: standard, post op L THR on 2020    HEP: LTR, PPT, quad sets    Specialty Daily Treatment Diary       Manual 3/22 3/24 3/12 3/15 3/17   Cupping        L H' johnny stretch, HS stretch, piriformis stretch        Tiger tail to Hamstring                        Exercise Diary         TM 5 min 4 incline 2  4mph 5 min 4 incline 2  7mph  5 mins  5 mins 5 min 4 incline 2  2mph           Ecc SAQ        SLR (flex) 1 5# 3x10 1 5# 3x10 0# 3x10 1# 3x10 1 5# 3x10   Sidelying H' ABD  15x b/l       Prone H' Ext        Prone quad stretch c SOS 20 sec x  3 20 sec x 3  20 sec x 3  20 sec x 3 NV   MARIS c MH on B quads 5 sec x 20 5 sec x 20  5 sec x 20 5 sec x 20 5 sec x 20    Ball Squeezes in hooklying -> Ball c Bridge        B H' ABD in hooklying -> TB c Bridge MTB sidelying  MTB sidelying 30 MTB sidelying 30 MTB sidelying 30  MTB sidelying 30   Bridge MTB 30 MTB 30x MTB 30 MTB 30  MTB 30   LTR  PB 5 sec 30x      PPT        Johnny stretch                LAQ c ecc 5# 30 5# 30 4# 30 5# 30 5# 30   Seated H' IR/ER ecc c towel roll underneath knee 5# 30 5# 30 4# 30 5# 30 5# 30   Seated HS stretch 20 sec x 3  20 sec x 3    20 sec x 3    Piriformis stretch        Ball rollouts        Mini Squats -> Chair Squats c TB around knees 30x  30x  30x  30x  30    3 way H' TB BTB 30 b/l abd and ext BTB 30x b/l  abd and ext  GTB 30x b/labd and ext GTB 30 b/l abd and ext BTB 30x b/l abd and ext    Lat Band Walks 4 laps  BTB 4 laps      Monster walks  3 laps  BTB 4 laps              Lat Step ups 8" x 15 8"x30      Step Downs 8"x15  8"x30      Step Ups        Gastroc stretch on Slant Board 30"x3  30 sec x 3  30 secx 3  30 sec x 3  30 sec x 3    Airex: SLS 20"x3 20"x3      TKE BTB 5" x 15  MTB 5" x 20 GTB 5" x 10 NV BTB 5" x 15   Hip Hikes        Lunges        LINA  5"x20                      HEP/EDU        Modalities                                   Skin checks performed pre and post application: intact

## 2021-03-26 ENCOUNTER — OFFICE VISIT (OUTPATIENT)
Dept: PHYSICAL THERAPY | Facility: CLINIC | Age: 61
End: 2021-03-26
Payer: COMMERCIAL

## 2021-03-26 DIAGNOSIS — M25.552 LEFT HIP PAIN: ICD-10-CM

## 2021-03-26 DIAGNOSIS — R26.2 DIFFICULTY WALKING: ICD-10-CM

## 2021-03-26 DIAGNOSIS — Z96.642 HISTORY OF TOTAL LEFT HIP REPLACEMENT: Primary | ICD-10-CM

## 2021-03-26 DIAGNOSIS — M62.81 MUSCLE WEAKNESS (GENERALIZED): ICD-10-CM

## 2021-03-26 DIAGNOSIS — M25.551 RIGHT HIP PAIN: ICD-10-CM

## 2021-03-26 PROCEDURE — 97112 NEUROMUSCULAR REEDUCATION: CPT

## 2021-03-26 PROCEDURE — 97110 THERAPEUTIC EXERCISES: CPT

## 2021-03-26 NOTE — PROGRESS NOTES
Daily Note     Today's date: 3/26/2021  Patient name: Natali Mcclelland  : 1960  MRN: [id number]  Referring provider: Rafael Short*  Dx:   Encounter Diagnosis     ICD-10-CM    1  History of total left hip replacement  Z96 642    2  Left hip pain  M25 552    3  Right hip pain  M25 551    4  Difficulty walking  R26 2    5  Muscle weakness (generalized)  M62 81                   Subjective: Patient states he is doing well today  He does report that when he leaves therapy he feels great, but when he gets to work 20 min later he feels stiff again  He believes that it could be his RBC count  Objective: See treatment diary below      Assessment: Tolerated treatment well  Encouraged patient to see doctor about blood levels  Continued to progress patient through exercises  Introduced leg press with good tolerance  He continues to demonstrate strides toward improvement  Patient demonstrated fatigue post treatment and would benefit from continued PT      Plan: Continue per plan of care  Precautions: standard, post op L THR on 2020    HEP: LTR, PPT, quad sets    Specialty Daily Treatment Diary       Manual 3/22 3/24 3/26 3/15 3/17   Cupping        L H' laura stretch, HS stretch, piriformis stretch        Tiger tail to Hamstring                        Exercise Diary         TM 5 min 4 incline 2  4mph 5 min 4 incline 2  7mph  5 mins  4 incline 2  7mph 5 mins 5 min 4 incline 2  2mph           Ecc SAQ        SLR (flex) 1 5# 3x10 1 5# 3x10 2# 3x10  1# 3x10 1 5# 3x10   Sidelying H' ABD  15x b/l  15x b/l      Prone H' Ext        Prone quad stretch c SOS 20 sec x  3 20 sec x 3  20 sec x 3 20 sec x 3 NV   MARIS c MH on B quads 5 sec x 20 5 sec x 20  5 sec x 30  5 sec x 20 5 sec x 20    Ball Squeezes in hooklying -> Ball c Bridge        B H' ABD in hooklying -> TB c Bridge MTB sidelying  MTB sidelying 30 MTB sidelying 30 MTB sidelying 30  MTB sidelying 30   Bridge MTB 30 MTB 30x MTB 30 MTB 30  MTB 30   LTR  PB 5 sec 30x      PPT        Johnny stretch        Leg Press   130# 3x10      LAQ c ecc 5# 30 5# 30 5# 30 5# 30 5# 30   Seated H' IR/ER ecc c towel roll underneath knee 5# 30 5# 30 5# 30 5# 30 5# 30   Seated HS stretch 20 sec x 3  20 sec x 3  HEP  20 sec x 3    Piriformis stretch        Ball rollouts        Mini Squats -> Chair Squats c TB around knees 30x  30x  30x  30x  30    3 way H' TB BTB 30 b/l abd and ext BTB 30x b/l  abd and ext  BTB 30x b/l  abd and ext  GTB 30 b/l abd and ext BTB 30x b/l abd and ext    Lat Band Walks 4 laps  BTB 4 laps BTB 4 laps     Monster walks  3 laps  BTB 4 laps BTB 4 laps             Lat Step ups 8" x 15 8"x30 8"x30     Step Downs 8"x15  8"x30 8"x30     Step Ups        Gastroc stretch on Slant Board 30"x3  30 sec x 3  HEP 30 sec x 3  30 sec x 3    Airex: SLS 20"x3 20"x3 20"x3     TKE BTB 5" x 15  MTB 5" x 20 NV NV BTB 5" x 15   Hip Hikes        Lunges                                HEP/EDU        Modalities                                   Skin checks performed pre and post application: intact

## 2021-03-29 ENCOUNTER — OFFICE VISIT (OUTPATIENT)
Dept: PHYSICAL THERAPY | Facility: CLINIC | Age: 61
End: 2021-03-29
Payer: COMMERCIAL

## 2021-03-29 DIAGNOSIS — Z96.642 HISTORY OF TOTAL LEFT HIP REPLACEMENT: Primary | ICD-10-CM

## 2021-03-29 DIAGNOSIS — R26.2 DIFFICULTY WALKING: ICD-10-CM

## 2021-03-29 DIAGNOSIS — M62.81 MUSCLE WEAKNESS (GENERALIZED): ICD-10-CM

## 2021-03-29 DIAGNOSIS — M25.551 RIGHT HIP PAIN: ICD-10-CM

## 2021-03-29 DIAGNOSIS — M25.552 LEFT HIP PAIN: ICD-10-CM

## 2021-03-29 PROCEDURE — 97112 NEUROMUSCULAR REEDUCATION: CPT

## 2021-03-29 PROCEDURE — 97110 THERAPEUTIC EXERCISES: CPT

## 2021-03-29 PROCEDURE — 97140 MANUAL THERAPY 1/> REGIONS: CPT

## 2021-03-29 NOTE — PROGRESS NOTES
Daily Note     Today's date: 3/29/2021  Patient name: Maynor Ellison  : 1960  MRN: [id number]  Referring provider: Jesus Maharaj*  Dx:   Encounter Diagnosis     ICD-10-CM    1  History of total left hip replacement  Z96 642    2  Left hip pain  M25 552    3  Right hip pain  M25 551    4  Difficulty walking  R26 2    5  Muscle weakness (generalized)  M62 81                   Subjective: Patient states he is feeling good today  Objective: See treatment diary below      Assessment: Tolerated treatment well  Initiated POC on treadmill for warm up  Continued to progress patient through exercises this visit  Introduced SL on leg press with notable challenge  Progressed in weight with SLR, and added in additional reps to sidelying hip abduction  He requires tactile cuing for correct form for sidelying abduction, demonstrates better form with correction  Patient has made improvements with flexibility especially into quad stretching  Encouraged patient to continue to stretch daily at home  Patient demonstrated fatigue post treatment, exhibited good technique with therapeutic exercises and would benefit from continued PT      Plan: Continue per plan of care  Precautions: standard, post op L THR on 2020    HEP: LTR, PPT, quad sets    Specialty Daily Treatment Diary       Manual 3/22 3/24 3/26 3/15 3/17   Cupping        L H' laura stretch, HS stretch, piriformis stretch        Tiger tail to Hamstring                        Exercise Diary         TM 5 min 4 incline 2  4mph 5 min 4 incline 2  7mph  5 mins  4 incline 2  7mph 5 mins 4 incline 2 7 mph 5 min 4 incline 2  2mph           Ecc SAQ        SLR (flex) 1 5# 3x10 1 5# 3x10 2# 3x10  2# 3x10 1 5# 3x10   Sidelying H' ABD  15x b/l  15x b/l  20x     Prone H' Ext        Prone quad stretch c SOS 20 sec x  3 20 sec x 3  20 sec x 3 20 sec x 3  NV   MARIS c MH on B quads 5 sec x 20 5 sec x 20  5 sec x 30   5 sec x 20    Ball Squeezes in hooklying -> [facility] Bridge        B H' ABD in hooklying -> TB c Bridge MTB sidelying  MTB sidelying 30 MTB sidelying 30 MTB sidelying 30  MTB sidelying 30   Bridge MTB 30 MTB 30x MTB 30 MTB 30  MTB 30   LTR  PB 5 sec 30x      PPT        SL leg press     70# 3x10    Leg Press   130# 3x10  130# 3x10    LAQ c ecc 5# 30 5# 30 5# 30 6# 30 5# 30   Seated H' IR/ER ecc c towel roll underneath knee 5# 30 5# 30 5# 30 6# 30 5# 30   Seated HS stretch 20 sec x 3  20 sec x 3  HEP  20 sec x 3    Piriformis stretch        Ball rollouts        Mini Squats -> Chair Squats c TB around knees 30x  30x  30x  30x  TB this vist   3 way H' TB BTB 30 b/l abd and ext BTB 30x b/l  abd and ext  BTB 30x b/l  abd and ext  BTB 30 b/l  abd and ext BTB 30x b/l abd and ext    Lat Band Walks 4 laps  BTB 4 laps BTB 4 laps BTB 4 laps     Monster walks  3 laps  BTB 4 laps BTB 4 laps BTB 4 laps            Lat Step ups 8" x 15 8"x30 8"x30 8" x30    Step Downs 8"x15  8"x30 8"x30 8"x30     Step Ups        Gastroc stretch on Slant Board 30"x3  30 sec x 3  HEP  30 sec x 3    Airex: SLS 20"x3 20"x3 20"x3 20" x 3     TKE BTB 5" x 15  MTB 5" x 20 NV  BTB 5" x 15   Hip Hikes        Lunges                                HEP/EDU        Modalities                                   Skin checks performed pre and post application: intact

## 2021-03-31 ENCOUNTER — EVALUATION (OUTPATIENT)
Dept: PHYSICAL THERAPY | Facility: CLINIC | Age: 61
End: 2021-03-31
Payer: COMMERCIAL

## 2021-03-31 DIAGNOSIS — M25.552 LEFT HIP PAIN: ICD-10-CM

## 2021-03-31 DIAGNOSIS — M62.81 MUSCLE WEAKNESS (GENERALIZED): Primary | ICD-10-CM

## 2021-03-31 DIAGNOSIS — M25.551 RIGHT HIP PAIN: ICD-10-CM

## 2021-03-31 DIAGNOSIS — Z96.642 HISTORY OF TOTAL LEFT HIP REPLACEMENT: ICD-10-CM

## 2021-03-31 DIAGNOSIS — R26.2 DIFFICULTY WALKING: ICD-10-CM

## 2021-03-31 PROCEDURE — 97110 THERAPEUTIC EXERCISES: CPT

## 2021-03-31 PROCEDURE — 97140 MANUAL THERAPY 1/> REGIONS: CPT

## 2021-03-31 NOTE — PROGRESS NOTES
PT Evaluation     Today's date: 3/31/2021  Patient name: Aurelia Gil  : 1960  MRN: [id number]  Referring provider: Frederick Dhaliwal PA-C  Dx:   Encounter Diagnosis     ICD-10-CM    1  Right hip pain  M25 551    2  Left hip pain  M25 552    3  Difficulty walking  R26 2    4  Muscle weakness (generalized)  M62 81    5  History of total left hip replacement  Z96 642          Assessment  Assessment details: Aurelia Gil is a 62 y o  male presenting to outpatient physical therapy at [place] [address] with complaints of L hip pain s/p THR on 2020, right hip pain, and left knee pain  Overall he has improved with hip ROM and strength however he continues to present with decreased range of motion, decreased strength, limited flexibility, poor postural awareness, poor body mechanics, poor balance, decreased tolerance to activity and decreased functional mobility due to Right hip pain  (primary encounter diagnosis), left hip pain, difficulty walking, muscle weakness (generalized), history of total left hip replacement  Therapist reviewed proper responses to exercises, diagnosis, prognosis, plan of care, HEP, and modalities to use at home   Zaire Adams would benefit from skilled PT services in order to address these deficits and reach maximum level of function   Thank you for the referral!  Impairments: abnormal coordination, abnormal gait, abnormal muscle firing, abnormal muscle tone, abnormal or restricted ROM, abnormal movement, activity intolerance, impaired balance, impaired physical strength, lacks appropriate home exercise program, pain with function, poor posture  and poor body mechanics    Symptom irritability: moderateUnderstanding of Dx/Px/POC: good   Prognosis: good    Goals  STGs (in 4 weeks):  1  Pt will report having at least a 50% improvement since I E  - Goal met   2  Pt will report having at most a 2/10 pain level with functional mobility  - progressing towards  3  Pt will be independent with HEP  - goal met   4  Pt will demonstrate full AROM of L hip without onset of pain  LTGs (in 12 weeks):  1  Pt will report having at least a 75% improvement since I E  - progressing towards  2  Pt will demonstrate symmetrical gait pattern without AD over even and uneven terrain  - progressing towards  3  Pt will be able to ascend/descend stairs reciprocally with good eccentric quad control  Progressing towards, but has improved    Plan  Patient would benefit from: PT eval  Planned modality interventions: unattended electrical stimulation, ultrasound and TENS  Planned therapy interventions: joint mobilization, manual therapy, neuromuscular re-education, patient education, self care, balance, balance/weight bearing training, home exercise program, therapeutic exercise, therapeutic activities, stretching, strengthening and flexibility  Frequency: 3x week  Plan of Care beginning date: 3/31/21  Plan of Care expiration date: 21  Treatment plan discussed with: patient        Subjective Evaluation    History of Present Illness    RE: 3/31/21: He reports that overall he has improved since I E  however he continues to demonstrate difficulty with quality of gait, ascending/descending stairs reciprocally    Mechanism of injury: Pt is a 62year old male who had his L THR 2020  He reports that he did not receive physical therapy pos op however he has experienced progressive bilateral hip pain as well as left knee pain  He reports that he is very limited functionally  Now referred to skilled OP PT     Quality of life: good    Pain  Current pain ratin  At best pain ratin  At worst pain ratin   Quality: tight, throbbing, squeezing, discomfort, cramping and dull ache  Relieving factors: relaxation, rest and medications  Aggravating factors: lifting, stair climbing, standing and walking (performing transfers after prolonged sitting, unable to kneel)  Progression: worsening    Patient Goals  Patient goals for therapy: decreased pain, improved balance, increased motion, increased strength, independence with ADLs/IADLs, return to sport/leisure activities and return to work          Objective    Observation: incision intact, atrophy noted in L glutes and L quadriceps especially distally, L knee bursitis noted anteriorly     Hip A/PROM: deg     R  L  IR (sitting):  12/18 20/25  ER (sitting):  22/25 25/27  Flexion:  110  115  Extension:  TBA  TBA  Abduction:  10*/15*  15/18    Knee A/PROM: deg     R  L  Extension:  TBA  TBA  Flexion:  TBA  TBA    Lumbar Screen  AROM: TBA    Strength: MMT  Hip   R  L  IR:   3/5  3/5  ER:   3/5  3/5  Flexion:  4/5  3/5  Extension:  3/5  3/5  Abduction:  3/5  3-/5    Knee   R  L  Extension:  4+/5  4+5  Flexion:  4+/5  4+/5    Flexibility: decreased bilateral hamstring, hip flexor, TFL, glute/piriformis, quadriceps    Special Tests  VIPUL: (+) R hip  FADDIR: (+) R hip  Scour: (R) R hip    Tenderness/Palpation: TTP along incision, L TFL/gluet med, proximal quad, ITB    Function  Gait: Pt demonstrates trendelenburg gait pattern (L worse than R) with lateral trunk flexion to the left with hyperextension of LLE  Squat: unable to perform  Stairs: non reciprocal leading with RLE      Precautions: standard, post op L THR on 11/20/2020    HEP: LTR, PPT, quad sets      Specialty Daily Treatment Diary       Manual 3/22 3/24 3/26 3/29 3/31   Cupping        L H' laura stretch, HS stretch, piriformis stretch        Tiger tail to Hamstring                        Exercise Diary         TM 5 min 4 incline 2  4mph 5 min 4 incline 2  7mph  5 mins  4 incline 2  7mph 5 mins 4 incline 2 7 mph 5 min 4 incline 2  2mph           Ecc SAQ        SLR (flex) 1 5# 3x10 1 5# 3x10 2# 3x10  2# 3x10 1 5# 3x10   Sidelying H' ABD  15x b/l  15x b/l  20x  20x    Prone H' Ext        Prone quad stretch c SOS 20 sec x  3 20 sec x 3  20 sec x 3 20 sec x 3  NV   MARIS c MH on B quads 5 sec x 20 5 sec x 20  5 sec x 30   5 sec x 20    Ball Squeezes in hooklying -> Ball c Bridge        B H' ABD in hooklying -> TB c Bridge MTB sidelying  MTB sidelying 30 MTB sidelying 30 MTB sidelying 30  MTB sidelying 30   Bridge MTB 30 MTB 30x MTB 30 MTB 30  MTB 30   LTR  PB 5 sec 30x      PPT        SL leg press     70# 3x10 70# 3x10   Leg Press   130# 3x10  130# 3x10 130# 3x10    LAQ c ecc 5# 30 5# 30 5# 30 6# 30 6# 30   Seated H' IR/ER ecc c towel roll underneath knee 5# 30 5# 30 5# 30 6# 30 6# 30   Seated HS stretch 20 sec x 3  20 sec x 3  HEP     Piriformis stretch        Ball rollouts        Mini Squats -> Chair Squats c TB around knees 30x  30x  30x  30x  30x    3 way H' TB BTB 30 b/l abd and ext BTB 30x b/l  abd and ext  BTB 30x b/l  abd and ext  BTB 30 b/l  abd and ext NV   Lat Band Walks 4 laps  BTB 4 laps BTB 4 laps BTB 4 laps  NV   Monster walks  3 laps  BTB 4 laps BTB 4 laps BTB 4 laps NV           Lat Step ups 8" x 15 8"x30 8"x30 8" x30 8" 30x   Step Downs 8"x15  8"x30 8"x30 8"x30  8" 30x   Step Ups        Gastroc stretch on Slant Board 30"x3  30 sec x 3  HEP     Airex: SLS 20"x3 20"x3 20"x3 20" x 3  NV   TKE BTB 5" x 15  MTB 5" x 20 NV     Hip Hikes        Lunges                                HEP/EDU        Modalities                                   Skin checks performed pre and post application: intact

## 2021-04-02 ENCOUNTER — TELEPHONE (OUTPATIENT)
Dept: HEMATOLOGY ONCOLOGY | Facility: CLINIC | Age: 61
End: 2021-04-02

## 2021-04-02 ENCOUNTER — APPOINTMENT (OUTPATIENT)
Dept: LAB | Facility: HOSPITAL | Age: 61
End: 2021-04-02
Attending: INTERNAL MEDICINE
Payer: COMMERCIAL

## 2021-04-02 ENCOUNTER — OFFICE VISIT (OUTPATIENT)
Dept: PHYSICAL THERAPY | Facility: CLINIC | Age: 61
End: 2021-04-02
Payer: COMMERCIAL

## 2021-04-02 DIAGNOSIS — M25.551 RIGHT HIP PAIN: ICD-10-CM

## 2021-04-02 DIAGNOSIS — D69.6 THROMBOCYTOPENIA (HCC): ICD-10-CM

## 2021-04-02 DIAGNOSIS — R26.2 DIFFICULTY WALKING: ICD-10-CM

## 2021-04-02 DIAGNOSIS — Z96.642 HISTORY OF TOTAL LEFT HIP REPLACEMENT: ICD-10-CM

## 2021-04-02 DIAGNOSIS — D50.9 IRON DEFICIENCY ANEMIA, UNSPECIFIED IRON DEFICIENCY ANEMIA TYPE: ICD-10-CM

## 2021-04-02 DIAGNOSIS — M25.552 LEFT HIP PAIN: ICD-10-CM

## 2021-04-02 DIAGNOSIS — M62.81 MUSCLE WEAKNESS (GENERALIZED): Primary | ICD-10-CM

## 2021-04-02 LAB
ALBUMIN SERPL BCP-MCNC: 3.8 G/DL (ref 3.5–5)
ALP SERPL-CCNC: 72 U/L (ref 46–116)
ALT SERPL W P-5'-P-CCNC: 51 U/L (ref 12–78)
ANION GAP SERPL CALCULATED.3IONS-SCNC: 9 MMOL/L (ref 4–13)
AST SERPL W P-5'-P-CCNC: 28 U/L (ref 5–45)
BASOPHILS # BLD AUTO: 0.01 THOUSANDS/ΜL (ref 0–0.1)
BASOPHILS NFR BLD AUTO: 0 % (ref 0–1)
BILIRUB SERPL-MCNC: 0.4 MG/DL (ref 0.2–1)
BUN SERPL-MCNC: 17 MG/DL (ref 5–25)
CALCIUM SERPL-MCNC: 8.7 MG/DL (ref 8.3–10.1)
CHLORIDE SERPL-SCNC: 106 MMOL/L (ref 100–108)
CO2 SERPL-SCNC: 28 MMOL/L (ref 21–32)
CREAT SERPL-MCNC: 0.9 MG/DL (ref 0.6–1.3)
EOSINOPHIL # BLD AUTO: 0.02 THOUSAND/ΜL (ref 0–0.61)
EOSINOPHIL NFR BLD AUTO: 1 % (ref 0–6)
ERYTHROCYTE [DISTWIDTH] IN BLOOD BY AUTOMATED COUNT: 14.6 % (ref 11.6–15.1)
FERRITIN SERPL-MCNC: 264 NG/ML (ref 8–388)
GFR SERPL CREATININE-BSD FRML MDRD: 93 ML/MIN/1.73SQ M
GLUCOSE SERPL-MCNC: 94 MG/DL (ref 65–140)
HCT VFR BLD AUTO: 34.2 % (ref 36.5–49.3)
HGB BLD-MCNC: 11.2 G/DL (ref 12–17)
IMM GRANULOCYTES # BLD AUTO: 0 THOUSAND/UL (ref 0–0.2)
IMM GRANULOCYTES NFR BLD AUTO: 0 % (ref 0–2)
IRON SATN MFR SERPL: 38 %
IRON SERPL-MCNC: 120 UG/DL (ref 65–175)
LYMPHOCYTES # BLD AUTO: 1.74 THOUSANDS/ΜL (ref 0.6–4.47)
LYMPHOCYTES NFR BLD AUTO: 47 % (ref 14–44)
MCH RBC QN AUTO: 36 PG (ref 26.8–34.3)
MCHC RBC AUTO-ENTMCNC: 32.7 G/DL (ref 31.4–37.4)
MCV RBC AUTO: 110 FL (ref 82–98)
MONOCYTES # BLD AUTO: 0.28 THOUSAND/ΜL (ref 0.17–1.22)
MONOCYTES NFR BLD AUTO: 8 % (ref 4–12)
NEUTROPHILS # BLD AUTO: 1.61 THOUSANDS/ΜL (ref 1.85–7.62)
NEUTS SEG NFR BLD AUTO: 44 % (ref 43–75)
NRBC BLD AUTO-RTO: 0 /100 WBCS
PLATELET # BLD AUTO: 90 THOUSANDS/UL (ref 149–390)
PMV BLD AUTO: 11.7 FL (ref 8.9–12.7)
POTASSIUM SERPL-SCNC: 3.9 MMOL/L (ref 3.5–5.3)
PROT SERPL-MCNC: 7.5 G/DL (ref 6.4–8.2)
RBC # BLD AUTO: 3.11 MILLION/UL (ref 3.88–5.62)
SODIUM SERPL-SCNC: 143 MMOL/L (ref 136–145)
TIBC SERPL-MCNC: 317 UG/DL (ref 250–450)
WBC # BLD AUTO: 3.66 THOUSAND/UL (ref 4.31–10.16)

## 2021-04-02 PROCEDURE — 97110 THERAPEUTIC EXERCISES: CPT

## 2021-04-02 PROCEDURE — 82728 ASSAY OF FERRITIN: CPT

## 2021-04-02 PROCEDURE — 85025 COMPLETE CBC W/AUTO DIFF WBC: CPT

## 2021-04-02 PROCEDURE — 83540 ASSAY OF IRON: CPT

## 2021-04-02 PROCEDURE — 80053 COMPREHEN METABOLIC PANEL: CPT

## 2021-04-02 PROCEDURE — 83918 ORGANIC ACIDS TOTAL QUANT: CPT

## 2021-04-02 PROCEDURE — 97112 NEUROMUSCULAR REEDUCATION: CPT

## 2021-04-02 PROCEDURE — 36415 COLL VENOUS BLD VENIPUNCTURE: CPT

## 2021-04-02 PROCEDURE — 83550 IRON BINDING TEST: CPT

## 2021-04-02 PROCEDURE — 97530 THERAPEUTIC ACTIVITIES: CPT

## 2021-04-02 NOTE — TELEPHONE ENCOUNTER
Per Dr Shayy Aguirre, the patient is to have labs drawn and 8 doses of Venofer ordered  I spoke with the patient and he states he will go for labs today because he's been feeling extremely tired  Please set up per Dr Shayy Aguirre

## 2021-04-02 NOTE — TELEPHONE ENCOUNTER
Patient states that he was to call in to be set ou for iron infusion and b12 injection after he recovered from hip surgery  Best  call back  [phone]

## 2021-04-02 NOTE — PROGRESS NOTES
Daily Note     Today's date: 2021  Patient name: Kobe Rajput  : 1960  MRN: [id number]  Referring provider: Ching Mosquera*  Dx:   Encounter Diagnosis     ICD-10-CM    1  Muscle weakness (generalized)  M62 81    2  History of total left hip replacement  Z96 642    3  Left hip pain  M25 552    4  Right hip pain  M25 551    5  Difficulty walking  R26 2                   Subjective: Patient states he is having an off day reports he think his thyroid is acting up  Reports he will be calling doctor  Objective: See treatment diary below      Assessment: Tolerated treatment well  No progressions were made due to patients subjective of not feeling the best today  He did well with POC  Patient did note of edema in L knee cap  Encouraged patient to be icing instead of heat, provided patient with compression sleeve  Patient demonstrated fatigue post treatment and would benefit from continued PT      Plan: Continue per plan of care  Precautions: standard, post op L THR on 2020    HEP: LTR, PPT, quad sets    Specialty Daily Treatment Diary     Manual 4/2 3/24 3/26 3/29 3/31   Cupping        L H' laura stretch, HS stretch, piriformis stretch        Tiger tail to Hamstring                        Exercise Diary         TM 5 min no incline 5 min 4 incline 2  7mph  5 mins  4 incline 2  7mph 5 mins 4 incline 2 7 mph 5 min 4 incline 2  2mph           Ecc SAQ        SLR (flex) 1 5# 30x 1 5# 3x10 2# 3x10  2# 3x10 1 5# 3x10   Sidelying H' ABD 20x 15x b/l  15x b/l  20x  20x    Prone H' Ext        Prone quad stretch c SOS NV 20 sec x 3  20 sec x 3 20 sec x 3  NV   MARIS c MH on B quads NV 5 sec x 20  5 sec x 30   5 sec x 20    Ball Squeezes in hooklying -> Ball c Bridge        B H' ABD in hooklying -> TB c Bridge MTB sidelying 30 MTB sidelying 30 MTB sidelying 30 MTB sidelying 30  MTB sidelying 30   Bridge MTB 30 MTB 30x MTB 30 MTB 30  MTB 30   LTR  PB 5 sec 30x      PPT        SL leg press  NV   70# 3x10 70# 3x10   Leg Press NV   130# 3x10  130# 3x10 130# 3x10    LAQ c ecc 6# 30 5# 30 5# 30 6# 30 6# 30   Seated H' IR/ER ecc c towel roll underneath knee 6# 30 5# 30 5# 30 6# 30 6# 30   Seated HS stretch  20 sec x 3  HEP     Piriformis stretch        Ball rollouts        Mini Squats -> Chair Squats c TB around knees 30x 30x  30x  30x  30x    3 way H' TB BTB 30 abd and ext BTB 30x b/l  abd and ext  BTB 30x b/l  abd and ext  BTB 30 b/l  abd and ext NV   Lat Band Walks BTB 4 laps BTB 4 laps BTB 4 laps BTB 4 laps  NV   Monster walks  BTB 4 las BTB 4 laps BTB 4 laps BTB 4 laps NV           Lat Step ups 8"x30 8"x30 8"x30 8" x30 8" 30x   Step Downs 8" 30 8"x30 8"x30 8"x30  8" 30x   Step Ups        Gastroc stretch on Slant Board  30 sec x 3  HEP     Airex: SLS NV 20"x3 20"x3 20" x 3  NV   TKE NV MTB 5" x 20 NV     Hip Hikes        Lunges                                HEP/EDU        Modalities        MH                           Skin checks performed pre and post application: intact

## 2021-04-05 ENCOUNTER — APPOINTMENT (OUTPATIENT)
Dept: PHYSICAL THERAPY | Facility: CLINIC | Age: 61
End: 2021-04-05
Payer: COMMERCIAL

## 2021-04-05 DIAGNOSIS — E53.8 B12 DEFICIENCY: Primary | ICD-10-CM

## 2021-04-05 DIAGNOSIS — D50.0 IRON DEFICIENCY ANEMIA DUE TO CHRONIC BLOOD LOSS: ICD-10-CM

## 2021-04-05 DIAGNOSIS — R16.1 SPLENOMEGALY: ICD-10-CM

## 2021-04-05 RX ORDER — SODIUM CHLORIDE 9 MG/ML
20 INJECTION, SOLUTION INTRAVENOUS ONCE
Status: CANCELLED | OUTPATIENT
Start: 2021-04-06

## 2021-04-05 RX ORDER — CYANOCOBALAMIN 1000 UG/ML
1000 INJECTION INTRAMUSCULAR; SUBCUTANEOUS ONCE
Status: CANCELLED | OUTPATIENT
Start: 2021-04-06

## 2021-04-05 NOTE — TELEPHONE ENCOUNTER
Results are back and reviewed  Per Dr Reece Grief please set the patient up for 6 doses of Venofer and 6 of vitamin B12 once a week  If the patient prefers to get it twice a week, please set the patient up with 6 doses of Venofer and 3 or vitamin B12

## 2021-04-06 ENCOUNTER — APPOINTMENT (OUTPATIENT)
Dept: PHYSICAL THERAPY | Facility: CLINIC | Age: 61
End: 2021-04-06
Payer: COMMERCIAL

## 2021-04-06 ENCOUNTER — HOSPITAL ENCOUNTER (OUTPATIENT)
Dept: INFUSION CENTER | Facility: HOSPITAL | Age: 61
Discharge: HOME | End: 2021-04-06
Attending: INTERNAL MEDICINE
Payer: COMMERCIAL

## 2021-04-06 VITALS
SYSTOLIC BLOOD PRESSURE: 166 MMHG | RESPIRATION RATE: 18 BRPM | OXYGEN SATURATION: 98 % | DIASTOLIC BLOOD PRESSURE: 74 MMHG | TEMPERATURE: 97.8 F | HEART RATE: 82 BPM

## 2021-04-06 DIAGNOSIS — E53.8 B12 DEFICIENCY: Primary | ICD-10-CM

## 2021-04-06 DIAGNOSIS — D50.0 IRON DEFICIENCY ANEMIA DUE TO CHRONIC BLOOD LOSS: ICD-10-CM

## 2021-04-06 DIAGNOSIS — R16.1 SPLENOMEGALY: ICD-10-CM

## 2021-04-06 PROCEDURE — 96372 THER/PROPH/DIAG INJ SC/IM: CPT

## 2021-04-06 PROCEDURE — 96365 THER/PROPH/DIAG IV INF INIT: CPT

## 2021-04-06 RX ORDER — CYANOCOBALAMIN 1000 UG/ML
1000 INJECTION INTRAMUSCULAR; SUBCUTANEOUS ONCE
Status: CANCELLED | OUTPATIENT
Start: 2021-04-14

## 2021-04-06 RX ORDER — CYANOCOBALAMIN 1000 UG/ML
1000 INJECTION INTRAMUSCULAR; SUBCUTANEOUS ONCE
Status: COMPLETED | OUTPATIENT
Start: 2021-04-06 | End: 2021-04-06

## 2021-04-06 RX ORDER — SODIUM CHLORIDE 9 MG/ML
20 INJECTION, SOLUTION INTRAVENOUS ONCE
Status: CANCELLED | OUTPATIENT
Start: 2021-04-13

## 2021-04-06 RX ORDER — SODIUM CHLORIDE 9 MG/ML
20 INJECTION, SOLUTION INTRAVENOUS ONCE
Status: COMPLETED | OUTPATIENT
Start: 2021-04-06 | End: 2021-04-06

## 2021-04-06 RX ADMIN — SODIUM CHLORIDE 20 ML/HR: 9 INJECTION, SOLUTION INTRAVENOUS at 14:51

## 2021-04-06 RX ADMIN — IRON SUCROSE 200 MG: 20 INJECTION, SOLUTION INTRAVENOUS at 14:51

## 2021-04-06 RX ADMIN — CYANOCOBALAMIN 1000 MCG: 1000 INJECTION, SOLUTION INTRAMUSCULAR at 14:52

## 2021-04-07 ENCOUNTER — OFFICE VISIT (OUTPATIENT)
Dept: PHYSICAL THERAPY | Facility: CLINIC | Age: 61
End: 2021-04-07
Payer: COMMERCIAL

## 2021-04-07 DIAGNOSIS — M25.551 RIGHT HIP PAIN: ICD-10-CM

## 2021-04-07 DIAGNOSIS — Z96.642 HISTORY OF TOTAL LEFT HIP REPLACEMENT: ICD-10-CM

## 2021-04-07 DIAGNOSIS — R26.2 DIFFICULTY WALKING: ICD-10-CM

## 2021-04-07 DIAGNOSIS — M62.81 MUSCLE WEAKNESS (GENERALIZED): Primary | ICD-10-CM

## 2021-04-07 DIAGNOSIS — M25.552 LEFT HIP PAIN: ICD-10-CM

## 2021-04-07 PROCEDURE — 97110 THERAPEUTIC EXERCISES: CPT

## 2021-04-07 PROCEDURE — 97112 NEUROMUSCULAR REEDUCATION: CPT

## 2021-04-07 NOTE — PROGRESS NOTES
Daily Note     Today's date: 2021  Patient name: Gordo Mckinney  : 1960  MRN: [id number]  Referring provider: Romero Koyanagi*  Dx:   Encounter Diagnosis     ICD-10-CM    1  Muscle weakness (generalized)  M62 81    2  History of total left hip replacement  Z96 642    3  Left hip pain  M25 552    4  Right hip pain  M25 551    5  Difficulty walking  R26 2                   Subjective: Patient arrived to clinic and said he finally got his blood work done  Reports that his B12 and Iron levels are down and he is getting infusion  He states that the doctor advised that his low levels are a result from his SOB and stiffness  Objective: See treatment diary below       Assessment: Tolerated treatment well  Held on step ups this visit as he always experiences SOB  He noted he felt SOB after treadmill but was not as bad without incline  Continued to hold on high exertion exercises  He did well with current POC  Patient demonstrated fatigue post treatment and would benefit from continued PT      Plan: Continue per plan of care  Precautions: standard, post op L THR on 2020    HEP: LTR, PPT, quad sets    Specialty Daily Treatment Diary     Manual 4/2 4/7 3/26 3/29 3/31   Cupping        L H' laura stretch, HS stretch, piriformis stretch        Tiger tail to Hamstring                        Exercise Diary         TM 5 min no incline 5 min no incline  5 mins  4 incline 2  7mph 5 mins 4 incline 2 7 mph 5 min 4 incline 2  2mph           Ecc SAQ        SLR (flex) 1 5# 30x 2# 30x 2# 3x10  2# 3x10 1 5# 3x10   Sidelying H' ABD 20x 30x 15x b/l  20x  20x    Prone H' Ext        Prone quad stretch c SOS NV  20 sec x 3 20 sec x 3  NV   MARIS c MH on B quads NV 5 sec x 20  5 sec x 30   5 sec x 20    Ball Squeezes in hooklying -> Ball c Bridge        B H' ABD in hooklying -> TB c Bridge MTB sidelying 30 MTB sidelying 30  MTB sidelying 30 MTB sidelying 30  MTB sidelying 30   Bridge MTB 30 MTB 30 MTB 30 MTB 30  MTB 30 LTR        PPT        SL leg press  NV NV  70# 3x10 70# 3x10   Leg Press NV  # 3x10  130# 3x10 130# 3x10    LAQ c ecc 6# 30 6# 30 5# 30 6# 30 6# 30   Seated H' IR/ER ecc c towel roll underneath knee 6# 30 6# 30 5# 30 6# 30 6# 30   Seated HS stretch   HEP     Piriformis stretch        Ball rollouts        Mini Squats -> Chair Squats c TB around knees 30x 30x  30x  30x  30x    3 way H' TB BTB 30 abd and ext BTB 30 abd and ext BTB 30x b/l  abd and ext  BTB 30 b/l  abd and ext NV   Lat Band Walks BTB 4 laps BTB 4 laps BTB 4 laps BTB 4 laps  NV   Monster walks  BTB 4 las BTB 4 laps  BTB 4 laps BTB 4 laps NV           Lat Step ups 8"x30 hold 8"x30 8" x30 8" 30x   Step Downs 8" 30 hold 8"x30 8"x30  8" 30x   Step Ups        Gastroc stretch on Slant Board   HEP     Airex: SLS NV 20 sec x 3  20"x3 20" x 3  NV   TKE NV  NV     Hip Hikes        Lunges                                HEP/EDU        Modalities        MH                           Skin checks performed pre and post application: intact

## 2021-04-09 ENCOUNTER — APPOINTMENT (OUTPATIENT)
Dept: PHYSICAL THERAPY | Facility: CLINIC | Age: 61
End: 2021-04-09
Payer: COMMERCIAL

## 2021-04-11 LAB
METHYLMALONATE SERPL-SCNC: 190 NMOL/L (ref 0–378)
SL AMB DISCLAIMER: NORMAL

## 2021-04-12 ENCOUNTER — OFFICE VISIT (OUTPATIENT)
Dept: PHYSICAL THERAPY | Facility: CLINIC | Age: 61
End: 2021-04-12
Payer: COMMERCIAL

## 2021-04-12 DIAGNOSIS — M25.551 RIGHT HIP PAIN: ICD-10-CM

## 2021-04-12 DIAGNOSIS — Z96.642 HISTORY OF TOTAL LEFT HIP REPLACEMENT: ICD-10-CM

## 2021-04-12 DIAGNOSIS — M25.552 LEFT HIP PAIN: ICD-10-CM

## 2021-04-12 DIAGNOSIS — R26.2 DIFFICULTY WALKING: ICD-10-CM

## 2021-04-12 DIAGNOSIS — M62.81 MUSCLE WEAKNESS (GENERALIZED): Primary | ICD-10-CM

## 2021-04-12 PROCEDURE — 97110 THERAPEUTIC EXERCISES: CPT

## 2021-04-12 PROCEDURE — 97112 NEUROMUSCULAR REEDUCATION: CPT

## 2021-04-12 RX ORDER — SODIUM CHLORIDE 9 MG/ML
20 INJECTION, SOLUTION INTRAVENOUS ONCE
Status: CANCELLED | OUTPATIENT
Start: 2021-04-14

## 2021-04-12 NOTE — PROGRESS NOTES
Daily Note     Today's date: 2021  Patient name: Mike Corbin  : 1960  MRN: [id number]  Referring provider: Tricia Quiroz*  Dx:   Encounter Diagnosis     ICD-10-CM    1  Muscle weakness (generalized)  M62 81    2  History of total left hip replacement  Z96 642    3  Left hip pain  M25 552    4  Right hip pain  M25 551    5  Difficulty walking  R26 2                   Subjective: Patient states he is doing okay today  Reports that he is feeling more of the effects from his blood deficit  He is having SOB and still feeling the stiffness  Objective: See treatment diary below      Assessment: Tolerated treatment well  Initiated POC on TM, continued to complete without incline as patient gets SOB  Resumed with normal POC as able  Encouraged patient to take more rest breaks due to increase in SOB  He did better this session with the increase in rest breaks  He is still demonstrating improvements with strength but cardio seems to be getting a little worse  Patient demonstrated fatigue post treatment and would benefit from continued PT      Plan: Continue per plan of care  Precautions: standard, post op L THR on 2020    HEP: LTR, PPT, quad sets    Specialty Daily Treatment Diary     Manual 4/2 4/7 4/12 3/29 3/31   Cupping        L H' laura stretch, HS stretch, piriformis stretch        Tiger tail to Hamstring                        Exercise Diary         TM 5 min no incline 5 min no incline  5 min no incline 5 mins 4 incline 2 7 mph 5 min 4 incline 2  2mph           Ecc SAQ        SLR (flex) 1 5# 30x 2# 30x 2# 30x  2# 3x10 1 5# 3x10   Sidelying H' ABD 20x 30x 20x  20x  20x    Prone H' Ext        Prone quad stretch c SOS NV  20 sec x 3  20 sec x 3  NV   MARIS c MH on B quads NV 5 sec x 20  5 sec x 20  5 sec x 20    Ball Squeezes in hooklying -> Ball c Bridge        B H' ABD in hooklying -> TB c Bridge MTB sidelying 30 MTB sidelying 30  MTB  seidelying 30  MTB sidelying 30  MTB sidelying 30 Bridge MTB 30 MTB 30 MTB 30  MTB 30  MTB 30   LTR        PPT        SL leg press  NV NV 70# 3x10 70# 3x10 70# 3x10   Leg Press NV  # 3x10 130# 3x10 130# 3x10    LAQ c ecc 6# 30 6# 30 MTB 30  6# 30 6# 30   Seated H' IR/ER ecc c towel roll underneath knee 6# 30 6# 30 MTB 30 6# 30 6# 30   Seated HS stretch        Piriformis stretch        Ball rollouts        Mini Squats -> Chair Squats c TB around knees 30x 30x  30x  30x  30x    3 way H' TB BTB 30 abd and ext BTB 30 abd and ext MTB 20 abd and ext BTB 30 b/l  abd and ext NV   Lat Band Walks BTB 4 laps BTB 4 laps MTB 4 laps BTB 4 laps  NV   Monster walks  BTB 4 las BTB 4 laps  MTB 4 laps BTB 4 laps NV           Lat Step ups 8"x30 hold 8" x 20 8" x30 8" 30x   Step Downs 8" 30 hold 8" x 20 8"x30  8" 30x   Step Ups        Gastroc stretch on Slant Board        Airex: SLS NV 20 sec x 3   20" x 3  NV   TKE NV       Hip Hikes        Lunges                                HEP/EDU        Modalities        MH                           Skin checks performed pre and post application: intact

## 2021-04-14 ENCOUNTER — OFFICE VISIT (OUTPATIENT)
Dept: PHYSICAL THERAPY | Facility: CLINIC | Age: 61
End: 2021-04-14
Payer: COMMERCIAL

## 2021-04-14 ENCOUNTER — HOSPITAL ENCOUNTER (OUTPATIENT)
Dept: INFUSION CENTER | Facility: HOSPITAL | Age: 61
Discharge: HOME | End: 2021-04-14
Attending: INTERNAL MEDICINE
Payer: COMMERCIAL

## 2021-04-14 VITALS
HEART RATE: 71 BPM | TEMPERATURE: 97.3 F | OXYGEN SATURATION: 97 % | SYSTOLIC BLOOD PRESSURE: 139 MMHG | RESPIRATION RATE: 16 BRPM | DIASTOLIC BLOOD PRESSURE: 77 MMHG

## 2021-04-14 DIAGNOSIS — D50.0 IRON DEFICIENCY ANEMIA DUE TO CHRONIC BLOOD LOSS: ICD-10-CM

## 2021-04-14 DIAGNOSIS — Z96.642 HISTORY OF TOTAL LEFT HIP REPLACEMENT: ICD-10-CM

## 2021-04-14 DIAGNOSIS — M25.551 RIGHT HIP PAIN: ICD-10-CM

## 2021-04-14 DIAGNOSIS — M25.552 LEFT HIP PAIN: ICD-10-CM

## 2021-04-14 DIAGNOSIS — E53.8 B12 DEFICIENCY: Primary | ICD-10-CM

## 2021-04-14 DIAGNOSIS — M62.81 MUSCLE WEAKNESS (GENERALIZED): Primary | ICD-10-CM

## 2021-04-14 DIAGNOSIS — R16.1 SPLENOMEGALY: ICD-10-CM

## 2021-04-14 DIAGNOSIS — R26.2 DIFFICULTY WALKING: ICD-10-CM

## 2021-04-14 PROCEDURE — 96372 THER/PROPH/DIAG INJ SC/IM: CPT

## 2021-04-14 PROCEDURE — 97110 THERAPEUTIC EXERCISES: CPT

## 2021-04-14 PROCEDURE — 96365 THER/PROPH/DIAG IV INF INIT: CPT

## 2021-04-14 PROCEDURE — 97140 MANUAL THERAPY 1/> REGIONS: CPT

## 2021-04-14 RX ORDER — SODIUM CHLORIDE 9 MG/ML
20 INJECTION, SOLUTION INTRAVENOUS ONCE
Status: CANCELLED | OUTPATIENT
Start: 2021-04-20

## 2021-04-14 RX ORDER — SODIUM CHLORIDE 9 MG/ML
20 INJECTION, SOLUTION INTRAVENOUS ONCE
Status: COMPLETED | OUTPATIENT
Start: 2021-04-14 | End: 2021-04-14

## 2021-04-14 RX ORDER — CYANOCOBALAMIN 1000 UG/ML
1000 INJECTION INTRAMUSCULAR; SUBCUTANEOUS ONCE
Status: COMPLETED | OUTPATIENT
Start: 2021-04-14 | End: 2021-04-14

## 2021-04-14 RX ORDER — CYANOCOBALAMIN 1000 UG/ML
1000 INJECTION INTRAMUSCULAR; SUBCUTANEOUS ONCE
Status: CANCELLED | OUTPATIENT
Start: 2021-04-20

## 2021-04-14 RX ADMIN — IRON SUCROSE 200 MG: 20 INJECTION, SOLUTION INTRAVENOUS at 15:05

## 2021-04-14 RX ADMIN — CYANOCOBALAMIN 1000 MCG: 1000 INJECTION, SOLUTION INTRAMUSCULAR at 15:05

## 2021-04-14 RX ADMIN — SODIUM CHLORIDE 20 ML/HR: 9 INJECTION, SOLUTION INTRAVENOUS at 15:05

## 2021-04-14 NOTE — PROGRESS NOTES
Daily Note     Today's date: 2021  Patient name: Luther Jaquez  : 1960  MRN: [id number]  Referring provider: David Wade*  Dx:   Encounter Diagnosis     ICD-10-CM    1  Muscle weakness (generalized)  M62 81    2  History of total left hip replacement  Z96 642    3  Left hip pain  M25 552    4  Right hip pain  M25 551    5  Difficulty walking  R26 2                   Subjective: Patient states he threw his back out trying to  a plate and is in pain today  Objective: See treatment diary below      Assessment: Tolerated treatment fair  Initiated POC in supine with MHP  Focused today on lumbar stretches and applied TRP/ tiger tail to lower lumbar region  Patient noted relief leaving therapy  Provided patient with HEP to futher improve lumbar symptoms  Instructed to not perform any other exercise besides the lumbar region  Patient demonstrated fatigue post treatment and would benefit from continued PT      Plan: Continue per plan of care  Precautions: standard, post op L THR on 2020    HEP: LTR, PPT, quad sets    Specialty Daily Treatment Diary     Manual 4/2 4/7 4/12 4/14 3/31   Cupping        L H' laura stretch, HS stretch, piriformis stretch        Tiger tail to Hamstring    TPR/ Tiger Tail  RA                    Exercise Diary         TM 5 min no incline 5 min no incline  5 min no incline  5 min 4 incline 2  2mph           Ecc SAQ        SLR (flex) 1 5# 30x 2# 30x 2# 30x   1 5# 3x10   Sidelying H' ABD 20x 30x 20x   20x    Prone H' Ext        Prone quad stretch c SOS NV  20 sec x 3  20 sec x3  NV   MARIS c MH on B quads NV 5 sec x 20  5 sec x 20  5 sec x 20    Ball Squeezes in hooklying -> Ball c Bridge        B H' ABD in hooklying -> TB c Bridge MTB sidelying 30 MTB sidelying 30  MTB  seidelying 30   MTB sidelying 30   Bridge MTB 30 MTB 30 MTB 30   MTB 30   LTR        PPT        SL leg press  NV NV 70# 3x10  70# 3x10   Leg Press NV  # 3x10  130# 3x10    LAQ c ecc 6# 30 6# 30 MTB 30   6# 30   Seated H' IR/ER ecc c towel roll underneath knee 6# 30 6# 30 MTB 30  6# 30   PPT    10"x10    DKTC on ball     10"x15 on Red ball    Ball rollouts    10'x10     Mini Squats -> Chair Squats c TB around knees 30x 30x  30x   30x    3 way H' TB BTB 30 abd and ext BTB 30 abd and ext MTB 20 abd and ext  NV   Lat Band Walks BTB 4 laps BTB 4 laps MTB 4 laps  NV   Monster walks  BTB 4 las BTB 4 laps  MTB 4 laps  NV           Lat Step ups 8"x30 hold 8" x 20  8" 30x   Step Downs 8" 30 hold 8" x 20  8" 30x   Step Ups        Gastroc stretch on Slant Board        Airex: SLS NV 20 sec x 3    NV   TKE NV       Hip Hikes        Lunges                                HEP/EDU        Modalities                                   Skin checks performed pre and post application: intact

## 2021-04-14 NOTE — PLAN OF CARE
Problem: Knowledge Deficit  Goal: Patient/family/caregiver demonstrates understanding of disease process, treatment plan, medications, and discharge instructions  Description: Complete learning assessment and assess knowledge base    Interventions:  - Provide teaching at level of understanding  - Provide teaching via preferred learning methods  4/14/2021 1522 by Vida Cartagena RN  Outcome: Progressing  4/14/2021 1458 by Vida Cartagena, RN  Outcome: Progressing

## 2021-04-16 ENCOUNTER — OFFICE VISIT (OUTPATIENT)
Dept: PHYSICAL THERAPY | Facility: CLINIC | Age: 61
End: 2021-04-16
Payer: COMMERCIAL

## 2021-04-16 DIAGNOSIS — M25.552 LEFT HIP PAIN: ICD-10-CM

## 2021-04-16 DIAGNOSIS — Z96.642 HISTORY OF TOTAL LEFT HIP REPLACEMENT: ICD-10-CM

## 2021-04-16 DIAGNOSIS — R26.2 DIFFICULTY WALKING: ICD-10-CM

## 2021-04-16 DIAGNOSIS — M25.551 RIGHT HIP PAIN: ICD-10-CM

## 2021-04-16 DIAGNOSIS — M62.81 MUSCLE WEAKNESS (GENERALIZED): Primary | ICD-10-CM

## 2021-04-16 PROCEDURE — 97110 THERAPEUTIC EXERCISES: CPT

## 2021-04-16 PROCEDURE — 97140 MANUAL THERAPY 1/> REGIONS: CPT

## 2021-04-16 NOTE — PROGRESS NOTES
Daily Note     Today's date: 2021  Patient name: Mp Soto  : 1960  MRN: [id number]  Referring provider: Osbaldo Sanders*  Dx:   Encounter Diagnosis     ICD-10-CM    1  Muscle weakness (generalized)  M62 81    2  History of total left hip replacement  Z96 642    3  Left hip pain  M25 552    4  Right hip pain  M25 551    5  Difficulty walking  R26 2                   Subjective: Patient states he is having pain into back again  Reports the relief from last session lasted a day until sxs returned  He reports he was just walking when sxs started to act back up again  Objective: See treatment diary below      Assessment: Tolerated treatment well  Resumed with lumbar POC as he was still having symptoms  Encouraged patient to be stretching at home over the weekend  He noted he felt a lot better leaving therapy with much relief  Patient demonstrated fatigue post treatment and would benefit from continued PT      Plan: Continue per plan of care        Precautions: standard, post op L THR on 2020    HEP: LTR, PPT, quad sets    Specialty Daily Treatment Diary     Manual    Cupping        L H' laura stretch, HS stretch, piriformis stretch        Tiger tail to Hamstring    TPR/ Everetts Tail  RA RA TPR                   Exercise Diary         TM 5 min no incline 5 min no incline  5 min no incline     Supine hamstring stretch      20 sec x 3 3   Ecc SAQ        SLR (flex) 1 5# 30x 2# 30x 2# 30x      Sidelying H' ABD 20x 30x 20x      Prone H' Ext        Prone quad stretch c SOS NV  20 sec x 3  20 sec x3  20 sec x 3   MARIS c MH on B quads NV 5 sec x 20  5 sec x 20     Ball Squeezes in hooklying -> Ball c Bridge        B H' ABD in hooklying -> TB c Bridge MTB sidelying 30 MTB sidelying 30  MTB  seidelying 30      Bridge MTB 30 MTB 30 MTB 30      LTR     5"  X 20   PPT        SL leg press  NV NV 70# 3x10     Leg Press NV  # 3x10     LAQ c ecc 6# 30 6# 30 MTB 30      Seated H' IR/ER ecc c towel roll underneath knee 6# 30 6# 30 MTB 30     PPT    10"x10 10"x15   DKTC on ball     10"x15 on Red ball 10"x15 on Red ball    Ball rollouts    10'x10  10"x10   Mini Squats -> Chair Squats c TB around knees 30x 30x  30x      3 way H' TB BTB 30 abd and ext BTB 30 abd and ext MTB 20 abd and ext     Lat Band Walks BTB 4 laps BTB 4 laps MTB 4 laps     Monster walks  BTB 4 las BTB 4 laps  MTB 4 laps             Lat Step ups 8"x30 hold 8" x 20     Step Downs 8" 30 hold 8" x 20     Step Ups        Gastroc stretch on Slant Board        Airex: SLS NV 20 sec x 3       TKE NV       Hip Hikes        Lunges                                HEP/EDU        Modalities                                   Skin checks performed pre and post application: intact

## 2021-04-20 ENCOUNTER — HOSPITAL ENCOUNTER (OUTPATIENT)
Dept: INFUSION CENTER | Facility: HOSPITAL | Age: 61
Discharge: HOME | End: 2021-04-20
Attending: INTERNAL MEDICINE
Payer: COMMERCIAL

## 2021-04-20 ENCOUNTER — OFFICE VISIT (OUTPATIENT)
Dept: PHYSICAL THERAPY | Facility: CLINIC | Age: 61
End: 2021-04-20
Payer: COMMERCIAL

## 2021-04-20 VITALS
DIASTOLIC BLOOD PRESSURE: 75 MMHG | HEART RATE: 77 BPM | TEMPERATURE: 97.2 F | RESPIRATION RATE: 18 BRPM | SYSTOLIC BLOOD PRESSURE: 168 MMHG | OXYGEN SATURATION: 98 %

## 2021-04-20 DIAGNOSIS — D50.0 IRON DEFICIENCY ANEMIA DUE TO CHRONIC BLOOD LOSS: ICD-10-CM

## 2021-04-20 DIAGNOSIS — M25.551 RIGHT HIP PAIN: ICD-10-CM

## 2021-04-20 DIAGNOSIS — E53.8 B12 DEFICIENCY: Primary | ICD-10-CM

## 2021-04-20 DIAGNOSIS — Z96.642 HISTORY OF TOTAL LEFT HIP REPLACEMENT: ICD-10-CM

## 2021-04-20 DIAGNOSIS — M25.552 LEFT HIP PAIN: ICD-10-CM

## 2021-04-20 DIAGNOSIS — R26.2 DIFFICULTY WALKING: ICD-10-CM

## 2021-04-20 DIAGNOSIS — M62.81 MUSCLE WEAKNESS (GENERALIZED): Primary | ICD-10-CM

## 2021-04-20 DIAGNOSIS — R16.1 SPLENOMEGALY: ICD-10-CM

## 2021-04-20 PROCEDURE — 96372 THER/PROPH/DIAG INJ SC/IM: CPT

## 2021-04-20 PROCEDURE — 97110 THERAPEUTIC EXERCISES: CPT

## 2021-04-20 PROCEDURE — 96365 THER/PROPH/DIAG IV INF INIT: CPT

## 2021-04-20 RX ORDER — CYANOCOBALAMIN 1000 UG/ML
1000 INJECTION INTRAMUSCULAR; SUBCUTANEOUS ONCE
Status: COMPLETED | OUTPATIENT
Start: 2021-04-20 | End: 2021-04-20

## 2021-04-20 RX ORDER — CYANOCOBALAMIN 1000 UG/ML
1000 INJECTION INTRAMUSCULAR; SUBCUTANEOUS ONCE
Status: CANCELLED | OUTPATIENT
Start: 2021-04-27

## 2021-04-20 RX ORDER — SODIUM CHLORIDE 9 MG/ML
20 INJECTION, SOLUTION INTRAVENOUS ONCE
Status: CANCELLED | OUTPATIENT
Start: 2021-04-27

## 2021-04-20 RX ORDER — SODIUM CHLORIDE 9 MG/ML
20 INJECTION, SOLUTION INTRAVENOUS ONCE
Status: COMPLETED | OUTPATIENT
Start: 2021-04-20 | End: 2021-04-20

## 2021-04-20 RX ADMIN — IRON SUCROSE 200 MG: 20 INJECTION, SOLUTION INTRAVENOUS at 14:45

## 2021-04-20 RX ADMIN — CYANOCOBALAMIN 1000 MCG: 1000 INJECTION, SOLUTION INTRAMUSCULAR at 14:46

## 2021-04-20 RX ADMIN — SODIUM CHLORIDE 20 ML/HR: 9 INJECTION, SOLUTION INTRAVENOUS at 14:45

## 2021-04-20 NOTE — PROGRESS NOTES
Daily Note     Today's date: 2021  Patient name: Connie Oliver  : 1960  MRN: [id number]  Referring provider: Neo Garcia*  Dx:   Encounter Diagnosis     ICD-10-CM    1  Muscle weakness (generalized)  M62 81    2  History of total left hip replacement  Z96 642    3  Left hip pain  M25 552    4  Right hip pain  M25 551    5  Difficulty walking  R26 2                   Subjective: Patient states his back is feeling better but still not 100%      Objective: See treatment diary below      Assessment: Tolerated treatment well  Resumed with lumbar POC but also intiated LLE exercises back into POC  No pain was noted throughout session  Discussed with patient about taking a pause on physical therapy due to plateu, problems with blood levels, and limited visits per deven year as patient is getting another surgery this fall  Will provide patient with updated HEP next visit  Patient demonstrated fatigue post treatment and would benefit from continued PT      Plan: Continue per plan of care  Potential discharge next visit       Precautions: standard, post op L THR on 2020    HEP: LTR, PPT, quad sets    Specialty Daily Treatment Diary     Manual    Cupping        L H' laura stretch, HS stretch, piriformis stretch        Tiger tail to Hamstring    TPR/ [place] Tail  RA RA TPR                   Exercise Diary         TM 5 min no incline 5 min no incline  5 min no incline     Supine hamstring stretch  20 sec x 3     20 sec x 3 3   Ecc SAQ        SLR (flex) 1 5# 30x 2# 30x 2# 30x      Sidelying H' ABD  30x 20x      Prone H' Ext        Prone quad stretch c SOS 20 sec x 3  20 sec x 3  20 sec x3  20 sec x 3   MARIS c MH on B quads 5 sec x 20  5 sec x 20  5 sec x 20     Ball Squeezes in hooklying -> [place] c Bridge        B H' ABD in hooklying -> TB c Bridge  MTB sidelying 30  MTB  seidelying 30      Bridge  MTB 30 MTB 30      LTR 5" x 20     5"  X 20   PPT        SL leg press   NV 70# 3x10 Leg Press  # 3x10     LAQ c ecc  6# 30 MTB 30      Seated H' IR/ER ecc c towel roll underneath knee  6# 30 MTB 30     PPT 10"x15   10"x10 10"x15   DKTC on ball  10"x15 on red ball   10"x15 on Red ball 10"x15 on Red ball    Ball rollouts 10"x10   10'x10  10"x10   Mini Squats -> Chair Squats c TB around knees  30x  30x      3 way H' TB  BTB 30 abd and ext MTB 20 abd and ext     Lat Band Walks  BTB 4 laps MTB 4 laps     Monster walks   BTB 4 laps  MTB 4 laps             Lat Step ups  hold 8" x 20     Step Downs  hold 8" x 20     Step Ups        Gastroc stretch on Slant Board        Airex: SLS  20 sec x 3       TKE        Hip Hikes        Lunges                                HEP/EDU        Modalities                                   Skin checks performed pre and post application: intact

## 2021-04-20 NOTE — PROGRESS NOTES
Pt here for Venofer and B12; tolerated treatment with no adv reactions; pt left unit ambulatory with steady gait

## 2021-04-22 ENCOUNTER — OFFICE VISIT (OUTPATIENT)
Dept: PHYSICAL THERAPY | Facility: CLINIC | Age: 61
End: 2021-04-22
Payer: COMMERCIAL

## 2021-04-22 DIAGNOSIS — Z96.642 HISTORY OF TOTAL LEFT HIP REPLACEMENT: ICD-10-CM

## 2021-04-22 DIAGNOSIS — M62.81 MUSCLE WEAKNESS (GENERALIZED): Primary | ICD-10-CM

## 2021-04-22 DIAGNOSIS — M25.551 RIGHT HIP PAIN: ICD-10-CM

## 2021-04-22 DIAGNOSIS — R26.2 DIFFICULTY WALKING: ICD-10-CM

## 2021-04-22 DIAGNOSIS — M25.552 LEFT HIP PAIN: ICD-10-CM

## 2021-04-22 PROCEDURE — 97530 THERAPEUTIC ACTIVITIES: CPT

## 2021-04-22 PROCEDURE — 97110 THERAPEUTIC EXERCISES: CPT

## 2021-04-22 NOTE — PROGRESS NOTES
Daily Note / Discharge     Today's date: 2021  Patient name: Ioana Davis  : 1960  MRN: [id number]  Referring provider: Mohamud Ceballos*  Dx:   Encounter Diagnosis     ICD-10-CM    1  Muscle weakness (generalized)  M62 81    2  History of total left hip replacement  Z96 642    3  Left hip pain  M25 552    4  Right hip pain  M25 551    5  Difficulty walking  R26 2                   Subjective:  Patient states he is doing well this morning  Reports he believes he is finally feeling better with infusion treatments  Objective: See treatment diary below      Assessment: Patient has demonstrated appropriate strength and ROM in order to be discharged at this time  Patient only gets 60 visits per deven /year and he is getting a second surgery in the fall and will need these appts for future surgery  Provided patient with updated HEP and theraband and encouraged patient to becompleting these exercises 3 times a week  And to call if he had any questions or concerns         Plan: Discharge     Precautions: standard, post op L THR on 2020    HEP: LTR, PPT, quad sets    Specialty Daily Treatment Diary     Manual    Cupping        L H' laura stretch, HS stretch, piriformis stretch        Tiger tail to Hamstring    TPR/ [place] Tail  RA RA TPR                   Exercise Diary         TM 5 min no incline 5 min no incline  5 min no incline     Supine hamstring stretch  20 sec x 3     20 sec x 3 3   Ecc SAQ        SLR (flex) 1 5# 30x  2# 30x      Sidelying H' ABD   20x      Prone H' Ext        Prone quad stretch c SOS 20 sec x 3  20 sec x 3  20 sec x3  20 sec x 3   MARIS c MH on B quads 5 sec x 20   5 sec x 20     Ball Squeezes in hooklying -> [place] c Bridge        B H' ABD in hooklying -> TB c Bridge   MTB  seidelying 30      Bridge   MTB 30      LTR 5" x 20     5"  X 20   PPT        SL leg press    70# 3x10     Leg Press  130# 3x10 130# 3x10     LAQ c ecc   MTB 30      Seated H' IR/ER ecc c towel roll underneath knee   MTB 30     PPT 10"x15   10"x10 10"x15   DKTC on ball  10"x15 on red ball   10"x15 on Red ball 10"x15 on Red ball    Ball rollouts 10"x10   10'x10  10"x10   Mini Squats -> Chair Squats c TB around knees  30x  30x      3 way H' TB  BTB 30 abd and ext MTB 20 abd and ext     Lat Band Walks  BTB 4 laps MTB 4 laps     Monster walks   BTB 4 laps  MTB 4 laps             Lat Step ups  hold 8" x 20     Step Downs  hold 8" x 20     Step Ups        Gastroc stretch on Slant Board        Airex: SLS  20 sec x 3       TKE        Hip Hikes        Lunges                                HEP/EDU        Modalities                                   Skin checks performed pre and post application: intact

## 2021-04-26 ENCOUNTER — APPOINTMENT (OUTPATIENT)
Dept: PHYSICAL THERAPY | Facility: CLINIC | Age: 61
End: 2021-04-26
Payer: COMMERCIAL

## 2021-04-27 ENCOUNTER — HOSPITAL ENCOUNTER (OUTPATIENT)
Dept: INFUSION CENTER | Facility: HOSPITAL | Age: 61
Discharge: HOME | End: 2021-04-27
Attending: INTERNAL MEDICINE
Payer: COMMERCIAL

## 2021-04-27 VITALS
DIASTOLIC BLOOD PRESSURE: 76 MMHG | SYSTOLIC BLOOD PRESSURE: 156 MMHG | OXYGEN SATURATION: 96 % | TEMPERATURE: 97.3 F | RESPIRATION RATE: 18 BRPM | HEART RATE: 73 BPM

## 2021-04-27 DIAGNOSIS — D50.0 IRON DEFICIENCY ANEMIA DUE TO CHRONIC BLOOD LOSS: ICD-10-CM

## 2021-04-27 DIAGNOSIS — R16.1 SPLENOMEGALY: ICD-10-CM

## 2021-04-27 DIAGNOSIS — E53.8 B12 DEFICIENCY: Primary | ICD-10-CM

## 2021-04-27 PROCEDURE — 96365 THER/PROPH/DIAG IV INF INIT: CPT

## 2021-04-27 PROCEDURE — 96372 THER/PROPH/DIAG INJ SC/IM: CPT

## 2021-04-27 RX ORDER — CYANOCOBALAMIN 1000 UG/ML
1000 INJECTION INTRAMUSCULAR; SUBCUTANEOUS ONCE
Status: CANCELLED | OUTPATIENT
Start: 2021-05-04

## 2021-04-27 RX ORDER — SODIUM CHLORIDE 9 MG/ML
20 INJECTION, SOLUTION INTRAVENOUS ONCE
Status: CANCELLED | OUTPATIENT
Start: 2021-05-04

## 2021-04-27 RX ORDER — CYANOCOBALAMIN 1000 UG/ML
1000 INJECTION INTRAMUSCULAR; SUBCUTANEOUS ONCE
Status: COMPLETED | OUTPATIENT
Start: 2021-04-27 | End: 2021-04-27

## 2021-04-27 RX ORDER — SODIUM CHLORIDE 9 MG/ML
20 INJECTION, SOLUTION INTRAVENOUS ONCE
Status: COMPLETED | OUTPATIENT
Start: 2021-04-27 | End: 2021-04-27

## 2021-04-27 RX ADMIN — CYANOCOBALAMIN 1000 MCG: 1000 INJECTION, SOLUTION INTRAMUSCULAR at 14:56

## 2021-04-27 RX ADMIN — SODIUM CHLORIDE 20 ML/HR: 9 INJECTION, SOLUTION INTRAVENOUS at 14:55

## 2021-04-27 RX ADMIN — IRON SUCROSE 200 MG: 20 INJECTION, SOLUTION INTRAVENOUS at 14:55

## 2021-04-30 ENCOUNTER — APPOINTMENT (OUTPATIENT)
Dept: PHYSICAL THERAPY | Facility: CLINIC | Age: 61
End: 2021-04-30
Payer: COMMERCIAL

## 2021-05-02 DIAGNOSIS — E03.9 HYPOTHYROIDISM, UNSPECIFIED TYPE: ICD-10-CM

## 2021-05-03 RX ORDER — LEVOTHYROXINE SODIUM 112 UG/1
224 TABLET ORAL DAILY
Qty: 180 TABLET | Refills: 1 | Status: SHIPPED | OUTPATIENT
Start: 2021-05-03 | End: 2021-05-20

## 2021-05-04 ENCOUNTER — HOSPITAL ENCOUNTER (OUTPATIENT)
Dept: INFUSION CENTER | Facility: HOSPITAL | Age: 61
Discharge: HOME | End: 2021-05-04
Attending: INTERNAL MEDICINE
Payer: COMMERCIAL

## 2021-05-04 VITALS
TEMPERATURE: 96.9 F | SYSTOLIC BLOOD PRESSURE: 145 MMHG | HEART RATE: 64 BPM | OXYGEN SATURATION: 98 % | DIASTOLIC BLOOD PRESSURE: 70 MMHG | RESPIRATION RATE: 18 BRPM

## 2021-05-04 DIAGNOSIS — R16.1 SPLENOMEGALY: ICD-10-CM

## 2021-05-04 DIAGNOSIS — E53.8 B12 DEFICIENCY: Primary | ICD-10-CM

## 2021-05-04 DIAGNOSIS — D50.0 IRON DEFICIENCY ANEMIA DUE TO CHRONIC BLOOD LOSS: ICD-10-CM

## 2021-05-04 PROCEDURE — 96365 THER/PROPH/DIAG IV INF INIT: CPT

## 2021-05-04 PROCEDURE — 96372 THER/PROPH/DIAG INJ SC/IM: CPT

## 2021-05-04 RX ORDER — SODIUM CHLORIDE 9 MG/ML
20 INJECTION, SOLUTION INTRAVENOUS ONCE
Status: CANCELLED | OUTPATIENT
Start: 2021-05-11

## 2021-05-04 RX ORDER — CYANOCOBALAMIN 1000 UG/ML
1000 INJECTION INTRAMUSCULAR; SUBCUTANEOUS ONCE
Status: CANCELLED | OUTPATIENT
Start: 2021-05-11

## 2021-05-04 RX ORDER — CYANOCOBALAMIN 1000 UG/ML
1000 INJECTION INTRAMUSCULAR; SUBCUTANEOUS ONCE
Status: COMPLETED | OUTPATIENT
Start: 2021-05-04 | End: 2021-05-04

## 2021-05-04 RX ORDER — SODIUM CHLORIDE 9 MG/ML
20 INJECTION, SOLUTION INTRAVENOUS ONCE
Status: COMPLETED | OUTPATIENT
Start: 2021-05-04 | End: 2021-05-04

## 2021-05-04 RX ADMIN — SODIUM CHLORIDE 20 ML/HR: 9 INJECTION, SOLUTION INTRAVENOUS at 14:37

## 2021-05-04 RX ADMIN — IRON SUCROSE 200 MG: 20 INJECTION, SOLUTION INTRAVENOUS at 14:37

## 2021-05-04 RX ADMIN — CYANOCOBALAMIN 1000 MCG: 1000 INJECTION, SOLUTION INTRAMUSCULAR at 14:37

## 2021-05-04 NOTE — PROGRESS NOTES
Pt here for B12 and Venofer; injection given with no adverse reactions; bandaid dry and intact; tolerated Venofer; pt left unit ambulatory with steady gait

## 2021-05-05 ENCOUNTER — TELEPHONE (OUTPATIENT)
Dept: HEMATOLOGY ONCOLOGY | Facility: CLINIC | Age: 61
End: 2021-05-05

## 2021-05-05 ENCOUNTER — APPOINTMENT (OUTPATIENT)
Dept: LAB | Facility: HOSPITAL | Age: 61
End: 2021-05-05
Attending: INTERNAL MEDICINE
Payer: COMMERCIAL

## 2021-05-05 DIAGNOSIS — D50.0 IRON DEFICIENCY ANEMIA DUE TO CHRONIC BLOOD LOSS: ICD-10-CM

## 2021-05-05 DIAGNOSIS — D50.0 IRON DEFICIENCY ANEMIA DUE TO CHRONIC BLOOD LOSS: Primary | ICD-10-CM

## 2021-05-05 LAB
ALBUMIN SERPL BCP-MCNC: 3.7 G/DL (ref 3.5–5)
ALP SERPL-CCNC: 78 U/L (ref 46–116)
ALT SERPL W P-5'-P-CCNC: 50 U/L (ref 12–78)
ANION GAP SERPL CALCULATED.3IONS-SCNC: 9 MMOL/L (ref 4–13)
AST SERPL W P-5'-P-CCNC: 30 U/L (ref 5–45)
BASOPHILS # BLD AUTO: 0.01 THOUSANDS/ΜL (ref 0–0.1)
BASOPHILS NFR BLD AUTO: 0 % (ref 0–1)
BILIRUB SERPL-MCNC: 0.3 MG/DL (ref 0.2–1)
BUN SERPL-MCNC: 16 MG/DL (ref 5–25)
CALCIUM SERPL-MCNC: 8.6 MG/DL (ref 8.3–10.1)
CHLORIDE SERPL-SCNC: 107 MMOL/L (ref 100–108)
CO2 SERPL-SCNC: 27 MMOL/L (ref 21–32)
CREAT SERPL-MCNC: 0.89 MG/DL (ref 0.6–1.3)
EOSINOPHIL # BLD AUTO: 0.03 THOUSAND/ΜL (ref 0–0.61)
EOSINOPHIL NFR BLD AUTO: 1 % (ref 0–6)
ERYTHROCYTE [DISTWIDTH] IN BLOOD BY AUTOMATED COUNT: 15.4 % (ref 11.6–15.1)
FERRITIN SERPL-MCNC: 593 NG/ML (ref 8–388)
GFR SERPL CREATININE-BSD FRML MDRD: 93 ML/MIN/1.73SQ M
GLUCOSE P FAST SERPL-MCNC: 90 MG/DL (ref 65–99)
HCT VFR BLD AUTO: 35.7 % (ref 36.5–49.3)
HGB BLD-MCNC: 11.8 G/DL (ref 12–17)
IMM GRANULOCYTES # BLD AUTO: 0.01 THOUSAND/UL (ref 0–0.2)
IMM GRANULOCYTES NFR BLD AUTO: 0 % (ref 0–2)
IRON SATN MFR SERPL: 36 %
IRON SERPL-MCNC: 116 UG/DL (ref 65–175)
LYMPHOCYTES # BLD AUTO: 1.89 THOUSANDS/ΜL (ref 0.6–4.47)
LYMPHOCYTES NFR BLD AUTO: 45 % (ref 14–44)
MCH RBC QN AUTO: 36.4 PG (ref 26.8–34.3)
MCHC RBC AUTO-ENTMCNC: 33.1 G/DL (ref 31.4–37.4)
MCV RBC AUTO: 110 FL (ref 82–98)
MONOCYTES # BLD AUTO: 0.35 THOUSAND/ΜL (ref 0.17–1.22)
MONOCYTES NFR BLD AUTO: 8 % (ref 4–12)
NEUTROPHILS # BLD AUTO: 1.87 THOUSANDS/ΜL (ref 1.85–7.62)
NEUTS SEG NFR BLD AUTO: 46 % (ref 43–75)
PLATELET # BLD AUTO: 107 THOUSANDS/UL (ref 149–390)
PMV BLD AUTO: 10 FL (ref 8.9–12.7)
POTASSIUM SERPL-SCNC: 3.8 MMOL/L (ref 3.5–5.3)
PROT SERPL-MCNC: 7.4 G/DL (ref 6.4–8.2)
RBC # BLD AUTO: 3.24 MILLION/UL (ref 3.88–5.62)
SODIUM SERPL-SCNC: 143 MMOL/L (ref 136–145)
TIBC SERPL-MCNC: 319 UG/DL (ref 250–450)
WBC # BLD AUTO: 4.16 THOUSAND/UL (ref 4.31–10.16)

## 2021-05-05 PROCEDURE — 80053 COMPREHEN METABOLIC PANEL: CPT

## 2021-05-05 PROCEDURE — 85025 COMPLETE CBC W/AUTO DIFF WBC: CPT

## 2021-05-05 PROCEDURE — 83918 ORGANIC ACIDS TOTAL QUANT: CPT

## 2021-05-05 PROCEDURE — 36415 COLL VENOUS BLD VENIPUNCTURE: CPT

## 2021-05-05 PROCEDURE — 83540 ASSAY OF IRON: CPT

## 2021-05-05 PROCEDURE — 83550 IRON BINDING TEST: CPT

## 2021-05-05 PROCEDURE — 82728 ASSAY OF FERRITIN: CPT

## 2021-05-05 NOTE — TELEPHONE ENCOUNTER
Labs Before Visit     Patient called to see if they need labs drawn prior to visit     Person calling in  Patient   Date Of Appointment 05/12/2021   Call back number [phone]

## 2021-05-05 NOTE — TELEPHONE ENCOUNTER
Pre visit lab orders entered into Epic  Patient advised of above  Patient verbalized understanding of above      Dr Jose Raul Victoria RN notified as Anneliese Pollard

## 2021-05-11 ENCOUNTER — HOSPITAL ENCOUNTER (OUTPATIENT)
Dept: INFUSION CENTER | Facility: HOSPITAL | Age: 61
Discharge: HOME | End: 2021-05-11
Attending: INTERNAL MEDICINE
Payer: COMMERCIAL

## 2021-05-11 VITALS
SYSTOLIC BLOOD PRESSURE: 143 MMHG | RESPIRATION RATE: 18 BRPM | OXYGEN SATURATION: 98 % | DIASTOLIC BLOOD PRESSURE: 84 MMHG | HEART RATE: 59 BPM | TEMPERATURE: 97.1 F

## 2021-05-11 DIAGNOSIS — R16.1 SPLENOMEGALY: ICD-10-CM

## 2021-05-11 DIAGNOSIS — D50.0 IRON DEFICIENCY ANEMIA DUE TO CHRONIC BLOOD LOSS: ICD-10-CM

## 2021-05-11 DIAGNOSIS — E53.8 B12 DEFICIENCY: Primary | ICD-10-CM

## 2021-05-11 PROCEDURE — 96365 THER/PROPH/DIAG IV INF INIT: CPT

## 2021-05-11 PROCEDURE — 96372 THER/PROPH/DIAG INJ SC/IM: CPT

## 2021-05-11 RX ORDER — SODIUM CHLORIDE 9 MG/ML
20 INJECTION, SOLUTION INTRAVENOUS ONCE
Status: COMPLETED | OUTPATIENT
Start: 2021-05-11 | End: 2021-05-11

## 2021-05-11 RX ORDER — CYANOCOBALAMIN 1000 UG/ML
1000 INJECTION INTRAMUSCULAR; SUBCUTANEOUS ONCE
Status: COMPLETED | OUTPATIENT
Start: 2021-05-11 | End: 2021-05-11

## 2021-05-11 RX ORDER — SODIUM CHLORIDE 9 MG/ML
20 INJECTION, SOLUTION INTRAVENOUS ONCE
Status: CANCELLED | OUTPATIENT
Start: 2021-05-11

## 2021-05-11 RX ORDER — CYANOCOBALAMIN 1000 UG/ML
1000 INJECTION INTRAMUSCULAR; SUBCUTANEOUS ONCE
Status: CANCELLED | OUTPATIENT
Start: 2021-05-11

## 2021-05-11 RX ADMIN — CYANOCOBALAMIN 1000 MCG: 1000 INJECTION, SOLUTION INTRAMUSCULAR at 14:57

## 2021-05-11 RX ADMIN — IRON SUCROSE 200 MG: 20 INJECTION, SOLUTION INTRAVENOUS at 14:56

## 2021-05-11 RX ADMIN — SODIUM CHLORIDE 20 ML/HR: 9 INJECTION, SOLUTION INTRAVENOUS at 14:56

## 2021-05-12 ENCOUNTER — OFFICE VISIT (OUTPATIENT)
Dept: HEMATOLOGY ONCOLOGY | Facility: HOSPITAL | Age: 61
End: 2021-05-12
Payer: COMMERCIAL

## 2021-05-12 VITALS
HEART RATE: 89 BPM | OXYGEN SATURATION: 96 % | TEMPERATURE: 98 F | DIASTOLIC BLOOD PRESSURE: 82 MMHG | HEIGHT: 74 IN | RESPIRATION RATE: 16 BRPM | WEIGHT: 280 LBS | SYSTOLIC BLOOD PRESSURE: 152 MMHG | BODY MASS INDEX: 35.94 KG/M2

## 2021-05-12 DIAGNOSIS — K76.0 FATTY LIVER: ICD-10-CM

## 2021-05-12 DIAGNOSIS — R16.1 SPLENOMEGALY: ICD-10-CM

## 2021-05-12 DIAGNOSIS — D50.0 IRON DEFICIENCY ANEMIA DUE TO CHRONIC BLOOD LOSS: ICD-10-CM

## 2021-05-12 DIAGNOSIS — D50.9 IRON DEFICIENCY ANEMIA, UNSPECIFIED IRON DEFICIENCY ANEMIA TYPE: ICD-10-CM

## 2021-05-12 DIAGNOSIS — M79.10 MUSCLE PAIN: ICD-10-CM

## 2021-05-12 DIAGNOSIS — D69.6 THROMBOCYTOPENIA (HCC): Primary | ICD-10-CM

## 2021-05-12 DIAGNOSIS — E53.8 B12 DEFICIENCY: ICD-10-CM

## 2021-05-12 DIAGNOSIS — I73.9 CLAUDICATION (HCC): ICD-10-CM

## 2021-05-12 LAB
METHYLMALONATE SERPL-SCNC: 158 NMOL/L (ref 0–378)
SL AMB DISCLAIMER: NORMAL

## 2021-05-12 PROCEDURE — 99214 OFFICE O/P EST MOD 30 MIN: CPT | Performed by: INTERNAL MEDICINE

## 2021-05-12 RX ORDER — AMOXICILLIN 500 MG/1
CAPSULE ORAL
COMMUNITY
Start: 2021-04-20 | End: 2021-06-28

## 2021-05-12 NOTE — PROGRESS NOTES
Hematology/Oncology Outpatient Follow- up Note  Cj Barnhart 62 y o  male MRN: @ Encounter: [id number]        Date:  5/12/2021    Presenting Complaint/Diagnosis : Macrocytosis and anemia and thrombocytopenia for at least 4 years    Previous Hematologic/ Oncologic History:      Venofer and B12    Current Hematologic/ Oncologic Treatment:     observation    Interval History:     the patient returns for follow-up visit  He is doing reasonably well  Per the record he never saw hepatologist   He has not had any imaging sons since 2019 of his abdomen  He did have his surgery which went well  Denies any nausea denies any vomiting denies any diarrhea  Denies any bleeding  He states he has a lot of muscle aches and pains  He states these get worse when he starts walking but then the aching improves when he stops but starts again when he exerts himself  It sounds like claudication but again he is slightly vague about this  He also states his muscles ache when he is at rest   I think a Rheumatology referral for this is reasonable along with vascular surgery to evaluate for claudication  I will set him up with hepatology once again and will order an ultrasound of his liver as he has not had imaging since 2019 and has missed his opportunity multiple times despite being told to have a hepatology workup  The rest of his 14 point review of systems today was negative  Test Results:    Imaging: No results found      Labs:   Lab Results   Component Value Date    WBC 4 16 (L) 05/05/2021    HGB 11 8 (L) 05/05/2021    HCT 35 7 (L) 05/05/2021     (H) 05/05/2021     (L) 05/05/2021     Lab Results   Component Value Date     11/29/2017    K 3 8 05/05/2021     05/05/2021    CO2 27 05/05/2021    BUN 16 05/05/2021    CREATININE 0 89 05/05/2021    GLUF 90 05/05/2021    CALCIUM 8 6 05/05/2021    AST 30 05/05/2021    ALT 50 05/05/2021    ALKPHOS 78 05/05/2021    PROT 6 6 11/29/2017    BILITOT 0 4 11/29/2017    EGFR 93 05/05/2021       Lab Results   Component Value Date    IRON 116 05/05/2021    TIBC 319 05/05/2021    FERRITIN 593 (H) 05/05/2021     ROS: As stated in the history of present illness otherwise his 14 point review of systems today was negative  Active Problems:   Patient Active Problem List   Diagnosis    Hematochezia    Fatty liver    Hyperlipidemia    Hypothyroidism    Macrocytic anemia    Osteoarthritis of both hips    Splenomegaly    Testicular hypogonadism    Thrombocytopenia (HCC)    Ureteric stone    Vitamin D deficiency    Leucopenia    Claudication (HCC)    Iron deficiency anemia due to chronic blood loss    B12 deficiency       Past Medical History:   Past Medical History:   Diagnosis Date    Anemia     last assessed: 09/03/15    Cholelithiasis     Disease of thyroid gland     GERD (gastroesophageal reflux disease)     Hypersecretion of testicular hormones     Insomnia     Kidney stone     Male erectile dysfunction     Osteoarthritis, hip, bilateral     Thrombosed external hemorrhoids        Surgical History:   Past Surgical History:   Procedure Laterality Date    CHOLECYSTECTOMY      MO COLONOSCOPY FLX DX W/COLLJ SPEC WHEN PFRMD N/A 11/6/2017    Procedure: EGD AND COLONOSCOPY;  Surgeon: Facundo Mitchell MD;  Location: [facility]; Service: Gastroenterology       Family History:    Family History   Problem Relation Age of Onset    Arthritis Mother     Heart disease Father     Hypertension Father     Diabetes Father     Asthma Father     Cancer Paternal Grandmother     Cancer Family     Heart disease Family        Cancer-related family history includes Cancer in his family and paternal grandmother      Social History:   Social History     Socioeconomic History    Marital status: /Civil Union     Spouse name: Not on file    Number of children: Not on file    Years of education: Not on file    Highest education level: Not on file Occupational History    Not on file   Social Needs    Financial resource strain: Not on file    Food insecurity     Worry: Not on file     Inability: Not on file    Transportation needs     Medical: Not on file     Non-medical: Not on file   Tobacco Use    Smoking status: Former Smoker    Smokeless tobacco: Never Used    Tobacco comment: QUIT 2002/ SECOND HAND    Substance and Sexual Activity    Alcohol use: No    Drug use: No    Sexual activity: Not on file   Lifestyle    Physical activity     Days per week: Not on file     Minutes per session: Not on file    Stress: Not on file   Relationships    Social connections     Talks on phone: Not on file     Gets together: Not on file     Attends Christian service: Not on file     Active member of club or organization: Not on file     Attends meetings of clubs or organizations: Not on file     Relationship status: Not on file    Intimate partner violence     Fear of current or ex partner: Not on file     Emotionally abused: Not on file     Physically abused: Not on file     Forced sexual activity: Not on file   Other Topics Concern    Not on file   Social History Narrative    Not on file       Current Medications:   Current Outpatient Medications   Medication Sig Dispense Refill    levothyroxine 112 mcg tablet Take 2 tablets (224 mcg total) by mouth daily 180 tablet 1    liothyronine (CYTOMEL) 5 mcg tablet TAKE 1 TABLET BY MOUTH EVERY DAY 90 tablet 1     Current Facility-Administered Medications   Medication Dose Route Frequency Provider Last Rate Last Admin    ipratropium-albuterol (DUO-NEB) 0 5-2 5 mg/3 mL inhalation solution 3 mL  3 mL Nebulization Q6H Lana Oliveros, DO   3 mL at 02/02/18 0187       Allergies: No Known Allergies    Physical Exam:    There is no height or weight on file to calculate BSA      Wt Readings from Last 3 Encounters:   03/19/21 125 kg (275 lb)   11/11/20 119 kg (262 lb)   10/26/20 118 kg (261 lb)        Temp Readings from Last 3 Encounters:   05/11/21 (!) 97 1 °F (36 2 °C) (Temporal)   05/04/21 (!) 96 9 °F (36 1 °C) (Temporal)   04/27/21 (!) 97 3 °F (36 3 °C) (Temporal)        BP Readings from Last 3 Encounters:   05/11/21 143/84   05/04/21 145/70   04/27/21 156/76         Pulse Readings from Last 3 Encounters:   05/11/21 59   05/04/21 64   04/27/21 73        Physical Exam     Constitutional   General appearance: No acute distress, well appearing and well nourished  Eyes   Conjunctiva and lids: No swelling, erythema or discharge  Pupils and irises: Equal, round and reactive to light  Ears, Nose, Mouth, and Throat   External inspection of ears and nose: Normal     Nasal mucosa, septum, and turbinates: Normal without edema or erythema  Oropharynx: Normal with no erythema, edema, exudate or lesions  Pulmonary   Respiratory effort: No increased work of breathing or signs of respiratory distress  Auscultation of lungs: Clear to auscultation  Cardiovascular   Palpation of heart: Normal PMI, no thrills  Auscultation of heart: Normal rate and rhythm, normal S1 and S2, without murmurs  Examination of extremities for edema and/or varicosities: Normal     Carotid pulses: Normal     Abdomen   Abdomen: Non-tender, no masses  Liver and spleen: No hepatomegaly or splenomegaly  Lymphatic   Palpation of lymph nodes in neck: No lymphadenopathy  Musculoskeletal   Gait and station: Normal     Digits and nails: Normal without clubbing or cyanosis  Inspection/palpation of joints, bones, and muscles: Normal     Skin   Skin and subcutaneous tissue: Normal without rashes or lesions  Neurologic   Cranial nerves: Cranial nerves 2-12 intact  Sensation: No sensory loss  Psychiatric   Orientation to person, place, and time: Normal     Mood and affect: Normal         Assessment / Plan:      The patient is a pleasant 61-year-old male who has had macrocytosis and thrombocytopenia varying degree over the last 5 years   I suspect this thrombocytopenia is secondary to portal hypertension from his fatty liver  His bone marrow biopsy did not show any ring sideroblasts or any evidence of MDS  It was a normocellular marrow  I suspect his blood counts are related to his fatty liver  We have given him Venofer in the past for low iron levels  at his last visit I set him up for Venofer and hepatology consult  I also advised him if he does need surgery he can be transfused with platelets  His iron has corrected  His hemoglobin is now 11 8 with a platelet count of a 333  White count 4 16  At this point continuation of observation is reasonable  Iron studies are within normal limits  The patient still has not had his imaging and still has not seen hepatology  His last ultrasound was in 2019  I will order another ultrasound and also once again re-emphasized importance of following up with hepatology because of his fatty liver  I will set him up to see vascular surgery to evaluate for claudication  I will set him up to see Rheumatology to evaluate for resting muscle pain and weakness  I will set him up to see hepatology for his liver  I will order an ultrasound so this is available when hepatology sees him  Patient is in agreement with the plan  I will see him back in 6 months with repeat blood work  Counts seem to be running in a stable range at this point  Goals and Barriers:  Current Goal:  Prolong Survival from   Thrombocytopenia and a fatty liver  Barriers: None  Patient's Capacity to Self Care:  Patient able to self care  Portions of the record may have been created with voice recognition software   Occasional wrong word or "sound a like" substitutions may have occurred due to the inherent limitations of voice recognition software   Read the chart carefully and recognize, using context, where substitutions have occurred

## 2021-05-13 ENCOUNTER — OFFICE VISIT (OUTPATIENT)
Dept: GASTROENTEROLOGY | Facility: CLINIC | Age: 61
End: 2021-05-13
Payer: COMMERCIAL

## 2021-05-13 VITALS
HEART RATE: 63 BPM | SYSTOLIC BLOOD PRESSURE: 150 MMHG | DIASTOLIC BLOOD PRESSURE: 70 MMHG | TEMPERATURE: 98.8 F | BODY MASS INDEX: 35.75 KG/M2 | WEIGHT: 278.6 LBS | HEIGHT: 74 IN

## 2021-05-13 DIAGNOSIS — E53.8 B12 DEFICIENCY: ICD-10-CM

## 2021-05-13 DIAGNOSIS — K76.0 FATTY LIVER: ICD-10-CM

## 2021-05-13 DIAGNOSIS — D50.0 IRON DEFICIENCY ANEMIA DUE TO CHRONIC BLOOD LOSS: ICD-10-CM

## 2021-05-13 DIAGNOSIS — R16.1 SPLENOMEGALY: ICD-10-CM

## 2021-05-13 PROCEDURE — 99244 OFF/OP CNSLTJ NEW/EST MOD 40: CPT | Performed by: INTERNAL MEDICINE

## 2021-05-13 NOTE — PROGRESS NOTES
[facility] - Outpatient Consultation  Aylin Samson 62 y o  male MRN: [id number]  Encounter: [id number]          ASSESSMENT AND PLAN:      62year old with hepatosplenomegaly, thrombocytopenia and macrocytic anemia here for further evaluation of liver disease  He has hepatic steatosis and hepatomegaly on ultrasound  Viral serology was negative for hepatitis B and C  He has no clinical stigmata of chronic liver disease /cirrhosis  Fortunately his liver function test is normal   His BMI is 35 7  Clinically he has nonalcoholic fatty liver disease but no biochemical evidence of inflammation  Serology workup is work ordered few years ago but not completed  -  I will alter serology workup to assess for autoimmune hepatitis, PBC, Meng's disease and other underlying disorders  - Ultrasound elastography to assess fibrosis  - Right upper quadrant ultrasound  - Hepatitis A and B immune status  - if serology workup is unremarkable will consider  Transjugular liver biopsy with pressure measurement and histology evaluation   -  We also discussed about lifestyle change including regular exercise and weight loss      ______________________________________________________________________    HPI:  62year old male referred by Dr Chase Lang for evaluation of hepatomegaly and fatty liver disease  He has hypothyroidism, thrombocytopenia and macrocytic anemia referred for evaluation of fatty liver disease  He was diagnosed with fatty liver disease several years ago  He had 2 ultrasounds in the last 4 years  He has lost ultrasound was done on July 8, 2019 which showed hepatosplenomegaly and hepatic steatosis  His hemoglobin is 11 8, WBC 4 16, platelet count 217    His  Liver enzymes are normal   ALT 51 AST 28 total bilirubin 0 4    iron level 116, TIBC 319 and ferritin is 593    hepatitis A, B and C serology were negative   Henies jaundice, pruritus, lower extremity edema or abdominal pain  Other serologies were ordered in 2017 but were not completed at that time  He sees Dr Sergey Nicole for macrocytosis and thrombocytopenia for over 5 years  He had normal bone marrow biopsy and no evidence of MDS  REVIEW OF SYSTEMS:    CONSTITUTIONAL: Denies any fever, chills, rigors, and weight loss  HEENT: No earache or tinnitus  Denies hearing loss or visual disturbances  CARDIOVASCULAR: No chest pain or palpitations  RESPIRATORY: Denies any cough, hemoptysis, shortness of breath or dyspnea on exertion  GASTROINTESTINAL: As noted in the History of Present Illness  GENITOURINARY: No problems with urination  Denies any hematuria or dysuria  NEUROLOGIC: No dizziness or vertigo, denies headaches  MUSCULOSKELETAL: Denies any muscle or joint pain  SKIN: Denies skin rashes or itching  ENDOCRINE: Denies excessive thirst  Denies intolerance to heat or cold  PSYCHOSOCIAL: Denies depression or anxiety  Denies any recent memory loss  Historical Information   Past Medical History:   Diagnosis Date    Anemia     last assessed: 09/03/15    Cholelithiasis     Disease of thyroid gland     GERD (gastroesophageal reflux disease)     Hypersecretion of testicular hormones     Insomnia     Kidney stone     Male erectile dysfunction     Osteoarthritis, hip, bilateral     Thrombosed external hemorrhoids      Past Surgical History:   Procedure Laterality Date    CHOLECYSTECTOMY      COLONOSCOPY      DE COLONOSCOPY FLX DX W/COLLJ SPEC WHEN PFRMD N/A 11/6/2017    Procedure: EGD AND COLONOSCOPY;  Surgeon: Milo Roque MD;  Location[facility];   Service: Gastroenterology     Social History   Social History     Substance and Sexual Activity   Alcohol Use No     Social History     Substance and Sexual Activity   Drug Use No     Social History     Tobacco Use   Smoking Status Former Smoker   Smokeless Tobacco Never Used   Tobacco Comment    QUIT 2002/ SECOND HAND      Family History   Problem Relation Age of Onset    Arthritis Mother     Heart disease Father     Hypertension Father     Diabetes Father     Asthma Father     Cancer Paternal Grandmother     Cancer Family     Heart disease Family        Meds/Allergies       Current Outpatient Medications:     levothyroxine 112 mcg tablet    liothyronine (CYTOMEL) 5 mcg tablet    amoxicillin (AMOXIL) 500 mg capsule    Current Facility-Administered Medications:     ipratropium-albuterol (DUO-NEB) 0 5-2 5 mg/3 mL inhalation solution 3 mL, 3 mL, Nebulization, Q6H, 3 mL at 02/02/18 0925    No Known Allergies        Objective     Blood pressure 150/70, pulse 63, temperature 98 8 °F (37 1 °C), temperature source Tympanic, height 6' 2" (1 88 m), weight 126 kg (278 lb 9 6 oz)  Body mass index is 35 77 kg/m²  PHYSICAL EXAM:      General Appearance:   Alert, cooperative, no distress   HEENT:   Normocephalic, atraumatic, anicteric      Neck:  Supple, symmetrical, trachea midline   Lungs:   Clear to auscultation bilaterally; no rales, rhonchi or wheezing; respirations unlabored    Heart[de-identified]   Regular rate and rhythm; no murmur, rub, or gallop  Abdomen:   Soft, non-tender, non-distended; normal bowel sounds; no masses, no organomegaly    Genitalia:   Deferred    Rectal:   Deferred    Extremities:  No cyanosis, clubbing or edema    Pulses:  2+ and symmetric    Skin:  No jaundice, rashes, or lesions    Lymph nodes:  No palpable cervical lymphadenopathy        Lab Results:   No visits with results within 1 Day(s) from this visit     Latest known visit with results is:   Appointment on 05/05/2021   Component Date Value    WBC 05/05/2021 4 16*    RBC 05/05/2021 3 24*    Hemoglobin 05/05/2021 11 8*    Hematocrit 05/05/2021 35 7*    MCV 05/05/2021 110*    MCH 05/05/2021 36 4*    MCHC 05/05/2021 33 1     RDW 05/05/2021 15 4*    MPV 05/05/2021 10 0     Platelets 33/81/5186 107*    Neutrophils Relative 05/05/2021 46     Immat GRANS % 05/05/2021 0     Lymphocytes Relative 05/05/2021 45*    Monocytes Relative 05/05/2021 8     Eosinophils Relative 05/05/2021 1     Basophils Relative 05/05/2021 0     Neutrophils Absolute 05/05/2021 1 87     Immature Grans Absolute 05/05/2021 0 01     Lymphocytes Absolute 05/05/2021 1 89     Monocytes Absolute 05/05/2021 0 35     Eosinophils Absolute 05/05/2021 0 03     Basophils Absolute 05/05/2021 0 01     Sodium 05/05/2021 143     Potassium 05/05/2021 3 8     Chloride 05/05/2021 107     CO2 05/05/2021 27     ANION GAP 05/05/2021 9     BUN 05/05/2021 16     Creatinine 05/05/2021 0 89     Glucose, Fasting 05/05/2021 90     Calcium 05/05/2021 8 6     AST 05/05/2021 30     ALT 05/05/2021 50     Alkaline Phosphatase 05/05/2021 78     Total Protein 05/05/2021 7 4     Albumin 05/05/2021 3 7     Total Bilirubin 05/05/2021 0 30     eGFR 05/05/2021 93     Methylmalonic Acid, S 05/05/2021 158     Disclaimer: 05/05/2021 Comment     Iron Saturation 05/05/2021 36     TIBC 05/05/2021 319     Iron 05/05/2021 116     Ferritin 05/05/2021 593*         Radiology Results:   US 7/8/2019  Hepatosplenomegaly      Hepatic steatosis      Right mid pole 7 mm nonobstructing S

## 2021-05-17 ENCOUNTER — OFFICE VISIT (OUTPATIENT)
Dept: ENDOCRINOLOGY | Facility: HOSPITAL | Age: 61
End: 2021-05-17
Payer: COMMERCIAL

## 2021-05-17 VITALS
HEART RATE: 59 BPM | WEIGHT: 275.2 LBS | BODY MASS INDEX: 35.32 KG/M2 | HEIGHT: 74 IN | DIASTOLIC BLOOD PRESSURE: 62 MMHG | SYSTOLIC BLOOD PRESSURE: 114 MMHG

## 2021-05-17 DIAGNOSIS — E03.9 HYPOTHYROIDISM, UNSPECIFIED TYPE: Primary | ICD-10-CM

## 2021-05-17 DIAGNOSIS — E55.9 VITAMIN D DEFICIENCY: ICD-10-CM

## 2021-05-17 DIAGNOSIS — R63.5 WEIGHT GAIN: ICD-10-CM

## 2021-05-17 DIAGNOSIS — E29.1 TESTICULAR HYPOGONADISM: ICD-10-CM

## 2021-05-17 PROCEDURE — 99214 OFFICE O/P EST MOD 30 MIN: CPT | Performed by: INTERNAL MEDICINE

## 2021-05-17 PROCEDURE — 1036F TOBACCO NON-USER: CPT | Performed by: INTERNAL MEDICINE

## 2021-05-17 PROCEDURE — 3008F BODY MASS INDEX DOCD: CPT | Performed by: INTERNAL MEDICINE

## 2021-05-17 RX ORDER — DEXAMETHASONE 1 MG
TABLET ORAL
Qty: 1 TABLET | Refills: 0 | Status: SHIPPED | OUTPATIENT
Start: 2021-05-17 | End: 2021-06-28

## 2021-05-17 NOTE — PROGRESS NOTES
5/17/2021    Assessment/Plan      Diagnoses and all orders for this visit:    Hypothyroidism, unspecified type  -     TSH, 3rd generation  -     T3, free  -     T4, free  -     Comprehensive metabolic panel  -     PTH, intact  -     Vitamin D 25 hydroxy  -     Insulin-like growth factor 1 (IGF-1) - Lab Collect  -     Testosterone, free, total Lab Collect  -     Luteinizing hormone Lab Collect  -     Follicle stimulating hormone Lab Collect  -     Prolactin Lab Collect    Testicular hypogonadism  -     TSH, 3rd generation  -     T3, free  -     T4, free  -     Comprehensive metabolic panel  -     PTH, intact  -     Vitamin D 25 hydroxy  -     Insulin-like growth factor 1 (IGF-1) - Lab Collect  -     Testosterone, free, total Lab Collect  -     Luteinizing hormone Lab Collect  -     Follicle stimulating hormone Lab Collect  -     Prolactin Lab Collect    Weight gain  -     TSH, 3rd generation  -     T3, free  -     T4, free  -     Comprehensive metabolic panel  -     PTH, intact  -     Vitamin D 25 hydroxy  -     Insulin-like growth factor 1 (IGF-1) - Lab Collect  -     Testosterone, free, total Lab Collect  -     Luteinizing hormone Lab Collect  -     Follicle stimulating hormone Lab Collect  -     Prolactin Lab Collect  -     dexamethasone (DECADRON) 1 mg tablet; Take 1 tab at 10:00 p m  The night before cortisol blood work  -     Cortisol Level, AM Specimen    Vitamin D deficiency  -     TSH, 3rd generation  -     T3, free  -     T4, free  -     Comprehensive metabolic panel  -     PTH, intact  -     Vitamin D 25 hydroxy  -     Insulin-like growth factor 1 (IGF-1) - Lab Collect  -     Testosterone, free, total Lab Collect  -     Luteinizing hormone Lab Collect  -     Follicle stimulating hormone Lab Collect  -     Prolactin Lab Collect        Assessment/Plan:    1  Hypothyroidism: It is been almost a year since he has had thyroid function studies checked    We should start with thyroid blood work and see if his dose needs to be adjusted  We also discussed we can try other brand such as Tirosint or an adjustment in dosage such is with his T4-T3  We will be in touch patient as results are available  2  Weight gain,  Muscle weakness:  I have suggested we pursue additional lab work as ordered above  The patient is aware that these are screening test we may need to do additional testing based on lab work results  He will go for a 1 mg dexamethasone suppression test on a separate day  CC: New Patient    History of Present Illness     HPI: Jefry Fountain is a 62y o  year old male with history of Hypothyroidism who presents for evaluation of history of hypothyroidism  About 20 years ago he notes he was diagnosed with hypothyroidism given family history as well as symptoms of concern such as fatigue, periorbital puffiness  He states he has been experiencing the symptoms more recently as well as an aspect of weight gain of about 30 lb over the past few months  He also reports increasing arthralgias, lower extremity muscle weakness, muscle cramping, fatigue  He denies any significant corticosteroid exposure  He is following up with Gastroenterology and Hematology  He reports no concerns regarding libido but does have erectile dysfunction  He states in the past was advised for a vascular surgery evaluation given some lower extremity vascular changes as well as weakness and erectile dysfunction may be implicated as well  Review of Systems   Constitutional: Positive for fatigue and unexpected weight change  HENT: Negative for trouble swallowing and voice change  Eyes: Negative for visual disturbance  Respiratory: Negative for shortness of breath  Cardiovascular: Negative for palpitations and leg swelling  Gastrointestinal: Negative for abdominal pain, nausea and vomiting  Endocrine: Negative for polydipsia and polyuria  Musculoskeletal: Positive for arthralgias  Negative for myalgias     Skin: Negative for rash  Neurological: Positive for weakness  Negative for dizziness and tremors  Hematological: Negative for adenopathy  Psychiatric/Behavioral: Negative for agitation and confusion  Historical Information   Past Medical History:   Diagnosis Date    Anemia     last assessed: 09/03/15    Cholelithiasis     Disease of thyroid gland     GERD (gastroesophageal reflux disease)     Hypersecretion of testicular hormones     Insomnia     Kidney stone     Male erectile dysfunction     Osteoarthritis, hip, bilateral     Thrombosed external hemorrhoids      Past Surgical History:   Procedure Laterality Date    CHOLECYSTECTOMY      COLONOSCOPY      [place] COLONOSCOPY FLX DX W/COLLJ SPEC WHEN PFRMD N/A 11/6/2017    Procedure: EGD AND COLONOSCOPY;  Surgeon: Facundo Mitchell MD;  [facility]; Service: Gastroenterology     Social History   Social History     Substance and Sexual Activity   Alcohol Use No     Social History     Substance and Sexual Activity   Drug Use No     Social History     Tobacco Use   Smoking Status Former Smoker   Smokeless Tobacco Never Used   Tobacco Comment    QUIT 2002/ SECOND HAND      Family History:   Family History   Problem Relation Age of Onset    Arthritis Mother     Heart disease Father     Hypertension Father     Diabetes Father     Asthma Father     Cancer Paternal Grandmother     Cancer Family     Heart disease Family        Meds/Allergies   Current Outpatient Medications   Medication Sig Dispense Refill    levothyroxine 112 mcg tablet Take 2 tablets (224 mcg total) by mouth daily 180 tablet 1    liothyronine (CYTOMEL) 5 mcg tablet TAKE 1 TABLET BY MOUTH EVERY DAY 90 tablet 1    amoxicillin (AMOXIL) 500 mg capsule       dexamethasone (DECADRON) 1 mg tablet Take 1 tab at 10:00 p m  The night before cortisol blood work   1 tablet 0     Current Facility-Administered Medications   Medication Dose Route Frequency Provider Last Rate Last Admin    ipratropium-albuterol (DUO-NEB) 0 5-2 5 mg/3 mL inhalation solution 3 mL  3 mL Nebulization Q6H Marshall Cooley    3 mL at 02/02/18 0925     No Known Allergies    Objective   Vitals: Blood pressure 114/62, pulse 59, height 6' 2" (1 88 m), weight 125 kg (275 lb 3 2 oz)  Invasive Devices     None                 Physical Exam  Vitals signs reviewed  Constitutional:       General: He is not in acute distress  Appearance: He is well-developed  He is not diaphoretic  HENT:      Head: Normocephalic and atraumatic  Eyes:      Conjunctiva/sclera: Conjunctivae normal       Pupils: Pupils are equal, round, and reactive to light  Neck:      Musculoskeletal: Normal range of motion and neck supple  Thyroid: No thyromegaly  Cardiovascular:      Rate and Rhythm: Normal rate and regular rhythm  Pulmonary:      Effort: Pulmonary effort is normal  No respiratory distress  Breath sounds: Normal breath sounds  Abdominal:      General: Bowel sounds are normal       Palpations: Abdomen is soft  Musculoskeletal: Normal range of motion  Skin:     General: Skin is warm and dry  Findings: No rash  Neurological:      Mental Status: He is alert and oriented to person, place, and time  Motor: No abnormal muscle tone  Psychiatric:         Behavior: Behavior normal          The history was obtained from the review of the chart and from the patient      Lab Results:      Component      Latest Ref Rng & Units 6/8/2020   Testosterone, Total, LC/MS      250 - 1,100 ng/dL 266   TESTOSTERONE FREE      35 0 - 155 0 pg/mL 45 2   TSH, POC      0 40 - 4 50 mIU/L 1 36   Free T4      0 8 - 1 8 ng/dL 1 5         Future Appointments   Date Time Provider Clay Singletary   5/26/2021 10:30 AM [facility] [address]   5/26/2021 11:00 AM [facility] [address]   8/11/2021  8:00 AM DO MICHEAL Evangelista [facility]   11/16/2021  3:40 PM Julieta Sheridan MD GASTRO  Practice-Med   11/19/2021  8:20 AM Asim Sharon Boxer, MD HEM ON[facility]       Portions of the record may have been created with voice recognition software  Occasional wrong word or "sound a like" substitutions may have occurred due to the inherent limitations of voice recognition software  Read the chart carefully and recognize, using context, where substitutions have occurred

## 2021-05-18 ENCOUNTER — LAB (OUTPATIENT)
Dept: LAB | Facility: HOSPITAL | Age: 61
End: 2021-05-18
Payer: COMMERCIAL

## 2021-05-18 DIAGNOSIS — R16.1 SPLENOMEGALY: ICD-10-CM

## 2021-05-18 DIAGNOSIS — D50.9 IRON DEFICIENCY ANEMIA, UNSPECIFIED IRON DEFICIENCY ANEMIA TYPE: ICD-10-CM

## 2021-05-18 DIAGNOSIS — D50.0 IRON DEFICIENCY ANEMIA DUE TO CHRONIC BLOOD LOSS: ICD-10-CM

## 2021-05-18 DIAGNOSIS — D69.6 THROMBOCYTOPENIA (HCC): ICD-10-CM

## 2021-05-18 DIAGNOSIS — E53.8 B12 DEFICIENCY: ICD-10-CM

## 2021-05-18 DIAGNOSIS — K76.0 FATTY LIVER: ICD-10-CM

## 2021-05-18 LAB
25(OH)D3 SERPL-MCNC: 21.6 NG/ML (ref 30–100)
ALBUMIN SERPL BCP-MCNC: 4.1 G/DL (ref 3.5–5)
ALP SERPL-CCNC: 69 U/L (ref 46–116)
ALT SERPL W P-5'-P-CCNC: 53 U/L (ref 12–78)
ANION GAP SERPL CALCULATED.3IONS-SCNC: 5 MMOL/L (ref 4–13)
AST SERPL W P-5'-P-CCNC: 29 U/L (ref 5–45)
BASOPHILS # BLD AUTO: 0.01 THOUSANDS/ΜL (ref 0–0.1)
BASOPHILS NFR BLD AUTO: 0 % (ref 0–1)
BILIRUB SERPL-MCNC: 0.64 MG/DL (ref 0.2–1)
BUN SERPL-MCNC: 20 MG/DL (ref 5–25)
CALCIUM SERPL-MCNC: 9.1 MG/DL (ref 8.3–10.1)
CHLORIDE SERPL-SCNC: 106 MMOL/L (ref 100–108)
CO2 SERPL-SCNC: 28 MMOL/L (ref 21–32)
CREAT SERPL-MCNC: 0.91 MG/DL (ref 0.6–1.3)
EOSINOPHIL # BLD AUTO: 0.03 THOUSAND/ΜL (ref 0–0.61)
EOSINOPHIL NFR BLD AUTO: 1 % (ref 0–6)
ERYTHROCYTE [DISTWIDTH] IN BLOOD BY AUTOMATED COUNT: 15.5 % (ref 11.6–15.1)
FERRITIN SERPL-MCNC: 738 NG/ML (ref 8–388)
FSH SERPL-ACNC: 5 MIU/ML (ref 0.7–10.8)
GFR SERPL CREATININE-BSD FRML MDRD: 91 ML/MIN/1.73SQ M
GLUCOSE P FAST SERPL-MCNC: 115 MG/DL (ref 65–99)
HAV AB SER QL IA: NORMAL
HBV SURFACE AB SER-ACNC: <3.1 MIU/ML
HCT VFR BLD AUTO: 38.2 % (ref 36.5–49.3)
HGB BLD-MCNC: 12.6 G/DL (ref 12–17)
IGA SERPL-MCNC: 190 MG/DL (ref 70–400)
IGG SERPL-MCNC: 1020 MG/DL (ref 700–1600)
IGM SERPL-MCNC: 94 MG/DL (ref 40–230)
IMM GRANULOCYTES # BLD AUTO: 0.01 THOUSAND/UL (ref 0–0.2)
IMM GRANULOCYTES NFR BLD AUTO: 0 % (ref 0–2)
IRON SATN MFR SERPL: 47 %
IRON SERPL-MCNC: 145 UG/DL (ref 65–175)
LH SERPL-ACNC: 2.5 MIU/ML (ref 1.2–10.6)
LYMPHOCYTES # BLD AUTO: 1.52 THOUSANDS/ΜL (ref 0.6–4.47)
LYMPHOCYTES NFR BLD AUTO: 42 % (ref 14–44)
MCH RBC QN AUTO: 36.7 PG (ref 26.8–34.3)
MCHC RBC AUTO-ENTMCNC: 33 G/DL (ref 31.4–37.4)
MCV RBC AUTO: 111 FL (ref 82–98)
MONOCYTES # BLD AUTO: 0.27 THOUSAND/ΜL (ref 0.17–1.22)
MONOCYTES NFR BLD AUTO: 7 % (ref 4–12)
NEUTROPHILS # BLD AUTO: 1.8 THOUSANDS/ΜL (ref 1.85–7.62)
NEUTS SEG NFR BLD AUTO: 50 % (ref 43–75)
NRBC BLD AUTO-RTO: 0 /100 WBCS
PLATELET # BLD AUTO: 101 THOUSANDS/UL (ref 149–390)
PMV BLD AUTO: 11.9 FL (ref 8.9–12.7)
POTASSIUM SERPL-SCNC: 4.2 MMOL/L (ref 3.5–5.3)
PROLACTIN SERPL-MCNC: 7.9 NG/ML (ref 2.5–17.4)
PROT SERPL-MCNC: 7.7 G/DL (ref 6.4–8.2)
PTH-INTACT SERPL-MCNC: 82.6 PG/ML (ref 18.4–80.1)
RBC # BLD AUTO: 3.43 MILLION/UL (ref 3.88–5.62)
SODIUM SERPL-SCNC: 139 MMOL/L (ref 136–145)
T3FREE SERPL-MCNC: 2.53 PG/ML (ref 2.3–4.2)
T4 FREE SERPL-MCNC: 1.06 NG/DL (ref 0.76–1.46)
TIBC SERPL-MCNC: 306 UG/DL (ref 250–450)
TSH SERPL DL<=0.05 MIU/L-ACNC: 6.98 UIU/ML (ref 0.36–3.74)
WBC # BLD AUTO: 3.64 THOUSAND/UL (ref 4.31–10.16)

## 2021-05-18 PROCEDURE — 86708 HEPATITIS A ANTIBODY: CPT

## 2021-05-18 PROCEDURE — 86255 FLUORESCENT ANTIBODY SCREEN: CPT

## 2021-05-18 PROCEDURE — 84439 ASSAY OF FREE THYROXINE: CPT | Performed by: INTERNAL MEDICINE

## 2021-05-18 PROCEDURE — 85025 COMPLETE CBC W/AUTO DIFF WBC: CPT

## 2021-05-18 PROCEDURE — 84305 ASSAY OF SOMATOMEDIN: CPT | Performed by: INTERNAL MEDICINE

## 2021-05-18 PROCEDURE — 83540 ASSAY OF IRON: CPT

## 2021-05-18 PROCEDURE — 80053 COMPREHEN METABOLIC PANEL: CPT

## 2021-05-18 PROCEDURE — 82103 ALPHA-1-ANTITRYPSIN TOTAL: CPT

## 2021-05-18 PROCEDURE — 84402 ASSAY OF FREE TESTOSTERONE: CPT | Performed by: INTERNAL MEDICINE

## 2021-05-18 PROCEDURE — 83970 ASSAY OF PARATHORMONE: CPT | Performed by: INTERNAL MEDICINE

## 2021-05-18 PROCEDURE — 82784 ASSAY IGA/IGD/IGG/IGM EACH: CPT

## 2021-05-18 PROCEDURE — 86256 FLUORESCENT ANTIBODY TITER: CPT

## 2021-05-18 PROCEDURE — 83918 ORGANIC ACIDS TOTAL QUANT: CPT

## 2021-05-18 PROCEDURE — 83002 ASSAY OF GONADOTROPIN (LH): CPT | Performed by: INTERNAL MEDICINE

## 2021-05-18 PROCEDURE — 84403 ASSAY OF TOTAL TESTOSTERONE: CPT | Performed by: INTERNAL MEDICINE

## 2021-05-18 PROCEDURE — 86235 NUCLEAR ANTIGEN ANTIBODY: CPT

## 2021-05-18 PROCEDURE — 86038 ANTINUCLEAR ANTIBODIES: CPT

## 2021-05-18 PROCEDURE — 84165 PROTEIN E-PHORESIS SERUM: CPT

## 2021-05-18 PROCEDURE — 82306 VITAMIN D 25 HYDROXY: CPT | Performed by: INTERNAL MEDICINE

## 2021-05-18 PROCEDURE — 86706 HEP B SURFACE ANTIBODY: CPT

## 2021-05-18 PROCEDURE — 83516 IMMUNOASSAY NONANTIBODY: CPT

## 2021-05-18 PROCEDURE — 83550 IRON BINDING TEST: CPT

## 2021-05-18 PROCEDURE — 84165 PROTEIN E-PHORESIS SERUM: CPT | Performed by: PATHOLOGY

## 2021-05-18 PROCEDURE — 84146 ASSAY OF PROLACTIN: CPT | Performed by: INTERNAL MEDICINE

## 2021-05-18 PROCEDURE — 82728 ASSAY OF FERRITIN: CPT

## 2021-05-18 PROCEDURE — 36415 COLL VENOUS BLD VENIPUNCTURE: CPT

## 2021-05-18 PROCEDURE — 83001 ASSAY OF GONADOTROPIN (FSH): CPT | Performed by: INTERNAL MEDICINE

## 2021-05-18 PROCEDURE — 84481 FREE ASSAY (FT-3): CPT | Performed by: INTERNAL MEDICINE

## 2021-05-18 PROCEDURE — 82390 ASSAY OF CERULOPLASMIN: CPT

## 2021-05-18 PROCEDURE — 84443 ASSAY THYROID STIM HORMONE: CPT | Performed by: INTERNAL MEDICINE

## 2021-05-19 ENCOUNTER — LAB (OUTPATIENT)
Dept: LAB | Facility: HOSPITAL | Age: 61
End: 2021-05-19
Attending: INTERNAL MEDICINE
Payer: COMMERCIAL

## 2021-05-19 ENCOUNTER — TELEPHONE (OUTPATIENT)
Dept: ENDOCRINOLOGY | Facility: HOSPITAL | Age: 61
End: 2021-05-19

## 2021-05-19 ENCOUNTER — TELEPHONE (OUTPATIENT)
Dept: GASTROENTEROLOGY | Facility: CLINIC | Age: 61
End: 2021-05-19

## 2021-05-19 ENCOUNTER — TRANSCRIBE ORDERS (OUTPATIENT)
Dept: ADMINISTRATIVE | Facility: HOSPITAL | Age: 61
End: 2021-05-19

## 2021-05-19 DIAGNOSIS — R63.5 ABNORMAL WEIGHT GAIN: Primary | ICD-10-CM

## 2021-05-19 DIAGNOSIS — R63.5 ABNORMAL WEIGHT GAIN: ICD-10-CM

## 2021-05-19 LAB
A1AT SERPL-MCNC: 130 MG/DL (ref 101–187)
ACTIN IGG SERPL-ACNC: 9 UNITS (ref 0–19)
CERULOPLASMIN SERPL-MCNC: 25.3 MG/DL (ref 16–31)
CORTIS AM PEAK SERPL-MCNC: 1.2 UG/DL (ref 4.2–22.4)
ENDOMYSIUM IGA SER QL: NEGATIVE
GLIADIN PEPTIDE IGA SER-ACNC: 6 UNITS (ref 0–19)
GLIADIN PEPTIDE IGG SER-ACNC: 3 UNITS (ref 0–19)
IGA SERPL-MCNC: 195 MG/DL (ref 90–386)
MITOCHONDRIA M2 IGG SER-ACNC: <20 UNITS (ref 0–20)
RYE IGE QN: NEGATIVE
TESTOST FREE SERPL-MCNC: 14.2 PG/ML (ref 6.6–18.1)
TESTOST SERPL-MCNC: 161 NG/DL (ref 264–916)
TTG IGA SER-ACNC: <2 U/ML (ref 0–3)
TTG IGG SER-ACNC: 4 U/ML (ref 0–5)

## 2021-05-19 PROCEDURE — 82533 TOTAL CORTISOL: CPT | Performed by: INTERNAL MEDICINE

## 2021-05-19 NOTE — TELEPHONE ENCOUNTER
----- Message from Julieta Sheridan MD sent at 5/16/2021  8:14 PM EDT -----  Regarding: labs  Hi,     Please call the patient know that I reviewed his labs and there are some labs that were not completed  from few years ago  I placed the order       Thank you,    Nam Sifuentes

## 2021-05-19 NOTE — TELEPHONE ENCOUNTER
Alanna from   [facility] in [place] called with a critical low cortisol level (1 2)    Was done today at 7:03 am

## 2021-05-20 ENCOUNTER — TELEPHONE (OUTPATIENT)
Dept: GASTROENTEROLOGY | Facility: CLINIC | Age: 61
End: 2021-05-20

## 2021-05-20 DIAGNOSIS — E29.1 TESTICULAR HYPOGONADISM: ICD-10-CM

## 2021-05-20 DIAGNOSIS — K76.0 FATTY LIVER: Primary | ICD-10-CM

## 2021-05-20 DIAGNOSIS — E55.9 VITAMIN D DEFICIENCY: Primary | ICD-10-CM

## 2021-05-20 DIAGNOSIS — R79.89 ELEVATED FERRITIN: ICD-10-CM

## 2021-05-20 DIAGNOSIS — E03.9 HYPOTHYROIDISM, UNSPECIFIED TYPE: ICD-10-CM

## 2021-05-20 LAB
ALBUMIN SERPL ELPH-MCNC: 4.73 G/DL (ref 3.5–5)
ALBUMIN SERPL ELPH-MCNC: 63 % (ref 52–65)
ALPHA1 GLOB SERPL ELPH-MCNC: 0.31 G/DL (ref 0.1–0.4)
ALPHA1 GLOB SERPL ELPH-MCNC: 4.1 % (ref 2.5–5)
ALPHA2 GLOB SERPL ELPH-MCNC: 0.63 G/DL (ref 0.4–1.2)
ALPHA2 GLOB SERPL ELPH-MCNC: 8.4 % (ref 7–13)
BETA GLOB ABNORMAL SERPL ELPH-MCNC: 0.47 G/DL (ref 0.4–0.8)
BETA1 GLOB SERPL ELPH-MCNC: 6.2 % (ref 5–13)
BETA2 GLOB SERPL ELPH-MCNC: 5.4 % (ref 2–8)
BETA2+GAMMA GLOB SERPL ELPH-MCNC: 0.41 G/DL (ref 0.2–0.5)
GAMMA GLOB ABNORMAL SERPL ELPH-MCNC: 0.97 G/DL (ref 0.5–1.6)
GAMMA GLOB SERPL ELPH-MCNC: 12.9 % (ref 12–22)
IGF-I SERPL-MCNC: 127 NG/ML (ref 68–247)
IGG/ALB SER: 1.7 {RATIO} (ref 1.1–1.8)
PROT PATTERN SERPL ELPH-IMP: NORMAL
PROT SERPL-MCNC: 7.5 G/DL (ref 6.4–8.2)

## 2021-05-20 RX ORDER — LEVOTHYROXINE SODIUM 0.12 MG/1
TABLET ORAL
Qty: 180 TABLET | Refills: 1
Start: 2021-05-20 | End: 2021-05-21 | Stop reason: SDUPTHER

## 2021-05-20 NOTE — TELEPHONE ENCOUNTER
----- Message from Johanne Fountain MD sent at 5/20/2021  7:58 AM EDT -----  Regarding: Result  Please let him know most of the blood works are unremarkable  He has mildly elevated ferritin  I ordered hemochromatosis mutation analysis test   He can do this next time he has  blood work schedule  He also needs hepatitis A and B vaccination through his primary doctor  Follow-up with me as per his appointment    Thank you    Asha Bass

## 2021-05-20 NOTE — PROGRESS NOTES
I reviewed his labs which are basically unremarkable for underlying liver disease  His ferritin is moderately elevated  Will check hemochromatosis mutation analysis  Given his underlying hepatosplenomegaly I will consider liver biopsy with pressure measurement in the future  He also needs hepatitis a and B vaccination through his primary doctor

## 2021-05-21 DIAGNOSIS — E29.1 TESTICULAR HYPOGONADISM: ICD-10-CM

## 2021-05-21 DIAGNOSIS — E55.9 VITAMIN D DEFICIENCY: ICD-10-CM

## 2021-05-21 DIAGNOSIS — E03.9 HYPOTHYROIDISM, UNSPECIFIED TYPE: ICD-10-CM

## 2021-05-21 RX ORDER — LEVOTHYROXINE SODIUM 0.12 MG/1
TABLET ORAL
Qty: 180 TABLET | Refills: 1 | Status: SHIPPED | OUTPATIENT
Start: 2021-05-21 | End: 2021-11-12

## 2021-05-21 NOTE — TELEPHONE ENCOUNTER
I spoke to the patient in regards to his results, verbalized understanding  Suggestion made to the patient in regards to getting vaccine for Hepatitis A and B through his PCP

## 2021-05-24 LAB
METHYLMALONATE SERPL-SCNC: 91 NMOL/L (ref 0–378)
SL AMB DISCLAIMER: NORMAL

## 2021-05-26 ENCOUNTER — HOSPITAL ENCOUNTER (OUTPATIENT)
Dept: ULTRASOUND IMAGING | Facility: HOSPITAL | Age: 61
Discharge: HOME | End: 2021-05-26

## 2021-05-26 ENCOUNTER — HOSPITAL ENCOUNTER (OUTPATIENT)
Dept: ULTRASOUND IMAGING | Facility: HOSPITAL | Age: 61
Discharge: HOME | End: 2021-05-26
Attending: INTERNAL MEDICINE
Payer: COMMERCIAL

## 2021-05-26 DIAGNOSIS — D50.0 IRON DEFICIENCY ANEMIA DUE TO CHRONIC BLOOD LOSS: ICD-10-CM

## 2021-05-26 DIAGNOSIS — R16.1 SPLENOMEGALY: ICD-10-CM

## 2021-05-26 DIAGNOSIS — K76.0 FATTY LIVER: ICD-10-CM

## 2021-05-26 DIAGNOSIS — E53.8 B12 DEFICIENCY: ICD-10-CM

## 2021-05-26 PROCEDURE — 76981 USE PARENCHYMA: CPT

## 2021-05-26 PROCEDURE — 76700 US EXAM ABDOM COMPLETE: CPT

## 2021-05-27 ENCOUNTER — TELEPHONE (OUTPATIENT)
Dept: GASTROENTEROLOGY | Facility: CLINIC | Age: 61
End: 2021-05-27

## 2021-05-27 NOTE — TELEPHONE ENCOUNTER
----- Message from Johanne Fountain MD sent at 5/27/2021 12:30 PM EDT -----  Please let him know ultrasound showed moderate fatty liver disease but no scarring of the liver

## 2021-06-28 ENCOUNTER — OFFICE VISIT (OUTPATIENT)
Dept: FAMILY MEDICINE CLINIC | Facility: CLINIC | Age: 61
End: 2021-06-28
Payer: COMMERCIAL

## 2021-06-28 VITALS
TEMPERATURE: 97.7 F | SYSTOLIC BLOOD PRESSURE: 121 MMHG | HEART RATE: 61 BPM | OXYGEN SATURATION: 97 % | BODY MASS INDEX: 34.91 KG/M2 | HEIGHT: 74 IN | DIASTOLIC BLOOD PRESSURE: 72 MMHG | WEIGHT: 272 LBS

## 2021-06-28 DIAGNOSIS — I73.9 CLAUDICATION (HCC): ICD-10-CM

## 2021-06-28 DIAGNOSIS — Z00.00 ANNUAL PHYSICAL EXAM: Primary | ICD-10-CM

## 2021-06-28 DIAGNOSIS — M25.50 ARTHRALGIA, UNSPECIFIED JOINT: ICD-10-CM

## 2021-06-28 PROCEDURE — 3008F BODY MASS INDEX DOCD: CPT | Performed by: FAMILY MEDICINE

## 2021-06-28 PROCEDURE — 3725F SCREEN DEPRESSION PERFORMED: CPT | Performed by: FAMILY MEDICINE

## 2021-06-28 PROCEDURE — 99396 PREV VISIT EST AGE 40-64: CPT | Performed by: FAMILY MEDICINE

## 2021-06-28 PROCEDURE — 1036F TOBACCO NON-USER: CPT | Performed by: FAMILY MEDICINE

## 2021-06-28 NOTE — PROGRESS NOTES
[id number]    NAME: Cj Barnhart  AGE: 62 y o  SEX: male  : 1960     DATE: 2021     Assessment and Plan:     Problem List Items Addressed This Visit        Other    Claudication [facility])     Patient is a diagnosis of claudication and has symptoms claudication but I am not absolutely sure that his diagnosis is truly peripheral tear disease  Does have pain with walking it does get worse with more walking but he has pain even when he starts walking  We will get ABIs  If they are abnormal I will refer him to vascular surgery  Relevant Orders    VAS ELDA & waveform analysis, multiple levels      Other Visit Diagnoses     Annual physical exam    -  Primary    Arthralgia, unspecified joint        Relevant Orders    Lyme Antibody Profile with reflex to WB          Immunizations and preventive care screenings were discussed with patient today  Appropriate education was printed on patient's after visit summary  Counseling:  Dental Health: discussed importance of regular tooth brushing, flossing, and dental visits  · Exercise: the importance of regular exercise/physical activity was discussed  Recommend exercise 3-5 times per week for at least 30 minutes  BMI Counseling: Body mass index is 34 92 kg/m²  The BMI is above normal  Exercise recommendations include exercising 3-5 times per week  Return in 6 months (on 2021)  Chief Complaint:     Chief Complaint   Patient presents with    Annual Exam     Pt here today for his annual exam     Leg Problem     Pt states his ankles swell and his legs feel weak  Pt feels like everything started after surgery  He states he had plaque build up in the past      COVID Vaccination     Pt wants to discuss covid vaccination         History of Present Illness:     Adult Annual Physical   Patient here for a comprehensive physical exam  The patient reports problems - pain with ambulation, worried about PAD, having a lot of trouble walking celena as he does more walking  Diet and Physical Activity  · Diet/Nutrition: well balanced diet and has started a gluten free diet  · Exercise: walking  Depression Screening  PHQ-9 Depression Screening    PHQ-9:   Frequency of the following problems over the past two weeks:      Little interest or pleasure in doing things: 1 - several days  Feeling down, depressed, or hopeless: 0 - not at all  PHQ-2 Score: 1       General Health  · Sleep: sleeps well  · Hearing: normal - bilateral   · Vision: vision problems: decreased and most recent eye exam >1 year ago  · Dental: regular dental visits  [facility]  · Symptoms include: nocturia     Review of Systems:     Review of Systems   Constitutional: Negative for chills, fever and unexpected weight change  HENT: Negative for congestion, ear pain, hearing loss, postnasal drip, rhinorrhea and sore throat  Eyes: Negative for visual disturbance  Respiratory: Negative for cough and shortness of breath  Cardiovascular: Negative for chest pain  Gastrointestinal: Negative for abdominal pain, constipation and diarrhea  Genitourinary: Negative for difficulty urinating  Musculoskeletal: Positive for arthralgias, gait problem and myalgias  Skin: Negative for rash  Neurological: Negative for light-headedness and headaches  Psychiatric/Behavioral: Negative for dysphoric mood and sleep disturbance  The patient is not nervous/anxious         Past Medical History:     Past Medical History:   Diagnosis Date    Anemia     last assessed: 09/03/15    Cholelithiasis     Disease of thyroid gland     GERD (gastroesophageal reflux disease)     Hypersecretion of testicular hormones     Insomnia     Kidney stone     Male erectile dysfunction     Osteoarthritis, hip, bilateral     Thrombosed external hemorrhoids       Past Surgical History:     Past Surgical History:   Procedure Laterality Date    CHOLECYSTECTOMY      COLONOSCOPY      [place] COLONOSCOPY FLX DX W/COLLJ SPEC WHEN PFRMD N/A 11/6/2017    Procedure: EGD AND COLONOSCOPY;  Surgeon: Alee Daniel MD;  [facility]; Service: Gastroenterology    TOTAL HIP ARTHROPLASTY Left 11/20/2020      Family History:     Family History   Problem Relation Age of Onset    Arthritis Mother     Heart disease Father     Hypertension Father     Diabetes Father     Asthma Father     Cancer Paternal Grandmother     Cancer Family     Heart disease Family       Social History:     Social History     Socioeconomic History    Marital status: /Civil Union     Spouse name: None    Number of children: None    Years of education: None    Highest education level: None   Occupational History    None   Tobacco Use    Smoking status: Former Smoker    Smokeless tobacco: Never Used    Tobacco comment: QUIT 2002/ SECOND HAND    Vaping Use    Vaping Use: Never used   Substance and Sexual Activity    Alcohol use: No    Drug use: No    Sexual activity: None   Other Topics Concern    None   Social History Narrative    None     Social Determinants of Health     Financial Resource Strain:     Difficulty of Paying Living Expenses:    Food Insecurity:     Worried About Running Out of Food in the Last Year:     Ran Out of Food in the Last Year:    Transportation Needs:     Lack of Transportation (Medical):      Lack of Transportation (Non-Medical):    Physical Activity:     Days of Exercise per Week:     Minutes of Exercise per Session:    Stress:     Feeling of Stress :    Social Connections:     Frequency of Communication with Friends and Family:     Frequency of Social Gatherings with Friends and Family:     Attends [employer] Services:     Active Member of Clubs or Organizations:     Attends Club or Organization Meetings:     Marital Status:    Intimate Partner Violence:     Fear of Current or Ex-Partner:     Emotionally Abused:     Physically Abused:     Sexually Abused:       Current Medications:     Current Outpatient Medications   Medication Sig Dispense Refill    Cholecalciferol 50 MCG (2000 UT) TABS 1 tab daily      levothyroxine 125 mcg tablet 2 tabs daily (Patient taking differently: 2 tabs daily 1 time a day) 180 tablet 1    liothyronine (CYTOMEL) 5 mcg tablet TAKE 1 TABLET BY MOUTH EVERY DAY 90 tablet 1     Current Facility-Administered Medications   Medication Dose Route Frequency Provider Last Rate Last Admin    ipratropium-albuterol (DUO-NEB) 0 5-2 5 mg/3 mL inhalation solution 3 mL  3 mL Nebulization Q6H Cate Nicolas, DO   3 mL at 02/02/18 1479      Allergies:     No Known Allergies   Physical Exam:     /72 (BP Location: Left arm, Patient Position: Sitting, Cuff Size: Large)   Pulse 61   Temp 97 7 °F (36 5 °C) (Tympanic)   Ht 6' 2" (1 88 m)   Wt 123 kg (272 lb)   SpO2 97%   BMI 34 92 kg/m²     Physical Exam  Constitutional:       Appearance: He is well-developed  HENT:      Head: Normocephalic and atraumatic  Right Ear: Hearing, tympanic membrane, ear canal and external ear normal       Left Ear: Hearing, tympanic membrane, ear canal and external ear normal    Eyes:      General: Lids are normal       Conjunctiva/sclera: Conjunctivae normal       Pupils: Pupils are equal, round, and reactive to light  Cardiovascular:      Rate and Rhythm: Normal rate and regular rhythm  Heart sounds: Normal heart sounds  No murmur heard  Pulmonary:      Effort: Pulmonary effort is normal  No respiratory distress  Breath sounds: Normal breath sounds  No wheezing  Abdominal:      General: Bowel sounds are normal  There is no distension  Palpations: Abdomen is soft  Tenderness: There is no abdominal tenderness  Musculoskeletal:         General: No deformity  Normal range of motion  Cervical back: Full passive range of motion without pain, normal range of motion and neck supple  Skin:     General: Skin is warm and dry  Findings: No rash  Neurological:      Mental Status: He is alert and oriented to person, place, and time  Cranial Nerves: No cranial nerve deficit  Psychiatric:         Mood and Affect: Mood normal          Behavior: Behavior normal          Thought Content:  Thought content normal          Judgment: Judgment normal           MD Bennie Dolan

## 2021-06-28 NOTE — ASSESSMENT & PLAN NOTE
Patient is a diagnosis of claudication and has symptoms claudication but I am not absolutely sure that his diagnosis is truly peripheral tear disease  Does have pain with walking it does get worse with more walking but he has pain even when he starts walking  We will get ABIs  If they are abnormal I will refer him to vascular surgery

## 2021-06-28 NOTE — PROGRESS NOTES
[id number]    NAME: Cj Barnhart  AGE: 62 y o  SEX: male  : 1960     DATE: 2021     Assessment and Plan:     Problem List Items Addressed This Visit        Other    Claudication [facility]) - Primary    Relevant Orders    VAS ELDA & waveform analysis, multiple levels      Other Visit Diagnoses     Annual physical exam              Immunizations and preventive care screenings were discussed with patient today  Appropriate education was printed on patient's after visit summary  Counseling:  · {Annual Physical; Counselin}         Return in 6 months (on 2021)  Chief Complaint:     Chief Complaint   Patient presents with    Annual Exam     Pt here today for his annual exam     Leg Problem     Pt states his ankles swell and his legs feel weak  Pt feels like everything started after surgery  He states he had plaque build up in the past      COVID Vaccination     Pt wants to discuss covid vaccination  History of Present Illness:     Adult Annual Physical   Patient here for a comprehensive physical exam  The patient reports {problems:30721}  Diet and Physical Activity  · Diet/Nutrition: {annual physical; diet:60174141}  · Exercise: {annual physical; exercise:2102}  Depression Screening  PHQ-9 Depression Screening    PHQ-9:   Frequency of the following problems over the past two weeks:      Little interest or pleasure in doing things: 1 - several days  Feeling down, depressed, or hopeless: 0 - not at all  PHQ-2 Score: 1       General Health  · Sleep: {annual physical; sleep:2102}  · Hearing: {annual physical; hearin}  · Vision: {annual physical; vision:}  · Dental: {annual physical; dental:}          Health  · Symptoms include: {annual physical; urinary symptoms:18244::"none"}     Review of Systems:     Review of Systems   Past Medical History:     Past Medical History:   Diagnosis Date    Anemia     last assessed: 09/03/15    Cholelithiasis     Disease of thyroid gland     GERD (gastroesophageal reflux disease)     Hypersecretion of testicular hormones     Insomnia     Kidney stone     Male erectile dysfunction     Osteoarthritis, hip, bilateral     Thrombosed external hemorrhoids       Past Surgical History:     Past Surgical History:   Procedure Laterality Date    CHOLECYSTECTOMY      COLONOSCOPY      [place] COLONOSCOPY FLX DX W/COLLJ SPEC WHEN PFRMD N/A 11/6/2017    Procedure: EGD AND COLONOSCOPY;  Surgeon: Chelo Hutson MD;  [facility]; Service: Gastroenterology    TOTAL HIP ARTHROPLASTY Left 11/20/2020      Family History:     Family History   Problem Relation Age of Onset    Arthritis Mother     Heart disease Father     Hypertension Father     Diabetes Father     Asthma Father     Cancer Paternal Grandmother     Cancer Family     Heart disease Family       Social History:     Social History     Socioeconomic History    Marital status: /Civil Union     Spouse name: None    Number of children: None    Years of education: None    Highest education level: None   Occupational History    None   Tobacco Use    Smoking status: Former Smoker    Smokeless tobacco: Never Used    Tobacco comment: QUIT 2002/ SECOND HAND    Vaping Use    Vaping Use: Never used   Substance and Sexual Activity    Alcohol use: No    Drug use: No    Sexual activity: None   Other Topics Concern    None   Social History Narrative    None     Social Determinants of Health     Financial Resource Strain:     Difficulty of Paying Living Expenses:    Food Insecurity:     Worried About Running Out of Food in the Last Year:     Ran Out of Food in the Last Year:    Transportation Needs:     Lack of Transportation (Medical):      Lack of Transportation (Non-Medical):    Physical Activity:     Days of Exercise per Week:     Minutes of Exercise per Session:    Stress:     Feeling of Stress :    Social Connections:     Frequency of Communication with Friends and Family:     Frequency of Social Gatherings with Friends and Family:     Attends Zoroastrianism Services:     Active Member of Clubs or Organizations:     Attends Club or Organization Meetings:     Marital Status:    Intimate Partner Violence:     Fear of Current or Ex-Partner:     Emotionally Abused:     Physically Abused:     Sexually Abused:       Current Medications:     Current Outpatient Medications   Medication Sig Dispense Refill    Cholecalciferol 50 MCG (2000 UT) TABS 1 tab daily      levothyroxine 125 mcg tablet 2 tabs daily (Patient taking differently: 2 tabs daily 1 time a day) 180 tablet 1    liothyronine (CYTOMEL) 5 mcg tablet TAKE 1 TABLET BY MOUTH EVERY DAY 90 tablet 1     Current Facility-Administered Medications   Medication Dose Route Frequency Provider Last Rate Last Admin    ipratropium-albuterol (DUO-NEB) 0 5-2 5 mg/3 mL inhalation solution 3 mL  3 mL Nebulization Q6H Clearlakesravani Crystal, DO   3 mL at 02/02/18 8465      Allergies:     No Known Allergies   Physical Exam:     /72 (BP Location: Left arm, Patient Position: Sitting, Cuff Size: Large)   Pulse 61   Temp 97 7 °F (36 5 °C) (Tympanic)   Ht 6' 2" (1 88 m)   Wt 123 kg (272 lb)   SpO2 97%   BMI 34 92 kg/m²     Physical Exam     MD Bennie Dolan

## 2021-06-28 NOTE — PATIENT INSTRUCTIONS

## 2021-07-20 ENCOUNTER — HOSPITAL ENCOUNTER (OUTPATIENT)
Dept: NON INVASIVE DIAGNOSTICS | Facility: CLINIC | Age: 61
Discharge: HOME | End: 2021-07-20
Payer: COMMERCIAL

## 2021-07-20 DIAGNOSIS — I73.9 CLAUDICATION (HCC): ICD-10-CM

## 2021-07-20 PROCEDURE — 93923 UPR/LXTR ART STDY 3+ LVLS: CPT

## 2021-07-20 PROCEDURE — 93923 UPR/LXTR ART STDY 3+ LVLS: CPT | Performed by: SURGERY

## 2021-08-04 ENCOUNTER — HOSPITAL ENCOUNTER (OUTPATIENT)
Dept: MRI IMAGING | Facility: HOSPITAL | Age: 61
Discharge: HOME | End: 2021-08-04
Payer: COMMERCIAL

## 2021-08-04 DIAGNOSIS — C80.1 MALIGNANT (PRIMARY) NEOPLASM, UNSPECIFIED (HCC): ICD-10-CM

## 2021-08-04 PROCEDURE — G1004 CDSM NDSC: HCPCS

## 2021-08-04 PROCEDURE — 73721 MRI JNT OF LWR EXTRE W/O DYE: CPT

## 2021-08-07 NOTE — PROGRESS NOTES
Assessment and Plan:   Patient is a 69-year-old male who presents for rheumatology consult regarding thigh myalgia, stiffness and weakness  He does have advanced osteoarthritis in the right hip and will be getting joint replacement in a few months  He did have the left hip replaced in November last year and reports ever since then, he has had a progressive feeling of stiffness and weakness in the proximal legs which he feels is pretty equivalent in both legs  He describes issues with stiffness after rest and in the morning but denies any prolonged stiffness going on for close to an hour or more  He also denies any other peripheral joint issues and mainly having the issues in both thighs  On exam he does have weakness asymmetrically right greater than left hip flexor, no other weakness noted on exam or any findings of inflammatory arthritis  In his fingers I question distal sclerodactyly any does have Raynaud's over the past several years  We will do workup for underlying autoimmune muscle disease, scleroderma, and also PMR is on the differential, although he does not have any proximal arm involvement  He also already does have a negative TUNDE which I would expect to be positive in at least scleroderma  We discussed that we will get the additional lab work and determine next steps from there and need for follow-up  If our workup is negative he may benefit next from a neurology evaluation  He was in agreement with the plan      Plan:  Diagnoses and all orders for this visit:    Myalgia  -     Sjogren's Antibodies  -     Sedimentation rate, automated  -     RF Screen w/ Reflex to Titer  -     Cyclic citrul peptide antibody, IgG  -     CK  -     MyoMarker 3 Plus Profile (RDL)  -     C-reactive protein  -     Aldolase  -     Chronic Hepatitis Panel  -     Anti-scleroderma antibody  -     Centromere Antibody  -     PM/SCL ANTIBODIES    Proximal leg weakness  -     Sjogren's Antibodies  -     Sedimentation rate, automated  -     RF Screen w/ Reflex to Titer  -     Cyclic citrul peptide antibody, IgG  -     CK  -     MyoMarker 3 Plus Profile (RDL)  -     C-reactive protein  -     Aldolase  -     Chronic Hepatitis Panel  -     Anti-scleroderma antibody  -     Centromere Antibody  -     PM/SCL ANTIBODIES    Leukopenia, unspecified type  -     Sjogren's Antibodies  -     Sedimentation rate, automated  -     RF Screen w/ Reflex to Titer  -     Cyclic citrul peptide antibody, IgG  -     CK  -     MyoMarker 3 Plus Profile (RDL)  -     C-reactive protein    Thrombocytopenia (HCC)  -     Sjogren's Antibodies  -     Sedimentation rate, automated  -     RF Screen w/ Reflex to Titer  -     Cyclic citrul peptide antibody, IgG  -     CK  -     MyoMarker 3 Plus Profile (RDL)  -     C-reactive protein    Raynaud's phenomenon without gangrene  -     Sjogren's Antibodies  -     Sedimentation rate, automated  -     RF Screen w/ Reflex to Titer  -     Cyclic citrul peptide antibody, IgG  -     CK  -     MyoMarker 3 Plus Profile (RDL)  -     C-reactive protein  -     Aldolase  -     Chronic Hepatitis Panel  -     Anti-scleroderma antibody  -     Centromere Antibody  -     PM/SCL ANTIBODIES        Follow-up plan: depending on w/u      HPI  Jesse Zhu is a 62 y o   male with hypothyroidism, GERD, H/O kidney stones, s/p left THR, fatty liver, thrombocytopenia, macrocytosis, iron deficiency anemia, h/o bone marrow biopsy, OA, who presents for rheumatology consult by request of Dr Leigha Milner for myalgia  Never saw Rheumatology in the past     He is here for evaluation stiffness and weakness in his proximal leg  He had his L THR in 11/2020 and ever since then has had pain in his muscles in both legs pretty equally  He does have advanced OA in the right and does plan to have a replacement in November this year, but feels that there is something different or additional going on in terms of this sensation he has in his proximal legs    Reports pain, stiffness and weakness in both thighs, describes a stiffness he feels after resting when he gets back up  He also feels weak in the thighs, especially with trying to stand up from seated position like up from the toilet or a chair, or in and out of a car  He is stiff in the morning in the thighs when he 1st gets out of bed but reports it does resolve once he is walking for about 5 minutes, denies any prolonged stiffness for an hour or more  He was doing PT but stopped because he was getting worse  Left leg is longer than the right leg and the right leg will be corrected once he gets his other hip replaced but for now he has used a shoe insert that has helped with his back pain  Gets issues with white and blue color change of fingers in the cold over the last several years  Denies any significant dysphagia or dyspnea  Denies photosensitivity, rashes, psoriasis, sicca symptoms, oral or nasal ulcers, alopecia, h/o pericarditis or pleurisy, h/o blood clots  Review of Systems  Review of Systems   Constitutional: Negative for chills, fatigue, fever and unexpected weight change  HENT: Negative for mouth sores and trouble swallowing  Eyes: Negative for pain and visual disturbance  Respiratory: Negative for cough and shortness of breath  Cardiovascular: Positive for leg swelling  Negative for chest pain  Gastrointestinal: Negative for abdominal pain, blood in stool, constipation, diarrhea and nausea  Musculoskeletal: Positive for arthralgias, back pain and myalgias  Negative for joint swelling  Skin: Positive for color change and rash  Neurological: Negative for weakness and numbness  Hematological: Negative for adenopathy  Psychiatric/Behavioral: Negative for sleep disturbance         Allergies  No Known Allergies    Home Medications    Current Outpatient Medications:     Cholecalciferol 50 MCG (2000 UT) TABS, 1 tab daily, Disp:  , Rfl:     levothyroxine 125 mcg tablet, 2 tabs daily (Patient taking differently: 2 tabs daily 1 time a day), Disp: 180 tablet, Rfl: 1    liothyronine (CYTOMEL) 5 mcg tablet, TAKE 1 TABLET BY MOUTH EVERY DAY, Disp: 90 tablet, Rfl: 1    Current Facility-Administered Medications:     ipratropium-albuterol (DUO-NEB) 0 5-2 5 mg/3 mL inhalation solution 3 mL, 3 mL, Nebulization, Q6H, Lorenda Ray, DO, 3 mL at 02/02/18 9761    Past Medical History  Past Medical History:   Diagnosis Date    Anemia     last assessed: 09/03/15    Cholelithiasis     Disease of thyroid gland     GERD (gastroesophageal reflux disease)     Hypersecretion of testicular hormones     Insomnia     Kidney stone     Male erectile dysfunction     Osteoarthritis, hip, bilateral     Thrombosed external hemorrhoids        Past Surgical History   Past Surgical History:   Procedure Laterality Date    CHOLECYSTECTOMY      COLONOSCOPY      OK COLONOSCOPY FLX DX W/COLLJ SPEC WHEN PFRMD N/A 11/6/2017    Procedure: EGD AND COLONOSCOPY;  Surgeon: Jemima Cohn MD;  [facility]; Service: Gastroenterology    TOTAL HIP ARTHROPLASTY Left 11/20/2020       Family History  No known family history of autoimmune or inflammatory diseases      Family History   Problem Relation Age of Onset    Arthritis Mother     Heart disease Father     Hypertension Father     Diabetes Father     Asthma Father     Cancer Paternal Grandmother     Cancer Family     Heart disease Family        Social History  Occupation: [employer]   Social History     Substance and Sexual Activity   Alcohol Use Yes    Comment: socially     Social History     Substance and Sexual Activity   Drug Use Never     Social History     Tobacco Use   Smoking Status Former Smoker   Smokeless Tobacco Never Used   Tobacco Comment    QUIT 2002/ SECOND HAND        Objective:    Vitals:    08/11/21 0803   BP: 144/82   Pulse: 68   Weight: 123 kg (272 lb)   Height: 6' 2" (1 88 m)       Physical Exam  Constitutional:       General: He is not in acute distress  Appearance: He is well-developed  HENT:      Head: Normocephalic and atraumatic  Eyes:      General: Lids are normal  No scleral icterus  Conjunctiva/sclera: Conjunctivae normal    Pulmonary:      Effort: Pulmonary effort is normal  No tachypnea, accessory muscle usage or respiratory distress  Musculoskeletal:      Cervical back: Neck supple  Comments: No joint swelling or synovitis anywhere  Skin:     General: Skin is dry  Findings: No rash  Neurological:      Mental Status: He is alert  Comments: Subtle weakness in hip flexors, Right 4/5, Left 5-/5; no other weakness on exam    Psychiatric:         Behavior: Behavior normal  Behavior is cooperative  Imaging:   MRI R hip 8/4/21:  IMPRESSION:     Complete cartilage loss along the anterior superior joint space with subchondral marrow edema and cystic change both sides of the joint  Diffuse labral tearing      Labs:   Component      Latest Ref Rng & Units 5/18/2021   WBC      4 31 - 10 16 Thousand/uL 3 64 (L)   Red Blood Cell Count      3 88 - 5 62 Million/uL 3 43 (L)   Hemoglobin      12 0 - 17 0 g/dL 12 6   HCT      36 5 - 49 3 % 38 2   MCV      82 - 98 fL 111 (H)   MCH      26 8 - 34 3 pg 36 7 (H)   MCHC      31 4 - 37 4 g/dL 33 0   RDW      11 6 - 15 1 % 15 5 (H)   MPV      8 9 - 12 7 fL 11 9   Platelet Count      519 - 390 Thousands/uL 101 (L)   nRBC      /100 WBCs 0   Neutrophils %      43 - 75 % 50   Immat GRANS %      0 - 2 % 0   Lymphocytes Relative      14 - 44 % 42   Monocytes Relative      4 - 12 % 7   Eosinophils      0 - 6 % 1   Basophils Relative      0 - 1 % 0   Absolute Neutrophils      1 85 - 7 62 Thousands/µL 1 80 (L)   Immature Grans Absolute      0 00 - 0 20 Thousand/uL 0 01   Lymphocytes Absolute      0 60 - 4 47 Thousands/µL 1 52   Absolute Monocytes      0 17 - 1 22 Thousand/µL 0 27   Absolute Eosinophils      0 00 - 0 61 Thousand/µL 0 03   Basophils Absolute      0 00 - 0 10 Thousands/µL 0 01   Sodium      136 - 145 mmol/L 139   Potassium      3 5 - 5 3 mmol/L 4 2   Chloride      100 - 108 mmol/L 106   CO2      21 - 32 mmol/L 28   Anion Gap      4 - 13 mmol/L 5   BUN      5 - 25 mg/dL 20   Creatinine      0 60 - 1 30 mg/dL 0 91   GLUCOSE FASTING      65 - 99 mg/dL 115 (H)   Calcium      8 3 - 10 1 mg/dL 9 1   AST      5 - 45 U/L 29   ALT      12 - 78 U/L 53   Alkaline Phosphatase      46 - 116 U/L 69   Total Protein      6 4 - 8 2 g/dL 7 7   Albumin      3 5 - 5 0 g/dL 4 1   TOTAL BILIRUBIN      0 20 - 1 00 mg/dL 0 64   eGFR      ml/min/1 73sq m 91   IGA      90 - 386 mg/dL 195   GLIADIN IGA ANTIBODIES      0 - 19 units 6   GLIADIN IGG ANTIBODIES      0 - 19 units 3   Tissue Transglut Ab IGG      0 - 5 U/mL 4   TTG IGA      0 - 3 U/mL <2   ENDOMYSIAL IGA ANTIBODY      Negative Negative   IGA      70 0 - 400 0 mg/dL 190 0   IGG      700 0-1,600 0 mg/dL 1,020 0   IGM      40 0 - 230 0 mg/dL 94 0   Iron Saturation      % 47   TIBC      250 - 450 ug/dL 306   Iron      65 - 175 ug/dL 145   TSH 3RD GENERATON      0 358 - 3 740 uIU/mL 6 980 (H)   T3, Free      2 30 - 4 20 pg/mL 2 53   Free T4      0 76 - 1 46 ng/dL 1 06   PARATHYROID HORMONE      18 4 - 80 1 pg/mL 82 6 (H)   Vit D, 25-Hydroxy      30 0 - 100 0 ng/mL 21 6 (L)   ALPHA-1 ANTITRYPSIN      101 - 187 mg/dL 130   ANTI-NUCLEAR ANTIBODY (TUNDE)      Negative Negative   MITOCHONDRIAL ANTIBODY      0 0 - 20 0 Units <20 0   SMOOTH MUSCLE ANTIBODY      0 - 19 Units 9   CERULOPLASMIN      16 0 - 31 0 mg/dL 25 3   HEPATITIS B SURFACE ANTIBODY      mIU/mL <3 10   HEPATITIS A TOTAL ANTIBODY      Non-reactive Non-reactive   Ferritin      8 - 388 ng/mL 738 (H)     Component      Latest Ref Rng & Units 5/18/2021   Albumin/Globulin Ratio      1 10 - 1 80 1 70   ALBUMIN ELP      52 0 - 65 0 % 63 0   ALBUMIN CONC ELP      3 50 - 5 00 g/dl 4 73   ALPHA 1      2 5 - 5 0 % 4 1   ALPHA 1 CONC ELP      0 10 - 0 40 g/dL 0 31   ALPHA 2      7 0 - 13 0 % 8 4 ALPHA 2 CONC ELP      0 40 - 1 20 g/dL 0 63   BETA-1      5 0 - 13 0 % 6 2   BETA 1 CONC ELP      0 40 - 0 80 g/dL 0 47   BETA-2      2 0 - 8 0 % 5 4   BETA 2 CONC ELP      0 20 - 0 50 g/dL 0 41   GAMMA GLOBULIN      12 0 - 22 0 % 12 9   GAMMA CONC ELP      0 50 - 1 60 g/dL 0 97   Total Protein      6 4 - 8 2 g/dL 7 5   SPEP INTERPRETATION       No monoclonal bands        Component      Latest Ref Rng & Units 10/31/2017   HEPATITIS A IGM ANTIBODY      NON-REACTIVE NON-REACTIVE   HEPATITIS B SURFACE ANTIGEN      NON-REACTIVE NON-REACTIVE   HEPATITIS B CORE TOTAL ANTIBODY      NON-REACTIVE NON-REACTIVE   HEPATITIS C ANTIBODY      NON-REACTIVE NON-REACTIVE   SIGNAL TO CUT-OFF      <1 00 0 25

## 2021-08-11 ENCOUNTER — APPOINTMENT (OUTPATIENT)
Dept: LAB | Facility: CLINIC | Age: 61
End: 2021-08-11
Payer: COMMERCIAL

## 2021-08-11 ENCOUNTER — OFFICE VISIT (OUTPATIENT)
Dept: RHEUMATOLOGY | Facility: CLINIC | Age: 61
End: 2021-08-11
Payer: COMMERCIAL

## 2021-08-11 VITALS
HEART RATE: 68 BPM | SYSTOLIC BLOOD PRESSURE: 144 MMHG | HEIGHT: 74 IN | DIASTOLIC BLOOD PRESSURE: 82 MMHG | WEIGHT: 272 LBS | BODY MASS INDEX: 34.91 KG/M2

## 2021-08-11 DIAGNOSIS — D69.6 THROMBOCYTOPENIA (HCC): ICD-10-CM

## 2021-08-11 DIAGNOSIS — D72.819 LEUKOPENIA, UNSPECIFIED TYPE: ICD-10-CM

## 2021-08-11 DIAGNOSIS — M25.50 ARTHRALGIA, UNSPECIFIED JOINT: ICD-10-CM

## 2021-08-11 DIAGNOSIS — M79.10 MYALGIA: Primary | ICD-10-CM

## 2021-08-11 DIAGNOSIS — R29.898 PROXIMAL LEG WEAKNESS: ICD-10-CM

## 2021-08-11 DIAGNOSIS — I73.00 RAYNAUD'S PHENOMENON WITHOUT GANGRENE: ICD-10-CM

## 2021-08-11 LAB
CK MB SERPL-MCNC: 1.9 % (ref 0–2.5)
CK MB SERPL-MCNC: 7.5 NG/ML (ref 0–5)
CK SERPL-CCNC: 390 U/L (ref 39–308)
CRP SERPL QL: 20 MG/L
ERYTHROCYTE [SEDIMENTATION RATE] IN BLOOD: 27 MM/HOUR (ref 0–19)
HBV CORE AB SER QL: NORMAL
HBV CORE IGM SER QL: NORMAL
HBV SURFACE AG SER QL: NORMAL
HCV AB SER QL: NORMAL
RHEUMATOID FACT SER QL LA: NEGATIVE

## 2021-08-11 PROCEDURE — 86235 NUCLEAR ANTIGEN ANTIBODY: CPT | Performed by: INTERNAL MEDICINE

## 2021-08-11 PROCEDURE — 82553 CREATINE MB FRACTION: CPT | Performed by: INTERNAL MEDICINE

## 2021-08-11 PROCEDURE — 36415 COLL VENOUS BLD VENIPUNCTURE: CPT | Performed by: INTERNAL MEDICINE

## 2021-08-11 PROCEDURE — 83516 IMMUNOASSAY NONANTIBODY: CPT | Performed by: INTERNAL MEDICINE

## 2021-08-11 PROCEDURE — 86618 LYME DISEASE ANTIBODY: CPT

## 2021-08-11 PROCEDURE — 86705 HEP B CORE ANTIBODY IGM: CPT | Performed by: INTERNAL MEDICINE

## 2021-08-11 PROCEDURE — 99245 OFF/OP CONSLTJ NEW/EST HI 55: CPT | Performed by: INTERNAL MEDICINE

## 2021-08-11 PROCEDURE — 85652 RBC SED RATE AUTOMATED: CPT | Performed by: INTERNAL MEDICINE

## 2021-08-11 PROCEDURE — 83520 IMMUNOASSAY QUANT NOS NONAB: CPT | Performed by: INTERNAL MEDICINE

## 2021-08-11 PROCEDURE — 87340 HEPATITIS B SURFACE AG IA: CPT | Performed by: INTERNAL MEDICINE

## 2021-08-11 PROCEDURE — 86430 RHEUMATOID FACTOR TEST QUAL: CPT | Performed by: INTERNAL MEDICINE

## 2021-08-11 PROCEDURE — 3008F BODY MASS INDEX DOCD: CPT | Performed by: INTERNAL MEDICINE

## 2021-08-11 PROCEDURE — 86140 C-REACTIVE PROTEIN: CPT | Performed by: INTERNAL MEDICINE

## 2021-08-11 PROCEDURE — 82550 ASSAY OF CK (CPK): CPT | Performed by: INTERNAL MEDICINE

## 2021-08-11 PROCEDURE — 82085 ASSAY OF ALDOLASE: CPT | Performed by: INTERNAL MEDICINE

## 2021-08-11 PROCEDURE — 86704 HEP B CORE ANTIBODY TOTAL: CPT | Performed by: INTERNAL MEDICINE

## 2021-08-11 PROCEDURE — 1036F TOBACCO NON-USER: CPT | Performed by: INTERNAL MEDICINE

## 2021-08-11 PROCEDURE — 86803 HEPATITIS C AB TEST: CPT | Performed by: INTERNAL MEDICINE

## 2021-08-11 PROCEDURE — 86200 CCP ANTIBODY: CPT | Performed by: INTERNAL MEDICINE

## 2021-08-12 LAB
ALDOLASE SERPL-CCNC: 8.5 U/L (ref 3.3–10.3)
B BURGDOR IGG+IGM SER-ACNC: 16
CENTROMERE B AB SER-ACNC: <0.2 AI (ref 0–0.9)
ENA SCL70 AB SER-ACNC: <0.2 AI (ref 0–0.9)
ENA SS-A AB SER-ACNC: 0.2 AI (ref 0–0.9)
ENA SS-B AB SER-ACNC: <0.2 AI (ref 0–0.9)

## 2021-08-13 LAB — CCP AB SER IA-ACNC: 1.3

## 2021-08-15 LAB — ENA PM/SCL AB SER-ACNC: <20 UNITS

## 2021-08-24 LAB
EJ AB SER QL: NEGATIVE
ENA JO1 AB SER IA-ACNC: <20 UNITS
ENA PM/SCL AB SER-ACNC: <20 UNITS
ENA SS-A 52KD IGG SER IA-ACNC: <20 UNITS
FIBRILLARIN AB SER QL: NEGATIVE
KU AB SER QL: NEGATIVE
MDA5 AB SER LINE BLOT-ACNC: <20 UNITS
MI2 AB SER QL: NEGATIVE
MJ AB SER LINE BLOT-ACNC: <20 UNITS
OJ AB SER QL: NEGATIVE
PL12 AB SER QL: NEGATIVE
PL7 AB SER QL: NEGATIVE
SAE1 IGG SER QL LINE BLOT: <20 UNITS
SRP AB SERPL QL: NEGATIVE
TIF1-GAMMA AB SER LINE BLOT-ACNC: <20 UNITS
U1 SNRNP AB SER IA-ACNC: <20 UNITS
U2 SNRNP AB SER QL: NEGATIVE

## 2021-08-25 DIAGNOSIS — M35.3 PMR (POLYMYALGIA RHEUMATICA) (HCC): Primary | ICD-10-CM

## 2021-08-25 RX ORDER — PREDNISONE 1 MG/1
TABLET ORAL
Qty: 120 TABLET | Refills: 1 | Status: SHIPPED | OUTPATIENT
Start: 2021-08-25 | End: 2021-12-29

## 2021-09-10 DIAGNOSIS — E03.9 HYPOTHYROIDISM, UNSPECIFIED TYPE: ICD-10-CM

## 2021-09-10 RX ORDER — LIOTHYRONINE SODIUM 5 UG/1
TABLET ORAL
Qty: 90 TABLET | Refills: 1 | Status: SHIPPED | OUTPATIENT
Start: 2021-09-10 | End: 2022-03-08

## 2021-10-25 ENCOUNTER — TELEPHONE (OUTPATIENT)
Dept: HEMATOLOGY ONCOLOGY | Facility: CLINIC | Age: 61
End: 2021-10-25

## 2021-10-26 ENCOUNTER — OFFICE VISIT (OUTPATIENT)
Dept: RHEUMATOLOGY | Facility: CLINIC | Age: 61
End: 2021-10-26
Payer: COMMERCIAL

## 2021-10-26 VITALS — BODY MASS INDEX: 34.91 KG/M2 | WEIGHT: 272 LBS | HEIGHT: 74 IN

## 2021-10-26 DIAGNOSIS — M16.0 PRIMARY OSTEOARTHRITIS OF BOTH HIPS: ICD-10-CM

## 2021-10-26 DIAGNOSIS — M79.10 MYALGIA: ICD-10-CM

## 2021-10-26 DIAGNOSIS — R29.898 PROXIMAL LEG WEAKNESS: Primary | ICD-10-CM

## 2021-10-26 PROCEDURE — 1036F TOBACCO NON-USER: CPT | Performed by: INTERNAL MEDICINE

## 2021-10-26 PROCEDURE — 3008F BODY MASS INDEX DOCD: CPT | Performed by: INTERNAL MEDICINE

## 2021-10-26 PROCEDURE — 99214 OFFICE O/P EST MOD 30 MIN: CPT | Performed by: INTERNAL MEDICINE

## 2021-11-05 DIAGNOSIS — E53.8 B12 DEFICIENCY: ICD-10-CM

## 2021-11-05 DIAGNOSIS — D50.0 IRON DEFICIENCY ANEMIA DUE TO CHRONIC BLOOD LOSS: Primary | ICD-10-CM

## 2021-11-05 DIAGNOSIS — D50.9 IRON DEFICIENCY ANEMIA, UNSPECIFIED IRON DEFICIENCY ANEMIA TYPE: ICD-10-CM

## 2021-11-05 DIAGNOSIS — R16.1 SPLENOMEGALY: ICD-10-CM

## 2021-11-05 DIAGNOSIS — D69.6 THROMBOCYTOPENIA (HCC): ICD-10-CM

## 2021-11-10 ENCOUNTER — TELEPHONE (OUTPATIENT)
Dept: SURGICAL ONCOLOGY | Facility: CLINIC | Age: 61
End: 2021-11-10

## 2021-11-11 ENCOUNTER — TELEPHONE (OUTPATIENT)
Dept: GASTROENTEROLOGY | Facility: AMBULARY SURGERY CENTER | Age: 61
End: 2021-11-11

## 2021-11-12 DIAGNOSIS — E03.9 HYPOTHYROIDISM, UNSPECIFIED TYPE: ICD-10-CM

## 2021-11-12 DIAGNOSIS — E29.1 TESTICULAR HYPOGONADISM: ICD-10-CM

## 2021-11-12 DIAGNOSIS — E55.9 VITAMIN D DEFICIENCY: ICD-10-CM

## 2021-11-12 RX ORDER — LEVOTHYROXINE SODIUM 0.12 MG/1
TABLET ORAL
Qty: 180 TABLET | Refills: 1 | Status: SHIPPED | OUTPATIENT
Start: 2021-11-12 | End: 2022-05-05

## 2021-11-16 ENCOUNTER — LAB (OUTPATIENT)
Dept: LAB | Facility: HOSPITAL | Age: 61
End: 2021-11-16
Payer: COMMERCIAL

## 2021-11-16 DIAGNOSIS — E53.8 B12 DEFICIENCY: ICD-10-CM

## 2021-11-16 DIAGNOSIS — D50.9 IRON DEFICIENCY ANEMIA, UNSPECIFIED IRON DEFICIENCY ANEMIA TYPE: ICD-10-CM

## 2021-11-16 DIAGNOSIS — E55.9 VITAMIN D DEFICIENCY: ICD-10-CM

## 2021-11-16 DIAGNOSIS — D69.6 THROMBOCYTOPENIA (HCC): ICD-10-CM

## 2021-11-16 DIAGNOSIS — E03.9 HYPOTHYROIDISM, UNSPECIFIED TYPE: ICD-10-CM

## 2021-11-16 DIAGNOSIS — K76.0 FATTY LIVER: ICD-10-CM

## 2021-11-16 DIAGNOSIS — D50.0 IRON DEFICIENCY ANEMIA DUE TO CHRONIC BLOOD LOSS: ICD-10-CM

## 2021-11-16 DIAGNOSIS — R16.1 SPLENOMEGALY: ICD-10-CM

## 2021-11-16 DIAGNOSIS — E29.1 TESTICULAR HYPOGONADISM: ICD-10-CM

## 2021-11-16 LAB
25(OH)D3 SERPL-MCNC: 23.7 NG/ML (ref 30–100)
ALBUMIN SERPL BCP-MCNC: 3.9 G/DL (ref 3.5–5)
ALP SERPL-CCNC: 72 U/L (ref 46–116)
ALT SERPL W P-5'-P-CCNC: 42 U/L (ref 12–78)
ANION GAP SERPL CALCULATED.3IONS-SCNC: 9 MMOL/L (ref 4–13)
AST SERPL W P-5'-P-CCNC: 23 U/L (ref 5–45)
BASOPHILS # BLD AUTO: 0.01 THOUSANDS/ΜL (ref 0–0.1)
BASOPHILS NFR BLD AUTO: 0 % (ref 0–1)
BILIRUB SERPL-MCNC: 0.85 MG/DL (ref 0.2–1)
BUN SERPL-MCNC: 14 MG/DL (ref 5–25)
CALCIUM SERPL-MCNC: 9.4 MG/DL (ref 8.3–10.1)
CHLORIDE SERPL-SCNC: 107 MMOL/L (ref 100–108)
CO2 SERPL-SCNC: 25 MMOL/L (ref 21–32)
CREAT SERPL-MCNC: 0.98 MG/DL (ref 0.6–1.3)
EOSINOPHIL # BLD AUTO: 0.02 THOUSAND/ΜL (ref 0–0.61)
EOSINOPHIL NFR BLD AUTO: 1 % (ref 0–6)
ERYTHROCYTE [DISTWIDTH] IN BLOOD BY AUTOMATED COUNT: 14.6 % (ref 11.6–15.1)
FERRITIN SERPL-MCNC: 537 NG/ML (ref 8–388)
GFR SERPL CREATININE-BSD FRML MDRD: 83 ML/MIN/1.73SQ M
GLUCOSE P FAST SERPL-MCNC: 110 MG/DL (ref 65–99)
HCT VFR BLD AUTO: 36.3 % (ref 36.5–49.3)
HGB BLD-MCNC: 11.8 G/DL (ref 12–17)
IMM GRANULOCYTES # BLD AUTO: 0.02 THOUSAND/UL (ref 0–0.2)
IMM GRANULOCYTES NFR BLD AUTO: 1 % (ref 0–2)
IRON SATN MFR SERPL: 50 % (ref 20–50)
IRON SERPL-MCNC: 158 UG/DL (ref 65–175)
LYMPHOCYTES # BLD AUTO: 1.53 THOUSANDS/ΜL (ref 0.6–4.47)
LYMPHOCYTES NFR BLD AUTO: 45 % (ref 14–44)
MCH RBC QN AUTO: 37.1 PG (ref 26.8–34.3)
MCHC RBC AUTO-ENTMCNC: 32.5 G/DL (ref 31.4–37.4)
MCV RBC AUTO: 114 FL (ref 82–98)
MONOCYTES # BLD AUTO: 0.29 THOUSAND/ΜL (ref 0.17–1.22)
MONOCYTES NFR BLD AUTO: 9 % (ref 4–12)
NEUTROPHILS # BLD AUTO: 1.46 THOUSANDS/ΜL (ref 1.85–7.62)
NEUTS SEG NFR BLD AUTO: 44 % (ref 43–75)
NRBC BLD AUTO-RTO: 0 /100 WBCS
PLATELET # BLD AUTO: 94 THOUSANDS/UL (ref 149–390)
PMV BLD AUTO: 11.2 FL (ref 8.9–12.7)
POTASSIUM SERPL-SCNC: 4 MMOL/L (ref 3.5–5.3)
PROT SERPL-MCNC: 7.5 G/DL (ref 6.4–8.2)
PTH-INTACT SERPL-MCNC: 73.2 PG/ML (ref 18.4–80.1)
RBC # BLD AUTO: 3.18 MILLION/UL (ref 3.88–5.62)
SODIUM SERPL-SCNC: 141 MMOL/L (ref 136–145)
T3FREE SERPL-MCNC: 3.68 PG/ML (ref 2.3–4.2)
T4 FREE SERPL-MCNC: 1.57 NG/DL (ref 0.76–1.46)
TIBC SERPL-MCNC: 315 UG/DL (ref 250–450)
TSH SERPL DL<=0.05 MIU/L-ACNC: 0.59 UIU/ML (ref 0.36–3.74)
WBC # BLD AUTO: 3.33 THOUSAND/UL (ref 4.31–10.16)

## 2021-11-16 PROCEDURE — 85025 COMPLETE CBC W/AUTO DIFF WBC: CPT

## 2021-11-16 PROCEDURE — 84439 ASSAY OF FREE THYROXINE: CPT

## 2021-11-16 PROCEDURE — 82728 ASSAY OF FERRITIN: CPT

## 2021-11-16 PROCEDURE — 84403 ASSAY OF TOTAL TESTOSTERONE: CPT

## 2021-11-16 PROCEDURE — 81256 HFE GENE: CPT

## 2021-11-16 PROCEDURE — 83918 ORGANIC ACIDS TOTAL QUANT: CPT

## 2021-11-16 PROCEDURE — 83540 ASSAY OF IRON: CPT

## 2021-11-16 PROCEDURE — 82306 VITAMIN D 25 HYDROXY: CPT

## 2021-11-16 PROCEDURE — 84443 ASSAY THYROID STIM HORMONE: CPT

## 2021-11-16 PROCEDURE — 83970 ASSAY OF PARATHORMONE: CPT

## 2021-11-16 PROCEDURE — 84402 ASSAY OF FREE TESTOSTERONE: CPT

## 2021-11-16 PROCEDURE — 80053 COMPREHEN METABOLIC PANEL: CPT

## 2021-11-16 PROCEDURE — 36415 COLL VENOUS BLD VENIPUNCTURE: CPT

## 2021-11-16 PROCEDURE — 83550 IRON BINDING TEST: CPT

## 2021-11-16 PROCEDURE — 84481 FREE ASSAY (FT-3): CPT

## 2021-11-17 LAB
TESTOST FREE SERPL-MCNC: 14.6 PG/ML (ref 6.6–18.1)
TESTOST SERPL-MCNC: 167 NG/DL (ref 264–916)

## 2021-11-18 ENCOUNTER — TELEPHONE (OUTPATIENT)
Dept: OTHER | Facility: OTHER | Age: 61
End: 2021-11-18

## 2021-11-19 ENCOUNTER — OFFICE VISIT (OUTPATIENT)
Dept: HEMATOLOGY ONCOLOGY | Facility: HOSPITAL | Age: 61
End: 2021-11-19
Payer: COMMERCIAL

## 2021-11-19 VITALS
HEIGHT: 74 IN | OXYGEN SATURATION: 98 % | RESPIRATION RATE: 14 BRPM | BODY MASS INDEX: 35.81 KG/M2 | WEIGHT: 279 LBS | SYSTOLIC BLOOD PRESSURE: 170 MMHG | TEMPERATURE: 98.4 F | HEART RATE: 63 BPM | DIASTOLIC BLOOD PRESSURE: 80 MMHG

## 2021-11-19 DIAGNOSIS — D50.0 IRON DEFICIENCY ANEMIA DUE TO CHRONIC BLOOD LOSS: Primary | ICD-10-CM

## 2021-11-19 DIAGNOSIS — E53.8 B12 DEFICIENCY: ICD-10-CM

## 2021-11-19 LAB
HFE GENE MUT ANL BLD/T: NORMAL
METHYLMALONATE SERPL-SCNC: 211 NMOL/L (ref 0–378)
SL AMB DISCLAIMER: NORMAL

## 2021-11-19 PROCEDURE — 99214 OFFICE O/P EST MOD 30 MIN: CPT | Performed by: INTERNAL MEDICINE

## 2021-11-22 ENCOUNTER — OFFICE VISIT (OUTPATIENT)
Dept: GASTROENTEROLOGY | Facility: CLINIC | Age: 61
End: 2021-11-22
Payer: COMMERCIAL

## 2021-11-22 VITALS
HEIGHT: 74 IN | DIASTOLIC BLOOD PRESSURE: 60 MMHG | SYSTOLIC BLOOD PRESSURE: 140 MMHG | OXYGEN SATURATION: 99 % | HEART RATE: 68 BPM | WEIGHT: 274 LBS | BODY MASS INDEX: 35.16 KG/M2 | TEMPERATURE: 97.6 F

## 2021-11-22 DIAGNOSIS — K62.5 RECTAL BLEEDING: ICD-10-CM

## 2021-11-22 DIAGNOSIS — K64.9 HEMORRHOIDS, UNSPECIFIED HEMORRHOID TYPE: Primary | ICD-10-CM

## 2021-11-22 PROCEDURE — 3008F BODY MASS INDEX DOCD: CPT | Performed by: PHYSICIAN ASSISTANT

## 2021-11-22 PROCEDURE — 99214 OFFICE O/P EST MOD 30 MIN: CPT | Performed by: PHYSICIAN ASSISTANT

## 2021-11-22 PROCEDURE — 1036F TOBACCO NON-USER: CPT | Performed by: PHYSICIAN ASSISTANT

## 2021-11-22 RX ORDER — HYDROCORTISONE 25 MG/G
CREAM TOPICAL 2 TIMES DAILY
Qty: 28 G | Refills: 2 | Status: SHIPPED | OUTPATIENT
Start: 2021-11-22 | End: 2022-07-12

## 2021-11-22 RX ORDER — HYDROCORTISONE ACETATE 25 MG/1
25 SUPPOSITORY RECTAL 2 TIMES DAILY
Qty: 30 SUPPOSITORY | Refills: 2 | Status: SHIPPED | OUTPATIENT
Start: 2021-11-22 | End: 2022-07-12

## 2021-12-10 ENCOUNTER — TELEPHONE (OUTPATIENT)
Dept: OBGYN CLINIC | Facility: HOSPITAL | Age: 61
End: 2021-12-10

## 2021-12-10 ENCOUNTER — TELEPHONE (OUTPATIENT)
Dept: GASTROENTEROLOGY | Facility: CLINIC | Age: 61
End: 2021-12-10

## 2021-12-29 ENCOUNTER — OFFICE VISIT (OUTPATIENT)
Dept: ENDOCRINOLOGY | Facility: HOSPITAL | Age: 61
End: 2021-12-29
Payer: COMMERCIAL

## 2021-12-29 VITALS
SYSTOLIC BLOOD PRESSURE: 140 MMHG | HEIGHT: 74 IN | BODY MASS INDEX: 35.65 KG/M2 | DIASTOLIC BLOOD PRESSURE: 84 MMHG | WEIGHT: 277.8 LBS | HEART RATE: 68 BPM

## 2021-12-29 DIAGNOSIS — E29.1 TESTICULAR HYPOGONADISM: ICD-10-CM

## 2021-12-29 DIAGNOSIS — E03.9 HYPOTHYROIDISM, UNSPECIFIED TYPE: Primary | ICD-10-CM

## 2021-12-29 DIAGNOSIS — R53.1 WEAKNESS: ICD-10-CM

## 2021-12-29 DIAGNOSIS — E55.9 VITAMIN D DEFICIENCY: ICD-10-CM

## 2021-12-29 PROCEDURE — 1036F TOBACCO NON-USER: CPT | Performed by: INTERNAL MEDICINE

## 2021-12-29 PROCEDURE — 99214 OFFICE O/P EST MOD 30 MIN: CPT | Performed by: INTERNAL MEDICINE

## 2021-12-29 PROCEDURE — 3008F BODY MASS INDEX DOCD: CPT | Performed by: INTERNAL MEDICINE

## 2021-12-29 RX ORDER — FERROUS SULFATE 325(65) MG
325 TABLET ORAL
COMMUNITY
End: 2022-07-12

## 2021-12-30 ENCOUNTER — DOCUMENTATION (OUTPATIENT)
Dept: ENDOCRINOLOGY | Facility: HOSPITAL | Age: 61
End: 2021-12-30

## 2022-01-04 ENCOUNTER — TELEPHONE (OUTPATIENT)
Dept: HEMATOLOGY ONCOLOGY | Facility: CLINIC | Age: 62
End: 2022-01-04

## 2022-01-04 NOTE — TELEPHONE ENCOUNTER
Pt's last visit note has been faxed over  Advising them to reach out to pt's PCP for clearance letter

## 2022-01-04 NOTE — TELEPHONE ENCOUNTER
[facility] called to get a clearance letter  Patient is scheduled for Right Total Hip on 1-   Denita Side is also requesting last visit note be faxed to [phone]

## 2022-01-06 ENCOUNTER — TELEPHONE (OUTPATIENT)
Dept: HEMATOLOGY ONCOLOGY | Facility: CLINIC | Age: 62
End: 2022-01-06

## 2022-01-06 NOTE — TELEPHONE ENCOUNTER
Called back to [facility] informing that she will need to reach out to pt's PCP to get the surgical clearance letter  I also faxed over pt's last vist note from Dr Michelle Chahal and wrote down that they need to contact pt's PCP yesterday  [employer] number provided

## 2022-01-06 NOTE — TELEPHONE ENCOUNTER
Liv from [facility] calling in to confirm with doctor patients surgery clearance  Juanita Cheng wants to make sure that doctor  was aware that patient's surgery which is schedule for Monday 1/10, would be okay at a speciality hospital, where there is no blood bank   Best call back number [phone]

## 2022-01-06 NOTE — TELEPHONE ENCOUNTER
Received phone call from [facility] stating that since they are specialty hospital and actually doesn't have any blood bank there  They would like to know Dr Cassandra Walsh opinion on which kind of hospital he prefers to from a hematologist aspect to complete pt's Sx  Dr Chloe Duong says it would not be his determination for this but he will recommend [facility] hospital instead of specialty hospital     [address] above info

## 2022-01-11 ENCOUNTER — TELEPHONE (OUTPATIENT)
Dept: OBGYN CLINIC | Facility: HOSPITAL | Age: 62
End: 2022-01-11

## 2022-01-11 ENCOUNTER — TELEPHONE (OUTPATIENT)
Dept: FAMILY MEDICINE CLINIC | Facility: CLINIC | Age: 62
End: 2022-01-11

## 2022-01-11 NOTE — TELEPHONE ENCOUNTER
Patient sees Dr Mana Reid Daughters from [facility] is calling to request a letter from Dr Mana Amador to clearing patient for surgery for a total hip replacement from a rheumatology stand point        Please fax to [phone]

## 2022-01-11 NOTE — TELEPHONE ENCOUNTER
Pt WIFE CALL AND SAID HE IS GETTING SURGERY ON 01/18/2022 AND HE HAS AN OPEN WOUND ON HIS ANKLE AREA AND WANT TO KNOW IF YOU CAN CALL SOMETHING INTO FOR HIM TO HEAL OTHER WISE HE CAN NOT GET SURGERY OR CAN WE DO A PHONE VISIT THE WIFE WANT TO KNOW

## 2022-01-11 NOTE — TELEPHONE ENCOUNTER
01/11/22    Called back and spoke with [facility] informing that I can rax over pt's most recent office note to them  However, Dr Dexter Bamberger is rounding in the hospital now and based on the conversation we had last week, Dr Dexter Bamberger will not provide any Sx clearance regarding this case  As I stated before, Dr Dexter Bamberger stated it would not be his determination for the feasibility of pt's Sx  Pt has been transferred to the [facility] hospital and they should be able to take care of all the pre-ops/ post-ops for pt

## 2022-01-11 NOTE — TELEPHONE ENCOUNTER
Due to patient's low platelet count, his surgery must be performed at [facility] instead  Alex Wick from pre-Admission testing calling to request a copy of Dr Jose Raul Victoria last progress note, and a letter stating that from a hematological standpoint the patient may proceed with surgery      TEL: [phone]  FAX: [phone]    Sx date : 1/18/2022

## 2022-01-12 ENCOUNTER — TELEMEDICINE (OUTPATIENT)
Dept: FAMILY MEDICINE CLINIC | Facility: CLINIC | Age: 62
End: 2022-01-12
Payer: COMMERCIAL

## 2022-01-12 ENCOUNTER — TELEPHONE (OUTPATIENT)
Dept: FAMILY MEDICINE CLINIC | Facility: CLINIC | Age: 62
End: 2022-01-12

## 2022-01-12 DIAGNOSIS — L97.919 VENOUS ULCER OF RIGHT LEG (HCC): Primary | ICD-10-CM

## 2022-01-12 DIAGNOSIS — M16.0 PRIMARY OSTEOARTHRITIS OF BOTH HIPS: ICD-10-CM

## 2022-01-12 DIAGNOSIS — I51.7 CARDIOMEGALY: ICD-10-CM

## 2022-01-12 DIAGNOSIS — I83.019 VENOUS ULCER OF RIGHT LEG (HCC): Primary | ICD-10-CM

## 2022-01-12 PROCEDURE — 1036F TOBACCO NON-USER: CPT | Performed by: FAMILY MEDICINE

## 2022-01-12 PROCEDURE — 99214 OFFICE O/P EST MOD 30 MIN: CPT | Performed by: FAMILY MEDICINE

## 2022-01-12 NOTE — TELEPHONE ENCOUNTER
BREANA MCGOVERN FROM [facility] PRE-ADMISSION TESTING PHONED REGARDING YOUR PATIENT July Arora  PT CHEST X-RAY SHOWS POSSIBLE CARDIOMEGALY  PT IS DUE TO HAVE HIP SURGERY ON 01/18  HIS SURGEON IS REQUESTING YOU TO ORDER AN ECHO TO BE COMPLETED PRIOR TO SURGERY   REQUESTING HE ALSO HAVE HEMATOLOGY AND GI CLEARANCE  CHEST XRAY AND NOTES REQUESTED AND SENT BY FAX    I HAVE IN PLACED IN RED FOLDER IN YOUR SPACE /  THE BUSINESS OFFICE     Joseph Brown [address] [place] [address] [phone]

## 2022-01-12 NOTE — PROGRESS NOTES
Virtual Regular Visit    Verification of patient location:    Patient is located in the following state in which I hold an active license [place]      Assessment/Plan:    Problem List Items Addressed This Visit        Musculoskeletal and Integument    Osteoarthritis of both hips - Primary               Reason for visit is   Chief Complaint   Patient presents with    wound     pt c/o wound on his right ankle that he noticed a week ago, pt states it now has a scab,         Encounter provider Laci Lees DO    Provider located at [address] [place] [phone]      Recent Visits  Date Type Provider Dept   01/11/22 Telephone [address] [facility] recent visits within past 7 days and meeting all other requirements  Today's Visits  Date Type Provider Dept   01/12/22 Telemedicine Laci Lees DO [facility]   Showing today's visits and meeting all other requirements  Future Appointments  No visits were found meeting these conditions  Showing future appointments within next 150 days and meeting all other requirements       The patient was identified by name and date of birth  Duglas Stanley was informed that this is a telemedicine visit and that the visit is being conducted through [facility] and patient was informed that this is not a secure, HIPAA-compliant platform  He agrees to proceed     My office door was closed  No one else was in the room  He acknowledged consent and understanding of privacy and security of the video platform  The patient has agreed to participate and understands they can discontinue the visit at any time  Patient is aware this is a billable service  Subjective  Duglas Stanley is a 62 y o  male WHO IS SCHEDULED TO HAVE RIGHT TOTAL HIP REPLACEMENT  HE NOTICED A SORE ON HIS RIGHT ANKLE AND WAS TOLD BY THE SURGEON IT NEEDS TO BE HEALED PRIOR TO HIM HAVING HIS HIP REPLACEMENT         PATIENT HAS A LONG HISTORY OF OSTEOARTHRITIS OF THE RIGHT HIP  HE IS SCHEDULED FOR TOTAL HIP REPLACEMENT IN A WEEK'S TIME  HE HAS AN OPEN LESION IN HIS RIGHT ANKLE  THIS HAS BEEN PRESENT AND HE HAS BEEN TREATING IT WITH TOPICAL NEOSPORIN WHICH HE HAS NOTED IMPROVEMENT OVER THE LAST WEEK  HE CONTINUES TO HAVE SOME SWELLING IN HIS LOWER EXTREMITIES HOWEVER WHICH IS CHRONIC       Past Medical History:   Diagnosis Date    Anemia     last assessed: 09/03/15    Cholelithiasis     Disease of thyroid gland     GERD (gastroesophageal reflux disease)     Hypersecretion of testicular hormones     Insomnia     Kidney stone     Low testosterone     Male erectile dysfunction     Osteoarthritis, hip, bilateral     Thrombosed external hemorrhoids        Past Surgical History:   Procedure Laterality Date    CHOLECYSTECTOMY      COLONOSCOPY      SD COLONOSCOPY FLX DX W/COLLJ SPEC WHEN PFRMD N/A 11/6/2017    Procedure: EGD AND COLONOSCOPY;  Surgeon: Nidhi Bland MD;  [facility];   Service: Gastroenterology    TOTAL HIP ARTHROPLASTY Left 11/20/2020       Current Outpatient Medications   Medication Sig Dispense Refill    Cholecalciferol 50 MCG (2000 UT) TABS 2 tab daily      ferrous sulfate 325 (65 Fe) mg tablet Take 325 mg by mouth daily with breakfast      levothyroxine 125 mcg tablet 2 tabs daily 1 time a day 180 tablet 1    liothyronine (CYTOMEL) 5 mcg tablet TAKE 1 TABLET BY MOUTH EVERY DAY 90 tablet 1    hydrocortisone (ANUSOL-HC) 2 5 % rectal cream Apply topically 2 (two) times a day (Patient not taking: Reported on 1/12/2022 ) 28 g 2    hydrocortisone (ANUSOL-HC) 25 mg suppository Insert 1 suppository (25 mg total) into the rectum 2 (two) times a day (Patient not taking: Reported on 1/12/2022 ) 30 suppository 2    polyethylene glycol (GOLYTELY) 4000 mL solution Use as instructed by office staff (Patient not taking: Reported on 1/12/2022 ) 4000 mL 0    Testosterone 2 MG/24HR PT24 1 patch daily (Patient not taking: Reported on 1/12/2022 ) 30 patch 5     Current Facility-Administered Medications   Medication Dose Route Frequency Provider Last Rate Last Admin    ipratropium-albuterol (DUO-NEB) 0 5-2 5 mg/3 mL inhalation solution 3 mL  3 mL Nebulization Q6H Pratibha Every, DO   3 mL at 02/02/18 0925        No Known Allergies    Review of Systems   Constitutional: Negative for activity change, appetite change, chills, diaphoresis, fatigue and unexpected weight change  HENT: Negative for congestion, ear discharge, ear pain, hearing loss, nosebleeds and rhinorrhea  Eyes: Negative for pain, redness, itching and visual disturbance  Respiratory: Negative for cough, choking, chest tightness and shortness of breath  Cardiovascular: Positive for leg swelling  Negative for chest pain  Gastrointestinal: Negative for abdominal pain, blood in stool, constipation, diarrhea and nausea  Endocrine: Negative for cold intolerance, polydipsia and polyphagia  Genitourinary: Negative for dysuria, frequency, hematuria and urgency  Musculoskeletal: Positive for gait problem  Negative for arthralgias, back pain, joint swelling, neck pain and neck stiffness  Skin: Negative for color change and rash  Allergic/Immunologic: Negative for environmental allergies and food allergies  Neurological: Negative for dizziness, tremors, seizures, speech difficulty, numbness and headaches  Hematological: Negative for adenopathy  Does not bruise/bleed easily  Psychiatric/Behavioral: Negative for behavioral problems, dysphoric mood, hallucinations and self-injury  Video Exam    There were no vitals filed for this visit  Physical Exam  Constitutional:       Appearance: He is well-developed  HENT:      Head: Normocephalic and atraumatic  Eyes:      Pupils: Pupils are equal, round, and reactive to light  Pulmonary:      Effort: Pulmonary effort is normal    Musculoskeletal:         General: Normal range of motion        Cervical back: Normal range of motion  Skin:     Findings: Lesion present  Comments: VENOUS ULCER OF THE RIGHT ANKLE   Neurological:      Mental Status: He is alert and oriented to person, place, and time  Psychiatric:         Behavior: Behavior normal          Thought Content: Thought content normal          Judgment: Judgment normal       HE WILL BE GIVEN TUBIGRIPS TO HELP COMPRESS THE AREA AND CONTINUE WITH TOPICAL CARE  HE CAN THEN PROCEED WITH GETTING HIS HIP REPLACEMENT SURGERY  I spent FIFTEEN minutes directly with the patient during this visit    VIRTUAL VISIT DISCLAIMER      Francois Bhakta verbally agrees to participate in [employer]  Pt is aware that [employer] could be limited without vital signs or the ability to perform a full hands-on physical Elizabeth Slim understands he or the provider may request at any time to terminate the video visit and request the patient to seek care or treatment in person

## 2022-01-13 ENCOUNTER — TELEPHONE (OUTPATIENT)
Dept: GASTROENTEROLOGY | Facility: CLINIC | Age: 62
End: 2022-01-13

## 2022-01-13 ENCOUNTER — DOCUMENTATION (OUTPATIENT)
Dept: GASTROENTEROLOGY | Facility: CLINIC | Age: 62
End: 2022-01-13

## 2022-01-13 ENCOUNTER — TELEPHONE (OUTPATIENT)
Dept: FAMILY MEDICINE CLINIC | Facility: CLINIC | Age: 62
End: 2022-01-13

## 2022-01-13 NOTE — TELEPHONE ENCOUNTER
Per patient's [employer] he does not require prior auth for his echo  Replaced by [facility]#[id number] - Ronald CARDOZA  Advised patient

## 2022-01-13 NOTE — TELEPHONE ENCOUNTER
Patients GI provider:  KETTY Verma    Number to return call: [phone]    Reason for call: Good morning, Werner Miller calling from [facility] Pre admission Testing in regards to mutual pt Rina Miller asking for approval from GI standpoint if its okay to proceed with pt's right hip replacement  Werner Miller can be reached at [phone]  Pt is scheduled for hip replacement next Tuesday, 1/18/2022  Werner Miller is looking for approval from Dr Rafy Dietz or Dr Skyler Kowalski        Scheduled procedure/appointment date if applicable: N/A

## 2022-01-14 ENCOUNTER — HOSPITAL ENCOUNTER (OUTPATIENT)
Dept: NON INVASIVE DIAGNOSTICS | Facility: HOSPITAL | Age: 62
Discharge: HOME | End: 2022-01-14
Payer: COMMERCIAL

## 2022-01-14 VITALS
SYSTOLIC BLOOD PRESSURE: 140 MMHG | HEIGHT: 74 IN | BODY MASS INDEX: 35.55 KG/M2 | HEART RATE: 66 BPM | WEIGHT: 277 LBS | DIASTOLIC BLOOD PRESSURE: 84 MMHG

## 2022-01-14 DIAGNOSIS — I51.7 CARDIOMEGALY: ICD-10-CM

## 2022-01-14 LAB
AORTIC ROOT: 3.3 CM
AORTIC VALVE MEAN VELOCITY: 11.8 M/S
APICAL FOUR CHAMBER EJECTION FRACTION: 67 %
ASCENDING AORTA: 4 CM
AV LVOT MEAN GRADIENT: 3 MMHG
AV LVOT PEAK GRADIENT: 5 MMHG
AV MEAN GRADIENT: 6 MMHG
AV PEAK GRADIENT: 13 MMHG
AV VELOCITY RATIO: 0.6
DOP CALC AO PEAK VEL: 1.82 M/S
DOP CALC AO VTI: 29.23 CM
DOP CALC LVOT PEAK VEL VTI: 22.05 CM
DOP CALC LVOT PEAK VEL: 1.1 M/S
E WAVE DECELERATION TIME: 209 MS
FRACTIONAL SHORTENING: 29 % (ref 28–44)
INTERVENTRICULAR SEPTUM IN DIASTOLE (PARASTERNAL SHORT AXIS VIEW): 1.2 CM
LAAS-AP2: 27.1 CM2
LAAS-AP4: 27.8 CM2
LEFT ATRIUM SIZE: 4.8 CM
LEFT INTERNAL DIMENSION IN SYSTOLE: 4.4 CM (ref 2.1–4)
LEFT VENTRICULAR INTERNAL DIMENSION IN DIASTOLE: 6.2 CM (ref 15.12–22.55)
LEFT VENTRICULAR POSTERIOR WALL IN END DIASTOLE: 1.2 CM
LEFT VENTRICULAR STROKE VOLUME: 110 ML
MV E'TISSUE VEL-LAT: 14 CM/S
MV E'TISSUE VEL-SEP: 11 CM/S
MV PEAK A VEL: 0.75 M/S
MV PEAK E VEL: 118 CM/S
MV STENOSIS PRESSURE HALF TIME: 0 MS
RIGHT ATRIAL 2D VOLUME: 57 ML
RIGHT ATRIUM AREA SYSTOLE A4C: 21 CM2
RIGHT VENTRICLE ID DIMENSION: 4.8 CM
SL CV LEFT ATRIUM LENGTH A2C: 6.1 CM
SL CV LV EF: 55
SL CV PED ECHO LEFT VENTRICLE DIASTOLIC VOLUME (MOD BIPLANE) 2D: 198 ML
SL CV PED ECHO LEFT VENTRICLE SYSTOLIC VOLUME (MOD BIPLANE) 2D: 88 ML
TRANSVERSE AORTIC ARCH: 3.2 CM
Z-SCORE OF LEFT VENTRICULAR DIMENSION IN END SYSTOLE: -10.91

## 2022-01-14 PROCEDURE — 93306 TTE W/DOPPLER COMPLETE: CPT | Performed by: INTERNAL MEDICINE

## 2022-01-14 PROCEDURE — 93306 TTE W/DOPPLER COMPLETE: CPT

## 2022-01-14 NOTE — TELEPHONE ENCOUNTER
Spoke to [facility] pre admission testing  Requested clearance letter faxed to

## 2022-01-17 ENCOUNTER — TELEPHONE (OUTPATIENT)
Dept: FAMILY MEDICINE CLINIC | Facility: CLINIC | Age: 62
End: 2022-01-17

## 2022-01-17 NOTE — TELEPHONE ENCOUNTER
Siddhartha Tyson RN   Registered Nurse   Specialty:  Perioperative   Telephone Encounter   Signed   Encounter Date:  1/13/2022                Telephone Encounter            [facility] pre admission testing  Requested clearance letter faxed to   Do you want to make an addendum to your patient notes and we can fax? (2) more than 100 beats/min

## 2022-03-08 DIAGNOSIS — E03.9 HYPOTHYROIDISM, UNSPECIFIED TYPE: ICD-10-CM

## 2022-03-08 RX ORDER — LIOTHYRONINE SODIUM 5 UG/1
TABLET ORAL
Qty: 90 TABLET | Refills: 1 | Status: SHIPPED | OUTPATIENT
Start: 2022-03-08

## 2022-03-09 DIAGNOSIS — I83.009 VENOUS ULCER (HCC): Primary | ICD-10-CM

## 2022-03-09 DIAGNOSIS — L97.909 VENOUS ULCER (HCC): Primary | ICD-10-CM

## 2022-03-09 RX ORDER — TRIAMCINOLONE ACETONIDE 1 MG/G
CREAM TOPICAL 2 TIMES DAILY
Qty: 45 G | Refills: 1 | Status: SHIPPED | OUTPATIENT
Start: 2022-03-09

## 2022-03-23 ENCOUNTER — EVALUATION (OUTPATIENT)
Dept: PHYSICAL THERAPY | Facility: CLINIC | Age: 62
End: 2022-03-23
Payer: COMMERCIAL

## 2022-03-23 DIAGNOSIS — M25.551 RIGHT HIP PAIN: Primary | ICD-10-CM

## 2022-03-23 DIAGNOSIS — M62.81 MUSCLE WEAKNESS (GENERALIZED): ICD-10-CM

## 2022-03-23 DIAGNOSIS — R26.2 DIFFICULTY WALKING: ICD-10-CM

## 2022-03-23 PROCEDURE — 97162 PT EVAL MOD COMPLEX 30 MIN: CPT | Performed by: PHYSICAL THERAPIST

## 2022-03-23 PROCEDURE — 97110 THERAPEUTIC EXERCISES: CPT | Performed by: PHYSICAL THERAPIST

## 2022-03-23 NOTE — PROGRESS NOTES
PT Evaluation     Today's date: 3/23/2022  Patient name: Jefry Fountain  : 1960  MRN: [id number]  Referring provider: Galen Stein MD  Dx:   Encounter Diagnosis     ICD-10-CM    1  Right hip pain  M25 551    2  Muscle weakness (generalized)  M62 81    3  Difficulty walking  R26 2          Assessment  Assessment details: Jefry Fountain is a 62 y o  male presenting to outpatient physical therapy at [place] [address] with complaints of chronic right hip pain/stiffness s/p right THR on 3/1/2022  He presents with decreased range of motion, decreased strength, limited flexibility, poor postural awareness, poor body mechanics, poor balance, decreased tolerance to activity and decreased functional mobility due to Right hip pain  (primary encounter diagnosis), Muscle weakness (generalized), Difficulty walking  Therapist discussed diagnosis, prognosis, plan of care, proper responses to exercises, HEP, bed mobility, stair mobility, modalities, and skin checks   He would benefit from skilled PT services in order to address these deficits and reach maximum level of function   Thank you for the referral!  Impairments: abnormal coordination, abnormal gait, abnormal muscle firing, abnormal muscle tone, abnormal or restricted ROM, abnormal movement, activity intolerance, impaired balance, impaired physical strength, lacks appropriate home exercise program, pain with function and poor body mechanics    Symptom irritability: moderateUnderstanding of Dx/Px/POC: good   Prognosis: good    Goals  STGs (in 4 weeks):  1  Pt will report having at least a 50% improvement since I E    2  Pt will report having at most a 2/10 pain level with functional mobility  3  Pt will demonstrate full AROM of right hip in all planes without onset of pain to within normative values  LTGs (in 12 weeks):  1   Pt will report having at least a 75% improvement since I E    2  Pt will ascend/descend full flight of stairs reciprocally with good eccentric quad control  3  Pt will amb with symmetrical gait pattern over even terrain without AD  4  Pt will be independent with HEP  Plan  Patient would benefit from: skilled physical therapy  Planned modality interventions: TENS and unattended electrical stimulation  Planned therapy interventions: manual therapy, balance, neuromuscular re-education, patient education, self care, strengthening, stretching, therapeutic activities, therapeutic exercise, home exercise program and flexibility  Frequency: 3x week  Plan of Care beginning date: 3/23/2022  Plan of Care expiration date: 6/15/2022  Treatment plan discussed with: patient        Subjective Evaluation    History of Present Illness  Date of surgery: 3/1/2022  Mechanism of injury: surgery  Mechanism of injury: Pt is a 62year old male who presents s/p right THR on 3/1/2022 after having chronic progressive right hip pain  He was previously treated at facility for left THR on 2020  Now referred to skilled OP PT for post op     Quality of life: good    Pain  Current pain ratin  At best pain ratin  At worst pain ratin  Quality: tight, pulling, pressure, dull ache and discomfort  Aggravating factors: walking, standing and lifting  Progression: no change    Treatments  Previous treatment: physical therapy  Patient Goals  Patient goals for therapy: decreased pain, improved balance, increased motion, increased strength, independence with ADLs/IADLs, return to sport/leisure activities and return to work          Objective    Observation: R hip incision intact and healing     Hip A/PROM: (measured degrees); *=pain     R  L  IR (sitting):  15/18  20/25  ER (sitting):  8/10  40/42  Flexion:  45*/48*  90/95  Extension:  0    Abduction:  10/15  20/25    Knee A/PROM: deg     R  L  Extension:  10/8  0  Flexion:  60/65  110/115      Strength: MMT  Hip   R  L  IR:   3-/5  4/5  ER:   3-/5  4/5  Flexion:  2+/5  4/5  Extension:  3-/5  4/5  Abduction:  2+/5  4-/5    Knee   R  L  Static Extension: 3/5  4+/5  Static Flexion:  3/5  4+/5  Dynamic Extension 3-/5  4+/5  Dynamic Flexion 3-/5  4+/5    Flexibility: decreased L HS, hip flexor, quadricep flexibility     Special Tests: N/A    Tenderness/Palpation: slight tenderness along right incision    Function  Gait: Pt ambs using SPC with antalgic gait pattern with decreased weight bearing on RLE with forward trunk flexion  Squat: Pt performs mini squat with increased weight shift on to LLE  Stairs: Pt ascend/descend steps non reciprocally         Precautions: post op R THR anterio-lateral approach (D O S 3/1/22)    HEP: HR, TR, Standing Hip Flexion, Hip Flexor stretch in standing, LAQ, quad sets    Specialty Daily Treatment Diary       Manual 3/23       STM/TPR R incision        R H' PROM all planes        R HS stretch        R laura stretch                Tiger tail anterior and lateral R hip                        Exercise Diary         RB                HR 20 (focus on straight knees)       TR 20               Mini Squats -> Chair squats        Step Ups        Lat Step ups        Step downs (ecc)        Touchdowns (laterally)                Standing H' Flexion c isometric hold and eccentric lowering                 TB TKE        TB 3 way H'         Lat Band walks        Monster Walks                Standing H' Flexor stretch 10 sec x 3 ea       Standing Lumbar extension using elevated table at wait for support                Slant board gastroc stretch                        Seated HS stretch                LAQ (towel roll under knee; eccentric control)        TB H' ER        TB H' IR                        Supine Heel Slides         Quad sets c towel roll under ankle 5 sec x 5       SAQ (ecc)        Supine H' ABD                 SLR (flex)                [employer] c bridge        Hookling TB Clamshells         Sidelying Clamshells        TB Bridges                Sidelying H' ABD                Prone H' Ext        Prone quad stretch c SOS                                                                HEP/EDU Diagnosis, prognosis, plan of care, bed mobility using stool and cane to assist in to bed       Modalities        MH        CP                   Skin checks performed pre and post application: intact

## 2022-03-23 NOTE — LETTER
2022    Peter Walsh, [address] [facility] [address]    Patient: Guillermina Carrillo   YOB: 1960   Date of Visit: 3/23/2022     Encounter Diagnosis     ICD-10-CM    1  Right hip pain  M25 551    2  Muscle weakness (generalized)  M62 81    3  Difficulty walking  R26 2        Dear Dr Flores Olivares: Thank you for your recent referral of Guillermina Carrillo  Please review the attached evaluation summary from Nacho's recent visit  Please verify that you agree with the plan of care by signing the attached order  If you have any questions or concerns, please do not hesitate to call  I sincerely appreciate the opportunity to share in the care of one of your patients and hope to have another opportunity to work with you in the near future  Sincerely,    Caity Franco, PT      Referring Provider:      I certify that I have read the below Plan of Care and certify the need for these services furnished under this plan of treatment while under my care  Peter Walsh MD  [address] [facility] [address]  Via Fax: [phone]          PT Evaluation     Today's date: 3/23/2022  Patient name: Guillermina Carrillo  : 1960  MRN: [id number]  Referring provider: Melissa Hayes MD  Dx:   Encounter Diagnosis     ICD-10-CM    1  Right hip pain  M25 551    2  Muscle weakness (generalized)  M62 81    3  Difficulty walking  R26 2          Assessment  Assessment details: Guillermina Carrillo is a 62 y o  male presenting to outpatient physical therapy at [place] [address] with complaints of chronic right hip pain/stiffness s/p right THR on 3/1/2022  He presents with decreased range of motion, decreased strength, limited flexibility, poor postural awareness, poor body mechanics, poor balance, decreased tolerance to activity and decreased functional mobility due to Right hip pain  (primary encounter diagnosis), Muscle weakness (generalized), Difficulty walking   Therapist discussed diagnosis, prognosis, plan of care, proper responses to exercises, HEP, bed mobility, stair mobility, modalities, and skin checks   He would benefit from skilled PT services in order to address these deficits and reach maximum level of function   Thank you for the referral!  Impairments: abnormal coordination, abnormal gait, abnormal muscle firing, abnormal muscle tone, abnormal or restricted ROM, abnormal movement, activity intolerance, impaired balance, impaired physical strength, lacks appropriate home exercise program, pain with function and poor body mechanics    Symptom irritability: moderateUnderstanding of Dx/Px/POC: good   Prognosis: good    Goals  STGs (in 4 weeks):  1  Pt will report having at least a 50% improvement since I E    2  Pt will report having at most a 2/10 pain level with functional mobility  3  Pt will demonstrate full AROM of right hip in all planes without onset of pain to within normative values  LTGs (in 12 weeks):  1  Pt will report having at least a 75% improvement since I E    2  Pt will ascend/descend full flight of stairs reciprocally with good eccentric quad control  3  Pt will amb with symmetrical gait pattern over even terrain without AD  4  Pt will be independent with HEP       Plan  Patient would benefit from: skilled physical therapy  Planned modality interventions: TENS and unattended electrical stimulation  Planned therapy interventions: manual therapy, balance, neuromuscular re-education, patient education, self care, strengthening, stretching, therapeutic activities, therapeutic exercise, home exercise program and flexibility  Frequency: 3x week  Plan of Care beginning date: 3/23/2022  Plan of Care expiration date: 6/15/2022  Treatment plan discussed with: patient        Subjective Evaluation    History of Present Illness  Date of surgery: 3/1/2022  Mechanism of injury: surgery  Mechanism of injury: Pt is a 62year old male who presents s/p right THR on 3/1/2022 after having chronic progressive right hip pain  He was previously treated at facility for left THR on 2020  Now referred to skilled OP PT for post op  Quality of life: good    Pain  Current pain ratin  At best pain ratin  At worst pain ratin  Quality: tight, pulling, pressure, dull ache and discomfort  Aggravating factors: walking, standing and lifting  Progression: no change    Treatments  Previous treatment: physical therapy  Patient Goals  Patient goals for therapy: decreased pain, improved balance, increased motion, increased strength, independence with ADLs/IADLs, return to sport/leisure activities and return to work          Objective    Observation: R hip incision intact and healing     Hip A/PROM: (measured degrees); *=pain     R  L  IR (sitting):  15/18  20/25  ER (sitting):  8/10  40/42  Flexion:  45*/48*  90/95  Extension:  0    Abduction:  10/15  20/25    Knee A/PROM: deg     R  L  Extension:  10/8  0  Flexion:  60/65  110/115      Strength: MMT  Hip   R  L  IR:   3-/5  4/5  ER:   3-/5  4/5  Flexion:  2+/5  4/5  Extension:  3-/5  4/5  Abduction:  2+/5  4-/5    Knee   R  L  Static Extension: 3/5  4+/5  Static Flexion:  3/5  4+/5  Dynamic Extension 3-/5  4+/5  Dynamic Flexion 3-/5  4+/5    Flexibility: decreased L HS, hip flexor, quadricep flexibility     Special Tests: N/A    Tenderness/Palpation: slight tenderness along right incision    Function  Gait: Pt ambs using SPC with antalgic gait pattern with decreased weight bearing on RLE with forward trunk flexion  Squat: Pt performs mini squat with increased weight shift on to LLE  Stairs: Pt ascend/descend steps non reciprocally         Precautions: post op R THR anterio-lateral approach (D O S 3/1/22)    HEP: HR, TR, Standing Hip Flexion, Hip Flexor stretch in standing, LAQ, quad sets    Specialty Daily Treatment Diary       Manual 3/23       STM/TPR R incision        R H' PROM all planes        R HS stretch        R laura stretch                Tiger tail anterior and lateral R hip                        Exercise Diary         RB                HR 20 (focus on straight knees)       TR 20               Mini Squats -> Chair squats        Step Ups        Lat Step ups        Step downs (ecc)        Touchdowns (laterally)                Standing H' Flexion c isometric hold and eccentric lowering                 TB TKE        TB 3 way H'         Lat Band walks        Monster Walks                Standing H' Flexor stretch 10 sec x 3 ea       Standing Lumbar extension using elevated table at wait for support                Slant board gastroc stretch                        Seated HS stretch                LAQ (towel roll under knee; eccentric control)        TB H' ER        TB H' IR                        Supine Heel Slides         Quad sets c towel roll under ankle 5 sec x 5       SAQ (ecc)        Supine H' ABD                 SLR (flex)                [employer] c bridge        Hookling TB Clamshells         Sidelying Clamshells        TB Bridges                Sidelying H' ABD                Prone H' Ext        Prone quad stretch c SOS                                                                HEP/EDU Diagnosis, prognosis, plan of care, bed mobility using stool and cane to assist in to bed       Modalities                CP                   Skin checks performed pre and post application: intact

## 2022-03-28 ENCOUNTER — OFFICE VISIT (OUTPATIENT)
Dept: PHYSICAL THERAPY | Facility: CLINIC | Age: 62
End: 2022-03-28
Payer: COMMERCIAL

## 2022-03-28 DIAGNOSIS — R26.2 DIFFICULTY WALKING: ICD-10-CM

## 2022-03-28 DIAGNOSIS — M25.551 RIGHT HIP PAIN: Primary | ICD-10-CM

## 2022-03-28 DIAGNOSIS — M62.81 MUSCLE WEAKNESS (GENERALIZED): ICD-10-CM

## 2022-03-28 PROCEDURE — 97110 THERAPEUTIC EXERCISES: CPT | Performed by: PHYSICAL THERAPIST

## 2022-03-28 PROCEDURE — 97112 NEUROMUSCULAR REEDUCATION: CPT | Performed by: PHYSICAL THERAPIST

## 2022-03-28 NOTE — PROGRESS NOTES
Daily Note     Today's date: 3/28/2022  Patient name: Ioana Davis  : 1960  MRN: [id number]  Referring provider: Monae Figueroa MD  Dx:   Encounter Diagnosis     ICD-10-CM    1  Right hip pain  M25 551    2  Muscle weakness (generalized)  M62 81    3  Difficulty walking  R26 2                   Subjective: Pt reports that he is feeling stronger  He did not have a problem getting in to bed using the cane for help  He reports that it is getting easier with getting in and out of the car  Objective: See treatment diary below      Assessment: Tolerated treatment well; initiated POC with CKC strengthening exercises  VCs provided on proper sequencing with exercises; step ups, lateral step ups, mini squats  Discussed DOMS with patient  Encouraged him to use ice at home  Patient demonstrated fatigue post treatment and would benefit from continued PT      Plan: Continue per plan of care          Precautions: post op R THR anterio-lateral approach (D O S 3/1/22)    HEP: HR, TR, Standing Hip Flexion, Hip Flexor stretch in standing, LAQ, quad sets    Specialty Daily Treatment Diary       Manual 3/23 3/28      STM/TPR R incision        R H' PROM all planes        R HS stretch        R laura stretch                Tiger tail anterior and lateral R hip                        Exercise Diary         RB                HR 20 (focus on straight knees) 30 (focus on straight knees)      TR 20 30              Mini Squats -> Chair squats  Mini: 2x10      Step Ups  (4") 2x10      Lat Step ups  (2") 2x10       Step downs (ecc)        Touchdowns (laterally)                Standing H' Flexion c isometric hold and eccentric lowering   5 sec; 2x10 ea              TB TKE        TB 3 way H'         Lat Band walks        Monster Walks                Standing H' Flexor stretch 10 sec x 3 ea 15 sec x 3 ea      Standing Lumbar extension using elevated table at waist for support  2 sec x 10               Slant board gastroc stretch  20 sec x 3 ea                      Seated HS stretch  20 sec x 3 ea              LAQ (towel roll under knee; eccentric control)  0# 5 sec; 2x10      TB H' ER        TB H' IR                        Supine Heel Slides         Quad sets c towel roll under ankle 5 sec x 5       SAQ (ecc)        Supine H' ABD                 SLR (flex)                Ball Squeeze  5 sec x 15 (black ball)      Ball c bridge        Hookling TB Clamshells   GTB 5 sec x 15      Sidelying Clamshells        TB Bridges                Sidelying H' ABD                Prone H' Ext        Prone quad stretch c SOS                                                                HEP/EDU Diagnosis, prognosis, plan of care, bed mobility using stool and cane to assist in to bed       Modalities        MH        CP  5 mins post in recumbent position with bolster underneath knees                 Skin checks performed pre and post application: intact

## 2022-03-31 ENCOUNTER — OFFICE VISIT (OUTPATIENT)
Dept: PHYSICAL THERAPY | Facility: CLINIC | Age: 62
End: 2022-03-31
Payer: COMMERCIAL

## 2022-03-31 DIAGNOSIS — M62.81 MUSCLE WEAKNESS (GENERALIZED): ICD-10-CM

## 2022-03-31 DIAGNOSIS — M25.551 RIGHT HIP PAIN: Primary | ICD-10-CM

## 2022-03-31 DIAGNOSIS — R26.2 DIFFICULTY WALKING: ICD-10-CM

## 2022-03-31 PROCEDURE — 97110 THERAPEUTIC EXERCISES: CPT | Performed by: PHYSICAL THERAPIST

## 2022-03-31 PROCEDURE — 97112 NEUROMUSCULAR REEDUCATION: CPT | Performed by: PHYSICAL THERAPIST

## 2022-03-31 NOTE — PROGRESS NOTES
Daily Note     Today's date: 3/31/2022  Patient name: Sunshine Ladd  : 1960  MRN: [id number]  Referring provider: Harjinder Garcia MD  Dx:   Encounter Diagnosis     ICD-10-CM    1  Right hip pain  M25 551    2  Muscle weakness (generalized)  M62 81    3  Difficulty walking  R26 2                   Subjective: Patient reports he feels he is improving with walking, he feels he is close to walking without a cane  Objective: See treatment diary below      Assessment: Tolerated treatment well  Patient continues to progress with hip flexion strength, though he continues to be significantly challenged by standing march exercises  Patient continues to progress with hip abduction strength and external rotation strength in open chain  Patient demonstrated fatigue post treatment, exhibited good technique with therapeutic exercises and would benefit from continued PT      Plan: Continue per plan of care  Progress treatment as tolerated  Progress challenge as appropriate as appropriate       Precautions: post op R THR anterio-lateral approach (D O S 3/1/22)    HEP: HR, TR, Standing Hip Flexion, Hip Flexor stretch in standing, LAQ, quad sets    Specialty Daily Treatment Diary       Manual 3/23 3/28 3/31     STM/TPR R incision        R H' PROM all planes        R HS stretch        R laura stretch                Tiger tail anterior and lateral R hip                        Exercise Diary         RB                HR 20 (focus on straight knees) 30 (focus on straight knees) 30 (focus on straight knees)     TR 20 30 30             Mini Squats -> Chair squats  Mini: 2x10 Mini: 2x10     Step Ups  (4") 2x10 (6") 2x10     Lat Step ups  (2") 2x10  (4") 2x10     Step downs (ecc)        Touchdowns (laterally)                Standing H' Flexion c isometric hold and eccentric lowering   5 sec; 2x10 ea 5 sec; 2x10 ea             TB TKE        TB 3 way H'         Lat Band walks        Monster Walks        SLS   30 sec x 3 Standing H' Flexor stretch 10 sec x 3 ea 15 sec x 3 ea 15 sec x 3 ea     Standing Lumbar extension using elevated table at waist for support  2 sec x 10  2 sec x 10             Slant board gastroc stretch  20 sec x 3 ea 20 sec x 3 ea                     Seated HS stretch  20 sec x 3 ea 20 sec x 3 ea             LAQ (towel roll under knee; eccentric control)  0# 5 sec; 2x10 3# 5 sec; 2x10     TB H' ER        TB H' IR                        Supine Heel Slides         Quad sets c towel roll under ankle 5 sec x 5       SAQ (ecc)        Supine H' ABD                 SLR (flex)                Ball Squeeze  5 sec x 15 (black ball) 5 sec x 15 (black ball)     Ball c bridge   2x10 3s     Hookling TB Clamshells   GTB 5 sec x 15 BTB 5 sec x 15     Sidelying Clamshells        TB Bridges                Sidelying H' ABD                Prone H' Ext        Prone quad stretch c SOS                                                                HEP/EDU Diagnosis, prognosis, plan of care, bed mobility using stool and cane to assist in to bed       Modalities        MH        CP  5 mins post in recumbent position with bolster underneath knees                 Skin checks performed pre and post application: intact

## 2022-04-05 ENCOUNTER — OFFICE VISIT (OUTPATIENT)
Dept: PHYSICAL THERAPY | Facility: CLINIC | Age: 62
End: 2022-04-05
Payer: COMMERCIAL

## 2022-04-05 DIAGNOSIS — M25.551 RIGHT HIP PAIN: Primary | ICD-10-CM

## 2022-04-05 DIAGNOSIS — M62.81 MUSCLE WEAKNESS (GENERALIZED): ICD-10-CM

## 2022-04-05 DIAGNOSIS — R26.2 DIFFICULTY WALKING: ICD-10-CM

## 2022-04-05 PROCEDURE — 97112 NEUROMUSCULAR REEDUCATION: CPT

## 2022-04-05 PROCEDURE — 97110 THERAPEUTIC EXERCISES: CPT

## 2022-04-05 PROCEDURE — 97530 THERAPEUTIC ACTIVITIES: CPT

## 2022-04-05 NOTE — PROGRESS NOTES
Daily Note     Today's date: 2022  Patient name: Maynor Ellison  : 1960  MRN: [id number]  Referring provider: Jesus Maharaj*  Dx:   Encounter Diagnosis     ICD-10-CM    1  Right hip pain  M25 551    2  Muscle weakness (generalized)  M62 81    3  Difficulty walking  R26 2                   Subjective: Patient states he is feeling some soreness today  Reports he didn't do much exercise since last visit  Objective: See treatment diary below      Assessment: Tolerated treatment well  Initiated POC with standing TE  Added in SLR and he required assistance from therapist secondary to weakness  Encouraged patient to be completing quad sets at home to further improve quad strength  Patient demonstrated fatigue post treatment, exhibited good technique with therapeutic exercises and would benefit from continued PT      Plan: Continue per plan of care        Precautions: post op R THR anterio-lateral approach (D O S 3/1/22)    HEP: HR, TR, Standing Hip Flexion, Hip Flexor stretch in standing, LAQ, quad sets    Specialty Daily Treatment Diary       Manual 3/23 3/28 3/31 4/5     STM/TPR R incision        R H' PROM all planes        R HS stretch        R laura stretch                Tiger tail anterior and lateral R hip                        Exercise Diary         RB                HR 20 (focus on straight knees) 30 (focus on straight knees) 30 (focus on straight knees) 30 (focus on straight knees)    TR 20 30 30 30             Mini Squats -> Chair squats  Mini: 2x10 Mini: 2x10 Mini squats 2x15    Step Ups  (4") 2x10 (6") 2x10 6" 2x15    Lat Step ups  (2") 2x10  (4") 2x10 6" 2x15    Step downs (ecc)        Touchdowns (laterally)                Standing H' Flexion c isometric hold and eccentric lowering   5 sec; 2x10 ea 5 sec; 2x10 ea 5 sec 2x10             TB TKE        TB 3 way H'         Lat Band walks        Monster Walks        SLS   30 sec x 3 30 sec x 3     Standing H' Flexor stretch 10 sec x 3 ea 15 sec x 3 ea 15 sec x 3 ea 15 sec x 3 ea    Standing Lumbar extension using elevated table at waist for support  2 sec x 10  2 sec x 10 5 sec x 15            Slant board gastroc stretch  20 sec x 3 ea 20 sec x 3 ea 20 sec x 3                     Seated HS stretch  20 sec x 3 ea 20 sec x 3 ea 20 sec x 3 ea            LAQ (towel roll under knee; eccentric control)  0# 5 sec; 2x10 3# 5 sec; 2x10 3# 5 sec; 3x10    TB H' ER        TB H' IR                        Supine Heel Slides         Quad sets c towel roll under ankle 5 sec x 5   5 sec x 20    SAQ (ecc)        Supine H' ABD                 SLR (flex)    AAROM 10x             Ball Squeeze  5 sec x 15 (black ball) 5 sec x 15 (black ball) 5 sec x 20 black ball    Ball c bridge   2x10 3s 2x10 3 seconds    Hookling TB Clamshells   GTB 5 sec x 15 BTB 5 sec x 15 BTB 5 sec x 30     Sidelying Clamshells        TB Bridges                Sidelying H' ABD                Prone H' Ext        Prone quad stretch c SOS                                                                HEP/EDU Diagnosis, prognosis, plan of care, bed mobility using stool and cane to assist in to bed       Modalities        MH        CP  5 mins post in recumbent position with bolster underneath knees                 Skin checks performed pre and post application: intact

## 2022-04-07 ENCOUNTER — OFFICE VISIT (OUTPATIENT)
Dept: PHYSICAL THERAPY | Facility: CLINIC | Age: 62
End: 2022-04-07
Payer: COMMERCIAL

## 2022-04-07 DIAGNOSIS — R26.2 DIFFICULTY WALKING: ICD-10-CM

## 2022-04-07 DIAGNOSIS — M25.551 RIGHT HIP PAIN: Primary | ICD-10-CM

## 2022-04-07 DIAGNOSIS — M62.81 MUSCLE WEAKNESS (GENERALIZED): ICD-10-CM

## 2022-04-07 PROCEDURE — 97110 THERAPEUTIC EXERCISES: CPT

## 2022-04-07 PROCEDURE — 97530 THERAPEUTIC ACTIVITIES: CPT

## 2022-04-07 PROCEDURE — 97112 NEUROMUSCULAR REEDUCATION: CPT

## 2022-04-07 NOTE — PROGRESS NOTES
Daily Note     Today's date: 2022  Patient name: Sonal Neal  : 1960  MRN: [id number]  Referring provider: Baron Sexton*  Dx:   Encounter Diagnosis     ICD-10-CM    1  Right hip pain  M25 551    2  Muscle weakness (generalized)  M62 81    3  Difficulty walking  R26 2                   Subjective: Patient states he is doing well today  Objective: See treatment diary below      Assessment: Tolerated treatment well  Initiated POC on RB for active warmup today  Added in additional strength today, theraband 3 way kicks, lateral side steps, demonstrated challenge  He was able to complete SLR with much better ability today  Continue to progress as able  Patient demonstrated fatigue post treatment, exhibited good technique with therapeutic exercises and would benefit from continued PT      Plan: Continue per plan of care        Precautions: post op R THR anterio-lateral approach (D O S 3/1/22)    HEP: HR, TR, Standing Hip Flexion, Hip Flexor stretch in standing, LAQ, quad sets    Specialty Daily Treatment Diary       Manual 3/23 3/28 3/31 4/5  4/7    STM/TPR R incision        R H' PROM all planes        R HS stretch        R laura stretch                Tiger tail anterior and lateral R hip                        Exercise Diary         RB     5 min            HR 20 (focus on straight knees) 30 (focus on straight knees) 30 (focus on straight knees) 30 (focus on straight knees) 30 focus on straight knees    TR 20 30 30 30  30           Mini Squats -> Chair squats  Mini: 2x10 Mini: 2x10 Mini squats 2x15 Mini squats 2x15   Step Ups  (4") 2x10 (6") 2x10 6" 2x15 6" x 30   Lat Step ups  (2") 2x10  (4") 2x10 6" 2x15 6" x 30   Step downs (ecc)        Touchdowns (laterally)                Standing H' Flexion c isometric hold and eccentric lowering   5 sec; 2x10 ea 5 sec; 2x10 ea 5 sec 2x10  5 sec x 20            TB TKE        TB 3 way H'      GTB 2x10 ea direction   Lat Band walks        [employer] SLS   30 sec x 3 30 sec x 3  30 sec x 3   Standing H' Flexor stretch 10 sec x 3 ea 15 sec x 3 ea 15 sec x 3 ea 15 sec x 3 ea    Standing Lumbar extension using elevated table at waist for support  2 sec x 10  2 sec x 10 5 sec x 15 5 sec x 15           Slant board gastroc stretch  20 sec x 3 ea 20 sec x 3 ea 20 sec x 3  20 sec x 3                    Seated HS stretch  20 sec x 3 ea 20 sec x 3 ea 20 sec x 3 ea 20 sec x 3            LAQ (towel roll under knee; eccentric control)  0# 5 sec; 2x10 3# 5 sec; 2x10 3# 5 sec; 3x10 3# 30x    TB H' ER        TB H' IR                        Supine Heel Slides         Quad sets c towel roll under ankle 5 sec x 5   5 sec x 20 5 sec x 20   SAQ (ecc)        Supine H' ABD                 SLR (flex)    AAROM 10x  AROM 15x            Ball Squeeze  5 sec x 15 (black ball) 5 sec x 15 (black ball) 5 sec x 20 black ball 5 sec x 20 black ball   Ball c bridge   2x10 3s 2x10 3 seconds 2x10    Hookling TB Clamshells   GTB 5 sec x 15 BTB 5 sec x 15 BTB 5 sec x 30  BTB 5 sec x 30   Sidelying Clamshells        TB Bridges                Sidelying H' ABD                Prone H' Ext        Prone quad stretch c SOS                                                                HEP/EDU Diagnosis, prognosis, plan of care, bed mobility using stool and cane to assist in to bed       Modalities        MH        CP  5 mins post in recumbent position with bolster underneath knees                 Skin checks performed pre and post application: intact

## 2022-04-12 ENCOUNTER — OFFICE VISIT (OUTPATIENT)
Dept: PHYSICAL THERAPY | Facility: CLINIC | Age: 62
End: 2022-04-12
Payer: COMMERCIAL

## 2022-04-12 DIAGNOSIS — M25.551 RIGHT HIP PAIN: Primary | ICD-10-CM

## 2022-04-12 DIAGNOSIS — R26.2 DIFFICULTY WALKING: ICD-10-CM

## 2022-04-12 DIAGNOSIS — M62.81 MUSCLE WEAKNESS (GENERALIZED): ICD-10-CM

## 2022-04-12 PROCEDURE — 97112 NEUROMUSCULAR REEDUCATION: CPT

## 2022-04-12 PROCEDURE — 97530 THERAPEUTIC ACTIVITIES: CPT

## 2022-04-12 PROCEDURE — 97110 THERAPEUTIC EXERCISES: CPT

## 2022-04-12 NOTE — PROGRESS NOTES
Daily Note     Today's date: 2022  Patient name: Adelita Glover  : 1960  MRN: [id number]  Referring provider: Shaylee Crocker*  Dx:   Encounter Diagnosis     ICD-10-CM    1  Right hip pain  M25 551    2  Muscle weakness (generalized)  M62 81    3  Difficulty walking  R26 2                   Subjective: Patient states he is feeling soreness into hip today due to trying to sleep on right side  Objective: See treatment diary below      Assessment: Tolerated treatment well  Patient noted soreness diminished post therapy session  Progressed patient in reps this visit with good tolerance  He is demonstrating improvements with strength  Patient demonstrated fatigue post treatment, exhibited good technique with therapeutic exercises and would benefit from continued PT      Plan: Continue per plan of care        Precautions: post op R THR anterio-lateral approach (D O S 3/1/22)    HEP: HR, TR, Standing Hip Flexion, Hip Flexor stretch in standing, LAQ, quad sets    Specialty Daily Treatment Diary       Manual     STM/TPR R incision        R H' PROM all planes        R HS stretch        R laura stretch                Tiger tail anterior and lateral R hip                        Exercise Diary         RB 5 min     5 min            HR 30 focus on straigt knee   30 (focus on straight knees) 30 focus on straight knees    TR 30   30  30           Mini Squats -> Chair squats 30    Mini squats 2x15 Mini squats 2x15   Step Ups 8" x 30   6" 2x15 6" x 30   Lat Step ups 8" x 30   6" 2x15 6" x 30   Step downs (ecc)        Touchdowns (laterally)                Standing H' Flexion c isometric hold and eccentric lowering     5 sec 2x10  5 sec x 20            TB TKE        TB 3 way H'  GTB 2x10 ea direction    GTB 2x10 ea direction   Lat Band walks        Monster Walks        SLS 30 sec x 3    30 sec x 3  30 sec x 3   Standing H' Flexor stretch    15 sec x 3 ea    Standing Lumbar extension using elevated table at waist for support    5 sec x 15 5 sec x 15           Slant board gastroc stretch 20 sec x 3   20 sec x 3  20 sec x 3                    Seated HS stretch 20 sec x 3    20 sec x 3 ea 20 sec x 3            LAQ (towel roll under knee; eccentric control) 3# 30   3# 5 sec; 3x10 3# 30x    TB H' ER        TB H' IR                        Supine Heel Slides         Quad sets c towel roll under ankle 5 sec x 30   5 sec x 20 5 sec x 20   SAQ (ecc)        Supine H' ABD                 SLR (flex) 2x10   AAROM 10x  AROM 15x            Ball Squeeze 5 sec x 30   5 sec x 20 black ball 5 sec x 20 black ball   Ball c bridge 2x10   2x10 3 seconds 2x10    Hookling TB Clamshells  BTB 5 sec x 30   BTB 5 sec x 30  BTB 5 sec x 30   Sidelying Clamshells        TB Bridges                Sidelying H' ABD                Prone H' Ext        Prone quad stretch c SOS                                                                HEP/EDU        Modalities        MH        CP  5 mins post in recumbent position with bolster underneath knees                 Skin checks performed pre and post application: intact

## 2022-04-14 ENCOUNTER — OFFICE VISIT (OUTPATIENT)
Dept: PHYSICAL THERAPY | Facility: CLINIC | Age: 62
End: 2022-04-14
Payer: COMMERCIAL

## 2022-04-14 DIAGNOSIS — R26.2 DIFFICULTY WALKING: ICD-10-CM

## 2022-04-14 DIAGNOSIS — M62.81 MUSCLE WEAKNESS (GENERALIZED): ICD-10-CM

## 2022-04-14 DIAGNOSIS — M25.551 RIGHT HIP PAIN: Primary | ICD-10-CM

## 2022-04-14 PROCEDURE — 97112 NEUROMUSCULAR REEDUCATION: CPT

## 2022-04-14 PROCEDURE — 97110 THERAPEUTIC EXERCISES: CPT

## 2022-04-14 NOTE — PROGRESS NOTES
Daily Note     Today's date: 2022  Patient name: Gabriel Samayoa  : 1960  MRN: [id number]  Referring provider: Isabella Mustafa*  Dx:   Encounter Diagnosis     ICD-10-CM    1  Right hip pain  M25 551    2  Muscle weakness (generalized)  M62 81    3  Difficulty walking  R26 2                   Subjective: Patient states he has been walking more so he is feeling some soreness into his back  Objective: See treatment diary below      Assessment: Tolerated treatment well  Initiated POC on RB for warmup  Continued to progress patient through exercises  PT encouraged patient to take rests if he is walking more  Added in side stepping with band and sidelying hip abduction with challenge  Patient demonstrated fatigue post treatment, exhibited good technique with therapeutic exercises and would benefit from continued PT      Plan: Continue per plan of care        Precautions: post op R THR anterio-lateral approach (D O S 3/1/22)    HEP: HR, TR, Standing Hip Flexion, Hip Flexor stretch in standing, LAQ, quad sets    Specialty Daily Treatment Diary       Manual /     STM/TPR R incision        R H' PROM all planes        R HS stretch        R luara stretch                Tiger tail anterior and lateral R hip                        Exercise Diary         RB 5 min  5 min   5 min            HR 30 focus on straigt knee 30 focus on straigt knee  30 (focus on straight knees) 30 focus on straight knees    TR 30 30x  30  30           Mini Squats -> Chair squats 30  30x  Mini squats 2x15 Mini squats 2x15   Step Ups 8" x 30 8" x 30  6" 2x15 6" x 30   Lat Step ups 8" x 30 8" x30  6" 2x15 6" x 30   Step downs (ecc)        Touchdowns (laterally)                Standing H' Flexion c isometric hold and eccentric lowering     5 sec 2x10  5 sec x 20            TB TKE        TB 3 way H'  GTB 2x10 ea direction GTB 3x10 ea direction   GTB 2x10 ea direction   Lat Band walks  GTB 3 laps       [employer] SLS 30 sec x 3  30 sec x 3   30 sec x 3  30 sec x 3   Standing H' Flexor stretch    15 sec x 3 ea    Standing Lumbar extension using elevated table at waist for support  5 sec x 15  5 sec x 15 5 sec x 15           Slant board gastroc stretch 20 sec x 3 20 secx 3   20 sec x 3  20 sec x 3                    Seated HS stretch 20 sec x 3  20 secx 3   20 sec x 3 ea 20 sec x 3            LAQ (towel roll under knee; eccentric control) 3# 30 3# 30  3# 5 sec; 3x10 3# 30x    TB H' ER        TB H' IR                        Supine Heel Slides         Quad sets c towel roll under ankle 5 sec x 30   5 sec x 20 5 sec x 20   SAQ (ecc)        Supine H' ABD                 SLR (flex) 2x10 3x10  AAROM 10x  AROM 15x            Ball Squeeze 5 sec x 30 5 sec x 30  5 sec x 20 black ball 5 sec x 20 black ball   Ball c bridge 2x10   2x10 3 seconds 2x10    Hookling TB Clamshells  BTB 5 sec x 30 5 sec x 30  BTB 5 sec x 30  BTB 5 sec x 30   Sidelying Clamshells  15x      TB Bridges  BTB 15x              Sidelying H' ABD                Prone H' Ext        Prone quad stretch c SOS                                                                HEP/EDU        Modalities        MH        CP                   Skin checks performed pre and post application: intact

## 2022-04-19 ENCOUNTER — APPOINTMENT (OUTPATIENT)
Dept: PHYSICAL THERAPY | Facility: CLINIC | Age: 62
End: 2022-04-19
Payer: COMMERCIAL

## 2022-04-21 ENCOUNTER — OFFICE VISIT (OUTPATIENT)
Dept: PHYSICAL THERAPY | Facility: CLINIC | Age: 62
End: 2022-04-21
Payer: COMMERCIAL

## 2022-04-21 DIAGNOSIS — M25.551 RIGHT HIP PAIN: Primary | ICD-10-CM

## 2022-04-21 DIAGNOSIS — R26.2 DIFFICULTY WALKING: ICD-10-CM

## 2022-04-21 DIAGNOSIS — M62.81 MUSCLE WEAKNESS (GENERALIZED): ICD-10-CM

## 2022-04-21 PROCEDURE — 97112 NEUROMUSCULAR REEDUCATION: CPT | Performed by: PHYSICAL THERAPIST

## 2022-04-21 PROCEDURE — 97140 MANUAL THERAPY 1/> REGIONS: CPT | Performed by: PHYSICAL THERAPIST

## 2022-04-21 PROCEDURE — 97110 THERAPEUTIC EXERCISES: CPT | Performed by: PHYSICAL THERAPIST

## 2022-04-21 NOTE — PROGRESS NOTES
Daily Note     Today's date: 2022  Patient name: Abdullahi Reynaga  : 1960  MRN: [id number]  Referring provider: Maria Esther Fisher*  Dx:   Encounter Diagnosis     ICD-10-CM    1  Right hip pain  M25 551    2  Muscle weakness (generalized)  M62 81    3  Difficulty walking  R26 2                   Subjective: Pt reports that overall he is feeling as though he is improving with strength and able to stand up better  Objective: See treatment diary below      Assessment: Tolerated treatment well; initiated POC with MH to R hip in supine to promote gentle hip extension f/b quad sets  VCs provided on proper sequencing with exercises; session focused on stretching of R hip to improve extension ROM  MT performed after receiving consent from pt  He reports relief post session  Patient demonstrated fatigue post treatment and would benefit from continued PT      Plan: Continue per plan of care        Precautions: post op R THR anterio-lateral approach (D O S 3/1/22)    HEP: HR, TR, Standing Hip Flexion, Hip Flexor stretch in standing, LAQ, quad sets    Specialty Daily Treatment Diary       Manual /     STM/TPR R incision   Tiger tail with laura stretch     R H' PROM all planes        R HS stretch        R laura stretch   SMF              Tiger tail anterior and lateral R hip   SMF  ea                     Exercise Diary         RB 5 min  5 min   5 min            HR 30 focus on straigt knee 30 focus on straigt knee   30 focus on straight knees    TR 30 30x   30           Mini Squats -> Chair squats 30  30x   Mini squats 2x15   Step Ups 8" x 30 8" x 30   6" x 30   Lat Step ups 8" x 30 8" x30   6" x 30   Step downs (ecc)        Touchdowns (laterally)                Standing H' Flexion c isometric hold and eccentric lowering      5 sec x 20            TB TKE        TB 3 way H'  GTB 2x10 ea direction GTB 3x10 ea direction NV  GTB 2x10 ea direction   Lat Band walks  GTB 3 laps  NV     [employer] SLS 30 sec x 3  30 sec x 3    30 sec x 3   Standing H' Flexor stretch   15 sec x 3     Standing Lumbar extension using elevated table at waist for support  5 sec x 15 2 sec; 2x10  5 sec x 15   Standing H' Ext AROM   0# 2 sec; 2x10 ea             Slant board gastroc stretch 20 sec x 3 20 secx 3    20 sec x 3                    Seated HS stretch 20 sec x 3  20 secx 3    20 sec x 3            LAQ (towel roll under knee; eccentric control) 3# 30 3# 30   3# 30x    TB H' ER        TB H' IR                        Supine Heel Slides         Quad sets c towel roll under ankle 5 sec x 30    5 sec x 20   SAQ (ecc)        Supine H' ABD                 SLR (flex) 2x10 3x10   AROM 15x            Ball Squeeze 5 sec x 30 5 sec x 30 5 sec x 20  5 sec x 20 black ball   Ball c bridge 2x10  5 sec; 20  2x10    Hookling TB Clamshells  BTB 5 sec x 30 5 sec x 30   BTB 5 sec x 30   Sidelying Clamshells  15x BTB 2 sec; 2x10     TB Bridges  BTB 15x BTB 20             Sidelying H' ABD   0# 20             Prone H' Ext        Prone quad stretch c SOS                                                                HEP/EDU        Modalities        MH   10 mins R hip in supine with hip extension while performing quad sets 5 sec x 20     CP                   Skin checks performed pre and post application: intact

## 2022-04-25 ENCOUNTER — OFFICE VISIT (OUTPATIENT)
Dept: PHYSICAL THERAPY | Facility: CLINIC | Age: 62
End: 2022-04-25
Payer: COMMERCIAL

## 2022-04-25 DIAGNOSIS — M25.551 RIGHT HIP PAIN: Primary | ICD-10-CM

## 2022-04-25 DIAGNOSIS — M62.81 MUSCLE WEAKNESS (GENERALIZED): ICD-10-CM

## 2022-04-25 DIAGNOSIS — R26.2 DIFFICULTY WALKING: ICD-10-CM

## 2022-04-25 PROCEDURE — 97140 MANUAL THERAPY 1/> REGIONS: CPT | Performed by: PHYSICAL THERAPIST

## 2022-04-25 PROCEDURE — 97112 NEUROMUSCULAR REEDUCATION: CPT | Performed by: PHYSICAL THERAPIST

## 2022-04-25 PROCEDURE — 97110 THERAPEUTIC EXERCISES: CPT | Performed by: PHYSICAL THERAPIST

## 2022-04-25 NOTE — PROGRESS NOTES
Daily Note     Today's date: 2022  Patient name: Nola Bonilla  : 1960  MRN: [id number]  Referring provider: Eleni Meyer*  Dx:   Encounter Diagnosis     ICD-10-CM    1  Right hip pain  M25 551    2  Muscle weakness (generalized)  M62 81    3  Difficulty walking  R26 2                   Subjective: Pt reports that he felt good after last visit however over the weekend he was doing yard work and is sore  Objective: See treatment diary below      Assessment: Tolerated treatment well; initiated POC with MH to R hip while performing quad sets  VCs provided on proper sequencing with exercises  MT performed after receiving consent from pt; tried IASTM along right ITB  Discussed benefits of IASTM with improving tissue mobility and reducing pain  Pt ambs and standing lateral shifted trunk; tried lateral flexion to the left  Patient demonstrated fatigue post treatment and would benefit from continued PT      Plan: Continue per plan of care        Precautions: post op R THR anterio-lateral approach (D O S 3/1/22)    HEP: HR, TR, Standing Hip Flexion, Hip Flexor stretch in standing, LAQ, quad sets    Specialty Daily Treatment Diary       Manual /     STM/TPR R incision   Tiger tail with laura stretch SMF tiger tail with laura stretch    R H' PROM all planes    SMF (flex only)    R HS stretch        R laura stretch   SMF  SMF            Tiger tail anterior and lateral R hip   SMF  ea SMF ea            IASTM to R lateral hip    SMF (bolster in between knees)    Cupping along ITB                Exercise Diary         RB 5 min  5 min              HR 30 focus on straigt knee 30 focus on straigt knee      TR 30 30x              Mini Squats -> Chair squats 30  30x      Step Ups 8" x 30 8" x 30      Lat Step ups 8" x 30 8" x30      Step downs (ecc)        Touchdowns (laterally)                Standing H' Flexion c isometric hold and eccentric lowering                 TB TKE        TB 3 way H'  GTB 2x10 ea direction GTB 3x10 ea direction NV     Lat Band walks  GTB 3 laps  NV     Monster Walks        SLS 30 sec x 3  30 sec x 3       Standing H' Flexor stretch   15 sec x 3     Standing Lumbar extension using elevated table at waist for support  5 sec x 15 2 sec; 2x10 2 sec; 2x10    Standing H' Ext AROM   0# 2 sec; 2x10 ea 0# 2 sec; 2x15    L arm on door frame with hips to the left    10x            Slant board gastroc stretch 20 sec x 3 20 secx 3                       Seated HS stretch 20 sec x 3  20 secx 3               LAQ (towel roll under knee; eccentric control) 3# 30 3# 30      TB H' ER        TB H' IR                        Supine Heel Slides         Quad sets c towel roll under ankle 5 sec x 30       SAQ (ecc)        Supine H' ABD                 SLR (flex) 2x10 3x10              Ball Squeeze 5 sec x 30 5 sec x 30 5 sec x 20 5 sec x 20    Ball c bridge 2x10  5 sec; 20 5 sec x 20     Hookling TB Clamshells  BTB 5 sec x 30 5 sec x 30      Sidelying Clamshells  15x BTB 2 sec; 2x10 BTB 2 sec; 2x10    TB Bridges  BTB 15x BTB 20 BTB 20            Sidelying H' ABD   0# 20 0# 2x10            Prone H' Ext        Prone quad stretch c SOS                                                                HEP/EDU        Modalities        MH   10 mins R hip in supine with hip extension while performing quad sets 5 sec x 20 10 mins R hip in supine with hip extension while performing quad sets: 5 sec x 30    CP                   Skin checks performed pre and post application: intact

## 2022-04-26 ENCOUNTER — APPOINTMENT (OUTPATIENT)
Dept: PHYSICAL THERAPY | Facility: CLINIC | Age: 62
End: 2022-04-26
Payer: COMMERCIAL

## 2022-04-27 ENCOUNTER — OFFICE VISIT (OUTPATIENT)
Dept: PHYSICAL THERAPY | Facility: CLINIC | Age: 62
End: 2022-04-27
Payer: COMMERCIAL

## 2022-04-27 DIAGNOSIS — R26.2 DIFFICULTY WALKING: ICD-10-CM

## 2022-04-27 DIAGNOSIS — M62.81 MUSCLE WEAKNESS (GENERALIZED): ICD-10-CM

## 2022-04-27 DIAGNOSIS — M25.551 RIGHT HIP PAIN: Primary | ICD-10-CM

## 2022-04-27 PROCEDURE — 97140 MANUAL THERAPY 1/> REGIONS: CPT

## 2022-04-27 PROCEDURE — 97110 THERAPEUTIC EXERCISES: CPT

## 2022-04-27 NOTE — PROGRESS NOTES
Daily Note     Today's date: 2022  Patient name: Alvin Knight  : 1960  MRN: [id number]  Referring provider: Jesus Silva*  Dx:   Encounter Diagnosis     ICD-10-CM    1  Right hip pain  M25 551    2  Muscle weakness (generalized)  M62 81    3  Difficulty walking  R26 2                   Subjective: Patient states he continues to notice improvements outside of PT  He was able to ride his tractor without difficulty today  Objective: See treatment diary below      Assessment: Tolerated treatment well  Initiated POC on RB for active warmup  Resumed with strength building with good tolerance  Continued with tiger tail on anterior and lateral hip  Encouraged patient to be utilizing RB at home as he feels decrease in stiffness when he uses it in PT  Patient demonstrated fatigue post treatment, exhibited good technique with therapeutic exercises and would benefit from continued PT      Plan: Continue per plan of care        Precautions: post op R THR anterio-lateral approach (D O S 3/1/22)    HEP: HR, TR, Standing Hip Flexion, Hip Flexor stretch in standing, LAQ, quad sets    Specialty Daily Treatment Diary       Manual      STM/TPR R incision   Tiger tail with laura stretch SMF tiger tail with laura stretch    R H' PROM all planes    SMF (flex only)    R HS stretch        R laura stretch   SMF  SMF            Tiger tail anterior and lateral R hip   SMF  ea SMF ea RA           IASTM to R lateral hip    SMF (bolster in between knees)    Cupping along ITB                Exercise Diary         RB  5 min   5 min            HR  30 focus on straigt knee      TR  30x              Mini Squats -> Chair squats  30x   30x   Step Ups  8" x 30   8" x 30   Lat Step ups  8" x30   8" x 30   Step downs (ecc)        Touchdowns (laterally)                Standing H' Flexion c isometric hold and eccentric lowering                 TB TKE        TB 3 way H'   GTB 3x10 ea direction NV  GTB 3x10 all direction   Lat Band walks  GTB 3 laps  NV  GTB 3 laps    Monster Walks        SLS  30 sec x 3       Standing H' Flexor stretch   15 sec x 3     Standing Lumbar extension using elevated table at waist for support  5 sec x 15 2 sec; 2x10 2 sec; 2x10 2 sec x 20x   Standing H' Ext AROM   0# 2 sec; 2x10 ea 0# 2 sec; 2x15    L arm on door frame with hips to the left    10x            Slant board gastroc stretch  20 secx 3    20 sec x 3                    Seated HS stretch  20 secx 3    20 sec x 3           LAQ (towel roll under knee; eccentric control)  3# 30      TB H' ER        TB H' IR                        Supine Heel Slides         Quad sets c towel roll under ankle        SAQ (ecc)        Supine H' ABD                 SLR (flex)  3x10              Ball Squeeze  5 sec x 30 5 sec x 20 5 sec x 20 5 sec x 30   Ball c bridge   5 sec; 20 5 sec x 20  5 sec 2x15   Hookling TB Clamshells   5 sec x 30      Sidelying Clamshells  15x BTB 2 sec; 2x10 BTB 2 sec; 2x10 BTB 30x   TB Bridges  BTB 15x BTB 20 BTB 20 BTB 2           Sidelying H' ABD   0# 20 0# 2x10 0# 2x10            Prone H' Ext        Prone quad stretch c SOS                                                                HEP/EDU        Modalities        MH   10 mins R hip in supine with hip extension while performing quad sets 5 sec x 20 10 mins R hip in supine with hip extension while performing quad sets: 5 sec x 30    CP                   Skin checks performed pre and post application: intact

## 2022-04-28 ENCOUNTER — APPOINTMENT (OUTPATIENT)
Dept: PHYSICAL THERAPY | Facility: CLINIC | Age: 62
End: 2022-04-28
Payer: COMMERCIAL

## 2022-04-29 ENCOUNTER — TELEPHONE (OUTPATIENT)
Dept: HEMATOLOGY ONCOLOGY | Facility: HOSPITAL | Age: 62
End: 2022-04-29

## 2022-04-29 NOTE — TELEPHONE ENCOUNTER
Spoke with patient regarding the need to reschedule his appointment  Patient was fine with the appointment change

## 2022-05-03 ENCOUNTER — OFFICE VISIT (OUTPATIENT)
Dept: PHYSICAL THERAPY | Facility: CLINIC | Age: 62
End: 2022-05-03
Payer: COMMERCIAL

## 2022-05-03 DIAGNOSIS — R26.2 DIFFICULTY WALKING: ICD-10-CM

## 2022-05-03 DIAGNOSIS — M62.81 MUSCLE WEAKNESS (GENERALIZED): ICD-10-CM

## 2022-05-03 DIAGNOSIS — M25.551 RIGHT HIP PAIN: Primary | ICD-10-CM

## 2022-05-03 PROCEDURE — 97112 NEUROMUSCULAR REEDUCATION: CPT | Performed by: PHYSICAL THERAPIST

## 2022-05-03 PROCEDURE — 97110 THERAPEUTIC EXERCISES: CPT | Performed by: PHYSICAL THERAPIST

## 2022-05-03 PROCEDURE — 97140 MANUAL THERAPY 1/> REGIONS: CPT | Performed by: PHYSICAL THERAPIST

## 2022-05-03 NOTE — PROGRESS NOTES
Daily Note     Today's date: 5/3/2022  Patient name: Adelita Glover  : 1960  MRN: [id number]  Referring provider: Shaylee Crocker*  Dx:   Encounter Diagnosis     ICD-10-CM    1  Right hip pain  M25 551    2  Muscle weakness (generalized)  M62 81    3  Difficulty walking  R26 2                   Subjective: Pt reports that when he does more work he has increased pain  He has noticed improvements with his posture  Objective: See treatment diary below      Assessment: Tolerated treatment well; initiated POC with MH to R hip in supine with quad sets to increase hip extension for tissue prep  VCs provided on proper sequencing with exercises; added prone quad stretch  MT performed after receiving consent from pt; tiger tail R anterior and lateral hip  Patient demonstrated fatigue post treatment and would benefit from continued PT      Plan: Continue per plan of care        Precautions: post op R THR anterio-lateral approach (D O S 3/1/22)    HEP: HR, TR, Standing Hip Flexion, Hip Flexor stretch in standing, LAQ, quad sets    Specialty Daily Treatment Diary       Manual 5/3  4/19 4/25 4/27    STM/TPR R incision   Tiger tail with laura stretch SMF tiger tail with laura stretch    R H' PROM all planes    SMF (flex only)    R HS stretch        R laura stretch SMF  SMF  SMF            Tiger tail anterior and lateral R hip SMF ea  SMF  ea SMF ea RA           IASTM to R lateral hip    SMF (bolster in between knees)    Cupping along ITB                Exercise Diary         RB     5 min            HR        TR                Mini Squats -> Chair squats     30x   Step Ups (8") 30    8" x 30   Lat Step ups (6") 30    8" x 30   Step downs (ecc)        Touchdowns (laterally)                Standing H' Flexion c isometric hold and eccentric lowering                 TB TKE        TB 3 way H'    NV  GTB 3x10 all direction   Lat Band walks   NV  GTB 3 laps    Monster Walks        SLS        Standing H' Flexor stretch   15 sec x 3     Standing Lumbar extension using elevated table at waist for support 2 sec x 10  2 sec; 2x10 2 sec; 2x10 2 sec x 20x   Standing H' Ext AROM NV  0# 2 sec; 2x10 ea 0# 2 sec; 2x15    L arm on door frame with hips to the left 2x10   10x            Slant board gastroc stretch 30 sec x 3     20 sec x 3                    Seated HS stretch     20 sec x 3           LAQ (towel roll under knee; eccentric control)        TB H' ER        TB H' IR                        Supine Heel Slides         Quad sets c towel roll under ankle        SAQ (ecc)        Supine H' ABD                 SLR (flex)                Ball Squeeze 10 sec x 20  5 sec x 20 5 sec x 20 5 sec x 30   Ball c bridge 5 sec; 2x15  5 sec; 20 5 sec x 20  5 sec 2x15   Sidelying Clamshells BTB 5 sec; 3x10  BTB 2 sec; 2x10 BTB 2 sec; 2x10 BTB 30x   TB Bridges BTB 5 sec x 20  BTB 20 BTB 20 BTB 2           Sidelying H' ABD 0# 2 sec; 2x10  0# 20 0# 2x10 0# 2x10            Prone H' Ext        Prone quad stretch c SOS 30 sec x 3 (R only)                                                               HEP/EDU        Modalities        MH 5 mins pre in supine with quad sets and hip extension (5 sec x 30)  10 mins R hip in supine with hip extension while performing quad sets 5 sec x 20 10 mins R hip in supine with hip extension while performing quad sets: 5 sec x 30    CP                   Skin checks performed pre and post application: intact

## 2022-05-05 ENCOUNTER — OFFICE VISIT (OUTPATIENT)
Dept: PHYSICAL THERAPY | Facility: CLINIC | Age: 62
End: 2022-05-05
Payer: COMMERCIAL

## 2022-05-05 DIAGNOSIS — R26.2 DIFFICULTY WALKING: ICD-10-CM

## 2022-05-05 DIAGNOSIS — M25.551 RIGHT HIP PAIN: Primary | ICD-10-CM

## 2022-05-05 DIAGNOSIS — M62.81 MUSCLE WEAKNESS (GENERALIZED): ICD-10-CM

## 2022-05-05 DIAGNOSIS — E03.9 HYPOTHYROIDISM, UNSPECIFIED TYPE: ICD-10-CM

## 2022-05-05 DIAGNOSIS — E55.9 VITAMIN D DEFICIENCY: ICD-10-CM

## 2022-05-05 DIAGNOSIS — E29.1 TESTICULAR HYPOGONADISM: ICD-10-CM

## 2022-05-05 PROCEDURE — 97112 NEUROMUSCULAR REEDUCATION: CPT

## 2022-05-05 PROCEDURE — 97110 THERAPEUTIC EXERCISES: CPT

## 2022-05-05 PROCEDURE — 97140 MANUAL THERAPY 1/> REGIONS: CPT

## 2022-05-05 RX ORDER — LEVOTHYROXINE SODIUM 0.12 MG/1
TABLET ORAL
Qty: 180 TABLET | Refills: 1 | Status: SHIPPED | OUTPATIENT
Start: 2022-05-05

## 2022-05-05 NOTE — PROGRESS NOTES
Daily Note / Discharge    Today's date: 2022  Patient name: Brayden Jimenez  : 1960  MRN: [id number]  Referring provider: Antionette Garcia*  Dx:   Encounter Diagnosis     ICD-10-CM    1  Right hip pain  M25 551    2  Muscle weakness (generalized)  M62 81    3  Difficulty walking  R26 2                   Subjective: Patient states he is doing alright today  Objective: See treatment diary below      Assessment: Tolerated treatment well  Continued to progress patient through TE  He presents with quad weakness, as he fatigues after 15 reps  Denies pain throughout session, notes more fatigue  Patient demonstrated fatigue post treatment, exhibited good technique with therapeutic exercises and would benefit from continued PT      Plan: Continue per plan of care        Precautions: post op R THR anterio-lateral approach (D O S 3/1/22)    HEP: HR, TR, Standing Hip Flexion, Hip Flexor stretch in standing, LAQ, quad sets    Specialty Daily Treatment Diary       Manual 5/3 5/5  4/25 4/27    STM/TPR R incision    SMF tiger tail with laura stretch    R H' PROM all planes    SMF (flex only)    R HS stretch        R laura stretch SMF RA  SMF            Tiger tail anterior and lateral R hip SMF ea RA  SMF ea RA           IASTM to R lateral hip    SMF (bolster in between knees)    Cupping along ITB                Exercise Diary         RB     5 min            HR        TR                Mini Squats -> Chair squats     30x   Step Ups (8") 30 8" x 30   8" x 30   Lat Step ups (6") 30 8" x 30   8" x 30   Step downs (ecc)        Touchdowns (laterally)                Standing H' Flexion c isometric hold and eccentric lowering                 TB TKE        TB 3 way H'      GTB 3x10 all direction   Lat Band walks     GTB 3 laps    Monster Walks        SLS        Standing H' Flexor stretch        Standing Lumbar extension using elevated table at waist for support 2 sec x 10 2 sec x 10  2 sec; 2x10 2 sec x 20x   Standing H' Ext AROM NV 2x10  0# 2 sec; 2x15    L arm on door frame with hips to the left 2x10 2x10  10x            Slant board gastroc stretch 30 sec x 3  30 sec x 3   20 sec x 3                    Seated HS stretch     20 sec x 3           LAQ (towel roll under knee; eccentric control)        TB H' ER        TB H' IR                        Supine Heel Slides         Quad sets c towel roll under ankle        SAQ (ecc)        Supine H' ABD                 SLR (flex)  15x              Ball Squeeze 10 sec x 20 5 sec x 30  5 sec x 20 5 sec x 30   Ball c bridge 5 sec; 2x15 5 sec 2x15  5 sec x 20  5 sec 2x15   Sidelying Clamshells BTB 5 sec; 3x10 BTB 3x10  BTB 2 sec; 2x10 BTB 30x   TB Bridges BTB 5 sec x 20 BTB 30x  BTB 20 BTB 2           Sidelying H' ABD 0# 2 sec; 2x10 0# 2 sec; 2x10  0# 2x10 0# 2x10            Prone H' Ext        Prone quad stretch c SOS 30 sec x 3 (R only) 20 sec x 3 R only                                                              HEP/EDU        Modalities        MH 5 mins pre in supine with quad sets and hip extension (5 sec x 30)  10 mins R hip in supine with hip extension while performing quad sets 5 sec x 20 10 mins R hip in supine with hip extension while performing quad sets: 5 sec x 30    CP                   Skin checks performed pre and post application: intact

## 2022-05-10 ENCOUNTER — OFFICE VISIT (OUTPATIENT)
Dept: PHYSICAL THERAPY | Facility: CLINIC | Age: 62
End: 2022-05-10
Payer: COMMERCIAL

## 2022-05-10 DIAGNOSIS — M62.81 MUSCLE WEAKNESS (GENERALIZED): ICD-10-CM

## 2022-05-10 DIAGNOSIS — M25.551 RIGHT HIP PAIN: Primary | ICD-10-CM

## 2022-05-10 DIAGNOSIS — R26.2 DIFFICULTY WALKING: ICD-10-CM

## 2022-05-10 PROCEDURE — 97112 NEUROMUSCULAR REEDUCATION: CPT | Performed by: PHYSICAL THERAPIST

## 2022-05-10 PROCEDURE — 97110 THERAPEUTIC EXERCISES: CPT | Performed by: PHYSICAL THERAPIST

## 2022-05-10 PROCEDURE — 97140 MANUAL THERAPY 1/> REGIONS: CPT | Performed by: PHYSICAL THERAPIST

## 2022-05-10 NOTE — PROGRESS NOTES
Daily Note     Today's date: 5/10/2022  Patient name: Emilia Kelley  : 1960  MRN: [id number]  Referring provider: Lorenzo Lau*  Dx:   Encounter Diagnosis     ICD-10-CM    1  Right hip pain  M25 551    2  Muscle weakness (generalized)  M62 81    3  Difficulty walking  R26 2                   Subjective: Patient reports he has been feeling a little better over the last few days, and less back pain  Objective: See treatment diary below      Assessment: Tolerated treatment well  Patient responded well to moderate intensity end range holds with laura stretch, and he continues to progress with hip abduction and ER strength in unloaded positions  Patient demonstrated fatigue post treatment, exhibited good technique with therapeutic exercises and would benefit from continued PT      Plan: Continue per plan of care  Progress treatment as tolerated  Progress challenge as appropriate with irritability       Precautions: post op R THR anterio-lateral approach (D O S 3/1/22)    HEP: HR, TR, Standing Hip Flexion, Hip Flexor stretch in standing, LAQ, quad sets    Specialty Daily Treatment Diary       Manual 5/3 5/5 5/10 4/25 4/27    STM/TPR R incision    SMF tiger tail with laura stretch    R H' PROM all planes    SMF (flex only)    R HS stretch        R laura stretch SMF RA BR SMF            Tiger tail anterior and lateral R hip SMF ea RA BR SMF ea RA           IASTM to R lateral hip    SMF (bolster in between knees)    Cupping along ITB                Exercise Diary         RB   5 min  5 min            HR        TR                Mini Squats -> Chair squats     30x   Step Ups (8") 30 8" x 30 8" x 30  8" x 30   Lat Step ups (6") 30 8" x 30 8" x 30  8" x 30   Step downs (ecc)        Touchdowns (laterally)                Standing H' Flexion c isometric hold and eccentric lowering                 TB TKE        TB 3 way H'      GTB 3x10 all direction   Lat Band walks     GTB 3 laps    [employer] SLS        Standing H' Flexor stretch        Standing Lumbar extension using elevated table at waist for support 2 sec x 10 2 sec x 10 2 sec x 10 2 sec; 2x10 2 sec x 20x   Standing H' Ext AROM NV 2x10 2x10  0# 2 sec; 2x15    L arm on door frame with hips to the left 2x10 2x10 2x10  10x            Slant board gastroc stretch 30 sec x 3  30 sec x 3 30 sec x 3  20 sec x 3                    Seated HS stretch     20 sec x 3           LAQ (towel roll under knee; eccentric control)        TB H' ER        TB H' IR                        Supine Heel Slides         Quad sets c towel roll under ankle        SAQ (ecc)        Supine H' ABD                 SLR (flex)  15x              Ball Squeeze 10 sec x 20 5 sec x 30 5 sec x 30 5 sec x 20 5 sec x 30   Ball c bridge 5 sec; 2x15 5 sec 2x15 5 sec x30 5 sec x 20  5 sec 2x15   Sidelying Clamshells BTB 5 sec; 3x10 BTB 3x10 BTB 3x10 BTB 2 sec; 2x10 BTB 30x   TB Bridges BTB 5 sec x 20 BTB 30x BTB 30x BTB 20 BTB 2           Sidelying H' ABD 0# 2 sec; 2x10 0# 2 sec; 2x10 0# sec; 2x10 0# 2x10 0# 2x10            Prone H' Ext        Prone quad stretch c SOS 30 sec x 3 (R only) 20 sec x 3 R only 20 sec x 3                                                             HEP/EDU        Modalities        MH 5 mins pre in supine with quad sets and hip extension (5 sec x 30)   10 mins R hip in supine with hip extension while performing quad sets: 5 sec x 30    CP                   Skin checks performed pre and post application: intact

## 2022-05-12 ENCOUNTER — OFFICE VISIT (OUTPATIENT)
Dept: PHYSICAL THERAPY | Facility: CLINIC | Age: 62
End: 2022-05-12
Payer: COMMERCIAL

## 2022-05-12 DIAGNOSIS — R26.2 DIFFICULTY WALKING: ICD-10-CM

## 2022-05-12 DIAGNOSIS — M62.81 MUSCLE WEAKNESS (GENERALIZED): ICD-10-CM

## 2022-05-12 DIAGNOSIS — M25.551 RIGHT HIP PAIN: Primary | ICD-10-CM

## 2022-05-12 PROCEDURE — 97140 MANUAL THERAPY 1/> REGIONS: CPT

## 2022-05-12 PROCEDURE — 97112 NEUROMUSCULAR REEDUCATION: CPT

## 2022-05-12 PROCEDURE — 97110 THERAPEUTIC EXERCISES: CPT

## 2022-05-12 NOTE — PROGRESS NOTES
Daily Note     Today's date: 2022  Patient name: Jace Rodrigez  : 1960  MRN: [id number]  Referring provider: Anmol Mcintosh*  Dx:   Encounter Diagnosis     ICD-10-CM    1  Right hip pain  M25 551    2  Muscle weakness (generalized)  M62 81    3  Difficulty walking  R26 2                   Subjective: Patient states he is doing well today  Objective: See treatment diary below      Assessment: Tolerated treatment well  Continued with POC  He was able to complete 2 sets of 15 but with challenge  Added hip IR/ER with theraband  Continue to progress as able  Patient demonstrated fatigue post treatment, exhibited good technique with therapeutic exercises and would benefit from continued PT      Plan: Continue per plan of care        Precautions: post op R THR anterio-lateral approach (D O S 3/1/22)    HEP: HR, TR, Standing Hip Flexion, Hip Flexor stretch in standing, LAQ, quad sets    Specialty Daily Treatment Diary       Manual 5/3 5/5 5/10 5/12    STM/TPR R incision        R H' PROM all planes        R HS stretch        R laura stretch SMF RA BR RA            Tiger tail anterior and lateral R hip SMF ea RA BR RA            IASTM to R lateral hip        Cupping along ITB                Exercise Diary         RB   5 min 5 min             HR        TR                Mini Squats -> Chair squats        Step Ups (8") 30 8" x 30 8" x 30 8 " x 30    Lat Step ups (6") 30 8" x 30 8" x 30 8" x 30    Step downs (ecc)        Touchdowns (laterally)                Standing H' Flexion c isometric hold and eccentric lowering                 TB TKE        TB 3 way H'         Lat Band walks        Monster Walks        SLS        Standing H' Flexor stretch        Standing Lumbar extension using elevated table at waist for support 2 sec x 10 2 sec x 10 2 sec x 10 2 sec x 10     Standing H' Ext AROM NV 2x10 2x10  2x10     L arm on door frame with hips to the left 2x10 2x10 2x10  2x10            Slant board gastroc stretch 30 sec x 3  30 sec x 3 30 sec x 3 30 sec x 3                     Seated HS stretch                LAQ (towel roll under knee; eccentric control)        TB H' ER    GTB 15x    TB H' IR    GTB 15x                    Supine Heel Slides         Quad sets c towel roll under ankle        SAQ (ecc)        Supine H' ABD                 SLR (flex)  15x  2x15            Ball Squeeze 10 sec x 20 5 sec x 30 5 sec x 30 5 sec x 30    Ball c bridge 5 sec; 2x15 5 sec 2x15 5 sec x30 5 sec x 30    Sidelying Clamshells BTB 5 sec; 3x10 BTB 3x10 BTB 3x10 MTB 30    TB Bridges BTB 5 sec x 20 BTB 30x BTB 30x MTB 30            Sidelying H' ABD 0# 2 sec; 2x10 0# 2 sec; 2x10 0# sec; 2x10 0# sec; 2x10            Prone H' Ext        Prone quad stretch c SOS 30 sec x 3 (R only) 20 sec x 3 R only 20 sec x 3 20 sec x3                                                             HEP/EDU        Modalities        MH 5 mins pre in supine with quad sets and hip extension (5 sec x 30)       CP                   Skin checks performed pre and post application: intact

## 2022-05-16 ENCOUNTER — OFFICE VISIT (OUTPATIENT)
Dept: PHYSICAL THERAPY | Facility: CLINIC | Age: 62
End: 2022-05-16
Payer: COMMERCIAL

## 2022-05-16 DIAGNOSIS — M62.81 MUSCLE WEAKNESS (GENERALIZED): ICD-10-CM

## 2022-05-16 DIAGNOSIS — R26.2 DIFFICULTY WALKING: ICD-10-CM

## 2022-05-16 DIAGNOSIS — M25.551 RIGHT HIP PAIN: Primary | ICD-10-CM

## 2022-05-16 PROCEDURE — 97140 MANUAL THERAPY 1/> REGIONS: CPT

## 2022-05-16 PROCEDURE — 97110 THERAPEUTIC EXERCISES: CPT

## 2022-05-16 NOTE — PROGRESS NOTES
Daily Note     Today's date: 2022  Patient name: Jahaira Costa  : 1960  MRN: [id number]  Referring provider: Farrukh Ibarra*  Dx:   Encounter Diagnosis     ICD-10-CM    1  Right hip pain  M25 551    2  Muscle weakness (generalized)  M62 81    3  Difficulty walking  R26 2                   Subjective: Patient states he is doing well  Objective: See treatment diary below      Assessment: Tolerated treatment well  Initiated POC on RB for active warmup  Progressed patient through TE today with good tolerance  Added in standing hip 3 way, side steps and monster walks  Responded well to manuals  Patient demonstrated fatigue post treatment, exhibited good technique with therapeutic exercises and would benefit from continued PT      Plan: Continue per plan of care        Precautions: post op R THR anterio-lateral approach (D O S 3/1/22)    HEP: HR, TR, Standing Hip Flexion, Hip Flexor stretch in standing, LAQ, quad sets    Specialty Daily Treatment Diary       Manual 5/3 5/5 5/10 5/12 5/16   STM/TPR R incision        R H' PROM all planes        R HS stretch        R laura stretch SMF RA BR RA RA           Tiger tail anterior and lateral R hip SMF ea RA BR RA RA           IASTM to R lateral hip        Cupping along ITB                Exercise Diary         RB   5 min 5 min  5 min           HR        TR                Mini Squats -> Chair squats        Step Ups (8") 30 8" x 30 8" x 30 8 " x 30 8" x 30   Lat Step ups (6") 30 8" x 30 8" x 30 8" x 30 8" x30    Step downs (ecc)        Touchdowns (laterally)                Standing H' Flexion c isometric hold and eccentric lowering                 TB TKE        TB 3 way H'      GTB 2x10 all directions   Lat Band walks     GTB 3 laps    Monster Walks     GTB 3 laps   SLS        Standing H' Flexor stretch        Standing Lumbar extension using elevated table at waist for support 2 sec x 10 2 sec x 10 2 sec x 10 2 sec x 10  5 sec x 20    Standing H' Ext AROM NV 2x10 2x10  2x10  DC    L arm on door frame with hips to the left 2x10 2x10 2x10  2x10 2x10            Slant board gastroc stretch 30 sec x 3  30 sec x 3 30 sec x 3 30 sec x 3  30 sec x 3                    Seated HS stretch                LAQ (towel roll under knee; eccentric control)        TB H' ER    GTB 15x GTB 20x   TB H' IR    GTB 15x GTB 20x                   Supine Heel Slides         Quad sets c towel roll under ankle        SAQ (ecc)        Supine H' ABD                 SLR (flex)  15x  2x15 2x15            Ball Squeeze 10 sec x 20 5 sec x 30 5 sec x 30 5 sec x 30 5 sec x 30   Ball c bridge 5 sec; 2x15 5 sec 2x15 5 sec x30 5 sec x 30 5 sec x 30   Sidelying Clamshells BTB 5 sec; 3x10 BTB 3x10 BTB 3x10 MTB 30 MTB 30   TB Bridges BTB 5 sec x 20 BTB 30x BTB 30x MTB 30 MTB 30           Sidelying H' ABD 0# 2 sec; 2x10 0# 2 sec; 2x10 0# sec; 2x10 0# sec; 2x10 0# 2x15           Prone H' Ext        Prone quad stretch c SOS 30 sec x 3 (R only) 20 sec x 3 R only 20 sec x 3 20 sec x3  20 sec x 3                                                            HEP/EDU        Modalities        MH 5 mins pre in supine with quad sets and hip extension (5 sec x 30)       CP                   Skin checks performed pre and post application: intact

## 2022-05-17 ENCOUNTER — APPOINTMENT (OUTPATIENT)
Dept: PHYSICAL THERAPY | Facility: CLINIC | Age: 62
End: 2022-05-17
Payer: COMMERCIAL

## 2022-05-19 ENCOUNTER — APPOINTMENT (OUTPATIENT)
Dept: PHYSICAL THERAPY | Facility: CLINIC | Age: 62
End: 2022-05-19
Payer: COMMERCIAL

## 2022-05-23 ENCOUNTER — OFFICE VISIT (OUTPATIENT)
Dept: PHYSICAL THERAPY | Facility: CLINIC | Age: 62
End: 2022-05-23
Payer: COMMERCIAL

## 2022-05-23 DIAGNOSIS — R26.2 DIFFICULTY WALKING: ICD-10-CM

## 2022-05-23 DIAGNOSIS — M25.551 RIGHT HIP PAIN: Primary | ICD-10-CM

## 2022-05-23 DIAGNOSIS — M62.81 MUSCLE WEAKNESS (GENERALIZED): ICD-10-CM

## 2022-05-23 PROCEDURE — 97110 THERAPEUTIC EXERCISES: CPT

## 2022-05-23 PROCEDURE — 97530 THERAPEUTIC ACTIVITIES: CPT

## 2022-05-23 PROCEDURE — 97112 NEUROMUSCULAR REEDUCATION: CPT

## 2022-05-23 NOTE — PROGRESS NOTES
Daily Note     Today's date: 2022  Patient name: Abdullahi Reynaga  : 1960  MRN: [id number]  Referring provider: Maria Esther Fisher*  Dx:   Encounter Diagnosis     ICD-10-CM    1  Right hip pain  M25 551    2  Muscle weakness (generalized)  M62 81    3  Difficulty walking  R26 2                   Subjective: Patient states he is frustrated with stiffness reports it continues no matter how much he moves  Objective: See treatment diary below      Assessment: Tolerated treatment well  Educated patient that he is doing everything possible in order to help with stiffness (heating, stretching, movement, massage)  Continued with POC  He is appropriately challenge with program   Patient demonstrated fatigue post treatment, exhibited good technique with therapeutic exercises and would benefit from continued PT      Plan: Continue per plan of care        Precautions: post op R THR anterio-lateral approach (D O S 3/1/22)    HEP: HR, TR, Standing Hip Flexion, Hip Flexor stretch in standing, LAQ, quad sets    Specialty Daily Treatment Diary       Manual 5/23  5/10 5/12 5/16   STM/TPR R incision        R H' PROM all planes        R HS stretch        R laura stretch RA  BR RA RA           Tiger tail anterior and lateral R hip RA  BR RA RA           IASTM to R lateral hip        Cupping along ITB                Exercise Diary         RB 5 min   5 min 5 min  5 min           Mini Squats -> Chair squats        Step Ups 8" x 30  8" x 30 8 " x 30 8" x 30   Lat Step ups 8" x 30  8" x 30 8" x 30 8" x30    Step downs (ecc)        Touchdowns (laterally)                Standing H' Flexion c isometric hold and eccentric lowering                 TB TKE        TB 3 way H'  GTB 2x10 all directions     GTB 2x10 all directions   Lat Band walks GTB 3 laps    GTB 3 laps    Monster Walks GTB 3 laps    GTB 3 laps   SLS        Standing H' Flexor stretch        Standing Lumbar extension using elevated table at waist for support 5 sec x 20  2 sec x 10 2 sec x 10  5 sec x 20    L arm on door frame with hips to the left NV  2x10  2x10 2x10            Slant board gastroc stretch 30 sec x 3   30 sec x 3 30 sec x 3  30 sec x 3                    Seated HS stretch                LAQ (towel roll under knee; eccentric control)        TB H' ER    GTB 15x GTB 20x   TB H' IR    GTB 15x GTB 20x                   Supine Heel Slides         Quad sets c towel roll under ankle        SAQ (ecc)        Supine H' ABD                 SLR (flex) 2x15   2x15 2x15            Ball Squeeze 5 sec x 30  5 sec x 30 5 sec x 30 5 sec x 30   Ball c bridge 5 sec x 30  5 sec x30 5 sec x 30 5 sec x 30   Sidelying Clamshells MTB 30  BTB 3x10 MTB 30 MTB 30   TB Bridges NV  BTB 30x MTB 30 MTB 30           Sidelying H' ABD 0# 2x15  0# sec; 2x10 0# sec; 2x10 0# 2x15           Prone H' Ext        Prone quad stretch c SOS NV  20 sec x 3 20 sec x3  20 sec x 3                                                            HEP/EDU        Modalities                CP                   Skin checks performed pre and post application: intact

## 2022-05-25 ENCOUNTER — OFFICE VISIT (OUTPATIENT)
Dept: PHYSICAL THERAPY | Facility: CLINIC | Age: 62
End: 2022-05-25
Payer: COMMERCIAL

## 2022-05-25 DIAGNOSIS — M25.551 RIGHT HIP PAIN: Primary | ICD-10-CM

## 2022-05-25 DIAGNOSIS — R26.2 DIFFICULTY WALKING: ICD-10-CM

## 2022-05-25 DIAGNOSIS — M62.81 MUSCLE WEAKNESS (GENERALIZED): ICD-10-CM

## 2022-05-25 PROCEDURE — 97110 THERAPEUTIC EXERCISES: CPT

## 2022-05-25 PROCEDURE — 97112 NEUROMUSCULAR REEDUCATION: CPT

## 2022-05-25 PROCEDURE — 97530 THERAPEUTIC ACTIVITIES: CPT

## 2022-05-25 NOTE — PROGRESS NOTES
Daily Note     Today's date: 2022  Patient name: Kevin Aguilar  : 1960  MRN: [id number]  Referring provider: Jelly Quesada*  Dx:   Encounter Diagnosis     ICD-10-CM    1  Right hip pain  M25 551    2  Difficulty walking  R26 2    3  Muscle weakness (generalized)  M62 81        Start Time: 1115  Stop Time: 1210  Total time in clinic (min): 55 minutes    Subjective: Patient continues with stiffness on (R) hip this visit  He states that he would like to start implementing more heat and stretching at home  Objective: See treatment diary below    Assessment:  Patient responded well to treatment progression  Added weight to SLR which gave appropriate challenge - VCs utilized for quad activation  Patient reported lower levels of (R) hip stiffness post session  Patient would benefit from continued PT  Plan: Continue per plan of care        Precautions: post op R THR anterio-lateral approach (D O S 3/1/22)    HEP: HR, TR, Standing Hip Flexion, Hip Flexor stretch in standing, LAQ, quad sets    Specialty Daily Treatment Diary       Manual 5/23 5/25 5/10 5/12 5/16   STM/TPR R incision        R H' PROM all planes        R HS stretch        R laura stretch RA LQ BR RA RA           Tiger tail anterior and lateral R hip RA LQ BR RA RA           IASTM to R lateral hip        Cupping along ITB                Exercise Diary         RB 5 min  5 min 5 min 5 min  5 min           Mini Squats -> Chair squats        Step Ups 8" x 30 8" x 30 8" x 30 8 " x 30 8" x 30   Lat Step ups 8" x 30 8" x 30 8" x 30 8" x 30 8" x30    Step downs (ecc)        Touchdowns (laterally)                Standing H' Flexion c isometric hold and eccentric lowering                 TB TKE        TB 3 way H'  GTB 2x10 all directions  GTB 2x10 all directions    GTB 2x10 all directions   Lat Band walks GTB 3 laps GTB 3 laps   GTB 3 laps    Monster Walks GTB 3 laps GTB 4 laps   GTB 3 laps   SLS        Standing H' Flexor stretch Standing Lumbar extension using elevated table at waist for support 5 sec x 20 5 sec x 20 2 sec x 10 2 sec x 10  5 sec x 20    L arm on door frame with hips to the left NV 2x10  2x10  2x10 2x10            Slant board gastroc stretch 30 sec x 3  30 sec x 3  30 sec x 3 30 sec x 3  30 sec x 3                    Seated HS stretch                LAQ (towel roll under knee; eccentric control)        TB H' ER    GTB 15x GTB 20x   TB H' IR    GTB 15x GTB 20x                   Supine Heel Slides         Quad sets c towel roll under ankle        SAQ (ecc)        Supine H' ABD                 SLR (flex) 2x15 1# 2x12  2x15 2x15            Ball Squeeze 5 sec x 30 5 sec x 30 5 sec x 30 5 sec x 30 5 sec x 30   Ball c bridge 5 sec x 30 5 sec x 30 5 sec x30 5 sec x 30 5 sec x 30   Sidelying Clamshells MTB 30 MTB 30 BTB 3x10 MTB 30 MTB 30   TB Bridges NV MTB 30 BTB 30x MTB 30 MTB 30           Sidelying H' ABD 0# 2x15 0# 2x15 0# sec; 2x10 0# sec; 2x10 0# 2x15           Prone H' Ext        Prone quad stretch c SOS NV 20 sec x 3  20 sec x 3 20 sec x3  20 sec x 3                                                            HEP/EDU        Modalities                CP                   Skin checks performed pre and post application: intact

## 2022-06-01 ENCOUNTER — OFFICE VISIT (OUTPATIENT)
Dept: PHYSICAL THERAPY | Facility: CLINIC | Age: 62
End: 2022-06-01
Payer: COMMERCIAL

## 2022-06-01 DIAGNOSIS — M25.551 RIGHT HIP PAIN: Primary | ICD-10-CM

## 2022-06-01 DIAGNOSIS — R26.2 DIFFICULTY WALKING: ICD-10-CM

## 2022-06-01 DIAGNOSIS — M62.81 MUSCLE WEAKNESS (GENERALIZED): ICD-10-CM

## 2022-06-01 PROCEDURE — 97140 MANUAL THERAPY 1/> REGIONS: CPT

## 2022-06-01 PROCEDURE — 97110 THERAPEUTIC EXERCISES: CPT

## 2022-06-01 PROCEDURE — 97112 NEUROMUSCULAR REEDUCATION: CPT

## 2022-06-01 NOTE — PROGRESS NOTES
Daily Note     Today's date: 2022  Patient name: Alvin Knight  : 1960  MRN: [id number]  Referring provider: Jesus Silva*  Dx:   Encounter Diagnosis     ICD-10-CM    1  Right hip pain  M25 551    2  Difficulty walking  R26 2    3  Muscle weakness (generalized)  M62 81                   Subjective: Patient states he is doing well today  Objective: See treatment diary below      Assessment: Tolerated treatment well  Initiated POC on RB for active warmup  He is demonstrating improvements with strength but still has some limitations with ROM  He was progressed to BTB with standing TE with good tolerance  Patient demonstrated fatigue post treatment, exhibited good technique with therapeutic exercises and would benefit from continued PT      Plan: Continue per plan of care        Precautions: post op R THR anterio-lateral approach (D O S 3/1/22)    HEP: HR, TR, Standing Hip Flexion, Hip Flexor stretch in standing, LAQ, quad sets    Specialty Daily Treatment Diary       Manual      STM/TPR R incision        R H' PROM all planes        R HS stretch        R laura stretch RA LQ RA             Tiger tail anterior and lateral R hip RA LQ RA             IASTM to R lateral hip        Cupping along ITB                Exercise Diary         RB 5 min  5 min 5 min             Mini Squats -> Chair squats        Step Ups 8" x 30 8" x 30 8" x 30     Lat Step ups 8" x 30 8" x 30 8" x 30     Step downs (ecc)        Touchdowns (laterally)                Standing H' Flexion c isometric hold and eccentric lowering                 TB TKE        TB 3 way H'  GTB 2x10 all directions  GTB 2x10 all directions  BTB 3x10 all directions     Lat Band walks GTB 3 laps GTB 3 laps BTB 3 laps     Monster Walks GTB 3 laps GTB 4 laps BTB # laps      SLS        Standing H' Flexor stretch        Standing Lumbar extension using elevated table at waist for support 5 sec x 20 5 sec x 20 5 sec x 20      L arm on door frame with hips to the left NV 2x10               Slant board gastroc stretch 30 sec x 3  30 sec x 3  30 sec x 3                      Seated HS stretch                LAQ (towel roll under knee; eccentric control)        TB H' ER   GTB 20x     TB H' IR   GTB 20x                     Supine Heel Slides         Quad sets c towel roll under ankle        SAQ (ecc)        Supine H' ABD                 SLR (flex) 2x15 1# 2x12 1# 2x15             Ball Squeeze 5 sec x 30 5 sec x 30 5 sec x 30     Ball c bridge 5 sec x 30 5 sec x 30 5 sec x 30     Sidelying Clamshells MTB 30 MTB 30 MTB 30     TB Bridges NV MTB 30 MTB 30             Sidelying H' ABD 0# 2x15 0# 2x15 0# 2x15             Prone H' Ext        Prone quad stretch c SOS NV 20 sec x 3  20 sec  X 3                                                              HEP/EDU        Modalities                CP                   Skin checks performed pre and post application: intact

## 2022-06-03 ENCOUNTER — OFFICE VISIT (OUTPATIENT)
Dept: PHYSICAL THERAPY | Facility: CLINIC | Age: 62
End: 2022-06-03
Payer: COMMERCIAL

## 2022-06-03 DIAGNOSIS — R26.2 DIFFICULTY WALKING: ICD-10-CM

## 2022-06-03 DIAGNOSIS — M25.551 RIGHT HIP PAIN: Primary | ICD-10-CM

## 2022-06-03 DIAGNOSIS — M62.81 MUSCLE WEAKNESS (GENERALIZED): ICD-10-CM

## 2022-06-03 PROCEDURE — 97112 NEUROMUSCULAR REEDUCATION: CPT

## 2022-06-03 PROCEDURE — 97110 THERAPEUTIC EXERCISES: CPT

## 2022-06-03 NOTE — PROGRESS NOTES
Daily Note     Today's date: 6/3/2022  Patient name: Nola Bonilla  : 1960  MRN: [id number]  Referring provider: Eleni Meyer*  Dx:   Encounter Diagnosis     ICD-10-CM    1  Right hip pain  M25 551    2  Difficulty walking  R26 2    3  Muscle weakness (generalized)  M62 81                   Subjective: Patient stated that he's been working on putting tile in a bathroom  His hip is feeling stiff this AM        Objective: See treatment diary below    EDU on being proactive with stretching at home throughout the day vs waiting until the end of the day when he's feeling stiff to do the stretches  Assessment: Tolerated treatment well  Patient is approprietly challenged with current set of interventions  Good form noted with step ups  Compensatory motions noted with hip strengthening, more so with hip abd  Reduced stiffness noted post manuals  SOB at times  Continued PT would be beneficial to improve function           Plan: Continue per plan of care         Precautions: post op R THR anterio-lateral approach (D O S 3/1/22)    HEP: HR, TR, Standing Hip Flexion, Hip Flexor stretch in standing, LAQ, quad sets    Specialty Daily Treatment Diary       Manual 5/23 5/25 6/1 6/3    STM/TPR R incision        R H' PROM all planes        R HS stretch        R laura stretch RA LQ RA MB            Tiger tail anterior and lateral R hip RA LQ RA MB            IASTM to R lateral hip        Cupping along ITB                Exercise Diary         RB 5 min  5 min 5 min 5 min            Mini Squats -> Chair squats        Step Ups 8" x 30 8" x 30 8" x 30 8" x 30    Lat Step ups 8" x 30 8" x 30 8" x 30 8" x 30    Step downs (ecc)        Touchdowns (laterally)                Standing H' Flexion c isometric hold and eccentric lowering                 TB TKE        TB 3 way H'  GTB 2x10 all directions  GTB 2x10 all directions  BTB 3x10 all directions BTB 3x10 all directions    Lat Band walks GTB 3 laps GTB 3 laps BTB 3 laps BTB 3 laps    Monster Walks GTB 3 laps GTB 4 laps BTB # laps  BTB 3 laps    SLS        Standing H' Flexor stretch        Standing Lumbar extension using elevated table at waist for support 5 sec x 20 5 sec x 20 5 sec x 20  5 sec x 20     L arm on door frame with hips to the left NV 2x10               Slant board gastroc stretch 30 sec x 3  30 sec x 3  30 sec x 3  30 sec x 3                     Seated HS stretch                LAQ (towel roll under knee; eccentric control)        TB H' ER   GTB 20x GTB 20x    TB H' IR   GTB 20x GTB 20x                    Supine Heel Slides         Quad sets c towel roll under ankle        SAQ (ecc)        Supine H' ABD                 SLR (flex) 2x15 1# 2x12 1# 2x15 1 5# 2x15            Ball Squeeze 5 sec x 30 5 sec x 30 5 sec x 30 5 sec x 30    Ball c bridge 5 sec x 30 5 sec x 30 5 sec x 30 5 sec x 30    Sidelying Clamshells MTB 30 MTB 30 MTB 30 MTB 30    TB Bridges NV MTB 30 MTB 30 MTB 30            Sidelying H' ABD 0# 2x15 0# 2x15 0# 2x15 0# 2x15            Prone H' Ext        Prone quad stretch c SOS NV 20 sec x 3  20 sec  X 3  3 x20 sec                                                            HEP/EDU        Modalities                CP                   Skin checks performed pre and post application: intact

## 2022-06-06 ENCOUNTER — OFFICE VISIT (OUTPATIENT)
Dept: PHYSICAL THERAPY | Facility: CLINIC | Age: 62
End: 2022-06-06
Payer: COMMERCIAL

## 2022-06-06 DIAGNOSIS — R26.2 DIFFICULTY WALKING: ICD-10-CM

## 2022-06-06 DIAGNOSIS — M25.551 RIGHT HIP PAIN: Primary | ICD-10-CM

## 2022-06-06 DIAGNOSIS — M62.81 MUSCLE WEAKNESS (GENERALIZED): ICD-10-CM

## 2022-06-06 PROCEDURE — 97110 THERAPEUTIC EXERCISES: CPT

## 2022-06-06 PROCEDURE — 97140 MANUAL THERAPY 1/> REGIONS: CPT

## 2022-06-06 NOTE — PROGRESS NOTES
Daily Note     Today's date: 2022  Patient name: Sarah Soto  : 1960  MRN: [id number]  Referring provider: Gwendolyn Stack*  Dx:   Encounter Diagnosis     ICD-10-CM    1  Right hip pain  M25 551    2  Difficulty walking  R26 2    3  Muscle weakness (generalized)  M62 81                   Subjective: Patient reports he is feeling good today, offers no new complaints at this time  Objective: See treatment diary below      Assessment: Tolerated treatment well  Patient demonstrated fatigue post treatment      Plan: Continue per plan of care        Precautions: post op R THR anterio-lateral approach (D O S 3/1/22)    HEP: HR, TR, Standing Hip Flexion, Hip Flexor stretch in standing, LAQ, quad sets    Specialty Daily Treatment Diary       Manual 5/23 5/25 6/1 6/3 6/6   STM/TPR R incision        R H' PROM all planes        R HS stretch        R laura stretch RA LQ RA MB HA           Tiger tail anterior and lateral R hip RA LQ RA MB HA           IASTM to R lateral hip        Cupping along ITB                Exercise Diary         RB 5 min  5 min 5 min 5 min 5 min           Mini Squats -> Chair squats        Step Ups 8" x 30 8" x 30 8" x 30 8" x 30 8" x30   Lat Step ups 8" x 30 8" x 30 8" x 30 8" x 30 8" x30   Step downs (ecc)        Touchdowns (laterally)                Standing H' Flexion c isometric hold and eccentric lowering                 TB TKE        TB 3 way H'  GTB 2x10 all directions  GTB 2x10 all directions  BTB 3x10 all directions BTB 3x10 all directions BTB 3x10 all directions   Lat Band walks GTB 3 laps GTB 3 laps BTB 3 laps BTB 3 laps BTB 3 laps   Monster Walks GTB 3 laps GTB 4 laps BTB # laps  BTB 3 laps BTB 3 laps   SLS        Standing H' Flexor stretch        Standing Lumbar extension using elevated table at waist for support 5 sec x 20 5 sec x 20 5 sec x 20  5 sec x 20  5"x20   L arm on door frame with hips to the left NV 2x10               Slant board gastroc stretch 30 sec x 3 30 sec x 3  30 sec x 3  30 sec x 3  30"x3                   Seated HS stretch                LAQ (towel roll under knee; eccentric control)        TB H' ER   GTB 20x GTB 20x GTB x20   TB H' IR   GTB 20x GTB 20x GTB x20                   Supine Heel Slides         Quad sets c towel roll under ankle        SAQ (ecc)        Supine H' ABD                 SLR (flex) 2x15 1# 2x12 1# 2x15 1 5# 2x15 1 5# 2x15           Ball Squeeze 5 sec x 30 5 sec x 30 5 sec x 30 5 sec x 30 5"x30   Ball c bridge 5 sec x 30 5 sec x 30 5 sec x 30 5 sec x 30 5"x30   Sidelying Clamshells MTB 30 MTB 30 MTB 30 MTB 30 MTB 30   TB Bridges NV MTB 30 MTB 30 MTB 30 MTB 30           Sidelying H' ABD 0# 2x15 0# 2x15 0# 2x15 0# 2x15 0# 2x15           Prone H' Ext        Prone quad stretch c SOS NV 20 sec x 3  20 sec  X 3  3 x20 sec 3x20"                                                           HEP/EDU        Modalities                CP                   Skin checks performed pre and post application: intact

## 2022-06-07 ENCOUNTER — APPOINTMENT (OUTPATIENT)
Dept: LAB | Facility: HOSPITAL | Age: 62
End: 2022-06-07
Attending: INTERNAL MEDICINE
Payer: COMMERCIAL

## 2022-06-07 DIAGNOSIS — E53.8 B12 DEFICIENCY: ICD-10-CM

## 2022-06-07 DIAGNOSIS — D50.0 IRON DEFICIENCY ANEMIA DUE TO CHRONIC BLOOD LOSS: ICD-10-CM

## 2022-06-07 LAB
ALBUMIN SERPL BCP-MCNC: 3.8 G/DL (ref 3.5–5)
ALP SERPL-CCNC: 77 U/L (ref 46–116)
ALT SERPL W P-5'-P-CCNC: 58 U/L (ref 12–78)
ANION GAP SERPL CALCULATED.3IONS-SCNC: 9 MMOL/L (ref 4–13)
AST SERPL W P-5'-P-CCNC: 31 U/L (ref 5–45)
BASOPHILS # BLD AUTO: 0.01 THOUSANDS/ΜL (ref 0–0.1)
BASOPHILS NFR BLD AUTO: 0 % (ref 0–1)
BILIRUB SERPL-MCNC: 0.4 MG/DL (ref 0.2–1)
BUN SERPL-MCNC: 16 MG/DL (ref 5–25)
CALCIUM SERPL-MCNC: 8.9 MG/DL (ref 8.3–10.1)
CHLORIDE SERPL-SCNC: 106 MMOL/L (ref 100–108)
CO2 SERPL-SCNC: 25 MMOL/L (ref 21–32)
CREAT SERPL-MCNC: 1 MG/DL (ref 0.6–1.3)
EOSINOPHIL # BLD AUTO: 0.05 THOUSAND/ΜL (ref 0–0.61)
EOSINOPHIL NFR BLD AUTO: 2 % (ref 0–6)
ERYTHROCYTE [DISTWIDTH] IN BLOOD BY AUTOMATED COUNT: 15.9 % (ref 11.6–15.1)
FERRITIN SERPL-MCNC: 352 NG/ML (ref 8–388)
GFR SERPL CREATININE-BSD FRML MDRD: 80 ML/MIN/1.73SQ M
GLUCOSE P FAST SERPL-MCNC: 114 MG/DL (ref 65–99)
HCT VFR BLD AUTO: 36.5 % (ref 36.5–49.3)
HGB BLD-MCNC: 11.6 G/DL (ref 12–17)
IMM GRANULOCYTES # BLD AUTO: 0.01 THOUSAND/UL (ref 0–0.2)
IMM GRANULOCYTES NFR BLD AUTO: 0 % (ref 0–2)
IRON SATN MFR SERPL: 41 % (ref 20–50)
IRON SERPL-MCNC: 126 UG/DL (ref 65–175)
LYMPHOCYTES # BLD AUTO: 1.48 THOUSANDS/ΜL (ref 0.6–4.47)
LYMPHOCYTES NFR BLD AUTO: 44 % (ref 14–44)
MCH RBC QN AUTO: 35 PG (ref 26.8–34.3)
MCHC RBC AUTO-ENTMCNC: 31.8 G/DL (ref 31.4–37.4)
MCV RBC AUTO: 110 FL (ref 82–98)
MONOCYTES # BLD AUTO: 0.32 THOUSAND/ΜL (ref 0.17–1.22)
MONOCYTES NFR BLD AUTO: 10 % (ref 4–12)
NEUTROPHILS # BLD AUTO: 1.47 THOUSANDS/ΜL (ref 1.85–7.62)
NEUTS SEG NFR BLD AUTO: 44 % (ref 43–75)
NRBC BLD AUTO-RTO: 0 /100 WBCS
PLATELET # BLD AUTO: 102 THOUSANDS/UL (ref 149–390)
PMV BLD AUTO: 10.9 FL (ref 8.9–12.7)
POTASSIUM SERPL-SCNC: 4.2 MMOL/L (ref 3.5–5.3)
PROT SERPL-MCNC: 7.6 G/DL (ref 6.4–8.2)
RBC # BLD AUTO: 3.31 MILLION/UL (ref 3.88–5.62)
SODIUM SERPL-SCNC: 140 MMOL/L (ref 136–145)
TIBC SERPL-MCNC: 308 UG/DL (ref 250–450)
WBC # BLD AUTO: 3.34 THOUSAND/UL (ref 4.31–10.16)

## 2022-06-07 PROCEDURE — 83550 IRON BINDING TEST: CPT

## 2022-06-07 PROCEDURE — 85025 COMPLETE CBC W/AUTO DIFF WBC: CPT

## 2022-06-07 PROCEDURE — 82728 ASSAY OF FERRITIN: CPT

## 2022-06-07 PROCEDURE — 83918 ORGANIC ACIDS TOTAL QUANT: CPT

## 2022-06-07 PROCEDURE — 83540 ASSAY OF IRON: CPT

## 2022-06-07 PROCEDURE — 80053 COMPREHEN METABOLIC PANEL: CPT

## 2022-06-07 PROCEDURE — 36415 COLL VENOUS BLD VENIPUNCTURE: CPT

## 2022-06-08 ENCOUNTER — APPOINTMENT (OUTPATIENT)
Dept: PHYSICAL THERAPY | Facility: CLINIC | Age: 62
End: 2022-06-08
Payer: COMMERCIAL

## 2022-06-10 ENCOUNTER — OFFICE VISIT (OUTPATIENT)
Dept: HEMATOLOGY ONCOLOGY | Facility: HOSPITAL | Age: 62
End: 2022-06-10
Payer: COMMERCIAL

## 2022-06-10 VITALS
RESPIRATION RATE: 18 BRPM | DIASTOLIC BLOOD PRESSURE: 76 MMHG | SYSTOLIC BLOOD PRESSURE: 128 MMHG | TEMPERATURE: 97.6 F | HEART RATE: 84 BPM | OXYGEN SATURATION: 94 % | BODY MASS INDEX: 35.81 KG/M2 | WEIGHT: 279 LBS | HEIGHT: 74 IN

## 2022-06-10 DIAGNOSIS — D50.0 IRON DEFICIENCY ANEMIA DUE TO CHRONIC BLOOD LOSS: ICD-10-CM

## 2022-06-10 DIAGNOSIS — D50.9 IRON DEFICIENCY ANEMIA, UNSPECIFIED IRON DEFICIENCY ANEMIA TYPE: Primary | ICD-10-CM

## 2022-06-10 DIAGNOSIS — E53.8 B12 DEFICIENCY: ICD-10-CM

## 2022-06-10 PROCEDURE — 99214 OFFICE O/P EST MOD 30 MIN: CPT | Performed by: INTERNAL MEDICINE

## 2022-06-10 PROCEDURE — 1036F TOBACCO NON-USER: CPT | Performed by: INTERNAL MEDICINE

## 2022-06-10 NOTE — PROGRESS NOTES
Hematology/Oncology Outpatient Follow- up Note  [facility] 65 y o  male MRN: @ Encounter: [id number]        Date:  6/10/2022    Presenting Complaint/Diagnosis :     Macrocytosis and anemia and thrombocytopenia for at least 4 years    Previous Hematologic/ Oncologic History:        Venofer and B12    Current Hematologic/ Oncologic Treatment:        observation    Interval History:       The patient returns for follow-up visit  Blood work is mostly stable  He himself is doing well  Had his hip replacement with no issues  Is undergoing rehab  Denies any nausea denies any vomiting denies any diarrhea  Iron studies are within acceptable limits  B12 is pending  Workup in the past was negative  The rest of his 14 point review of systems today was negative  Test Results:    Imaging: No results found  Labs:   Lab Results   Component Value Date    WBC 3 34 (L) 06/07/2022    HGB 11 6 (L) 06/07/2022    HCT 36 5 06/07/2022     (H) 06/07/2022     (L) 06/07/2022     Lab Results   Component Value Date     11/29/2017    K 4 2 06/07/2022     06/07/2022    CO2 25 06/07/2022    BUN 16 06/07/2022    CREATININE 1 00 06/07/2022    GLUF 114 (H) 06/07/2022    CALCIUM 8 9 06/07/2022    AST 31 06/07/2022    ALT 58 06/07/2022    ALKPHOS 77 06/07/2022    PROT 6 6 11/29/2017    BILITOT 0 4 11/29/2017    EGFR 80 06/07/2022         Lab Results   Component Value Date    SPEP See Comment 05/18/2021       Lab Results   Component Value Date    IRON 126 06/07/2022    TIBC 308 06/07/2022    FERRITIN 352 06/07/2022       ROS: As stated in the history of present illness otherwise his 14 point review of systems today was negative        Active Problems:   Patient Active Problem List   Diagnosis    Hematochezia    Fatty liver    Hyperlipidemia    Hypothyroidism    Macrocytic anemia    Osteoarthritis of both hips    Splenomegaly    Testicular hypogonadism    Thrombocytopenia ([address] )    Ureteric stone    Vitamin D deficiency    Leucopenia    Iron deficiency anemia due to chronic blood loss    B12 deficiency    Venous ulcer (HCC)       Past Medical History:   Past Medical History:   Diagnosis Date    Anemia     last assessed: 09/03/15    Cholelithiasis     Disease of thyroid gland     GERD (gastroesophageal reflux disease)     Hypersecretion of testicular hormones     Insomnia     Kidney stone     Low testosterone     Male erectile dysfunction     Osteoarthritis, hip, bilateral     Thrombosed external hemorrhoids        Surgical History:   Past Surgical History:   Procedure Laterality Date    CHOLECYSTECTOMY      COLONOSCOPY      NV COLONOSCOPY FLX DX W/COLLJ SPEC WHEN PFRMD N/A 11/6/2017    Procedure: EGD AND COLONOSCOPY;  Surgeon: Chayito Stoner MD;  [facility]; Service: Gastroenterology    TOTAL HIP ARTHROPLASTY Left 11/20/2020       Family History:    Family History   Problem Relation Age of Onset    Arthritis Mother     Heart disease Father     Hypertension Father     Diabetes Father     Asthma Father     Cancer Paternal Grandmother     Cancer Family     Heart disease Family        Cancer-related family history includes Cancer in his family and paternal grandmother      Social History:   Social History     Socioeconomic History    Marital status: /Civil Union     Spouse name: Not on file    Number of children: Not on file    Years of education: Not on file    Highest education level: Not on file   Occupational History    Not on file   Tobacco Use    Smoking status: Former Smoker    Smokeless tobacco: Never Used    Tobacco comment: QUIT 2002/ SECOND HAND    Vaping Use    Vaping Use: Never used   Substance and Sexual Activity    Alcohol use: Yes     Comment: socially    Drug use: Never    Sexual activity: Not on file   Other Topics Concern    Not on file   Social History Narrative    Not on file     Social Determinants of Health     Financial Resource Strain: Not on file   Food Insecurity: Not on file   Transportation Needs: Not on file   Physical Activity: Not on file   Stress: Not on file   Social Connections: Not on file   Intimate Partner Violence: Not on file   Housing Stability: Not on file       Current Medications:   Current Outpatient Medications   Medication Sig Dispense Refill    Cholecalciferol 50 MCG (2000 UT) TABS 2 tab daily      levothyroxine 125 mcg tablet 2 TABS DAILY 1 TIME A  tablet 1    liothyronine (CYTOMEL) 5 mcg tablet TAKE 1 TABLET BY MOUTH EVERY DAY 90 tablet 1    Testosterone 2 MG/24HR PT24 1 patch daily 30 patch 5    ferrous sulfate 325 (65 Fe) mg tablet Take 325 mg by mouth daily with breakfast      hydrocortisone (ANUSOL-HC) 2 5 % rectal cream Apply topically 2 (two) times a day (Patient not taking: Reported on 1/12/2022 ) 28 g 2    hydrocortisone (ANUSOL-HC) 25 mg suppository Insert 1 suppository (25 mg total) into the rectum 2 (two) times a day (Patient not taking: Reported on 1/12/2022 ) 30 suppository 2    polyethylene glycol (GOLYTELY) 4000 mL solution Use as instructed by office staff (Patient not taking: No sig reported) 4000 mL 0    triamcinolone (KENALOG) 0 1 % cream Apply topically 2 (two) times a day (Patient not taking: No sig reported) 45 g 1     Current Facility-Administered Medications   Medication Dose Route Frequency Provider Last Rate Last Admin    ipratropium-albuterol (DUO-NEB) 0 5-2 5 mg/3 mL inhalation solution 3 mL  3 mL Nebulization Q6H Kerry Dies, DO   3 mL at 02/02/18 0082       Allergies: No Known Allergies    Physical Exam:    Body surface area is 2 51 meters squared      Wt Readings from Last 3 Encounters:   06/10/22 127 kg (279 lb)   01/14/22 126 kg (277 lb)   12/29/21 126 kg (277 lb 12 8 oz)        Temp Readings from Last 3 Encounters:   06/10/22 97 6 °F (36 4 °C) (Temporal)   11/22/21 97 6 °F (36 4 °C) (Tympanic)   11/19/21 98 4 °F (36 9 °C) (Temporal)        BP Readings from Last 3 Encounters:   06/10/22 128/76   01/14/22 140/84   12/29/21 140/84         Pulse Readings from Last 3 Encounters:   06/10/22 84   01/14/22 66   12/29/21 68         Physical Exam     Constitutional   General appearance: No acute distress, well appearing and well nourished  Eyes   Conjunctiva and lids: No swelling, erythema or discharge  Pupils and irises: Equal, round and reactive to light  Ears, Nose, Mouth, and Throat   External inspection of ears and nose: Normal     Nasal mucosa, septum, and turbinates: Normal without edema or erythema  Oropharynx: Normal with no erythema, edema, exudate or lesions  Pulmonary   Respiratory effort: No increased work of breathing or signs of respiratory distress  Auscultation of lungs: Clear to auscultation  Cardiovascular   Palpation of heart: Normal PMI, no thrills  Auscultation of heart: Normal rate and rhythm, normal S1 and S2, without murmurs  Examination of extremities for edema and/or varicosities: Normal     Carotid pulses: Normal     Abdomen   Abdomen: Non-tender, no masses  Liver and spleen: No hepatomegaly or splenomegaly  Lymphatic   Palpation of lymph nodes in neck: No lymphadenopathy  Musculoskeletal   Gait and station: Normal     Digits and nails: Normal without clubbing or cyanosis  Inspection/palpation of joints, bones, and muscles: Normal     Skin   Skin and subcutaneous tissue: Normal without rashes or lesions  Neurologic   Cranial nerves: Cranial nerves 2-12 intact  Sensation: No sensory loss  Psychiatric   Orientation to person, place, and time: Normal     Mood and affect: Normal         Assessment / Plan:      The patient is a pleasant 55-year-old male who has had macrocytosis and thrombocytopenia varying degree over the last 5 years  I suspect this thrombocytopenia is secondary to portal hypertension from his fatty liver  His bone marrow biopsy did not show any ring sideroblasts or any evidence of MDS   It was a normocellular marrow  I suspect his blood counts are related to his fatty liver  His most recent blood work shows a white count of 3 34 with a hemoglobin of 11 6 and an MCV of 110  Platelet count was 208  Blood counts are consistent over the last 6 months and to previous blood counts  At this point I think based on the stability of his blood counts will continue observation  I will see him back in 6 months  Goals and Barriers:  Current Goal:  Prolong Survival from   Macrocytosis and fluctuating thrombocytopenia  Barriers: None  Patient's Capacity to Self Care:  Patient able to self care  Portions of the record may have been created with voice recognition software  Occasional wrong word or "sound a like" substitutions may have occurred due to the inherent limitations of voice recognition software  Read the chart carefully and recognize, using context, where substitutions have occurred

## 2022-06-13 ENCOUNTER — OFFICE VISIT (OUTPATIENT)
Dept: PHYSICAL THERAPY | Facility: CLINIC | Age: 62
End: 2022-06-13
Payer: COMMERCIAL

## 2022-06-13 DIAGNOSIS — R26.2 DIFFICULTY WALKING: ICD-10-CM

## 2022-06-13 DIAGNOSIS — M62.81 MUSCLE WEAKNESS (GENERALIZED): ICD-10-CM

## 2022-06-13 DIAGNOSIS — M25.551 RIGHT HIP PAIN: Primary | ICD-10-CM

## 2022-06-13 PROCEDURE — 97110 THERAPEUTIC EXERCISES: CPT

## 2022-06-13 PROCEDURE — 97112 NEUROMUSCULAR REEDUCATION: CPT

## 2022-06-13 NOTE — PROGRESS NOTES
Daily Note     Today's date: 2022  Patient name: Abdullahi Reynaga  : 1960  MRN: [id number]  Referring provider: Maria Esther Fisher*  Dx:   Encounter Diagnosis     ICD-10-CM    1  Right hip pain  M25 551    2  Difficulty walking  R26 2    3  Muscle weakness (generalized)  M62 81                   Subjective: Patient states he is doing well  Objective: See treatment diary below      Assessment: Tolerated treatment well  Patient has met all goals at this time  He still notes of quad stiffness even with stretching daily and increasing water intake  His doctor advised to make an appt with neurology  Discussed discharge with patient, he is ready in 2 weeks to transition to gym  Plan: Progress treatment as tolerated         Precautions: post op R THR anterio-lateral approach (D O S 3/1/22)    HEP: HR, TR, Standing Hip Flexion, Hip Flexor stretch in standing, LAQ, quad sets    Specialty Daily Treatment Diary       Manual 6/13 5/25 6/1 6/3 6/6   STM/TPR R incision        R H' PROM all planes        R HS stretch        R laura stretch  LQ RA MB VILLAVICENCIO           Tiger tail anterior and lateral R hip  LQ RA MB HA           IASTM to R lateral hip        Cupping along ITB                Exercise Diary         RB 5 min  5 min 5 min 5 min 5 min           Mini Squats -> Chair squats        Step Ups 8" x 30 8" x 30 8" x 30 8" x 30 8" x30   Lat Step ups 8" x 30 8" x 30 8" x 30 8" x 30 8" x30   Step downs (ecc)        Touchdowns (laterally)                Standing H' Flexion c isometric hold and eccentric lowering                 TB TKE        TB 3 way H'  BTB 30x all directions GTB 2x10 all directions  BTB 3x10 all directions BTB 3x10 all directions BTB 3x10 all directions   Lat Band walks BTB 3 laps GTB 3 laps BTB 3 laps BTB 3 laps BTB 3 laps   Monster Walks BTB 3 laps GTB 4 laps BTB # laps  BTB 3 laps BTB 3 laps   SLS        Standing H' Flexor stretch        Standing Lumbar extension using elevated table at waist for support DC 5 sec x 20 5 sec x 20  5 sec x 20  5"x20   L arm on door frame with hips to the left  2x10               Slant board gastroc stretch 30 sec x 43 30 sec x 3  30 sec x 3  30 sec x 3  30"x3                   Seated HS stretch                LAQ (towel roll under knee; eccentric control)        TB H' ER BTB 20  GTB 20x GTB 20x GTB x20   TB H' IR BTB 20  GTB 20x GTB 20x GTB x20                   Supine Heel Slides         Quad sets c towel roll under ankle        SAQ (ecc)        Supine H' ABD                 SLR (flex) 1 5# 2x15 1# 2x12 1# 2x15 1 5# 2x15 1 5# 2x15           Ball Squeeze  5 sec x 30 5 sec x 30 5 sec x 30 5"x30   Ball c bridge  5 sec x 30 5 sec x 30 5 sec x 30 5"x30   Sidelying Clamshells MTB 30 MTB 30 MTB 30 MTB 30 MTB 30   TB Bridges MTB 30 MTB 30 MTB 30 MTB 30 MTB 30           Sidelying H' ABD 1 5# 2x15 0# 2x15 0# 2x15 0# 2x15 0# 2x15           Prone H' Ext        Prone quad stretch c SOS  20 sec x 3  20 sec  X 3  3 x20 sec 3x20"                                                           HEP/EDU        Modalities                CP                   Skin checks performed pre and post application: intact

## 2022-06-15 ENCOUNTER — OFFICE VISIT (OUTPATIENT)
Dept: PHYSICAL THERAPY | Facility: CLINIC | Age: 62
End: 2022-06-15
Payer: COMMERCIAL

## 2022-06-15 DIAGNOSIS — M25.551 RIGHT HIP PAIN: Primary | ICD-10-CM

## 2022-06-15 DIAGNOSIS — M62.81 MUSCLE WEAKNESS (GENERALIZED): ICD-10-CM

## 2022-06-15 DIAGNOSIS — R26.2 DIFFICULTY WALKING: ICD-10-CM

## 2022-06-15 PROCEDURE — 97530 THERAPEUTIC ACTIVITIES: CPT

## 2022-06-15 PROCEDURE — 97112 NEUROMUSCULAR REEDUCATION: CPT

## 2022-06-15 PROCEDURE — 97110 THERAPEUTIC EXERCISES: CPT

## 2022-06-15 NOTE — PROGRESS NOTES
Daily Note     Today's date: 6/15/2022  Patient name: Florin Alexander  : 1960  MRN: [id number]  Referring provider: Kelly Encinas  Dx:   Encounter Diagnosis     ICD-10-CM    1  Right hip pain  M25 551    2  Difficulty walking  R26 2    3  Muscle weakness (generalized)  M62 81                   Subjective: Patient states he is doing well today  Objective: See treatment diary below      Assessment: Tolerated treatment well  Progressed patient through all TE today as he is nearing discharge next week  He demonstrated good tolerance with all progressions and was fatigued at the end of the session  Discharged supine exercises to HEP, provided updated HEP  Patient demonstrated fatigue post treatment and exhibited good technique with therapeutic exercises      Plan: Continue per plan of care        Precautions: post op R THR anterio-lateral approach (D O S 3/1/22)    HEP: HR, TR, Standing Hip Flexion, Hip Flexor stretch in standing, LAQ, quad sets    Specialty Daily Treatment Diary       Manual 6/13 6/15  6/3 6/6           Exercise Diary         RB 5 min  5 min  5 min 5 min           Mini Squats -> Chair squats        Step Ups 8" x 30 airex 8" x 30  8" x 30 8" x30   Lat Step ups 8" x 30 airex 8" x 30   8" x 30 8" x30   Step downs (ecc)        Touchdowns (laterally)        Leg Press  190# DL 30  190# DL 30  110# SL 30       Lateral sliding lunges   15x b/l       Static lunge   15x b/l       TB TKE        TB 3 way H'  BTB 30x all directions MTB 30x all directiosn  BTB 3x10 all directions BTB 3x10 all directions   Lat Band walks BTB 3 laps MTB 3 laps  BTB 3 laps BTB 3 laps   Monster Walks BTB 3 laps MTB 3 laps   BTB 3 laps BTB 3 laps   SLS  On airex 30 sec x 3       Standing H' Flexor stretch        Standing Lumbar extension using elevated table at waist for support DC   5 sec x 20  5"x20   L arm on door frame with hips to the left                Slant board gastroc stretch 30 sec x 43   30 sec x 3  30"x3 TB H' ER BTB 20 MTB 20  GTB 20x GTB x20   TB H' IR BTB 20 MTB 20   GTB 20x GTB x20                   Supine Heel Slides         Quad sets c towel roll under ankle        SAQ (ecc)        Supine H' ABD                 SLR (flex) 1 5# 2x15   1 5# 2x15 1 5# 2x15           Ball Squeeze  DC to HEP  5 sec x 30 5"x30   Ball c bridge    5 sec x 30 5"x30   Sidelying Clamshells MTB 30 DC to HPE  MTB 30 MTB 30   TB Bridges MTB 30 DC to HEP  MTB 30 MTB 30           Sidelying H' ABD 1 5# 2x15 DC to HEP  0# 2x15 0# 2x15           Prone H' Ext        Prone quad stretch c SOS    3 x20 sec 3x20"                                                           HEP/EDU        Modalities        ROSY        CP                   Skin checks performed pre and post application: intact

## 2022-06-16 LAB — METHYLMALONATE SERPL-SCNC: 204 NMOL/L (ref 0–378)

## 2022-06-20 ENCOUNTER — OFFICE VISIT (OUTPATIENT)
Dept: PHYSICAL THERAPY | Facility: CLINIC | Age: 62
End: 2022-06-20
Payer: COMMERCIAL

## 2022-06-20 DIAGNOSIS — M62.81 MUSCLE WEAKNESS (GENERALIZED): ICD-10-CM

## 2022-06-20 DIAGNOSIS — M25.551 RIGHT HIP PAIN: Primary | ICD-10-CM

## 2022-06-20 DIAGNOSIS — R26.2 DIFFICULTY WALKING: ICD-10-CM

## 2022-06-20 PROCEDURE — 97112 NEUROMUSCULAR REEDUCATION: CPT

## 2022-06-20 PROCEDURE — 97530 THERAPEUTIC ACTIVITIES: CPT

## 2022-06-20 PROCEDURE — 97110 THERAPEUTIC EXERCISES: CPT

## 2022-06-20 NOTE — PROGRESS NOTES
Daily Note     Today's date: 2022  Patient name: Alvin Knight  : 1960  MRN: [id number]  Referring provider: Jesus Silva*  Dx:   Encounter Diagnosis     ICD-10-CM    1  Right hip pain  M25 551    2  Difficulty walking  R26 2    3  Muscle weakness (generalized)  M62 81                   Subjective: Patient states he is doing well today  Objective: See treatment diary below      Assessment: Tolerated treatment well  Patient has met all goals at this time  He has been pain free for a few weeks now  Will be discharged next visit  Plan: Potential discharge next visit       Precautions: post op R THR anterio-lateral approach (D O S 3/1/22)    HEP: HR, TR, Standing Hip Flexion, Hip Flexor stretch in standing, LAQ, quad sets    Specialty Daily Treatment Diary       Manual 6/13 6/15 6/20  6/6           Exercise Diary         RB 5 min  5 min 5 min   5 min           Mini Squats -> Chair squats        Step Ups 8" x 30 airex 8" x 30 airex 8" x 30   8" x30   Lat Step ups 8" x 30 airex 8" x 30  airex 8" x 30   8" x30   Step downs (ecc)        Touchdowns (laterally)        Leg Press  190# DL 30  190# DL 30  110# SL 30  190# DL 30  110# SL 30      Lateral sliding lunges   15x b/l  20x b/l     Static lunge   15x b/l  2x10 b/l      TB TKE        TB 3 way H'  BTB 30x all directions MTB 30x all directiosn MTB 30 all directions  BTB 3x10 all directions   Lat Band walks BTB 3 laps MTB 3 laps MTB 3 laps  BTB 3 laps   Monster Walks BTB 3 laps MTB 3 laps  MTB 3 laps  BTB 3 laps   SLS  On airex 30 sec x 3  On airex 30 sec x 3      Standing H' Flexor stretch        Standing Lumbar extension using elevated table at waist for support DC    5"x20   L arm on door frame with hips to the left                Slant board gastroc stretch 30 sec x 43    30"x3   TB H' ER BTB 20 MTB 20 MTB 30  GTB x20   TB H' IR BTB 20 MTB 20  MTB 30  GTB x20                   Supine Heel Slides         Quad sets c towel roll under ankle SAQ (ecc)        Supine H' ABD                 SLR (flex) 1 5# 2x15    1 5# 2x15           Ball Squeeze  DC to HEP   5"x30   Ball c bridge     5"x30   Sidelying Clamshells MTB 30 DC to HPE   MTB 30   TB Bridges MTB 30 DC to HEP   MTB 30           Sidelying H' ABD 1 5# 2x15 DC to HEP   0# 2x15           Prone H' Ext        Prone quad stretch c SOS     3x20"                                                           HEP/EDU        Modalities                CP                   Skin checks performed pre and post application: intact

## 2022-06-22 ENCOUNTER — OFFICE VISIT (OUTPATIENT)
Dept: PHYSICAL THERAPY | Facility: CLINIC | Age: 62
End: 2022-06-22
Payer: COMMERCIAL

## 2022-06-22 DIAGNOSIS — M62.81 MUSCLE WEAKNESS (GENERALIZED): ICD-10-CM

## 2022-06-22 DIAGNOSIS — M25.551 RIGHT HIP PAIN: Primary | ICD-10-CM

## 2022-06-22 DIAGNOSIS — R26.2 DIFFICULTY WALKING: ICD-10-CM

## 2022-06-22 PROCEDURE — 97110 THERAPEUTIC EXERCISES: CPT

## 2022-06-22 PROCEDURE — 97530 THERAPEUTIC ACTIVITIES: CPT

## 2022-06-22 PROCEDURE — 97112 NEUROMUSCULAR REEDUCATION: CPT

## 2022-06-22 NOTE — PROGRESS NOTES
Daily Note / Discharge    Today's date: 2022  Patient name: Ted Murphy  : 1960  MRN: [id number]  Referring provider: Chantel Lynn*  Dx:   Encounter Diagnosis     ICD-10-CM    1  Right hip pain  M25 551    2  Difficulty walking  R26 2    3  Muscle weakness (generalized)  M62 81                   Subjective: patient states he is doing well and feels great  Objective: See treatment diary below      Assessment: Tolerated treatment well  Patient has met all goals at this time  Discussed with PT about discharge, she approved discharge  Provided patient with updated HEP and theraband  encouraged him to call with any questions or concerns         Plan: Discharge     Precautions: post op R THR anterio-lateral approach (D O S 3/1/22)    HEP: HR, TR, Standing Hip Flexion, Hip Flexor stretch in standing, LAQ, quad sets    Specialty Daily Treatment Diary       Manual 6/13 6/15 6/20 6/22 6/6           Exercise Diary         RB 5 min  5 min 5 min  5 min 5 min           Mini Squats -> Chair squats        Step Ups 8" x 30 airex 8" x 30 airex 8" x 30  airex 8" x30  8" x30   Lat Step ups 8" x 30 airex 8" x 30  airex 8" x 30  airex 8" x 30 8" x30   Step downs (ecc)        Touchdowns (laterally)        Leg Press  190# DL 30  190# DL 30  110# SL 30  190# DL 30  110# SL 30  190# DL 30  110# SL 30     Lateral sliding lunges   15x b/l  20x b/l 20x    Static lunge   15x b/l  2x10 b/l  2x10 b/l    TB TKE        TB 3 way H'  BTB 30x all directions MTB 30x all directiosn MTB 30 all directions MTB 30 all directions BTB 3x10 all directions   Lat Band walks BTB 3 laps MTB 3 laps MTB 3 laps MTB 3 laps BTB 3 laps   Mon[place] Walks BTB 3 laps MTB 3 laps  MTB 3 laps MTB 3 laps BTB 3 laps   SLS  On airex 30 sec x 3  On airex 30 sec x 3  On airex 30 sec x 3     Standing H' Flexor stretch        Standing Lumbar extension using elevated table at waist for support DC    5"x20   L arm on door frame with hips to the left Slant board gastroc stretch 30 sec x 43    30"x3   TB H' ER BTB 20 MTB 20 MTB 30  GTB x20   TB H' IR BTB 20 MTB 20  MTB 30  GTB x20                   Supine Heel Slides         Quad sets c towel roll under ankle        SAQ (ecc)        Supine H' ABD                         Prone H' Ext        Prone quad stretch c SOS     3x20"                                                           HEP/EDU        Modalities                CP                   Skin checks performed pre and post application: intact

## 2022-06-27 ENCOUNTER — APPOINTMENT (OUTPATIENT)
Dept: PHYSICAL THERAPY | Facility: CLINIC | Age: 62
End: 2022-06-27
Payer: COMMERCIAL

## 2022-06-29 ENCOUNTER — APPOINTMENT (OUTPATIENT)
Dept: PHYSICAL THERAPY | Facility: CLINIC | Age: 62
End: 2022-06-29
Payer: COMMERCIAL

## 2022-07-09 ENCOUNTER — APPOINTMENT (OUTPATIENT)
Dept: LAB | Facility: HOSPITAL | Age: 62
End: 2022-07-09
Attending: INTERNAL MEDICINE
Payer: COMMERCIAL

## 2022-07-09 DIAGNOSIS — E03.9 HYPOTHYROIDISM, UNSPECIFIED TYPE: ICD-10-CM

## 2022-07-09 DIAGNOSIS — E29.1 TESTICULAR HYPOGONADISM: ICD-10-CM

## 2022-07-09 LAB
ALBUMIN SERPL BCP-MCNC: 3.7 G/DL (ref 3.5–5)
ALP SERPL-CCNC: 68 U/L (ref 46–116)
ALT SERPL W P-5'-P-CCNC: 56 U/L (ref 12–78)
ANION GAP SERPL CALCULATED.3IONS-SCNC: 7 MMOL/L (ref 4–13)
AST SERPL W P-5'-P-CCNC: 30 U/L (ref 5–45)
BILIRUB SERPL-MCNC: 0.5 MG/DL (ref 0.2–1)
BUN SERPL-MCNC: 12 MG/DL (ref 5–25)
CALCIUM SERPL-MCNC: 8.6 MG/DL (ref 8.3–10.1)
CHLORIDE SERPL-SCNC: 103 MMOL/L (ref 100–108)
CHOLEST SERPL-MCNC: 238 MG/DL
CO2 SERPL-SCNC: 27 MMOL/L (ref 21–32)
CREAT SERPL-MCNC: 0.92 MG/DL (ref 0.6–1.3)
ERYTHROCYTE [DISTWIDTH] IN BLOOD BY AUTOMATED COUNT: 16 % (ref 11.6–15.1)
GFR SERPL CREATININE-BSD FRML MDRD: 89 ML/MIN/1.73SQ M
GLUCOSE P FAST SERPL-MCNC: 113 MG/DL (ref 65–99)
HCT VFR BLD AUTO: 34.8 % (ref 36.5–49.3)
HDLC SERPL-MCNC: 41 MG/DL
HGB BLD-MCNC: 11 G/DL (ref 12–17)
LDLC SERPL CALC-MCNC: 174 MG/DL (ref 0–100)
MCH RBC QN AUTO: 35.3 PG (ref 26.8–34.3)
MCHC RBC AUTO-ENTMCNC: 31.6 G/DL (ref 31.4–37.4)
MCV RBC AUTO: 112 FL (ref 82–98)
PLATELET # BLD AUTO: 88 THOUSANDS/UL (ref 149–390)
PMV BLD AUTO: 11.7 FL (ref 8.9–12.7)
POTASSIUM SERPL-SCNC: 4.2 MMOL/L (ref 3.5–5.3)
PROT SERPL-MCNC: 7.3 G/DL (ref 6.4–8.2)
PSA SERPL-MCNC: 0.8 NG/ML (ref 0–4)
RBC # BLD AUTO: 3.12 MILLION/UL (ref 3.88–5.62)
SODIUM SERPL-SCNC: 137 MMOL/L (ref 136–145)
T4 FREE SERPL-MCNC: 1.36 NG/DL (ref 0.76–1.46)
TRIGL SERPL-MCNC: 117 MG/DL
TSH SERPL DL<=0.05 MIU/L-ACNC: 1.24 UIU/ML (ref 0.45–4.5)
WBC # BLD AUTO: 3.26 THOUSAND/UL (ref 4.31–10.16)

## 2022-07-09 PROCEDURE — 80053 COMPREHEN METABOLIC PANEL: CPT

## 2022-07-09 PROCEDURE — G0103 PSA SCREENING: HCPCS

## 2022-07-09 PROCEDURE — 85027 COMPLETE CBC AUTOMATED: CPT

## 2022-07-09 PROCEDURE — 84402 ASSAY OF FREE TESTOSTERONE: CPT

## 2022-07-09 PROCEDURE — 36415 COLL VENOUS BLD VENIPUNCTURE: CPT

## 2022-07-09 PROCEDURE — 80061 LIPID PANEL: CPT

## 2022-07-09 PROCEDURE — 84439 ASSAY OF FREE THYROXINE: CPT

## 2022-07-09 PROCEDURE — 84403 ASSAY OF TOTAL TESTOSTERONE: CPT

## 2022-07-09 PROCEDURE — 84443 ASSAY THYROID STIM HORMONE: CPT

## 2022-07-11 LAB
TESTOST FREE SERPL-MCNC: 10.1 PG/ML (ref 6.6–18.1)
TESTOST SERPL-MCNC: 249 NG/DL (ref 264–916)

## 2022-07-12 ENCOUNTER — OFFICE VISIT (OUTPATIENT)
Dept: ENDOCRINOLOGY | Facility: CLINIC | Age: 62
End: 2022-07-12
Payer: COMMERCIAL

## 2022-07-12 VITALS
HEIGHT: 74 IN | WEIGHT: 275.4 LBS | SYSTOLIC BLOOD PRESSURE: 142 MMHG | DIASTOLIC BLOOD PRESSURE: 74 MMHG | HEART RATE: 74 BPM | BODY MASS INDEX: 35.34 KG/M2

## 2022-07-12 DIAGNOSIS — E55.9 VITAMIN D DEFICIENCY: Primary | ICD-10-CM

## 2022-07-12 DIAGNOSIS — E29.1 TESTICULAR HYPOGONADISM: ICD-10-CM

## 2022-07-12 DIAGNOSIS — E03.9 HYPOTHYROIDISM, UNSPECIFIED TYPE: ICD-10-CM

## 2022-07-12 PROCEDURE — 99214 OFFICE O/P EST MOD 30 MIN: CPT | Performed by: INTERNAL MEDICINE

## 2022-07-12 NOTE — PROGRESS NOTES
7/12/2022    Assessment/Plan      Diagnoses and all orders for this visit:    Vitamin D deficiency  -     Testosterone, free, total Lab Collect; Future  -     Comprehensive metabolic panel Lab Collect; Future  -     Vitamin D 25 hydroxy Lab Collect; Future  -     PTH, intact- Lab Collect; Future  -     CBC and Platelet- Lab Collect; Future  -     Testosterone 4 MG/24HR PT24; 1 patch  -     Cortisol Level, AM Specimen- Lab Collect; Future    Hypothyroidism, unspecified type  -     Testosterone, free, total Lab Collect; Future  -     Comprehensive metabolic panel Lab Collect; Future  -     Vitamin D 25 hydroxy Lab Collect; Future  -     PTH, intact- Lab Collect; Future  -     CBC and Platelet- Lab Collect; Future  -     Testosterone 4 MG/24HR PT24; 1 patch  -     Cortisol Level, AM Specimen- Lab Collect; Future    Testicular hypogonadism  -     Testosterone, free, total Lab Collect; Future  -     Comprehensive metabolic panel Lab Collect; Future  -     Vitamin D 25 hydroxy Lab Collect; Future  -     PTH, intact- Lab Collect; Future  -     CBC and Platelet- Lab Collect; Future  -     Testosterone 4 MG/24HR PT24; 1 patch  -     Cortisol Level, AM Specimen- Lab Collect; Future        Assessment/Plan:  1  Hypogonadotropic hypogonadism:  He is not gone for a pituitary MRI yet and he will schedule this later today  We will increase his testosterone patch to 4 mg daily and repeat labs in 2 months and we will call with the results  2  Hypothyroidism:  Overall doing well on current regimen  3  Vitamin-D deficiency:  Continue current supplementation  Update lab work  CC: Follow-up    History of Present Illness     HPI: Hugo Guerrier is a 65y o  year old male with history of hypogonadism and hypothyroidism as well as vitamin-D deficiency who presents for a follow-up appointment  He is had hypothyroidism for over 20 years    At his May visit in 2021 he noted fatigue as well as unexpected weight gain, arthralgias, muscle weakness  Workup included normal suppressed cortisol after dexamethasone, normal growth hormone, normal prolactin  He does have a history of a mildly elevated parathyroid hormone in the setting of vitamin-D deficiency  He was started on 2000 units of vitamin-D daily at that time  For hypothyroidism he continues on levothyroxine 125 mg tablets and takes 2 tablets daily as well as liothyronine 5 mcg daily  At his last visit we also started Androderm patch 2 mg daily  He presents today overall feeling okay  Initially he did report improvement in fatigue and muscle strength when he started the Androderm patch  This has been worse lately but he did have hip replacement surgery recently and has been undergoing therapy  He has endorsed persistent decrease in libido and erectile dysfunction  Review of Systems   Constitutional: Positive for fatigue  HENT: Negative for trouble swallowing and voice change  Eyes: Negative for visual disturbance  Respiratory: Negative for shortness of breath  Cardiovascular: Negative for palpitations and leg swelling  Gastrointestinal: Negative for abdominal pain, nausea and vomiting  Endocrine: Negative for polydipsia and polyuria  Musculoskeletal: Negative for arthralgias and myalgias  Skin: Negative for rash  Neurological: Positive for weakness  Negative for dizziness and tremors  Hematological: Negative for adenopathy  Psychiatric/Behavioral: Negative for agitation and confusion         Historical Information   Past Medical History:   Diagnosis Date    Anemia     last assessed: 09/03/15    Cholelithiasis     Disease of thyroid gland     GERD (gastroesophageal reflux disease)     Hypersecretion of testicular hormones     Insomnia     Kidney stone     Low testosterone     Male erectile dysfunction     Osteoarthritis, hip, bilateral     Thrombosed external hemorrhoids      Past Surgical History:   Procedure Laterality Date    CHOLECYSTECTOMY  COLONOSCOPY      NC COLONOSCOPY FLX DX W/COLLJ SPEC WHEN PFRMD N/A 11/6/2017    Procedure: EGD AND COLONOSCOPY;  Surgeon: Beryle Homme, MD;  Location: [facility]; Service: Gastroenterology    TOTAL HIP ARTHROPLASTY Left 11/20/2020     Social History   Social History     Substance and Sexual Activity   Alcohol Use Yes    Comment: socially     Social History     Substance and Sexual Activity   Drug Use Never     Social History     Tobacco Use   Smoking Status Former Smoker   Smokeless Tobacco Never Used   Tobacco Comment    QUIT 2002/ SECOND HAND      Family History:   Family History   Problem Relation Age of Onset    Arthritis Mother     Heart disease Father     Hypertension Father     Diabetes Father     Asthma Father     Cancer Paternal Grandmother     Cancer Family     Heart disease Family        Meds/Allergies   Current Outpatient Medications   Medication Sig Dispense Refill    Cholecalciferol 50 MCG (2000 UT) TABS 2 tab daily      levothyroxine 125 mcg tablet 2 TABS DAILY 1 TIME A  tablet 1    liothyronine (CYTOMEL) 5 mcg tablet TAKE 1 TABLET BY MOUTH EVERY DAY 90 tablet 1    Testosterone 4 MG/24HR PT24 1 patch 30 patch 5    triamcinolone (KENALOG) 0 1 % cream Apply topically 2 (two) times a day 45 g 1     Current Facility-Administered Medications   Medication Dose Route Frequency Provider Last Rate Last Admin    ipratropium-albuterol (DUO-NEB) 0 5-2 5 mg/3 mL inhalation solution 3 mL  3 mL Nebulization Q6H [place], DO   3 mL at 02/02/18 0925     No Known Allergies    Objective   Vitals: Blood pressure 142/74, pulse 74, height 6' 2" (1 88 m), weight 125 kg (275 lb 6 4 oz)  Invasive Devices  Report    None                 Physical Exam  Vitals reviewed  Constitutional:       General: He is not in acute distress  Appearance: He is well-developed  He is not diaphoretic  HENT:      Head: Normocephalic and atraumatic     Eyes:      Conjunctiva/sclera: Conjunctivae normal       Pupils: Pupils are equal, round, and reactive to light  Neck:      Thyroid: No thyromegaly  Cardiovascular:      Rate and Rhythm: Normal rate and regular rhythm  Pulmonary:      Effort: Pulmonary effort is normal  No respiratory distress  Breath sounds: Normal breath sounds  Abdominal:      General: Bowel sounds are normal       Palpations: Abdomen is soft  Musculoskeletal:         General: Normal range of motion  Cervical back: Normal range of motion and neck supple  Skin:     General: Skin is warm and dry  Findings: No rash  Neurological:      Mental Status: He is alert and oriented to person, place, and time  Motor: No abnormal muscle tone  Psychiatric:         Behavior: Behavior normal          The history was obtained from the review of the chart and from the patient      Lab Results:      Recent Results (from the past 60910 hour(s))   Cortisol Level, AM Specimen    Collection Time: 05/19/21  7:03 AM   Result Value Ref Range    Cortisol - AM 1 2 (LL) 4 2 - 22 4 ug/dL   Sjogren's Antibodies    Collection Time: 08/11/21  8:43 AM   Result Value Ref Range    SS-A (RO) Ab 0 2 0 0 - 0 9 AI    SS-B (LA) Ab <0 2 0 0 - 0 9 AI   Sedimentation rate, automated    Collection Time: 08/11/21  8:43 AM   Result Value Ref Range    Sed Rate 27 (H) 0 - 19 mm/hour   RF Screen w/ Reflex to Titer    Collection Time: 08/11/21  8:43 AM   Result Value Ref Range    Rheumatoid Factor Negative Negative   Cyclic citrul peptide antibody, IgG    Collection Time: 08/11/21  8:43 AM   Result Value Ref Range    Cyclic Citrullinated Peptide Ab  1 3 See comment   CK    Collection Time: 08/11/21  8:43 AM   Result Value Ref Range    Total  (H) 39 - 308 U/L   MyoMarker 3 Plus Profile (RDL)    Collection Time: 08/11/21  8:43 AM   Result Value Ref Range    FLOR-1 Antibody <20 <20 Units    PL 7 AutoAbs Negative Negative    PL 12 AutoAbs Negative Negative    EJ AutoAbs Negative Negative    OJ AutoAbs Negative Negative    SRP AutoAbs Negative Negative    MI-2 Autoab Negative Negative    Anti-TIF-1 gamma Ab (RDL) <20 <20 Units    Anti-MDA-5-Ab  <20 <20 Units    ANTI-NXP-2 <20 <20 Units    ANTI-SAE1 AB, IGG (RDL) <20 <20 Units    PM-SCL Ab <20 <20 Units    KU AutoAbs Negative Negative    ANTI-SS-A 52KD AB, IGG (RDL) <20 <20 Units    Anti-U1 RNP Ab (RDL) <20 <20 Units    ANTI-U2 RNP AB (RDL) Negative Negative    U3 RNP  Negative Negative   C-reactive protein    Collection Time: 08/11/21  8:43 AM   Result Value Ref Range    CRP 20 0 (H) <3 0 mg/L   Aldolase    Collection Time: 08/11/21  8:43 AM   Result Value Ref Range    Aldolase 8 5 3 3 - 10 3 U/L   Chronic Hepatitis Panel    Collection Time: 08/11/21  8:43 AM   Result Value Ref Range    Hepatitis B Surface Ag Non-reactive Non-reactive, NonReactive - Confirmed    Hepatitis C Ab Non-reactive Non-reactive    Hep B C IgM Non-reactive Non-reactive    Hep B Core Total Ab Non-reactive Non-reactive   Anti-scleroderma antibody    Collection Time: 08/11/21  8:43 AM   Result Value Ref Range    Scleroderma SCL-70 <0 2 0 0 - 0 9 AI   Centromere Antibody    Collection Time: 08/11/21  8:43 AM   Result Value Ref Range    Anti-Centromere B Antibodies <0 2 0 0 - 0 9 AI   PM/SCL ANTIBODIES    Collection Time: 08/11/21  8:43 AM   Result Value Ref Range    PM-SCL Ab <20 <20 Units   Lyme Antibody Profile with reflex to WB    Collection Time: 08/11/21  8:43 AM   Result Value Ref Range    Lyme total antibody 16 0 00 - 119   CKMB    Collection Time: 08/11/21  8:43 AM   Result Value Ref Range    CK-MB Index 1 9 0 0 - 2 5 %    CK-MB 7 5 (H) 0 0 - 5 0 ng/mL   Hemochromatosis mutation    Collection Time: 11/16/21  7:11 AM   Result Value Ref Range    Hereditary Hemochromatosis Comment    TSH, 3rd generation    Collection Time: 11/16/21  7:11 AM   Result Value Ref Range    TSH 3RD GENERATON 0 592 0 358 - 3 740 uIU/mL   T3, free    Collection Time: 11/16/21  7:11 AM   Result Value Ref Range T3, Free 3 68 2 30 - 4 20 pg/mL   T4, free    Collection Time: 11/16/21  7:11 AM   Result Value Ref Range    Free T4 1 57 (H) 0 76 - 1 46 ng/dL   Comprehensive metabolic panel    Collection Time: 11/16/21  7:11 AM   Result Value Ref Range    Sodium 141 136 - 145 mmol/L    Potassium 4 0 3 5 - 5 3 mmol/L    Chloride 107 100 - 108 mmol/L    CO2 25 21 - 32 mmol/L    ANION GAP 9 4 - 13 mmol/L    BUN 14 5 - 25 mg/dL    Creatinine 0 98 0 60 - 1 30 mg/dL    Glucose, Fasting 110 (H) 65 - 99 mg/dL    Calcium 9 4 8 3 - 10 1 mg/dL    AST 23 5 - 45 U/L    ALT 42 12 - 78 U/L    Alkaline Phosphatase 72 46 - 116 U/L    Total Protein 7 5 6 4 - 8 2 g/dL    Albumin 3 9 3 5 - 5 0 g/dL    Total Bilirubin 0 85 0 20 - 1 00 mg/dL    eGFR 83 ml/min/1 73sq m   PTH, intact    Collection Time: 11/16/21  7:11 AM   Result Value Ref Range    PTH 73 2 18 4 - 80 1 pg/mL   Vitamin D 25 hydroxy    Collection Time: 11/16/21  7:11 AM   Result Value Ref Range    Vit D, 25-Hydroxy 23 7 (L) 30 0 - 100 0 ng/mL   Testosterone, free, total Lab Collect    Collection Time: 11/16/21  7:11 AM   Result Value Ref Range    Testosterone, Free 14 6 6 6 - 18 1 pg/mL    TESTOSTERONE TOTAL 167 (L) 264 - 916 ng/dL   CBC and differential    Collection Time: 11/16/21  7:11 AM   Result Value Ref Range    WBC 3 33 (L) 4 31 - 10 16 Thousand/uL    RBC 3 18 (L) 3 88 - 5 62 Million/uL    Hemoglobin 11 8 (L) 12 0 - 17 0 g/dL    Hematocrit 36 3 (L) 36 5 - 49 3 %     (H) 82 - 98 fL    MCH 37 1 (H) 26 8 - 34 3 pg    MCHC 32 5 31 4 - 37 4 g/dL    RDW 14 6 11 6 - 15 1 %    MPV 11 2 8 9 - 12 7 fL    Platelets 94 (L) 589 - 390 Thousands/uL    nRBC 0 /100 WBCs    Neutrophils Relative 44 43 - 75 %    Immat GRANS % 1 0 - 2 %    Lymphocytes Relative 45 (H) 14 - 44 %    Monocytes Relative 9 4 - 12 %    Eosinophils Relative 1 0 - 6 %    Basophils Relative 0 0 - 1 %    Neutrophils Absolute 1 46 (L) 1 85 - 7 62 Thousands/µL    Immature Grans Absolute 0 02 0 00 - 0 20 Thousand/uL Lymphocytes Absolute 1 53 0 60 - 4 47 Thousands/µL    Monocytes Absolute 0 29 0 17 - 1 22 Thousand/µL    Eosinophils Absolute 0 02 0 00 - 0 61 Thousand/µL    Basophils Absolute 0 01 0 00 - 0 10 Thousands/µL   Methylmalonic acid, serum    Collection Time: 11/16/21  7:11 AM   Result Value Ref Range    Methylmalonic Acid, S 211 0 - 378 nmol/L    Disclaimer: Comment    Iron Saturation %    Collection Time: 11/16/21  7:11 AM   Result Value Ref Range    Iron Saturation 50 20 - 50 %    TIBC 315 250 - 450 ug/dL    Iron 158 65 - 175 ug/dL   Ferritin    Collection Time: 11/16/21  7:11 AM   Result Value Ref Range    Ferritin 537 (H) 8 - 388 ng/mL   Echo complete w/ contrast if indicated    Collection Time: 01/14/22  2:50 PM   Result Value Ref Range    LA size 4 8 cm    Aortic valve mean velocity 11 80 m/s    LVPWd 1 20 cm    Left Atrium Area-systolic Apical Two Chamber 27 1 cm2    Left Atrium Area-systolic Four Chamber 13 8 cm2    MV E' Tissue Velocity Septal 11 cm/s    MV E' Tissue Velocity Lateral 14 cm/s    RA 2D Volume 57 0 mL    IVSd 7 59 cm    LV DIASTOLIC VOLUME (MOD BIPLANE) 2D 198 mL    LEFT VENTRICLE SYSTOLIC VOLUME (MOD BIPLANE) 2D 88 mL    Left ventricular stroke volume (2D) 110 00 mL    A4C EF 67 %    Trans arch prox 3 20 cm    LA length (A2C) 6 10 cm    LVIDd 6 20 15 12 - 22 55 cm    LVIDS 4 40 2 1 - 4 0 cm    FS 29 28 - 44 %    Asc Ao 4 cm    Ao root 3 30 cm    RVID d 4 8 cm    LVOT mn grad 3 0 mmHg    AV mean gradient 6 mmHg    AV LVOT peak gradient 5 mmHg    E wave deceleration time 209 ms    LVOT peak navi 1 1 m/s    LVOT peak VTI 22 05 cm    Ao peak navi retrograde 1 82 m/s    Ao VTI 29 23 cm    AV peak gradient 13 mmHg    MV Peak E Navi 118 cm/s    MV Peak A Navi 0 75 m/s    RAA A4C 21 cm2    MV stenosis pressure 1/2 time 0 ms    AV Velocity Ratio 0 60     ZLVIDS -10 91     LV EF 55    CBC and differential    Collection Time: 06/07/22  7:08 AM   Result Value Ref Range    WBC 3 34 (L) 4 31 - 10 16 Thousand/uL RBC 3 31 (L) 3 88 - 5 62 Million/uL    Hemoglobin 11 6 (L) 12 0 - 17 0 g/dL    Hematocrit 36 5 36 5 - 49 3 %     (H) 82 - 98 fL    MCH 35 0 (H) 26 8 - 34 3 pg    MCHC 31 8 31 4 - 37 4 g/dL    RDW 15 9 (H) 11 6 - 15 1 %    MPV 10 9 8 9 - 12 7 fL    Platelets 346 (L) 255 - 390 Thousands/uL    nRBC 0 /100 WBCs    Neutrophils Relative 44 43 - 75 %    Immat GRANS % 0 0 - 2 %    Lymphocytes Relative 44 14 - 44 %    Monocytes Relative 10 4 - 12 %    Eosinophils Relative 2 0 - 6 %    Basophils Relative 0 0 - 1 %    Neutrophils Absolute 1 47 (L) 1 85 - 7 62 Thousands/µL    Immature Grans Absolute 0 01 0 00 - 0 20 Thousand/uL    Lymphocytes Absolute 1 48 0 60 - 4 47 Thousands/µL    Monocytes Absolute 0 32 0 17 - 1 22 Thousand/µL    Eosinophils Absolute 0 05 0 00 - 0 61 Thousand/µL    Basophils Absolute 0 01 0 00 - 0 10 Thousands/µL   Comprehensive metabolic panel    Collection Time: 06/07/22  7:08 AM   Result Value Ref Range    Sodium 140 136 - 145 mmol/L    Potassium 4 2 3 5 - 5 3 mmol/L    Chloride 106 100 - 108 mmol/L    CO2 25 21 - 32 mmol/L    ANION GAP 9 4 - 13 mmol/L    BUN 16 5 - 25 mg/dL    Creatinine 1 00 0 60 - 1 30 mg/dL    Glucose, Fasting 114 (H) 65 - 99 mg/dL    Calcium 8 9 8 3 - 10 1 mg/dL    AST 31 5 - 45 U/L    ALT 58 12 - 78 U/L    Alkaline Phosphatase 77 46 - 116 U/L    Total Protein 7 6 6 4 - 8 2 g/dL    Albumin 3 8 3 5 - 5 0 g/dL    Total Bilirubin 0 40 0 20 - 1 00 mg/dL    eGFR 80 ml/min/1 73sq m   Ferritin    Collection Time: 06/07/22  7:08 AM   Result Value Ref Range    Ferritin 352 8 - 388 ng/mL   Iron Saturation %    Collection Time: 06/07/22  7:08 AM   Result Value Ref Range    Iron Saturation 41 20 - 50 %    TIBC 308 250 - 450 ug/dL    Iron 126 65 - 175 ug/dL   Methylmalonic acid, serum    Collection Time: 06/07/22  7:08 AM   Result Value Ref Range    Methylmalonic Acid, S 204 0 - 378 nmol/L   TSH, 3rd generation    Collection Time: 07/09/22  7:49 AM   Result Value Ref Range    TSH 3RD CATHYTON 1 242 0 450 - 4 500 uIU/mL   T4, free    Collection Time: 07/09/22  7:49 AM   Result Value Ref Range    Free T4 1 36 0 76 - 1 46 ng/dL   CBC and Platelet- Lab Collect    Collection Time: 07/09/22  7:49 AM   Result Value Ref Range    WBC 3 26 (L) 4 31 - 10 16 Thousand/uL    RBC 3 12 (L) 3 88 - 5 62 Million/uL    Hemoglobin 11 0 (L) 12 0 - 17 0 g/dL    Hematocrit 34 8 (L) 36 5 - 49 3 %     (H) 82 - 98 fL    MCH 35 3 (H) 26 8 - 34 3 pg    MCHC 31 6 31 4 - 37 4 g/dL    RDW 16 0 (H) 11 6 - 15 1 %    Platelets 88 (L) 372 - 390 Thousands/uL    MPV 11 7 8 9 - 12 7 fL   PSA, total screen Lab Collect    Collection Time: 07/09/22  7:49 AM   Result Value Ref Range    PSA 0 8 0 0 - 4 0 ng/mL   Testosterone, free, total Lab Collect    Collection Time: 07/09/22  7:49 AM   Result Value Ref Range    Testosterone, Free 10 1 6 6 - 18 1 pg/mL    TESTOSTERONE TOTAL 249 (L) 264 - 916 ng/dL   Lipid Panel with Direct LDL reflex Lab Collect    Collection Time: 07/09/22  7:49 AM   Result Value Ref Range    Cholesterol 238 (H) See Comment mg/dL    Triglycerides 117 See Comment mg/dL    HDL, Direct 41 >=40 mg/dL    LDL Calculated 174 (H) 0 - 100 mg/dL   Comprehensive metabolic panel Lab Collect    Collection Time: 07/09/22  7:49 AM   Result Value Ref Range    Sodium 137 136 - 145 mmol/L    Potassium 4 2 3 5 - 5 3 mmol/L    Chloride 103 100 - 108 mmol/L    CO2 27 21 - 32 mmol/L    ANION GAP 7 4 - 13 mmol/L    BUN 12 5 - 25 mg/dL    Creatinine 0 92 0 60 - 1 30 mg/dL    Glucose, Fasting 113 (H) 65 - 99 mg/dL    Calcium 8 6 8 3 - 10 1 mg/dL    AST 30 5 - 45 U/L    ALT 56 12 - 78 U/L    Alkaline Phosphatase 68 46 - 116 U/L    Total Protein 7 3 6 4 - 8 2 g/dL    Albumin 3 7 3 5 - 5 0 g/dL    Total Bilirubin 0 50 0 20 - 1 00 mg/dL    eGFR 89 ml/min/1 73sq m         Future Appointments   Date Time Provider Clay Singletary   12/9/2022  3:00 PM Loyd Stone MD [facility]   1/13/2023 10:10 AM DO [facility]       Portions of the record may have been created with voice recognition software  Occasional wrong word or "sound a like" substitutions may have occurred due to the inherent limitations of voice recognition software  Read the chart carefully and recognize, using context, where substitutions have occurred

## 2022-08-09 ENCOUNTER — TELEPHONE (OUTPATIENT)
Dept: ENDOCRINOLOGY | Facility: CLINIC | Age: 62
End: 2022-08-09

## 2022-08-09 NOTE — TELEPHONE ENCOUNTER
Called [facility] and initiated PA request w/ Effie Valadez  She stated that request does not meet clinical criteria and peer to peer is being requested  Provider is to call [phone] for peer to peer  Please call for peer to peer ASAP since appt is scheduled for 8/13/22 otherwise the appt will need to be rescheduled      Thanks

## 2022-08-13 ENCOUNTER — HOSPITAL ENCOUNTER (OUTPATIENT)
Dept: MRI IMAGING | Facility: HOSPITAL | Age: 62
Discharge: HOME | End: 2022-08-13
Attending: INTERNAL MEDICINE
Payer: COMMERCIAL

## 2022-08-13 DIAGNOSIS — E29.1 TESTICULAR HYPOGONADISM: ICD-10-CM

## 2022-08-13 PROCEDURE — 70553 MRI BRAIN STEM W/O & W/DYE: CPT

## 2022-08-13 PROCEDURE — G1004 CDSM NDSC: HCPCS

## 2022-08-13 PROCEDURE — A9585 GADOBUTROL INJECTION: HCPCS | Performed by: INTERNAL MEDICINE

## 2022-08-13 RX ADMIN — GADOBUTROL 12 ML: 604.72 INJECTION INTRAVENOUS at 13:31

## 2022-08-22 ENCOUNTER — TELEPHONE (OUTPATIENT)
Dept: ENDOCRINOLOGY | Facility: CLINIC | Age: 62
End: 2022-08-22

## 2022-08-22 NOTE — TELEPHONE ENCOUNTER
----- Message from Jeremias Green DO sent at 8/22/2022  1:17 PM EDT -----  Please call patient regarding these results:  Recent MRI was normal

## 2022-09-04 DIAGNOSIS — E03.9 HYPOTHYROIDISM, UNSPECIFIED TYPE: ICD-10-CM

## 2022-09-04 RX ORDER — LIOTHYRONINE SODIUM 5 UG/1
TABLET ORAL
Qty: 90 TABLET | Refills: 1 | Status: SHIPPED | OUTPATIENT
Start: 2022-09-04

## 2022-09-15 ENCOUNTER — OFFICE VISIT (OUTPATIENT)
Dept: FAMILY MEDICINE CLINIC | Facility: CLINIC | Age: 62
End: 2022-09-15
Payer: COMMERCIAL

## 2022-09-15 VITALS
HEART RATE: 80 BPM | TEMPERATURE: 97.7 F | DIASTOLIC BLOOD PRESSURE: 74 MMHG | HEIGHT: 74 IN | OXYGEN SATURATION: 98 % | WEIGHT: 274 LBS | SYSTOLIC BLOOD PRESSURE: 136 MMHG | BODY MASS INDEX: 35.16 KG/M2

## 2022-09-15 DIAGNOSIS — C80.1 MALIGNANT (PRIMARY) NEOPLASM, UNSPECIFIED (HCC): ICD-10-CM

## 2022-09-15 DIAGNOSIS — M48.062 SPINAL STENOSIS OF LUMBAR REGION WITH NEUROGENIC CLAUDICATION: ICD-10-CM

## 2022-09-15 DIAGNOSIS — Z00.00 WELL ADULT EXAM: Primary | ICD-10-CM

## 2022-09-15 DIAGNOSIS — G47.33 OBSTRUCTIVE SLEEP APNEA SYNDROME: ICD-10-CM

## 2022-09-15 PROCEDURE — 3725F SCREEN DEPRESSION PERFORMED: CPT | Performed by: FAMILY MEDICINE

## 2022-09-15 PROCEDURE — 99396 PREV VISIT EST AGE 40-64: CPT | Performed by: FAMILY MEDICINE

## 2022-09-15 NOTE — PROGRESS NOTES
Subjective:   Chief Complaint   Patient presents with    Physical Exam     Physical /medication management , bilateral leg pain , feet circulation         Patient ID: Joshua Rey is a 65 y o  male  Here for well exam  Has weakness of upper legs and difficult gait despite b/l hip replacemdnts  Needs sleepstudy      The following portions of the patient's history were reviewed and updated as appropriate: allergies, current medications, past family history, past medical history, past social history, past surgical history and problem list     Review of Systems   Constitutional: Negative for activity change, appetite change, chills, diaphoresis, fatigue and unexpected weight change  HENT: Negative for congestion, ear discharge, ear pain, hearing loss, nosebleeds and rhinorrhea  Eyes: Negative for pain, redness, itching and visual disturbance  Respiratory: Negative for cough, choking, chest tightness and shortness of breath  Cardiovascular: Negative for chest pain and leg swelling  Gastrointestinal: Negative for abdominal pain, blood in stool, constipation, diarrhea and nausea  Endocrine: Negative for cold intolerance, polydipsia and polyphagia  Genitourinary: Negative for dysuria, frequency, hematuria and urgency  Musculoskeletal: Positive for gait problem  Negative for arthralgias, back pain, joint swelling, neck pain and neck stiffness  Skin: Negative for color change and rash  Allergic/Immunologic: Negative for environmental allergies and food allergies  Neurological: Positive for weakness  Negative for dizziness, tremors, seizures, speech difficulty, numbness and headaches  Hematological: Negative for adenopathy  Does not bruise/bleed easily  Psychiatric/Behavioral: Positive for suicidal ideas  Negative for behavioral problems, dysphoric mood, hallucinations and self-injury               Objective:  Vitals:    09/15/22 1646   BP: 136/74   Pulse: 80   Temp: 97 7 °F (36 5 °C) TempSrc: Tympanic   SpO2: 98%   Weight: 124 kg (274 lb)   Height: 6' 2" (1 88 m)      Physical Exam      Assessment/Plan:    No problem-specific Assessment & Plan notes found for this encounter  Diagnoses and all orders for this visit:    Well adult exam    Spinal stenosis of lumbar region with neurogenic claudication  -     MRI lumbar spine wo contrast; Future    Obstructive sleep apnea syndrome  -     Diagnostic Sleep Study;  Future    [facility]        Get MRI and sleep study then return

## 2022-09-23 ENCOUNTER — HOSPITAL ENCOUNTER (OUTPATIENT)
Dept: MRI IMAGING | Facility: HOSPITAL | Age: 62
End: 2022-09-23
Payer: COMMERCIAL

## 2022-09-23 DIAGNOSIS — M48.062 SPINAL STENOSIS OF LUMBAR REGION WITH NEUROGENIC CLAUDICATION: ICD-10-CM

## 2022-09-23 PROCEDURE — 72148 MRI LUMBAR SPINE W/O DYE: CPT

## 2022-09-29 ENCOUNTER — TELEPHONE (OUTPATIENT)
Dept: FAMILY MEDICINE CLINIC | Facility: CLINIC | Age: 62
End: 2022-09-29

## 2022-09-29 NOTE — TELEPHONE ENCOUNTER
----- Message from Matilde Duran DO sent at 9/29/2022 11:46 AM EDT -----  MRI MRI shows progressive degenerative disease in the lumbar spine    The next step is for him to see Dr Malcolm Leone for consideration of injections in his back

## 2022-10-03 DIAGNOSIS — E03.9 HYPOTHYROIDISM, UNSPECIFIED TYPE: ICD-10-CM

## 2022-10-03 DIAGNOSIS — E55.9 VITAMIN D DEFICIENCY: ICD-10-CM

## 2022-10-03 DIAGNOSIS — E29.1 TESTICULAR HYPOGONADISM: ICD-10-CM

## 2022-10-04 RX ORDER — LEVOTHYROXINE SODIUM 0.12 MG/1
TABLET ORAL
Qty: 180 TABLET | Refills: 1 | Status: SHIPPED | OUTPATIENT
Start: 2022-10-04

## 2022-10-06 ENCOUNTER — OFFICE VISIT (OUTPATIENT)
Dept: FAMILY MEDICINE CLINIC | Facility: CLINIC | Age: 62
End: 2022-10-06
Payer: COMMERCIAL

## 2022-10-06 VITALS
BODY MASS INDEX: 35.16 KG/M2 | SYSTOLIC BLOOD PRESSURE: 130 MMHG | DIASTOLIC BLOOD PRESSURE: 80 MMHG | HEIGHT: 74 IN | HEART RATE: 67 BPM | OXYGEN SATURATION: 98 % | WEIGHT: 274 LBS | TEMPERATURE: 98.4 F

## 2022-10-06 DIAGNOSIS — M54.16 LUMBAR RADICULOPATHY: Primary | ICD-10-CM

## 2022-10-06 DIAGNOSIS — Z23 ENCOUNTER FOR IMMUNIZATION: ICD-10-CM

## 2022-10-06 DIAGNOSIS — E66.9 OBESITY (BMI 35.0-39.9 WITHOUT COMORBIDITY): ICD-10-CM

## 2022-10-06 DIAGNOSIS — Z23 NEED FOR INFLUENZA VACCINATION: ICD-10-CM

## 2022-10-06 PROCEDURE — 90682 RIV4 VACC RECOMBINANT DNA IM: CPT | Performed by: FAMILY MEDICINE

## 2022-10-06 PROCEDURE — 90677 PCV20 VACCINE IM: CPT | Performed by: FAMILY MEDICINE

## 2022-10-06 PROCEDURE — 90472 IMMUNIZATION ADMIN EACH ADD: CPT | Performed by: FAMILY MEDICINE

## 2022-10-06 PROCEDURE — 99213 OFFICE O/P EST LOW 20 MIN: CPT | Performed by: FAMILY MEDICINE

## 2022-10-06 PROCEDURE — 90471 IMMUNIZATION ADMIN: CPT | Performed by: FAMILY MEDICINE

## 2022-10-06 NOTE — PATIENT INSTRUCTIONS
Obesity   AMBULATORY CARE:   Obesity  means your body mass index (BMI) is greater than 30  Your healthcare provider will use your height and weight to measure your BMI  The risks of obesity include  many health problems, including injuries or physical disability  · Diabetes (high blood sugar level)    · High blood pressure or high cholesterol    · Heart disease    · Stroke    · Gallbladder or liver disease    · Cancer of the colon, breast, prostate, liver, or kidney    · Sleep apnea    · Arthritis or gout    Screening  is done to check for health conditions before you have signs or symptoms  If you are 28to 79years old, your blood sugar level may be checked every 3 years for signs of prediabetes or diabetes  Your healthcare provider will check your blood pressure at each visit  High blood pressure can lead to a stroke or other problems  Your provider may check for signs of heart disease, cancer, or other health problems  Seek care immediately if:   · You have a severe headache, confusion, or difficulty speaking  · You have weakness on one side of your body  · You have chest pain, sweating, or shortness of breath  Call your doctor if:   · You have symptoms of gallbladder or liver disease, such as pain in your upper abdomen  · You have knee or hip pain and discomfort while walking  · You have symptoms of diabetes, such as intense hunger and thirst, and frequent urination  · You have symptoms of sleep apnea, such as snoring or daytime sleepiness  · You have questions or concerns about your condition or care  Treatment for obesity  focuses on helping you lose weight to improve your health  Even a small decrease in BMI can reduce the risk for many health problems  Your healthcare provider will help you set a weight-loss goal   · Lifestyle changes  are the first step in treating obesity  These include making healthy food choices and getting regular physical activity   Your healthcare provider may suggest a weight-loss program that involves coaching, education, and therapy  · Medicine  may help you lose weight when it is used with a healthy foods and physical activity  · Surgery  can help you lose weight if you are very obese and have other health problems  There are several types of weight-loss surgery  Ask your healthcare provider for more information  Tips for safe weight loss:   · Set small, realistic goals  An example of a small goal is to walk for 20 minutes 5 days a week  Anther goal is to lose 5% of your body weight  · Tell friends, family members, and coworkers about your goals  and ask for their support  Ask a friend to lose weight with you, or join a weight-loss support group  · Identify foods or triggers that may cause you to overeat , and find ways to avoid them  Remove tempting high-calorie foods from your home and workplace  Place a bowl of fresh fruit on your kitchen counter  If stress causes you to eat, then find other ways to cope with stress  A counselor or therapist may be able to help you  · Keep a diary to track what you eat and drink  Also write down how many minutes of physical activity you do each day  Weigh yourself once a week and record it in your diary  Eating changes: You will need to eat 500 to 1,000 fewer calories each day than you currently eat to lose 1 to 2 pounds a week  The following changes will help you cut calories:  · Eat smaller portions  Use small plates, no larger than 9 inches in diameter  Fill your plate half full of fruits and vegetables  Measure your food using measuring cups until you know what a serving size looks like  · Eat 3 meals and 1 or 2 snacks each day  Plan your meals in advance  [place] Gomez and eat at home most of the time  Eat slowly  Do not skip meals  Skipping meals can lead to overeating later in the day  This can make it harder for you to lose weight   Talk with a dietitian to help you make a meal plan and schedule that is right for you  · Eat fruits and vegetables at every meal   They are low in calories and high in fiber, which makes you feel full  Do not add butter, margarine, or cream sauce to vegetables  Use herbs to season steamed vegetables  · Eat less fat and fewer fried foods  Eat more baked or grilled chicken and fish  These protein sources are lower in calories and fat than red meat  Limit fast food  Dress your salads with olive oil and vinegar instead of bottled dressing  · Limit the amount of sugar you eat  Do not drink sugary beverages  Limit alcohol  Activity changes:  Physical activity is good for your body in many ways  It helps you burn calories and build strong muscles  It decreases stress and depression, and improves your mood  It can also help you sleep better  Talk to your healthcare provider before you begin an exercise program   · Exercise for at least 30 minutes 5 days a week  Start slowly  Set aside time each day for physical activity that you enjoy and that is convenient for you  It is best to do both weight training and an activity that increases your heart rate, such as walking, bicycling, or swimming  · Find ways to be more active  Do yard work and housecleaning  Walk up the stairs instead of using elevators  Spend your leisure time going to events that require walking, such as outdoor festivals or fairs  This extra physical activity can help you lose weight and keep it off  Follow up with your doctor as directed: You may need to meet with a dietitian  Write down your questions so you remember to ask them during your visits  © Copyright [employer] 2022 Information is for End User's use only and may not be sold, redistributed or otherwise used for commercial purposes  All illustrations and images included in CareNotes® are the copyrighted property of A D A M [employer] Inc  or Murali Heath   The above information is an  only   It is not intended as medical advice for individual conditions or treatments  Talk to your doctor, nurse or pharmacist before following any medical regimen to see if it is safe and effective for you

## 2022-10-06 NOTE — PROGRESS NOTES
Subjective:   Chief Complaint   Patient presents with    Follow-up     Go over MRI results,         Patient ID: Sandy Tinsley is a 65 y o  male  Patient is here to review the results of his MRI  He continues to have difficulty with ambulation  The following portions of the patient's history were reviewed and updated as appropriate: allergies, current medications, past family history, past medical history, past social history, past surgical history and problem list     Review of Systems   Constitutional: Negative for activity change, appetite change, chills, diaphoresis, fatigue and unexpected weight change  HENT: Negative for congestion, ear discharge, ear pain, hearing loss, nosebleeds and rhinorrhea  Eyes: Negative for pain, redness, itching and visual disturbance  Respiratory: Negative for cough, choking, chest tightness and shortness of breath  Cardiovascular: Negative for chest pain and leg swelling  Gastrointestinal: Negative for abdominal pain, blood in stool, constipation, diarrhea and nausea  Endocrine: Negative for cold intolerance, polydipsia and polyphagia  Genitourinary: Negative for dysuria, frequency, hematuria and urgency  Musculoskeletal: Negative for arthralgias, back pain, gait problem, joint swelling, neck pain and neck stiffness  Skin: Negative for color change and rash  Allergic/Immunologic: Negative for environmental allergies and food allergies  Neurological: Negative for dizziness, tremors, seizures, speech difficulty, numbness and headaches  Hematological: Negative for adenopathy  Does not bruise/bleed easily  Psychiatric/Behavioral: Negative for behavioral problems, dysphoric mood, hallucinations and self-injury               Objective:  Vitals:    10/06/22 1552   BP: 130/80   Pulse: 67   Temp: 98 4 °F (36 9 °C)   SpO2: 98%   Weight: 124 kg (274 lb)   Height: 6' 2" (1 88 m)      Physical Exam  Constitutional:       General: He is not in acute distress  Appearance: He is well-developed  He is not diaphoretic  HENT:      Head: Normocephalic and atraumatic  Right Ear: External ear normal       Left Ear: External ear normal       Nose: Nose normal       Mouth/Throat:      Pharynx: No oropharyngeal exudate  Eyes:      General: No scleral icterus  Right eye: No discharge  Left eye: No discharge  Conjunctiva/sclera: Conjunctivae normal       Pupils: Pupils are equal, round, and reactive to light  Neck:      Thyroid: No thyromegaly  Cardiovascular:      Rate and Rhythm: Normal rate and regular rhythm  Heart sounds: Normal heart sounds  No murmur heard  Pulmonary:      Effort: Pulmonary effort is normal       Breath sounds: Normal breath sounds  No wheezing or rales  Abdominal:      General: Bowel sounds are normal       Palpations: Abdomen is soft  There is no mass  Tenderness: There is no abdominal tenderness  There is no guarding  Musculoskeletal:         General: Tenderness present  Normal range of motion  Cervical back: Normal range of motion and neck supple  Lymphadenopathy:      Cervical: No cervical adenopathy  Skin:     General: Skin is warm and dry  Neurological:      Mental Status: He is alert and oriented to person, place, and time  Gait: Gait abnormal       Deep Tendon Reflexes: Reflexes are normal and symmetric  Psychiatric:         Thought Content: Thought content normal          Judgment: Judgment normal            Assessment/Plan:    No problem-specific Assessment & Plan notes found for this encounter  Diagnoses and all orders for this visit:    Lumbar radiculopathy  -     Ambulatory Referral to Pain Management;  Future    Need for influenza vaccination  -     influenza vaccine, quadrivalent, recombinant, PF, 0 5 mL, for patients 18 yr+ (FLUBLOK)    Encounter for immunization  -     Pneumococcal Conjugate Vaccine 20-valent (PCV20)    Obesity (BMI 35 0-39 9 without comorbidity)        He should get an opinion from the [facility]  He may benefit from an epidural steroid injection  He received Prevnar in flu shots here today  BMI Counseling: Body mass index is 35 18 kg/m²  The BMI is above normal  Nutrition recommendations include decreasing soda and/or juice intake  Exercise recommendations include exercising 3-5 times per week

## 2022-10-18 ENCOUNTER — TELEPHONE (OUTPATIENT)
Dept: SLEEP CENTER | Facility: CLINIC | Age: 62
End: 2022-10-18

## 2022-10-18 NOTE — TELEPHONE ENCOUNTER
----- Message from Aimee Parada MD sent at 10/18/2022  1:01 PM EDT -----  Approved    ----- Message -----  From: Mickey Kuo  Sent: 10/17/2022  11:12 AM EDT  To: Sleep Medicine [facility] Provider    This Diagnostic sleep study needs approval      If approved please sign and return to clerical pool  If denied please include reasons why  Also provide alternative testing if warranted  Please sign and return to clerical pool

## 2022-10-25 ENCOUNTER — CONSULT (OUTPATIENT)
Dept: PAIN MEDICINE | Facility: CLINIC | Age: 62
End: 2022-10-25
Payer: COMMERCIAL

## 2022-10-25 ENCOUNTER — APPOINTMENT (OUTPATIENT)
Dept: LAB | Facility: HOSPITAL | Age: 62
End: 2022-10-25
Attending: ANESTHESIOLOGY
Payer: COMMERCIAL

## 2022-10-25 VITALS
BODY MASS INDEX: 35.42 KG/M2 | SYSTOLIC BLOOD PRESSURE: 132 MMHG | HEART RATE: 67 BPM | TEMPERATURE: 98.2 F | HEIGHT: 74 IN | DIASTOLIC BLOOD PRESSURE: 70 MMHG | WEIGHT: 276 LBS

## 2022-10-25 DIAGNOSIS — D69.6 THROMBOCYTOPENIA (HCC): ICD-10-CM

## 2022-10-25 DIAGNOSIS — R29.898 WEAKNESS OF BOTH LOWER EXTREMITIES: Primary | ICD-10-CM

## 2022-10-25 DIAGNOSIS — M54.16 LUMBAR RADICULOPATHY: ICD-10-CM

## 2022-10-25 PROBLEM — Z96.641 S/P TOTAL RIGHT HIP ARTHROPLASTY: Status: ACTIVE | Noted: 2022-03-01

## 2022-10-25 LAB
ERYTHROCYTE [DISTWIDTH] IN BLOOD BY AUTOMATED COUNT: 15.3 % (ref 11.6–15.1)
HCT VFR BLD AUTO: 35 % (ref 36.5–49.3)
HGB BLD-MCNC: 11.6 G/DL (ref 12–17)
MCH RBC QN AUTO: 36.8 PG (ref 26.8–34.3)
MCHC RBC AUTO-ENTMCNC: 33.1 G/DL (ref 31.4–37.4)
MCV RBC AUTO: 111 FL (ref 82–98)
PLATELET # BLD AUTO: 110 THOUSANDS/UL (ref 149–390)
PMV BLD AUTO: 12.2 FL (ref 8.9–12.7)
RBC # BLD AUTO: 3.15 MILLION/UL (ref 3.88–5.62)
WBC # BLD AUTO: 4.46 THOUSAND/UL (ref 4.31–10.16)

## 2022-10-25 PROCEDURE — 36415 COLL VENOUS BLD VENIPUNCTURE: CPT

## 2022-10-25 PROCEDURE — 99244 OFF/OP CNSLTJ NEW/EST MOD 40: CPT | Performed by: ANESTHESIOLOGY

## 2022-10-25 PROCEDURE — 85027 COMPLETE CBC AUTOMATED: CPT

## 2022-10-25 RX ORDER — FOLIC ACID 0.8 MG
TABLET ORAL
COMMUNITY

## 2022-10-25 NOTE — PROGRESS NOTES
Assessment  1  Weakness of both lower extremities    2  Lumbar radiculopathy    3  Thrombocytopenia ([address] )        Plan    The patient's pain persists despite time, relative rest, activity modification and therapy  I believe that he may benefit from a lumbar epidural steroid injection to diminish any inflammatory component of his pain  I will initially use a transforaminal approach to better concentrate the steroid along the affected nerve root  If the patient does not receive significant pain relief following the initial injection, I may need to repeat using a translaminar approach  Secondary to his history of thrombocytopenia will obtain a platelet level prior to procedure  Because of his subjective weakness will obtain EMG of the bilateral lower limbs to rule any other significant etiology that may be contributing to his symptoms  In the office today, we reviewed the nature of the patient's pathology in depth using  diagrams and models  I discussed the approach I would use for the epidural steroid injection and provided literature for home review  The patient understands the risks associated with the procedure including but not limited to bleeding, infection, tissue injury, decreased immunity secondary to steroid injection, exacerbation of symptoms, allergic reaction, spinal headache, and paralysis and provided verbal consent  My impressions and treatment recommendations were discussed in detail with the patient who verbalized understanding and had no further questions  Discharge instructions were provided  I personally saw and examined the patient and I agree with the above discussed plan of care  This note is created using dictation transcription  It may contain typographical errors, grammatical errors, improperly dictated words, background noise and other errors      Orders Placed This Encounter   Procedures   • [place] spine and pain procedure     Standing Status:   Future     Standing Expiration Date:   10/25/2026     Order Specific Question:   Reason for Exam:     Answer:   B/L L5 TFESI 1     Order Specific Question:   Anticoagulant hold needed? Answer:   no   • FL spine and pain procedure     Standing Status:   Future     Standing Expiration Date:   10/25/2026     Order Specific Question:   Reason for Exam:     Answer:   Bilateral S1     Order Specific Question:   Anticoagulant hold needed? Answer:   no   • CBC and Platelet     Standing Status:   Future     Standing Expiration Date:   10/25/2023   • EMG 2 limb lower extremity     Standing Status:   Future     Standing Expiration Date:   10/25/2023     Scheduling Instructions:      Weakness lower legs     Order Specific Question:   Possible Diagnosis: Answer:   patient scheduled - unknown     New Medications Ordered This Visit   Medications   • Ferrous Sulfate (IRON SUPPLEMENT PO)     Sig: Take by mouth   • Magnesium 500 MG CAPS     Sig: Take by mouth     Referred By: Debbie Escobar DO  History of Present Illness    Sheldon Almaraz is a 65 y o  male with approximate two year history of low back and lower extremity pain as well as bilateral lower extremity weakness  States his quadriceps muscles hurt and has undergone extensive physical therapy but his symptoms persists which is moderate rates as 7/10 on the visual analog scale interfering with daily living activities  Has difficulty going from sitting to standing position he describes the pain as cramping shooting with numbness sensation  Reports that lying down decreases symptoms while standing and walking aggravate his pain  Physical therapy provided temporary relief  He also has history of bilateral hip replacement  I have personally reviewed and/or updated the patient's past medical history, past surgical history, family history, social history, current medications, allergies, and vital signs today       Review of Systems   Constitutional: Positive for unexpected weight change  Negative for fever  HENT: Negative for trouble swallowing  Eyes: Negative for visual disturbance  Respiratory: Negative for shortness of breath and wheezing  Cardiovascular: Negative for chest pain and palpitations  Gastrointestinal: Negative for constipation, diarrhea, nausea and vomiting  Endocrine: Negative for cold intolerance, heat intolerance and polydipsia  Genitourinary: Negative for difficulty urinating and frequency  Musculoskeletal: Positive for myalgias  Negative for arthralgias, gait problem and joint swelling  Skin: Negative for rash  Neurological: Positive for numbness  Negative for dizziness, seizures, syncope, weakness and headaches  Hematological: Does not bruise/bleed easily  Psychiatric/Behavioral: Negative for dysphoric mood  All other systems reviewed and are negative        Patient Active Problem List   Diagnosis   • Hematochezia   • Fatty liver   • Hyperlipidemia   • Hypothyroidism   • Macrocytic anemia   • Osteoarthritis of both hips   • Splenomegaly   • Testicular hypogonadism   • Thrombocytopenia ([facility] [address] )   • Ureteric stone   • Vitamin D deficiency   • Leucopenia   • Iron deficiency anemia due to chronic blood loss   • B12 deficiency   • Venous ulcer (HCC)   • Malignant (primary) neoplasm, unspecified ([facility] [address] )   • Hardening of the aorta (main artery of the heart) (HCC)   • S/P total right hip arthroplasty       Past Medical History:   Diagnosis Date   • Anemia     last assessed: 09/03/15   • Cholelithiasis    • Disease of thyroid gland    • GERD (gastroesophageal reflux disease)    • Hyperlipidemia    • Hypersecretion of testicular hormones    • Insomnia    • Kidney stone    • Low testosterone    • Male erectile dysfunction    • Osteoarthritis, hip, bilateral    • Thrombosed external hemorrhoids        Past Surgical History:   Procedure Laterality Date   • CHOLECYSTECTOMY     • COLONOSCOPY     • [place] COLONOSCOPY FLX DX W/COLLJ SPEC WHEN PFRMD N/A 11/6/2017 Procedure: EGD AND COLONOSCOPY;  Surgeon: Mildred Coronel MD;  Location: [facility]; Service: Gastroenterology   • TOTAL HIP ARTHROPLASTY Left 11/20/2020       Family History   Problem Relation Age of Onset   • Arthritis Mother    • Heart disease Father    • Hypertension Father    • Diabetes Father    • Asthma Father    • Cancer Paternal Grandmother    • Cancer Family    • Heart disease Family        Social History     Occupational History   • Not on file   Tobacco Use   • Smoking status: Former Smoker   • Smokeless tobacco: Never Used   • Tobacco comment: QUIT 2002/ SECOND HAND    Vaping Use   • Vaping Use: Never used   Substance and Sexual Activity   • Alcohol use: Yes     Comment: socially   • Drug use: Never   • Sexual activity: Not on file       Current Outpatient Medications on File Prior to Visit   Medication Sig   • Cholecalciferol 50 MCG (2000 UT) TABS 2 tab daily   • Ferrous Sulfate (IRON SUPPLEMENT PO) Take by mouth   • levothyroxine 125 mcg tablet 2 TABS DAILY 1 TIME A DAY   • liothyronine (CYTOMEL) 5 mcg tablet TAKE 1 TABLET BY MOUTH EVERY DAY   • Magnesium 500 MG CAPS Take by mouth   • Testosterone 4 MG/24HR PT24 1 patch   • triamcinolone (KENALOG) 0 1 % cream Apply topically 2 (two) times a day     Current Facility-Administered Medications on File Prior to Visit   Medication   • ipratropium-albuterol (DUO-NEB) 0 5-2 5 mg/3 mL inhalation solution 3 mL       No Known Allergies    Physical Exam    /70 (BP Location: Left arm, Patient Position: Sitting, Cuff Size: Standard)   Pulse 67   Temp 98 2 °F (36 8 °C)   Ht 6' 2" (1 88 m)   Wt 125 kg (276 lb)   BMI 35 44 kg/m²     Constitutional: normal, well developed, well nourished, alert, in no distress and non-toxic and no overt pain behavior   and overweight  Eyes: anicteric  HEENT: grossly intact  Neck: supple, symmetric, trachea midline and no masses   Pulmonary:even and unlabored  Cardiovascular:No edema or pitting edema present  Skin:Normal without rashes or lesions and well hydrated  Psychiatric:Mood and affect appropriate  Neurologic:Cranial Nerves II-XII grossly intact  Musculoskeletal:normal, Difficulty going from sitting to standing sitting position; no obvious skin lesions or erythema lumbar sacral spine; mild tenderness in lumbar paravertebrals, no sacroiliac or greater trochanteric tenderness bilateral; deep tendon reflexes are diminished but symmetrical bilateral patellar and achilles; no focal motor deficit appreciated lower limbs; negative bilateral straight leg raising  Imaging  MRI LUMBAR SPINE WITHOUT CONTRAST @  9-23-22     INDICATION: [id number]: Spinal stenosis, lumbar region with neurogenic claudication      COMPARISON:  MRI lumbar spine 2/17/2010     TECHNIQUE:  Sagittal T1, sagittal T2, sagittal inversion recovery, axial T1 and axial T2, coronal T2     IMAGE QUALITY:  Diagnostic     FINDINGS:     VERTEBRAL BODIES:  There are 5 lumbar type vertebral bodies  Normal alignment of the lumbar spine  No spondylolysis or spondylolisthesis  No scoliosis  No compression fracture  Normal marrow signal is identified within the visualized bony   structures  No discrete marrow lesion      SACRUM:  Normal signal within the sacrum  No evidence of insufficiency or stress fracture      DISTAL CORD AND CONUS:  Normal size and signal within the distal cord and conus  The conus terminates at the L1 level  The cauda equina nerve roots appear within normal limits      PARASPINAL SOFT TISSUES:  Mild dependent subcutaneous edema within the lower back region      LOWER THORACIC DISC SPACES:  Normal disc height and signal   No disc herniation, canal stenosis or foraminal narrowing      LUMBAR DISC SPACES:     L1-2: Disc desiccation with mild loss of disc height  No focal disc herniation, central canal stenosis, or neural foraminal narrowing         L2-3: Disc desiccation with mild loss of disc height    Diffuse disc bulge without central canal or neural foraminal narrowing        L3-4: Disc desiccation with mild loss of disc height  Diffuse disc bulge without central canal or neural foraminal narrowing        L4-5: Stable disc bulge with a new small central disc protrusion  No central canal or neural foraminal stenosis      L5-S1: Disc bulge with stable small central disc protrusion  New small broad-based left paracentral/foraminal disc protrusion is noted abutting the traversing left S1 nerve root  No central canal stenosis  Mild left subarticular/lateral recess   narrowing  Moderate bilateral neural foraminal stenosis, progressed, with encroachment of the exiting L5 nerve roots      Visualized abdominal and pelvic contents: No suspicious abnormality      IMPRESSION:     1  New small broad-based left paracentral/foraminal disc protrusion at L5-S1 abutting the traversing left S1 nerve root  Moderate bilateral L5-S1 neural foraminal narrowing, progressed, with encroachment of the exiting L5 nerve roots  2  New small central disc protrusion at L4-5 without central canal narrowing  MRI RIGHT HIP @  8-4-21     INDICATION:   C80 1: Malignant (primary) neoplasm, unspecified  lt hip replacement 2020, chronic b/l hip pain     COMPARISON: None      TECHNIQUE:  MR sequences were obtained of the right hip and pelvis including: Localizer, axial T2 fat sat, coronal T1/STIR, cone-down axial oblique PD of the affected hip, coronal PD of the affected hip, sagittal T2 fat sat of the affected hip        Gadolinium was not used      FINDINGS:     RIGHT HIP:     -JOINT EFFUSION: There is a small joint effusion      -BONES: No evidence of acute fracture or marrow infiltrative process      -ARTICULAR SURFACE:  Grade 4 chondrosis along the anterior superior joint space with subchondral marrow edema and cystic change at both sides of the joint    Joint line osteophytes are present      -ACETABULAR LABRUM: Diffuse labral tearing      -TROCHANTERIC BURSA: Normal      LEFT HIP: (please note dedicated small field of view images were not made of the left hip joint limiting its evaluation )      -JOINT EFFUSION: None      -BONES: Left hip arthroplasty with susceptibility artifact limits evaluation of adjacent structures       -TROCHANTERIC BURSA: Normal      REST OF PELVIS:     -BONES: Normal      -SI JOINTS AND SYMPHYSIS PUBIS:  Intact      -VISUALIZED LUMBAR SPINE:  Degenerative disc disease      -MUSCULATURE: Abductor insertions on the left hip are difficult to characterize due to susceptibility artifact      Mild right gluteus minimus insertional tendinosis  Hamstrings origins are intact      -PELVIC SOFT TISSUES: Colonic diverticulosis      -SUBCUTANEOUS TISSUES: Normal        IMPRESSION:     Complete cartilage loss along the anterior superior joint space with subchondral marrow edema and cystic change both sides of the joint  Diffuse labral tearing  I have personally reviewed pertinent films in PACS and my interpretation is Lumbar disc herniation L5-S1 with low lumbar spondylosis

## 2022-10-26 ENCOUNTER — TELEPHONE (OUTPATIENT)
Dept: FAMILY MEDICINE CLINIC | Facility: CLINIC | Age: 62
End: 2022-10-26

## 2022-10-26 NOTE — TELEPHONE ENCOUNTER
----- Message from Eve Abarca DO sent at 10/26/2022  8:12 AM EDT -----  CBC shows persistent mild anemia with low platelet count    We will discuss the next step at her next office visit

## 2022-10-27 ENCOUNTER — TELEPHONE (OUTPATIENT)
Dept: FAMILY MEDICINE CLINIC | Facility: CLINIC | Age: 62
End: 2022-10-27

## 2022-10-27 NOTE — TELEPHONE ENCOUNTER
----- Message from Robert Huynh DO sent at 10/26/2022  8:12 AM EDT -----  CBC shows persistent mild anemia with low platelet count    We will discuss the next step at her next office visit

## 2022-11-03 ENCOUNTER — HOSPITAL ENCOUNTER (OUTPATIENT)
Dept: NEUROLOGY | Facility: CLINIC | Age: 62
End: 2022-11-03

## 2022-11-03 DIAGNOSIS — R29.898 WEAKNESS OF BOTH LOWER EXTREMITIES: ICD-10-CM

## 2022-11-04 ENCOUNTER — TELEPHONE (OUTPATIENT)
Dept: PAIN MEDICINE | Facility: CLINIC | Age: 62
End: 2022-11-04

## 2022-11-04 ENCOUNTER — HOSPITAL ENCOUNTER (OUTPATIENT)
Dept: RADIOLOGY | Facility: CLINIC | Age: 62
Discharge: HOME | End: 2022-11-04

## 2022-11-04 VITALS
HEART RATE: 71 BPM | DIASTOLIC BLOOD PRESSURE: 62 MMHG | SYSTOLIC BLOOD PRESSURE: 145 MMHG | RESPIRATION RATE: 20 BRPM | OXYGEN SATURATION: 97 % | TEMPERATURE: 97 F

## 2022-11-04 DIAGNOSIS — R29.898 WEAKNESS OF BOTH LOWER EXTREMITIES: Primary | ICD-10-CM

## 2022-11-04 DIAGNOSIS — M54.16 LUMBAR RADICULOPATHY: ICD-10-CM

## 2022-11-04 RX ORDER — PAPAVERINE HCL 150 MG
15 CAPSULE, EXTENDED RELEASE ORAL ONCE
Status: COMPLETED | OUTPATIENT
Start: 2022-11-04 | End: 2022-11-04

## 2022-11-04 RX ADMIN — IOHEXOL 1 ML: 300 INJECTION, SOLUTION INTRAVENOUS at 09:53

## 2022-11-04 RX ADMIN — DEXAMETHASONE SODIUM PHOSPHATE 15 MG: 10 INJECTION, SOLUTION INTRAMUSCULAR; INTRAVENOUS at 09:54

## 2022-11-04 NOTE — DISCHARGE INSTRUCTIONS
Epidural Steroid Injection   WHAT YOU NEED TO KNOW:   An epidural steroid injection (VIVEK) is a procedure to inject steroid medicine into the epidural space  The epidural space is between your spinal cord and vertebrae  Steroids reduce inflammation and fluid buildup in your spine that may be causing pain  You may be given pain medicine along with the steroids  ACTIVITY  Do not drive or operate machinery today  No strenuous activity today - bending, lifting, etc   You may resume normal activites starting tomorrow - start slowly and as tolerated  You may shower today, but no tub baths or hot tubs  You may have numbness for several hours from the local anesthetic  Please use caution and common sense, especially with weight-bearing activities  CARE OF THE INJECTION SITE  If you have soreness or pain, apply ice to the area today (20 minutes on/20 minutes off)  Starting tomorrow, you may use warm, moist heat or ice if needed  You may have an increase or change in your discomfort for 36-48 hours after your treatment  Apply ice and continue with any pain medication you have been prescribed  Notify the [facility] if you have any of the following: redness, drainage, swelling, headache, stiff neck or fever above 100°F     SPECIAL INSTRUCTIONS  Our office will contact you in approximately 7 days for a progress report  MEDICATIONS  Continue to take all routine medications  Our office may have instructed you to hold some medications  As no general anesthesia was used in today's procedure, you should not experience any side effects related to anesthesia  If you are diabetic, the steroids used in today's injection may temporarily increase your blood sugar levels after the first few days after your injection  Please keep a close eye on your sugars and alert the doctor who manages your diabetes if your sugars are significantly high from your baseline or you are symptomatic       If you have a problem specifically related to your procedure, please call our office at [phone]  Problems not related to your procedure should be directed to your primary care physician

## 2022-11-04 NOTE — H&P
History of Present Illness: The patient is a 65 y o  male who presents with complaints of low back and leg pain  Past Medical History:   Diagnosis Date   • Anemia     last assessed: 09/03/15   • Cholelithiasis    • Disease of thyroid gland    • GERD (gastroesophageal reflux disease)    • Hyperlipidemia    • Hypersecretion of testicular hormones    • Insomnia    • Kidney stone    • Low testosterone    • Male erectile dysfunction    • Osteoarthritis, hip, bilateral    • Thrombosed external hemorrhoids        Past Surgical History:   Procedure Laterality Date   • CHOLECYSTECTOMY     • COLONOSCOPY     • MS COLONOSCOPY FLX DX W/COLLJ SPEC WHEN PFRMD N/A 11/6/2017    Procedure: EGD AND COLONOSCOPY;  Surgeon: Last Worley MD;  Location: [facility];   Service: Gastroenterology   • TOTAL HIP ARTHROPLASTY Left 11/20/2020         Current Outpatient Medications:   •  Cholecalciferol 50 MCG (2000 UT) TABS, 2 tab daily, Disp: , Rfl:   •  Ferrous Sulfate (IRON SUPPLEMENT PO), Take by mouth, Disp: , Rfl:   •  levothyroxine 125 mcg tablet, 2 TABS DAILY 1 TIME A DAY, Disp: 180 tablet, Rfl: 1  •  liothyronine (CYTOMEL) 5 mcg tablet, TAKE 1 TABLET BY MOUTH EVERY DAY, Disp: 90 tablet, Rfl: 1  •  Magnesium 500 MG CAPS, Take by mouth, Disp: , Rfl:   •  Testosterone 4 MG/24HR PT24, 1 patch, Disp: 30 patch, Rfl: 5  •  triamcinolone (KENALOG) 0 1 % cream, Apply topically 2 (two) times a day, Disp: 45 g, Rfl: 1    Current Facility-Administered Medications:   •  dexamethasone (PF) (DECADRON) injection 15 mg, 15 mg, Epidural, Once, Reno Capellan DO  •  iohexol (OMNIPAQUE) 300 mg/mL injection 50 mL, 50 mL, Epidural, Once, Reno Capellan DO  •  ipratropium-albuterol (DUO-NEB) 0 5-2 5 mg/3 mL inhalation solution 3 mL, 3 mL, Nebulization, Q6H, Bc Ordaz DO, 3 mL at 02/02/18 0925    No Known Allergies    Physical Exam:   Vitals:    11/04/22 0917   BP: 133/79   Pulse: 63   Resp: 20   Temp: (!) 97 °F (36 1 °C)   SpO2: 97%     General: Awake, Alert, Oriented x 3, Mood and affect appropriate  Respiratory: Respirations even and unlabored  Cardiovascular: Peripheral pulses intact; no edema  Musculoskeletal Exam:  Antalgic gait    ASA Score: III    Patient/Chart Verification  Patient ID Verified: Verbal  ID Band Applied: No  Consents Confirmed: Procedural, To be obtained in the Pre-Procedure area  Interval H&P(within 24 hr) Complete (required for Outpatients and Surgery Admit only): To be obtained in the Pre-Procedure area  Allergies Reviewed: Yes  Anticoag/NSAID held?: NA  Currently on antibiotics?: No    Assessment:   1   Lumbar radiculopathy        Plan: B/L L5 TFESI 1

## 2022-11-04 NOTE — TELEPHONE ENCOUNTER
----- Message from Avril Clancy DO sent at 11/4/2022  8:03 AM EDT -----  EMG reveals no significant lumbar sacral radiculopathy however does reveal possible demyelinating process and peripheral or peripheral neuropathy  Recommend formal neurological evaluation

## 2022-11-04 NOTE — TELEPHONE ENCOUNTER
Attempted to contact pt, s/w pt's wife, Yair Dhillon per medical communication consent on file  Per Yair Jacquie, pt is at his appt w/ SL for injection  Marlee Beaulieu, will review w/ pt  Jaki verbalized understanding and appreciation

## 2022-11-08 ENCOUNTER — TELEPHONE (OUTPATIENT)
Dept: NEUROLOGY | Facility: CLINIC | Age: 62
End: 2022-11-08

## 2022-11-08 NOTE — TELEPHONE ENCOUNTER
ADD ON- with Dr Carney and Ines Franz for a SPNL    11/09/2022, All office at 11:45 am     E-Verified     Thank you,     Poli Rodrigues

## 2022-11-09 ENCOUNTER — CONSULT (OUTPATIENT)
Dept: NEUROLOGY | Facility: CLINIC | Age: 62
End: 2022-11-09

## 2022-11-09 VITALS
BODY MASS INDEX: 35.55 KG/M2 | WEIGHT: 277 LBS | RESPIRATION RATE: 18 BRPM | HEART RATE: 63 BPM | OXYGEN SATURATION: 97 % | TEMPERATURE: 96.5 F | HEIGHT: 74 IN | DIASTOLIC BLOOD PRESSURE: 68 MMHG | SYSTOLIC BLOOD PRESSURE: 142 MMHG

## 2022-11-09 DIAGNOSIS — R29.898 WEAKNESS OF BOTH LOWER EXTREMITIES: Primary | ICD-10-CM

## 2022-11-09 DIAGNOSIS — R26.9 NEUROLOGIC GAIT DYSFUNCTION: Primary | ICD-10-CM

## 2022-11-09 RX ORDER — AMOXICILLIN 500 MG/1
CAPSULE ORAL
COMMUNITY
Start: 2022-10-10

## 2022-11-09 NOTE — PROGRESS NOTES
Patient ID: Narda Jeronimo is a 65 y o  male  Assessment/Plan:  Gait disturbance  Assessment:  Mr Doretha Stone is a pleasant 65 YOM that presented to the neurology clinic today for further evaluation of bilateral leg weakness and numbness  It is reported that his symptoms have been going on for three years, he has undergone bilateral hip replacements, undergone 3 months of intensive PT, and has started epidural steroid injections  Overall the patient states that his leg pain and weakness has improved marginally however still affects his ADLs and QOL to a great extent  We discussed the patient's symptomatology and putting the physical examination and clinical presentation and EMG together, the patient's symptoms does not appear to be related to the neuropathy found  Continued physical therapy, and physiatry will be most important  Plan:  Case discussed with attending, we will order the following studies:  - Serum Vitamin B12  - Serum B9  - Serum MG panel with reflex to Serum MuSK Abs  - Recommend continuing PT and exercise programme   - Recommend Physiatry  - Follow up with NP     Diagnoses and all orders for this visit:    Weakness of both lower extremities  -     Vitamin B12; Future  -     Folate; Future  -     Acetylcholine Receptor (AChR) Binding Antibodies with Reflex to MuSK Antibodies; Future  -     Ambulatory Referral to Neurology    Other orders  -     amoxicillin (AMOXIL) 500 mg capsule; 4 CAPSULES ONE TIME ORALLY 1 HOURS PRIOR TO DENTAL APPOINTMENT 1 DAYS         Subjective:  Referral placed by: Dr Yee Karimi  Referral reason: Dx: Weakness of both lower extremities      Mr Doretha Stone is a 65 YOM that has a PMH including but not limited to lower back pain, lower extremity pain, and bilateral lower extremity weakness for approximately two years, HTN, Hypothyroidism, and an unspecified malignant neoplasm   Per chart review that patient states that his quadricepts muscles hurt and he has undergone extensive PT with temporary relief of symptoms however persistence of his symptoms  He states that his pain is at a 7/10 and does interfere with his ADLs  The patient reported to his pain medicine physician that he has difficulty standing from a seated position and stated that the pain he is experiencing has characteristics of cramping, shooting, and numbness  The patient reported that laying down helps to alleviate his symptoms whilst standing and walking will be aggravating  The patient also has history of bilateral hip replacement  An EMG LEs x2  was completed on 11/03/2022 and demonstrated: electrodiagnostic 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  No electrodiagnostic evidence of a lumbar radiculopathy or a myopathy was demonstrated  Previous treatment has included one epidural steroid injection (x1 bilaterally 11/04, and patient states that it was helpful, for the first few days with tapering off back pain however still reports pain with walking)    Case discussed with attending, we will order the following studies:  - Serum Vitamin B12  - Serum B9  - Serum MG panel with reflex to Serum MuSK Abs  - Recommend continuing PT and exercise programme   - Recommend Physiatry    We discussed the patient's symptomatology and putting the physical examination and clinical presentation and EMG together, the patient's symptoms does not appear to be related to the neuropathy found  Continued physical therapy, and physiatry will be most important  Patient will follow up with a NP in a few months  Neuropathy  He describes symptoms of tingling, cramping, squeezing and shooting pains affecting his legs which is worse in the right lower extremity, of localized, transient and 7 / 10 severity  He denies numbness, burning, lancinating pain, hypersensitivity and allodynia  Onset of symptoms was 2 years   Symptoms currently occur in the evening and is described to be worse when sitting and last constant when using the extremity, and getting to a standing position after sitting for a prolongued period of time  He also describes autonomic symptoms of diarrhea, dry eyes, dry mouth, blurred vision and sexual dysfunction and he denies orthostasis, bloating, nausea, early satiety, constipation, alternating diarrhea and constipation and lack of sweating  Previous treatment has included one epidural steroid injection (x1 bilaterally 11/04, and patient states that it was helpful, for the first few days with tapering off back pain however still reports pain with walking)    Prior evaluation has included: PCP, Pain medicine, rheumatology  The following portions of the patient's history were reviewed and updated as appropriate: allergies, current medications, past family history, past medical history, past social history, past surgical history and problem list     Objective:  Blood pressure 142/68, pulse 63, temperature (!) 96 5 °F (35 8 °C), temperature source Tympanic, resp  rate 18, height 6' 2" (1 88 m), weight 126 kg (277 lb), SpO2 97 %  Physical Exam  Vitals reviewed  HENT:      Mouth/Throat:      Mouth: Mucous membranes are moist       Pharynx: Oropharynx is clear  Eyes:      General: Lids are normal       Extraocular Movements: Extraocular movements intact  Pupils: Pupils are equal, round, and reactive to light  Cardiovascular:      Rate and Rhythm: Normal rate  Pulmonary:      Effort: Pulmonary effort is normal    Skin:     General: Skin is warm  Neurological:      Mental Status: He is alert  Deep Tendon Reflexes:      Reflex Scores:       Tricep reflexes are 2+ on the right side and 2+ on the left side  Bicep reflexes are 2+ on the right side and 2+ on the left side  Brachioradialis reflexes are 2+ on the right side and 2+ on the left side  Patellar reflexes are 2+ on the left side  Achilles reflexes are 2+ on the left side    Psychiatric: Speech: Speech normal        Neurological Exam  Mental Status  Awake, alert and oriented to person, place and time  Alert  Speech is normal  Language is fluent with no aphasia  Attention and concentration are normal     Cranial Nerves  CN II: Visual acuity is normal  Visual fields full to confrontation  CN III, IV, VI: Extraocular movements intact bilaterally  Normal lids and orbits bilaterally  Pupils equal round and reactive to light bilaterally  CN V: Facial sensation is normal   CN VII: Full and symmetric facial movement  CN VIII: Hearing is normal   CN IX, X: Palate elevates symmetrically  Normal gag reflex  CN XI: Shoulder shrug strength is normal   CN XII: Tongue midline without atrophy or fasciculations  Motor  Normal muscle bulk throughout  No fasciculations present  Normal muscle tone  No abnormal involuntary movements  LUE: 5/5 WNL  RUE: 5/5 WNL  LLE: Hip flexion 4-/5, Hip extension 4/5, hip abductors 4/5,  knee flexion 4/5, knee extension 4/5, foot dorsiflexion/plantarflexion 5/5  RLE: Hip flexion 4-/5, Hip extension 4/5, hip abductors 4/5, knee flexion 4/5, knee extension 4/5, foot dorsiflexion/plantarflexion 5/5  Sensory  Light touch abnormality: Pinprick abnormality: Temperature is normal in upper and lower extremities  Vibration is normal in upper and lower extremities  Proprioception is normal in upper and lower extremities  RUE: Normal sensation to fine touch, pinprick, vibration, temperature  LUE: Normal sensation to fine touch, pinprick, vibration, temperature   LLE: Abnormal distrubution in lateral femoral cutaneous and saphenous distrubution with fine touch, pinprick  Decreased vibratory sensation from knee to feet  Normal temperature sensation  RLE: Abnormal distrubution in lateral femoral cutaneous and saphenous distrubution with fine touch, pinprick  Decreased vibratory sensation from knee to feet  Normal temperature sensation      Reflexes Right                      Left  Brachioradialis                    2+                         2+  Biceps                                 2+                         2+  Triceps                                2+                         2+  Patellar                                Tr                         2+  Achilles                                Tr                         2+  Plantar                           Downgoing                Downgoing    Right pathological reflexes: Sean's absent  Left pathological reflexes: Sean's absent  Coordination  Right: Finger-to-nose normal  Rapid alternating movement normal  Heel-to-shin normal Left: Finger-to-nose normal  Rapid alternating movement normal  Heel-to-shin normal     Gait  Casual gait: Narrow stance  Reduced stride length  Ataxic and antalgic gait  Normal right arm swing  Normal left arm swing  Toe walking abnormality: Heel walking abnormality: Tandem gait abnormality:    ROS:  Constitutional: Negative  Negative for appetite change and fever  HENT: Negative  Negative for hearing loss, tinnitus, trouble swallowing (at times) and voice change  Eyes: Negative  Negative for photophobia, pain and visual disturbance  Respiratory: Negative  Negative for shortness of breath  Cardiovascular: Negative  Negative for palpitations  Gastrointestinal: Negative  Negative for nausea and vomiting  Endocrine: Negative  Negative for cold intolerance  Genitourinary: Negative  Negative for dysuria, frequency and urgency  Musculoskeletal: Positive for back pain (low back pain) and gait problem (gait dysfunction- balance issues)  Negative for myalgias and neck pain  Pain in bilateral legs - more in the right leg   Skin: Negative  Negative for rash  Allergic/Immunologic: Negative  Neurological: Positive for weakness (bilateral legs) and numbness (legs)   Negative for dizziness, tremors, seizures, syncope, facial asymmetry, speech difficulty, light-headedness and headaches  Hematological: Negative  Does not bruise/bleed easily  Psychiatric/Behavioral: Negative  Negative for confusion, hallucinations and sleep disturbance

## 2022-11-09 NOTE — PROGRESS NOTES
Review of Systems   Constitutional: Negative  Negative for appetite change and fever  HENT: Negative  Negative for hearing loss, tinnitus, trouble swallowing (at times) and voice change  Eyes: Negative  Negative for photophobia, pain and visual disturbance  Respiratory: Negative  Negative for shortness of breath  Cardiovascular: Negative  Negative for palpitations  Gastrointestinal: Negative  Negative for nausea and vomiting  Endocrine: Negative  Negative for cold intolerance  Genitourinary: Negative  Negative for dysuria, frequency and urgency  Musculoskeletal: Positive for back pain (low back pain) and gait problem (gait dysfunction- balance issues)  Negative for myalgias and neck pain  Pain in bilateral legs - more in the right leg   Skin: Negative  Negative for rash  Allergic/Immunologic: Negative  Neurological: Positive for weakness (bilateral legs) and numbness (legs )  Negative for dizziness, tremors, seizures, syncope, facial asymmetry, speech difficulty, light-headedness and headaches  Hematological: Negative  Does not bruise/bleed easily  Psychiatric/Behavioral: Negative  Negative for confusion, hallucinations and sleep disturbance

## 2022-11-09 NOTE — ASSESSMENT & PLAN NOTE
Assessment:  Mr Doretha Stone is a pleasant 65 YOM that presented to the neurology clinic today for further evaluation of bilateral leg weakness and numbness  It is reported that his symptoms have been going on for three years, he has undergone bilateral hip replacements, undergone 3 months of intensive PT, and has started epidural steroid injections  Overall the patient states that his leg pain and weakness has improved marginally however still affects his ADLs and QOL to a great extent  We discussed the patient's symptomatology and putting the physical examination and clinical presentation and EMG together, the patient's symptoms does not appear to be related to the neuropathy found  Continued physical therapy, and physiatry will be most important      Plan:  Case discussed with attending, we will order the following studies:  - Serum Vitamin B12  - Serum B9  - Serum MG panel with reflex to Serum MuSK Abs  - Recommend continuing PT and exercise programme   - Recommend Physiatry  - Follow up with NP

## 2022-11-10 ENCOUNTER — OFFICE VISIT (OUTPATIENT)
Dept: FAMILY MEDICINE CLINIC | Facility: CLINIC | Age: 62
End: 2022-11-10

## 2022-11-10 VITALS
HEART RATE: 70 BPM | BODY MASS INDEX: 35.42 KG/M2 | WEIGHT: 276 LBS | DIASTOLIC BLOOD PRESSURE: 70 MMHG | OXYGEN SATURATION: 98 % | TEMPERATURE: 97.6 F | HEIGHT: 74 IN | SYSTOLIC BLOOD PRESSURE: 130 MMHG

## 2022-11-10 DIAGNOSIS — M51.16 INTERVERTEBRAL DISC DISORDER WITH RADICULOPATHY OF LUMBAR REGION: ICD-10-CM

## 2022-11-10 DIAGNOSIS — G62.9 POLYNEUROPATHY: ICD-10-CM

## 2022-11-10 DIAGNOSIS — R29.898 WEAKNESS OF BOTH LOWER EXTREMITIES: Primary | ICD-10-CM

## 2022-11-10 NOTE — PROGRESS NOTES
Subjective:   Chief Complaint   Patient presents with   • Follow-up     3 months f/u,         Patient ID: Kevin Aguilar is a 65 y o  male  Patient is here for follow-up  He had an MRI was seen by Neurology and had an EMG  There is evidence of polyneuropathy  This may contribute to his weakness  He is very happy with the outcome after receiving an epidural injection from Pain Management  He is planning on undergoing this 2 more times  He feels as though he is walking better and does not require as much effort  The following portions of the patient's history were reviewed and updated as appropriate: allergies, current medications, past family history, past medical history, past social history, past surgical history and problem list     Review of Systems   Constitutional: Negative for activity change, appetite change, chills, diaphoresis, fatigue and unexpected weight change  HENT: Negative for congestion, ear discharge, ear pain, hearing loss, nosebleeds and rhinorrhea  Eyes: Negative for pain, redness, itching and visual disturbance  Respiratory: Negative for cough, choking, chest tightness and shortness of breath  Cardiovascular: Negative for chest pain and leg swelling  Gastrointestinal: Negative for abdominal pain, blood in stool, constipation, diarrhea and nausea  Endocrine: Negative for cold intolerance, polydipsia and polyphagia  Genitourinary: Negative for dysuria, frequency, hematuria and urgency  Musculoskeletal: Positive for gait problem  Negative for arthralgias, back pain, joint swelling, neck pain and neck stiffness  Skin: Negative for color change and rash  Allergic/Immunologic: Negative for environmental allergies and food allergies  Neurological: Positive for weakness and numbness  Negative for dizziness, tremors, seizures, speech difficulty and headaches  Hematological: Negative for adenopathy  Does not bruise/bleed easily     Psychiatric/Behavioral: Negative for behavioral problems, dysphoric mood, hallucinations and self-injury  Objective:  Vitals:    11/10/22 1732   BP: 130/70   Pulse: 70   Temp: 97 6 °F (36 4 °C)   SpO2: 98%   Weight: 125 kg (276 lb)   Height: 6' 2" (1 88 m)      Physical Exam  Constitutional:       General: He is not in acute distress  Appearance: He is well-developed  He is not diaphoretic  HENT:      Head: Normocephalic and atraumatic  Right Ear: External ear normal       Left Ear: External ear normal       Nose: Nose normal       Mouth/Throat:      Pharynx: No oropharyngeal exudate  Eyes:      General: No scleral icterus  Right eye: No discharge  Left eye: No discharge  Conjunctiva/sclera: Conjunctivae normal       Pupils: Pupils are equal, round, and reactive to light  Neck:      Thyroid: No thyromegaly  Cardiovascular:      Rate and Rhythm: Normal rate and regular rhythm  Heart sounds: Normal heart sounds  No murmur heard  Pulmonary:      Effort: Pulmonary effort is normal       Breath sounds: Normal breath sounds  No wheezing or rales  Abdominal:      General: Bowel sounds are normal       Palpations: Abdomen is soft  There is no mass  Tenderness: There is no abdominal tenderness  There is no guarding  Musculoskeletal:         General: No tenderness  Normal range of motion  Cervical back: Normal range of motion and neck supple  Lymphadenopathy:      Cervical: No cervical adenopathy  Skin:     General: Skin is warm and dry  Neurological:      Mental Status: He is alert and oriented to person, place, and time  Sensory: Sensory deficit present  Deep Tendon Reflexes: Reflexes abnormal    Psychiatric:         Thought Content: Thought content normal          Judgment: Judgment normal            Assessment/Plan:    No problem-specific Assessment & Plan notes found for this encounter         Diagnoses and all orders for this visit:    Weakness of both lower extremities    Polyneuropathy    Intervertebral disc disorder with radiculopathy of lumbar region      proceed with follow-up injections per pain management  Recheck with me in 3 months    Continue the current medical regimen

## 2022-11-11 ENCOUNTER — TELEPHONE (OUTPATIENT)
Dept: PAIN MEDICINE | Facility: CLINIC | Age: 62
End: 2022-11-11

## 2022-11-11 NOTE — TELEPHONE ENCOUNTER
Caller: Merrill Coelho   Doctor/office: Dr Capellan  CB#: [phone]    % of improvement: 50%    Pain Scale (1-10): 5/10

## 2022-11-28 ENCOUNTER — HOSPITAL ENCOUNTER (OUTPATIENT)
Dept: RADIOLOGY | Facility: CLINIC | Age: 62
Discharge: HOME | End: 2022-11-28

## 2022-11-28 VITALS
RESPIRATION RATE: 20 BRPM | TEMPERATURE: 97.4 F | OXYGEN SATURATION: 97 % | DIASTOLIC BLOOD PRESSURE: 70 MMHG | HEART RATE: 57 BPM | SYSTOLIC BLOOD PRESSURE: 180 MMHG

## 2022-11-28 DIAGNOSIS — M54.16 LUMBAR RADICULOPATHY: ICD-10-CM

## 2022-11-28 RX ORDER — METHYLPREDNISOLONE ACETATE 80 MG/ML
160 INJECTION, SUSPENSION INTRA-ARTICULAR; INTRALESIONAL; INTRAMUSCULAR; PARENTERAL; SOFT TISSUE ONCE
Status: COMPLETED | OUTPATIENT
Start: 2022-11-28 | End: 2022-11-28

## 2022-11-28 RX ADMIN — IOHEXOL 1 ML: 300 INJECTION, SOLUTION INTRAVENOUS at 10:40

## 2022-11-28 RX ADMIN — METHYLPREDNISOLONE ACETATE 160 MG: 80 INJECTION, SUSPENSION INTRA-ARTICULAR; INTRALESIONAL; INTRAMUSCULAR; SOFT TISSUE at 10:41

## 2022-11-28 NOTE — DISCHARGE INSTRUCTIONS
Epidural Steroid Injection   WHAT YOU NEED TO KNOW:   An epidural steroid injection (VIVEK) is a procedure to inject steroid medicine into the epidural space  The epidural space is between your spinal cord and vertebrae  Steroids reduce inflammation and fluid buildup in your spine that may be causing pain  You may be given pain medicine along with the steroids  ACTIVITY  Do not drive or operate machinery today  No strenuous activity today - bending, lifting, etc   You may resume normal activites starting tomorrow - start slowly and as tolerated  You may shower today, but no tub baths or hot tubs  You may have numbness for several hours from the local anesthetic  Please use caution and common sense, especially with weight-bearing activities  CARE OF THE INJECTION SITE  If you have soreness or pain, apply ice to the area today (20 minutes on/20 minutes off)  Starting tomorrow, you may use warm, moist heat or ice if needed  You may have an increase or change in your discomfort for 36-48 hours after your treatment  Apply ice and continue with any pain medication you have been prescribed  Notify the [facility] if you have any of the following: redness, drainage, swelling, headache, stiff neck or fever above 100°F     SPECIAL INSTRUCTIONS  Our office will contact you in approximately 7 days for a progress report  MEDICATIONS  Continue to take all routine medications  Our office may have instructed you to hold some medications  As no general anesthesia was used in today's procedure, you should not experience any side effects related to anesthesia  If you are diabetic, the steroids used in today's injection may temporarily increase your blood sugar levels after the first few days after your injection  Please keep a close eye on your sugars and alert the doctor who manages your diabetes if your sugars are significantly high from your baseline or you are symptomatic       If you have a problem specifically related to your procedure, please call our office at [phone]  Problems not related to your procedure should be directed to your primary care physician

## 2022-11-28 NOTE — H&P
History of Present Illness: The patient is a 65 y o  male who presents with complaints of low back and leg pain  Past Medical History:   Diagnosis Date   • Anemia     last assessed: 09/03/15   • Cholelithiasis    • Disease of thyroid gland    • GERD (gastroesophageal reflux disease)    • Hyperlipidemia    • Hypersecretion of testicular hormones    • Insomnia    • Kidney stone    • Low testosterone    • Male erectile dysfunction    • Osteoarthritis, hip, bilateral    • Thrombosed external hemorrhoids        Past Surgical History:   Procedure Laterality Date   • CHOLECYSTECTOMY     • COLONOSCOPY     • FL COLONOSCOPY FLX DX W/COLLJ SPEC WHEN PFRMD N/A 11/6/2017    Procedure: EGD AND COLONOSCOPY;  Surgeon: Nicole Rosas MD;  Location[facility];   Service: Gastroenterology   • TOTAL HIP ARTHROPLASTY Left 11/20/2020         Current Outpatient Medications:   •  amoxicillin (AMOXIL) 500 mg capsule, 4 CAPSULES ONE TIME ORALLY 1 HOURS PRIOR TO DENTAL APPOINTMENT 1 DAYS, Disp: , Rfl:   •  Cholecalciferol 50 MCG (2000 UT) TABS, 2 tab daily, Disp: , Rfl:   •  Ferrous Sulfate (IRON SUPPLEMENT PO), Take by mouth, Disp: , Rfl:   •  levothyroxine 125 mcg tablet, 2 TABS DAILY 1 TIME A DAY, Disp: 180 tablet, Rfl: 1  •  liothyronine (CYTOMEL) 5 mcg tablet, TAKE 1 TABLET BY MOUTH EVERY DAY, Disp: 90 tablet, Rfl: 1  •  Magnesium 500 MG CAPS, Take by mouth, Disp: , Rfl:   •  Testosterone 4 MG/24HR PT24, 1 patch, Disp: 30 patch, Rfl: 5  •  triamcinolone (KENALOG) 0 1 % cream, Apply topically 2 (two) times a day, Disp: 45 g, Rfl: 1    Current Facility-Administered Medications:   •  iohexol (OMNIPAQUE) 300 mg/mL injection 50 mL, 50 mL, Epidural, Once, Reno Capellan DO  •  ipratropium-albuterol (DUO-NEB) 0 5-2 5 mg/3 mL inhalation solution 3 mL, 3 mL, Nebulization, Q6H, Hazel Youngblood DO, 3 mL at 02/02/18 5821  •  methylPREDNISolone acetate (DEPO-MEDROL) injection 160 mg, 160 mg, Epidural, Once, Reno Capellan DO    No Known Allergies    Physical Exam:   General: Awake, Alert, Oriented x 3, Mood and affect appropriate  Respiratory: Respirations even and unlabored  Cardiovascular: Peripheral pulses intact; no edema  Musculoskeletal Exam:   Decreased range of motion lumbar spine     ASA Score: II         Assessment:   1   Lumbar radiculopathy        Plan: Bilateral S1

## 2022-12-05 ENCOUNTER — TELEPHONE (OUTPATIENT)
Dept: PAIN MEDICINE | Facility: CLINIC | Age: 62
End: 2022-12-05

## 2022-12-05 NOTE — TELEPHONE ENCOUNTER
1st attempt  Unable to lm to cb with % improvement and pain level.   Mailbox full     Bilateral S1 TFESI 11/28 , 12/19 F/u

## 2022-12-07 ENCOUNTER — TELEPHONE (OUTPATIENT)
Dept: HEMATOLOGY ONCOLOGY | Facility: CLINIC | Age: 62
End: 2022-12-07

## 2022-12-07 NOTE — TELEPHONE ENCOUNTER
Appointment Cancellation Or Reschedule     Person calling in Patient    If other than patient calling, are they listed on the communication consent form? Provider Dr Niurka Wright   Office Visit Date and Time 12/9/22   Office Visit Location [facility]   Did patient want to reschedule their office appointment? If so, when was it scheduled to? 12/12/22   3pm   Did you have STAR scheduled for this appointment? no   Do you need STAR set up for your new appointment? If yes, please send to "PATIENT RIDESHARE" pool for STAR rescheduling no   If you are cancelling appointment, can we notify STAR to cancel ride? If yes, please send to "PATIENT RIDESHARE" pool for STAR to cancel service no   Is this patient calling to reschedule an infusion appointment? no   When is their next infusion appointment? no   Is this patient a Chemo patient? no   Reason for Cancellation or Reschedule Patient has a family thing to attend     If the patient is a treatment patient, please route this to the office nurse  If the patient is not on treatment, please route to the office MA  If the patient is a surgical oncology patient, please route to surg/onc clinical pool

## 2022-12-09 ENCOUNTER — APPOINTMENT (OUTPATIENT)
Dept: LAB | Facility: HOSPITAL | Age: 62
End: 2022-12-09
Attending: INTERNAL MEDICINE

## 2022-12-09 ENCOUNTER — TELEPHONE (OUTPATIENT)
Dept: NEUROLOGY | Facility: CLINIC | Age: 62
End: 2022-12-09

## 2022-12-09 DIAGNOSIS — D50.0 IRON DEFICIENCY ANEMIA DUE TO CHRONIC BLOOD LOSS: ICD-10-CM

## 2022-12-09 DIAGNOSIS — D50.9 IRON DEFICIENCY ANEMIA, UNSPECIFIED IRON DEFICIENCY ANEMIA TYPE: ICD-10-CM

## 2022-12-09 DIAGNOSIS — E53.8 B12 DEFICIENCY: ICD-10-CM

## 2022-12-09 LAB
ALBUMIN SERPL BCP-MCNC: 3.8 G/DL (ref 3.5–5)
ALP SERPL-CCNC: 71 U/L (ref 46–116)
ALT SERPL W P-5'-P-CCNC: 50 U/L (ref 12–78)
ANION GAP SERPL CALCULATED.3IONS-SCNC: 6 MMOL/L (ref 4–13)
AST SERPL W P-5'-P-CCNC: 26 U/L (ref 5–45)
BASOPHILS # BLD AUTO: 0.02 THOUSANDS/ÂΜL (ref 0–0.1)
BASOPHILS NFR BLD AUTO: 0 % (ref 0–1)
BILIRUB SERPL-MCNC: 0.45 MG/DL (ref 0.2–1)
BUN SERPL-MCNC: 22 MG/DL (ref 5–25)
CALCIUM SERPL-MCNC: 9.1 MG/DL (ref 8.3–10.1)
CHLORIDE SERPL-SCNC: 108 MMOL/L (ref 96–108)
CO2 SERPL-SCNC: 25 MMOL/L (ref 21–32)
CREAT SERPL-MCNC: 0.99 MG/DL (ref 0.6–1.3)
EOSINOPHIL # BLD AUTO: 0.02 THOUSAND/ÂΜL (ref 0–0.61)
EOSINOPHIL NFR BLD AUTO: 0 % (ref 0–6)
ERYTHROCYTE [DISTWIDTH] IN BLOOD BY AUTOMATED COUNT: 15 % (ref 11.6–15.1)
FERRITIN SERPL-MCNC: 349 NG/ML (ref 8–388)
GFR SERPL CREATININE-BSD FRML MDRD: 81 ML/MIN/1.73SQ M
GLUCOSE SERPL-MCNC: 122 MG/DL (ref 65–140)
HCT VFR BLD AUTO: 38.7 % (ref 36.5–49.3)
HGB BLD-MCNC: 12.4 G/DL (ref 12–17)
IMM GRANULOCYTES # BLD AUTO: 0.02 THOUSAND/UL (ref 0–0.2)
IMM GRANULOCYTES NFR BLD AUTO: 0 % (ref 0–2)
IRON SATN MFR SERPL: 37 % (ref 20–50)
IRON SERPL-MCNC: 121 UG/DL (ref 65–175)
LYMPHOCYTES # BLD AUTO: 1.71 THOUSANDS/ÂΜL (ref 0.6–4.47)
LYMPHOCYTES NFR BLD AUTO: 34 % (ref 14–44)
MCH RBC QN AUTO: 35.5 PG (ref 26.8–34.3)
MCHC RBC AUTO-ENTMCNC: 32 G/DL (ref 31.4–37.4)
MCV RBC AUTO: 111 FL (ref 82–98)
MONOCYTES # BLD AUTO: 0.45 THOUSAND/ÂΜL (ref 0.17–1.22)
MONOCYTES NFR BLD AUTO: 9 % (ref 4–12)
NEUTROPHILS # BLD AUTO: 2.84 THOUSANDS/ÂΜL (ref 1.85–7.62)
NEUTS SEG NFR BLD AUTO: 57 % (ref 43–75)
NRBC BLD AUTO-RTO: 0 /100 WBCS
PLATELET # BLD AUTO: 133 THOUSANDS/UL (ref 149–390)
PMV BLD AUTO: 11.8 FL (ref 8.9–12.7)
POTASSIUM SERPL-SCNC: 3.9 MMOL/L (ref 3.5–5.3)
PROT SERPL-MCNC: 7.3 G/DL (ref 6.4–8.4)
RBC # BLD AUTO: 3.49 MILLION/UL (ref 3.88–5.62)
SODIUM SERPL-SCNC: 139 MMOL/L (ref 135–147)
TIBC SERPL-MCNC: 331 UG/DL (ref 250–450)
WBC # BLD AUTO: 5.06 THOUSAND/UL (ref 4.31–10.16)

## 2022-12-09 NOTE — TELEPHONE ENCOUNTER
.Caller: pt    Doctor: Timi    Reason for call: pain level is a 5-6, the injection did help some     Call back#: [phone]

## 2022-12-09 NOTE — TELEPHONE ENCOUNTER
Received patient's chart, it seems the letter he received was from Spine and [facility], not Neurology     Attempted to call patient, went to  but mailbox is full and not accepting messages at this time     Patient is to call [facility] [phone]

## 2022-12-12 ENCOUNTER — OFFICE VISIT (OUTPATIENT)
Dept: HEMATOLOGY ONCOLOGY | Facility: HOSPITAL | Age: 62
End: 2022-12-12

## 2022-12-12 VITALS
TEMPERATURE: 98 F | HEART RATE: 74 BPM | WEIGHT: 272 LBS | SYSTOLIC BLOOD PRESSURE: 140 MMHG | RESPIRATION RATE: 14 BRPM | BODY MASS INDEX: 34.91 KG/M2 | HEIGHT: 74 IN | OXYGEN SATURATION: 98 % | DIASTOLIC BLOOD PRESSURE: 80 MMHG

## 2022-12-12 DIAGNOSIS — K76.0 FATTY LIVER: ICD-10-CM

## 2022-12-12 DIAGNOSIS — D69.6 THROMBOCYTOPENIA (HCC): Primary | ICD-10-CM

## 2022-12-12 NOTE — PROGRESS NOTES
Hematology/Oncology Outpatient Follow- up Note  Dawna Ugarte 65 y o  male MRN: @ Encounter: [id number]        Date:  12/12/2022    Presenting Complaint/Diagnosis : Macrocytosis and anemia and thrombocytopenia for at least 4 years      Previous Hematologic/ Oncologic History:    Venofer and B12    Current Hematologic/ Oncologic Treatment:    Observation    Interval History:    Patient returns for follow-up visit  He states he is doing reasonly well  He is recovering still from his hip surgery  He states his muscle in the thigh bothers him a little but his hip is much improved  Blood counts are actually better compared to previously  Overall they seem to be doing well and are in a stable range  He does have a fatty liver and is being followed by our colleagues in hepatology also  Denies any nausea denies any vomiting denies any diarrhea  Overall is otherwise at baseline  The rest of his 14 point review of systems today was negative  Denied any B symptoms whatsoever today  Test Results:    Imaging: FL spine and pain procedure    Result Date: 11/28/2022  Narrative: Place of Service: Procedure Room  Diagnosis:  Intervertebral disc disorder with radiculopathy  Procedure: Bilateral S1 Transforaminal Epidural Injection under Fluoroscopy  Indication for Fluoroscopy: This procedure requires the precise placement of the spinal needle into the specific paravertebral foramen  The only way to accurately and safely perform the injection  Medical Necessity: Failure of conservative medical management  Procedure Note:   After fully informed consent was obtained, the patient was then positioned on the fluoroscopy table in the prone position with a pillow beneath the pelvis to reduce lumbar lordosis  The lumbar area was prepped with ChloraPrep and draped in the usual manner  The fluoroscope was used to identify the L3///L4///L5 vertebral body on the AP projection  The  right S1 foramen was clearly identified  Using a 25g needle 1% lidocaine  was injected for local anesthetic  Then using a 22 gauge 5inch needle was inserted in the skin at a point overlying the foramen  Using AP and lateral projections the needle was advanced into the foramen  Paresthesias were not noted  After gentle negative aspiration, Omnipaque 300 was injected under live fluoroscopy  No vascular uptake was noted  Digital subtraction was utilized  Following demonstration of the neurogram, methylprednisolone in preservative-free normal saline was injected  There was no CSF, paresthesiae nor heme identified  This was then performed for the contralateral side in a similar nature without incident  Disposition: The patient was brought to the recovery room in stable condition  Strength and sensation were tested in both lower extremities and were found to be intact  Vital signs were stable  The patient tolerated the procedure well  Discharge instructions were reviewed and given to the patient  The patient was discharged home with a    Estimated blood loss:  Minimal No specimens were taken           Labs:   Lab Results   Component Value Date    WBC 5 06 12/09/2022    HGB 12 4 12/09/2022    HCT 38 7 12/09/2022     (H) 12/09/2022     (L) 12/09/2022     Lab Results   Component Value Date     11/29/2017    K 3 9 12/09/2022     12/09/2022    CO2 25 12/09/2022    BUN 22 12/09/2022    CREATININE 0 99 12/09/2022    GLUF 113 (H) 07/09/2022    CALCIUM 9 1 12/09/2022    AST 26 12/09/2022    ALT 50 12/09/2022    ALKPHOS 71 12/09/2022    PROT 6 6 11/29/2017    BILITOT 0 4 11/29/2017    EGFR 81 12/09/2022         Lab Results   Component Value Date    SPEP See Comment 05/18/2021       Lab Results   Component Value Date    PSA 0 8 07/09/2022       Lab Results   Component Value Date    IRON 121 12/09/2022    TIBC 331 12/09/2022    FERRITIN 349 12/09/2022         ROS: As stated in the history of present illness otherwise his 14 point review of systems today was negative  Active Problems:   Patient Active Problem List   Diagnosis   • Hematochezia   • Fatty liver   • Hyperlipidemia   • Hypothyroidism   • Macrocytic anemia   • Osteoarthritis of both hips   • Splenomegaly   • Testicular hypogonadism   • Thrombocytopenia ([address] )   • Ureteric stone   • Vitamin D deficiency   • Leucopenia   • Iron deficiency anemia due to chronic blood loss   • B12 deficiency   • Venous ulcer (HCC)   • Malignant (primary) neoplasm, unspecified (HCC)   • Hardening of the aorta (main artery of the heart) (HCC)   • S/P total right hip arthroplasty   • Polyneuropathy   • Weakness of both lower extremities   • Intervertebral disc disorder with radiculopathy of lumbar region   • Gait disturbance       Past Medical History:   Past Medical History:   Diagnosis Date   • Anemia     last assessed: 09/03/15   • Cholelithiasis    • Disease of thyroid gland    • GERD (gastroesophageal reflux disease)    • Hyperlipidemia    • Hypersecretion of testicular hormones    • Insomnia    • Kidney stone    • Low testosterone    • Male erectile dysfunction    • Osteoarthritis, hip, bilateral    • Thrombosed external hemorrhoids        Surgical History:   Past Surgical History:   Procedure Laterality Date   • CHOLECYSTECTOMY     • COLONOSCOPY     • TN COLONOSCOPY FLX DX W/COLLJ SPEC WHEN PFRMD N/A 11/6/2017    Procedure: EGD AND COLONOSCOPY;  Surgeon: Keith Jackson MD;  Location[facility]; Service: Gastroenterology   • TOTAL HIP ARTHROPLASTY Left 11/20/2020       Family History:    Family History   Problem Relation Age of Onset   • Arthritis Mother    • Heart disease Father    • Hypertension Father    • Diabetes Father    • Asthma Father    • Cancer Paternal Grandmother    • Cancer Family    • Heart disease Family        Cancer-related family history includes Cancer in his family and paternal grandmother      Social History:   Social History     Socioeconomic History   • Marital status: /Civil Union     Spouse name: Not on file   • Number of children: Not on file   • Years of education: Not on file   • Highest education level: Not on file   Occupational History   • Not on file   Tobacco Use   • Smoking status: Former   • Smokeless tobacco: Never   • Tobacco comments:     QUIT 2002/ SECOND HAND    Vaping Use   • Vaping Use: Never used   Substance and Sexual Activity   • Alcohol use: Yes     Comment: socially   • Drug use: Never   • Sexual activity: Not on file   Other Topics Concern   • Not on file   Social History Narrative   • Not on file     Social Determinants of Health     Financial Resource Strain: Not on file   Food Insecurity: Not on file   Transportation Needs: Not on file   Physical Activity: Not on file   Stress: Not on file   Social Connections: Not on file   Intimate Partner Violence: Not on file   Housing Stability: Not on file       Current Medications:   Current Outpatient Medications   Medication Sig Dispense Refill   • amoxicillin (AMOXIL) 500 mg capsule 4 CAPSULES ONE TIME ORALLY 1 HOURS PRIOR TO DENTAL APPOINTMENT 1 DAYS     • Cholecalciferol 50 MCG (2000 UT) TABS 2 tab daily     • Ferrous Sulfate (IRON SUPPLEMENT PO) Take by mouth     • levothyroxine 125 mcg tablet 2 TABS DAILY 1 TIME A  tablet 1   • liothyronine (CYTOMEL) 5 mcg tablet TAKE 1 TABLET BY MOUTH EVERY DAY 90 tablet 1   • Magnesium 500 MG CAPS Take by mouth     • Testosterone 4 MG/24HR PT24 1 patch 30 patch 5   • triamcinolone (KENALOG) 0 1 % cream Apply topically 2 (two) times a day 45 g 1     Current Facility-Administered Medications   Medication Dose Route Frequency Provider Last Rate Last Admin   • ipratropium-albuterol (DUO-NEB) 0 5-2 5 mg/3 mL inhalation solution 3 mL  3 mL Nebulization Q6H [facility], DO   3 mL at 02/02/18 7501       Allergies: No Known Allergies    Physical Exam:    Body surface area is 2 47 meters squared      Wt Readings from Last 3 Encounters:   12/12/22 123 kg (272 lb)   11/10/22 125 kg (276 lb)   11/09/22 126 kg (277 lb)        Temp Readings from Last 3 Encounters:   12/12/22 98 °F (36 7 °C) (Temporal)   11/28/22 (!) 97 4 °F (36 3 °C) (Temporal)   11/10/22 97 6 °F (36 4 °C)        BP Readings from Last 3 Encounters:   12/12/22 140/80   11/28/22 (!) 180/70   11/10/22 130/70         Pulse Readings from Last 3 Encounters:   12/12/22 74   11/28/22 57   11/10/22 70       Physical Exam     Constitutional   General appearance: No acute distress, well appearing and well nourished  Eyes   Conjunctiva and lids: No swelling, erythema or discharge  Pupils and irises: Equal, round and reactive to light  Ears, Nose, Mouth, and Throat   External inspection of ears and nose: Normal     Nasal mucosa, septum, and turbinates: Normal without edema or erythema  Oropharynx: Normal with no erythema, edema, exudate or lesions  Pulmonary   Respiratory effort: No increased work of breathing or signs of respiratory distress  Auscultation of lungs: Clear to auscultation  Cardiovascular   Palpation of heart: Normal PMI, no thrills  Auscultation of heart: Normal rate and rhythm, normal S1 and S2, without murmurs  Examination of extremities for edema and/or varicosities: Normal     Carotid pulses: Normal     Abdomen   Abdomen: Non-tender, no masses  Liver and spleen: No hepatomegaly or splenomegaly  Lymphatic   Palpation of lymph nodes in neck: No lymphadenopathy  Musculoskeletal   Gait and station: Normal     Digits and nails: Normal without clubbing or cyanosis  Inspection/palpation of joints, bones, and muscles: Normal     Skin   Skin and subcutaneous tissue: Normal without rashes or lesions  Neurologic   Cranial nerves: Cranial nerves 2-12 intact  Sensation: No sensory loss      Psychiatric   Orientation to person, place, and time: Normal     Mood and affect: Normal         Assessment / Plan:      The patient is a pleasant 65year-old male who has had macrocytosis and thrombocytopenia varying degree over the last 5 years  I suspect this thrombocytopenia is secondary to portal hypertension from his fatty liver  His bone marrow biopsy did not show any ring sideroblasts or any evidence of MDS  It was a normocellular marrow  I suspect his blood counts are related to his fatty liver  Blood counts are consistent over the last 6 months and to previous blood counts  Most recent white count 5 06 with a hemoglobin of 12 4 and a platelet count of 370  These are actually improved  At this point I think based on the stability of his blood counts will continue observation  I will see him back in 6 months  Goals and Barriers:  Current Goal:  Prolong Survival from macrocytosis and fluctuating blood counts  Barriers: None  Patient's Capacity to Self Care:  Patient  able to self care  Portions of the record may have been created with voice recognition software  Occasional wrong word or "sound a like" substitutions may have occurred due to the inherent limitations of voice recognition software  Read the chart carefully and recognize, using context, where substitutions have occurred

## 2022-12-13 LAB — METHYLMALONATE SERPL-SCNC: 197 NMOL/L (ref 0–378)

## 2022-12-14 NOTE — TELEPHONE ENCOUNTER
Second attempt to reach patient   Unable to leave vm as mail box is full    Sent patient MyChart message

## 2022-12-19 ENCOUNTER — OFFICE VISIT (OUTPATIENT)
Dept: PAIN MEDICINE | Facility: CLINIC | Age: 62
End: 2022-12-19

## 2022-12-19 VITALS
HEIGHT: 74 IN | SYSTOLIC BLOOD PRESSURE: 140 MMHG | TEMPERATURE: 98.3 F | WEIGHT: 274 LBS | BODY MASS INDEX: 35.16 KG/M2 | DIASTOLIC BLOOD PRESSURE: 90 MMHG

## 2022-12-19 DIAGNOSIS — M51.16 INTERVERTEBRAL DISC DISORDER WITH RADICULOPATHY OF LUMBAR REGION: ICD-10-CM

## 2022-12-19 DIAGNOSIS — M54.50 CHRONIC BILATERAL LOW BACK PAIN, UNSPECIFIED WHETHER SCIATICA PRESENT: ICD-10-CM

## 2022-12-19 DIAGNOSIS — G89.4 CHRONIC PAIN SYNDROME: Primary | ICD-10-CM

## 2022-12-19 DIAGNOSIS — G89.29 CHRONIC BILATERAL LOW BACK PAIN, UNSPECIFIED WHETHER SCIATICA PRESENT: ICD-10-CM

## 2022-12-19 DIAGNOSIS — M48.061 LUMBAR FORAMINAL STENOSIS: ICD-10-CM

## 2022-12-19 NOTE — PROGRESS NOTES
Assessment:  1  Chronic pain syndrome    2  Chronic bilateral low back pain, unspecified whether sciatica present    3  Intervertebral disc disorder with radiculopathy of lumbar region    4  Lumbar foraminal stenosis        Plan:  The patient is a 65 y o  male last seen on 11/28/2022 who presents for a follow up office visit in regards to chronic pain secondary to low back and bilateral leg pain, polyneuropathy, lumbar intervertebral disc disorder with radiculopathy, and foraminal stenosis  Patient presents today with ongoing pain and weakness in his thighs  He feels the epidural steroid injections he had in November 2022 had provided moderate pain relief and his gait has improved  I made him aware injections typically provide 3 to 6 months of pain relief  If his pain would worsen, we can repeat an additional injection at that time  A CBC and platelet count is needed prior to additional injections due to history of thrombocytopenia    I did also provide the patient exercises for his hips/IT band       The patient will follow-up as needed for reevaluation  The patient was advised to contact the office should their symptoms worsen in the interim  The patient was agreeable and verbalized an understanding  History of Present Illness: The patient is a 65 y o  male last seen on 11/28/2022 who presents for a follow up office visit in regards to chronic pain secondary to low back and bilateral leg pain, polyneuropathy, lumbar intervertebral disc disorder with radiculopathy, and foraminal stenosis  Patient's last office visit was November 28, 2022 in which he had a bilateral S1 transforaminal epidural steroid injection  Prior to this on November 4, 2022 he had a bilateral L5 transforaminal epidural steroid injection  Patient presents today with ongoing low back pain  He feels the pain is better since the last office visit    He states the pain is constant and is located mainly in the anterolateral  He has difficulty getting up from a seated position  He does note that he is walking better since the injections  He describes the pain as dull aching and sharp and is rating it a 4-5/10 numeric rating scale    He is taking no prescription pain medication     She had underwent an EMG of bilateral lower extremities which showed 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  No radiculopathy  He saw Dr Sherral Cowden, neurology, who felt the polyneuropathy was not the cause of his symptoms and referred him to physiatry and continue with physical therapy    I have personally reviewed and/or updated the patient's past medical history, past surgical history, family history, social history, current medications, allergies, and vital signs today  Review of Systems:    Review of Systems   Musculoskeletal: Positive for gait problem and joint swelling (joint stiffness)  Decreased ROM     Neurological: Positive for weakness  Past Medical History:   Diagnosis Date   • Anemia     last assessed: 09/03/15   • Cholelithiasis    • Disease of thyroid gland    • GERD (gastroesophageal reflux disease)    • Hyperlipidemia    • Hypersecretion of testicular hormones    • Insomnia    • Kidney stone    • Low testosterone    • Male erectile dysfunction    • Osteoarthritis, hip, bilateral    • Thrombosed external hemorrhoids        Past Surgical History:   Procedure Laterality Date   • CHOLECYSTECTOMY     • COLONOSCOPY     • CT COLONOSCOPY FLX DX W/COLLJ SPEC WHEN PFRMD N/A 11/6/2017    Procedure: EGD AND COLONOSCOPY;  Surgeon: Gary Licona MD;  Location[facility];   Service: Gastroenterology   • TOTAL HIP ARTHROPLASTY Left 11/20/2020       Family History   Problem Relation Age of Onset   • Arthritis Mother    • Heart disease Father    • Hypertension Father    • Diabetes Father    • Asthma Father    • Cancer Paternal Grandmother    • Cancer Family    • Heart disease Family        Social History Occupational History   • Not on file   Tobacco Use   • Smoking status: Former   • Smokeless tobacco: Never   • Tobacco comments:     QUIT 2002/ SECOND HAND    Vaping Use   • Vaping Use: Never used   Substance and Sexual Activity   • Alcohol use: Yes     Comment: socially   • Drug use: Never   • Sexual activity: Not on file         Current Outpatient Medications:   •  amoxicillin (AMOXIL) 500 mg capsule, 4 CAPSULES ONE TIME ORALLY 1 HOURS PRIOR TO DENTAL APPOINTMENT 1 DAYS, Disp: , Rfl:   •  Cholecalciferol 50 MCG (2000 UT) TABS, 2 tab daily, Disp: , Rfl:   •  Ferrous Sulfate (IRON SUPPLEMENT PO), Take by mouth, Disp: , Rfl:   •  levothyroxine 125 mcg tablet, 2 TABS DAILY 1 TIME A DAY, Disp: 180 tablet, Rfl: 1  •  liothyronine (CYTOMEL) 5 mcg tablet, TAKE 1 TABLET BY MOUTH EVERY DAY, Disp: 90 tablet, Rfl: 1  •  Magnesium 500 MG CAPS, Take by mouth, Disp: , Rfl:   •  Testosterone 4 MG/24HR PT24, 1 patch, Disp: 30 patch, Rfl: 5  •  triamcinolone (KENALOG) 0 1 % cream, Apply topically 2 (two) times a day, Disp: 45 g, Rfl: 1    Current Facility-Administered Medications:   •  ipratropium-albuterol (DUO-NEB) 0 5-2 5 mg/3 mL inhalation solution 3 mL, 3 mL, Nebulization, Q6H, Dani Mccabe DO, 3 mL at 02/02/18 0925    No Known Allergies    Physical Exam:    There were no vitals taken for this visit  Constitutional:normal, well developed, well nourished, alert, in no distress and non-toxic and no overt pain behavior    Eyes:anicteric  HEENT:grossly intact  Neck:supple, symmetric, trachea midline and no masses   Pulmonary:even and unlabored  Cardiovascular:No edema or pitting edema present  Skin:Normal without rashes or lesions and well hydrated  Psychiatric:Mood and affect appropriate  Neurologic:Cranial Nerves II-XII grossly intact  Musculoskeletal:normal      Imaging    MRI LUMBAR SPINE WITHOUT CONTRAST     INDICATION: [id number]: Spinal stenosis, lumbar region with neurogenic claudication      COMPARISON:  MRI lumbar spine 2/17/2010     TECHNIQUE:  Sagittal T1, sagittal T2, sagittal inversion recovery, axial T1 and axial T2, coronal T2     IMAGE QUALITY:  Diagnostic     FINDINGS:     VERTEBRAL BODIES:  There are 5 lumbar type vertebral bodies  Normal alignment of the lumbar spine  No spondylolysis or spondylolisthesis  No scoliosis  No compression fracture  Normal marrow signal is identified within the visualized bony   structures  No discrete marrow lesion      SACRUM:  Normal signal within the sacrum  No evidence of insufficiency or stress fracture      DISTAL CORD AND CONUS:  Normal size and signal within the distal cord and conus  The conus terminates at the L1 level  The cauda equina nerve roots appear within normal limits      PARASPINAL SOFT TISSUES:  Mild dependent subcutaneous edema within the lower back region      LOWER THORACIC DISC SPACES:  Normal disc height and signal   No disc herniation, canal stenosis or foraminal narrowing      LUMBAR DISC SPACES:     L1-2: Disc desiccation with mild loss of disc height  No focal disc herniation, central canal stenosis, or neural foraminal narrowing         L2-3: Disc desiccation with mild loss of disc height  Diffuse disc bulge without central canal or neural foraminal narrowing        L3-4: Disc desiccation with mild loss of disc height  Diffuse disc bulge without central canal or neural foraminal narrowing        L4-5: Stable disc bulge with a new small central disc protrusion  No central canal or neural foraminal stenosis      L5-S1: Disc bulge with stable small central disc protrusion  New small broad-based left paracentral/foraminal disc protrusion is noted abutting the traversing left S1 nerve root  No central canal stenosis  Mild left subarticular/lateral recess   narrowing    Moderate bilateral neural foraminal stenosis, progressed, with encroachment of the exiting L5 nerve roots      Visualized abdominal and pelvic contents: No suspicious abnormality      IMPRESSION:     1  New small broad-based left paracentral/foraminal disc protrusion at L5-S1 abutting the traversing left S1 nerve root  Moderate bilateral L5-S1 neural foraminal narrowing, progressed, with encroachment of the exiting L5 nerve roots  2  New small central disc protrusion at L4-5 without central canal narrowing         No orders to display         No orders of the defined types were placed in this encounter

## 2022-12-19 NOTE — PATIENT INSTRUCTIONS
Hip Bursitis Exercises   AMBULATORY CARE:   Hip bursitis exercises  help strengthen the muscles in your hip and keep the joint flexible  Strong muscles can help reduce pain, prevent injury, and keep the joint stable  The exercises can also help increase the range of motion in your hip joint  What you need to know about exercise safety:   Move slowly and smoothly  Avoid fast or jerky motions  This will help prevent an injury  Breathe normally  Do not hold your breath  It is important to breathe in and out so you do not tense up during exercise  Tension could prevent you from moving your joint in a full range of motion  Do the exercises and stretches on both legs  Do this so both hips remain strong and flexible  Stop if you feel sharp pain or an increase in pain  Contact your healthcare provider or physical therapist  It is normal to feel some discomfort during exercise  Regular exercise will help decrease your discomfort over time  Warm up before you stretch and exercise  Walk or ride a stationary bike for 5 to 10 minutes  How to do hip stretches: Your healthcare provider or physical therapist will tell you how many times to do each stretch  Do the stretch on both sides before you move to the next stretch  Standing iliotibial band stretch:  Stand with the leg on your injured side behind your other leg  Bend sideways toward the side that is not injured  Stop when you feel a stretch in your outer hip  Hold for 5 to 10 seconds  Then return to the starting position  Lying iliotibial band stretch:  Lie on your back  Bend the knee on your injured side toward your chest  Place your hands on the outside of your knee and thigh  Slowly pull the knee across your body  Stop when you feel a stretch in your hip and outer thigh  Hold for 5 to 10 seconds  Return your leg to the starting position  Hip stretch:  Lie on your back with both legs straight and on the ground   Bend the knee on your injured side toward your chest until you can reach your lower leg  Place both hands on your shin and pull your knee toward your chest  Hold for 5 to 10 seconds  Return your leg to the starting position  Knee to chest:  Lie on your back with both knees bent and feet flat on the floor  Bend the knee on your injured side toward your chest until you can reach your lower leg  Place both hands on your shin and pull your knee toward your chest  Hold for 5 to 10 seconds  Return your leg to the starting position  Internal hip rotator stretch:  You will do this exercise on a table  Lie on your side with the injured hip on top  You may be told to keep a pillow between your thighs  Move the top leg so the foot hangs below the edge of the table  Rotate your hip to raise your foot in the opposite direction of the bottom shoulder  Raise your foot as high as you can so you feel a stretch in the back of your thigh  Hold for 5 seconds  Then slowly lower your foot to the starting position  External hip rotator stretch:  You will do this exercise on a table  Lie on your side with the injured hip on the bottom  You do not need a pillow between your thighs for this exercise  Move the bottom leg so the foot is off the edge of the table  Rotate your hip to lift the foot in the opposite direction of the bottom shoulder  Raise your foot as high as you can so you feel a stretch in your buttock  Hold for 5 seconds  Then slowly lower your foot to the starting position  Kneeling hip flexor stretch:  Kneel on your knee on the injured side  Place the foot of your other leg on the floor so the knee is bent  Put both hands on top of your thigh  Keep your back straight and abdominal muscles tight  Lean forward until you feel a stretch in your other thigh  Hold the stretch for 10 seconds  Return to the starting position  How to do hip strengthening exercises:   Your healthcare provider or physical therapist will tell you how many times to do each exercise  Do the exercise on both sides before you move to the next exercise  Straight leg lift to the side: This may also be called hip abduction  Lie on your side with straight legs, with the injured hip on top  Slowly raise your top leg toward the ceiling as high as you can  Keep your foot pointed  Hold for 5 seconds  Then slowly lower your leg to the starting position  Inner thigh lift: This may also be called hip adduction  Lie on your side with straight legs, with the injured hip on the bottom  Cross your top leg over your bottom leg  Put the foot of your top leg on the floor in front of you  Raise your bottom leg until it touches the top leg  Hold for 5 seconds  Then slowly lower the leg to the floor  Clam exercise:  Lie on your side so your injured side is on top  Bend your knees  Keep your heels together during this exercise  Slowly raise your top knee toward the ceiling  Then lower your leg so your knees are together  Call your doctor if:   You have sharp pain during exercise or at rest     You have questions or concerns about the stretches or exercises  © Copyright [employer] 2022 Information is for End User's use only and may not be sold, redistributed or otherwise used for commercial purposes  All illustrations and images included in CareNotes® are the copyrighted property of A D A M [employer] Inc  or [facility]   The above information is an  only  It is not intended as medical advice for individual conditions or treatments  Talk to your doctor, nurse or pharmacist before following any medical regimen to see if it is safe and effective for you

## 2023-01-23 ENCOUNTER — OFFICE VISIT (OUTPATIENT)
Dept: FAMILY MEDICINE CLINIC | Facility: CLINIC | Age: 63
End: 2023-01-23

## 2023-01-23 VITALS
HEART RATE: 55 BPM | SYSTOLIC BLOOD PRESSURE: 110 MMHG | WEIGHT: 278 LBS | HEIGHT: 74 IN | BODY MASS INDEX: 35.68 KG/M2 | DIASTOLIC BLOOD PRESSURE: 62 MMHG | OXYGEN SATURATION: 100 % | TEMPERATURE: 97 F

## 2023-01-23 DIAGNOSIS — J01.00 ACUTE NON-RECURRENT MAXILLARY SINUSITIS: Primary | ICD-10-CM

## 2023-01-23 DIAGNOSIS — C80.1 MALIGNANT (PRIMARY) NEOPLASM, UNSPECIFIED (HCC): ICD-10-CM

## 2023-01-23 DIAGNOSIS — H10.31 ACUTE BACTERIAL CONJUNCTIVITIS OF RIGHT EYE: ICD-10-CM

## 2023-01-23 PROBLEM — Z96.642 HISTORY OF TOTAL LEFT HIP REPLACEMENT: Status: ACTIVE | Noted: 2022-03-01

## 2023-01-23 PROBLEM — R73.02 IMPAIRED GLUCOSE TOLERANCE: Status: ACTIVE | Noted: 2022-01-11

## 2023-01-23 RX ORDER — CEFUROXIME AXETIL 250 MG/1
250 TABLET ORAL EVERY 12 HOURS SCHEDULED
Qty: 20 TABLET | Refills: 0 | Status: SHIPPED | OUTPATIENT
Start: 2023-01-23 | End: 2023-02-02

## 2023-01-23 RX ORDER — NEOMYCIN/POLYMYXIN B/HYDROCORT 3.5-10K-1
1 SUSPENSION, DROPS(FINAL DOSAGE FORM)(ML) OPHTHALMIC (EYE) 3 TIMES DAILY
Qty: 7.5 ML | Refills: 0 | Status: SHIPPED | OUTPATIENT
Start: 2023-01-23 | End: 2023-01-23

## 2023-01-23 NOTE — PATIENT INSTRUCTIONS
Cefuroxime 1 tab twice a day  Eye drops 1 drop every 2 hours while awake 24 hours then 1 drop 3 times a day, 3-5 days

## 2023-01-23 NOTE — PROGRESS NOTES
Assessment/Plan:      1  Acute non-recurrent maxillary sinusitis  Assessment & Plan:  Cefuroxime 1 tab twice a day      2  Acute bacterial conjunctivitis of right eye  Assessment & Plan:  Erythromycin ointment 3 times a day for 3-5 days     Orders:  -     cefuroxime (CEFTIN) 250 mg tablet; Take 1 tablet (250 mg total) by mouth every 12 (twelve) hours for 10 days    3  [facility], [facility]        Subjective:  Chief Complaint   Patient presents with   • Sinus Problem     Sinus infection headaches ear neck and eye red and swelling started neck and sore throat Saturday otc meds advil cold and sinus tynole  No other concerns took home covid test was neg and no fever         Patient ID: Fabian Avitia is a 65 y o  male  Started on Thursday with a sore throat for 2 days, and resolved  Complains of constant sinus pressure, brown nasal discharge  Some post nasal drip   advil cold and sinus- helps with symptoms   Some redness of right eye  Vision is ok  Started yesterday more mild symptoms       Review of Systems   Constitutional: Negative  Negative for fatigue and fever  HENT: Positive for congestion, postnasal drip, sinus pain and sore throat  Eyes: Positive for discharge and redness  Respiratory: Negative  Negative for cough  Cardiovascular: Negative  Gastrointestinal: Negative  Endocrine: Negative  Genitourinary: Negative  Musculoskeletal: Negative  Skin: Negative  Allergic/Immunologic: Negative  Neurological: Negative  Hematological: Negative  Psychiatric/Behavioral: Negative            The following portions of the patient's history were reviewed and updated as appropriate: allergies, current medications, past family history, past medical history, past social history, past surgical history and problem list     Objective:  Vitals:    01/23/23 1003   BP: 110/62   Pulse: 55   Temp: (!) 97 °F (36 1 °C)   TempSrc: Tympanic   SpO2: 100%   Weight: 126 kg (278 lb) Height: 6' 2" (1 88 m)      Physical Exam  Vitals and nursing note reviewed  Constitutional:       Appearance: He is well-developed  HENT:      Head: Normocephalic and atraumatic  Right Ear: Tympanic membrane normal       Left Ear: Tympanic membrane normal       Nose: Congestion present  Eyes:      General:         Right eye: Discharge present  Cardiovascular:      Rate and Rhythm: Normal rate and regular rhythm  Heart sounds: Normal heart sounds  Pulmonary:      Effort: Pulmonary effort is normal       Breath sounds: Normal breath sounds  Abdominal:      General: Bowel sounds are normal       Palpations: Abdomen is soft  Skin:     General: Skin is warm and dry  Neurological:      Mental Status: He is alert and oriented to person, place, and time  Psychiatric:         Behavior: Behavior normal          Thought Content:  Thought content normal          Judgment: Judgment normal

## 2023-02-02 ENCOUNTER — TELEPHONE (OUTPATIENT)
Dept: NEUROLOGY | Facility: CLINIC | Age: 63
End: 2023-02-02

## 2023-02-02 DIAGNOSIS — J01.00 ACUTE NON-RECURRENT MAXILLARY SINUSITIS: Primary | ICD-10-CM

## 2023-02-02 RX ORDER — DOXYCYCLINE 100 MG/1
100 CAPSULE ORAL 2 TIMES DAILY
Qty: 20 CAPSULE | Refills: 0 | Status: SHIPPED | OUTPATIENT
Start: 2023-02-02 | End: 2023-02-12

## 2023-02-02 NOTE — TELEPHONE ENCOUNTER
Called and left a voicemail for patient - Please call back to confirm upcoming appointment with [facility]  Provided patient with apt date, time and location  Informed patient that check in is at least 15 minutes prior to apt time

## 2023-02-04 ENCOUNTER — APPOINTMENT (OUTPATIENT)
Dept: LAB | Facility: HOSPITAL | Age: 63
End: 2023-02-04
Attending: INTERNAL MEDICINE

## 2023-02-04 DIAGNOSIS — E03.9 HYPOTHYROIDISM, UNSPECIFIED TYPE: ICD-10-CM

## 2023-02-04 DIAGNOSIS — E55.9 VITAMIN D DEFICIENCY: ICD-10-CM

## 2023-02-04 DIAGNOSIS — E29.1 TESTICULAR HYPOGONADISM: ICD-10-CM

## 2023-02-04 LAB
ALBUMIN SERPL BCP-MCNC: 4 G/DL (ref 3.5–5)
ALP SERPL-CCNC: 79 U/L (ref 46–116)
ALT SERPL W P-5'-P-CCNC: 46 U/L (ref 12–78)
ANION GAP SERPL CALCULATED.3IONS-SCNC: 7 MMOL/L (ref 4–13)
AST SERPL W P-5'-P-CCNC: 31 U/L (ref 5–45)
BILIRUB SERPL-MCNC: 0.49 MG/DL (ref 0.2–1)
BUN SERPL-MCNC: 17 MG/DL (ref 5–25)
CALCIUM SERPL-MCNC: 9.3 MG/DL (ref 8.3–10.1)
CHLORIDE SERPL-SCNC: 107 MMOL/L (ref 96–108)
CO2 SERPL-SCNC: 28 MMOL/L (ref 21–32)
CREAT SERPL-MCNC: 0.9 MG/DL (ref 0.6–1.3)
ERYTHROCYTE [DISTWIDTH] IN BLOOD BY AUTOMATED COUNT: 14.6 % (ref 11.6–15.1)
GFR SERPL CREATININE-BSD FRML MDRD: 91 ML/MIN/1.73SQ M
GLUCOSE SERPL-MCNC: 103 MG/DL (ref 65–140)
HCT VFR BLD AUTO: 38.1 % (ref 36.5–49.3)
HGB BLD-MCNC: 12.5 G/DL (ref 12–17)
MCH RBC QN AUTO: 35.7 PG (ref 26.8–34.3)
MCHC RBC AUTO-ENTMCNC: 32.8 G/DL (ref 31.4–37.4)
MCV RBC AUTO: 109 FL (ref 82–98)
PLATELET # BLD AUTO: 131 THOUSANDS/UL (ref 149–390)
PMV BLD AUTO: 11.3 FL (ref 8.9–12.7)
POTASSIUM SERPL-SCNC: 4.5 MMOL/L (ref 3.5–5.3)
PROT SERPL-MCNC: 7.9 G/DL (ref 6.4–8.4)
RBC # BLD AUTO: 3.5 MILLION/UL (ref 3.88–5.62)
SODIUM SERPL-SCNC: 142 MMOL/L (ref 135–147)
WBC # BLD AUTO: 5 THOUSAND/UL (ref 4.31–10.16)

## 2023-02-05 LAB
25(OH)D3 SERPL-MCNC: 27 NG/ML (ref 30–100)
PTH-INTACT SERPL-MCNC: 88.7 PG/ML (ref 18.4–80.1)

## 2023-02-06 ENCOUNTER — APPOINTMENT (OUTPATIENT)
Dept: LAB | Facility: HOSPITAL | Age: 63
End: 2023-02-06
Attending: INTERNAL MEDICINE

## 2023-02-06 LAB — CORTIS AM PEAK SERPL-MCNC: 15.2 UG/DL (ref 4.2–22.4)

## 2023-02-07 LAB
TESTOST FREE SERPL-MCNC: 7.9 PG/ML (ref 6.6–18.1)
TESTOST SERPL-MCNC: 195 NG/DL (ref 264–916)

## 2023-02-08 ENCOUNTER — OFFICE VISIT (OUTPATIENT)
Dept: ENDOCRINOLOGY | Facility: CLINIC | Age: 63
End: 2023-02-08

## 2023-02-08 VITALS
HEIGHT: 74 IN | BODY MASS INDEX: 36.24 KG/M2 | DIASTOLIC BLOOD PRESSURE: 74 MMHG | HEART RATE: 65 BPM | WEIGHT: 282.4 LBS | SYSTOLIC BLOOD PRESSURE: 138 MMHG

## 2023-02-08 DIAGNOSIS — E55.9 VITAMIN D DEFICIENCY: Primary | ICD-10-CM

## 2023-02-08 DIAGNOSIS — E29.1 TESTICULAR HYPOGONADISM: ICD-10-CM

## 2023-02-08 DIAGNOSIS — E03.9 HYPOTHYROIDISM, UNSPECIFIED TYPE: ICD-10-CM

## 2023-02-08 RX ORDER — TESTOSTERONE 30 MG/1.5ML
SOLUTION TOPICAL
Qty: 60 ACT | Refills: 5 | Status: SHIPPED | OUTPATIENT
Start: 2023-02-08

## 2023-02-08 NOTE — PROGRESS NOTES
2/8/2023    Assessment/Plan      Diagnoses and all orders for this visit:    Vitamin D deficiency  -     Testosterone 30 MG/ACT SOLN; 2 pumps daily  -     Testosterone, free, total; Future  -     Comprehensive metabolic panel Lab Collect; Future  -     CBC and Platelet- Lab Collect; Future  -     Sex Hormone Binding Globulin- Lab Collect; Future  -     Cholecalciferol 125 MCG (5000 UT) TABS; 1 tab daily  -     PTH, intact- Lab Collect; Future  -     Vitamin D 25 hydroxy Lab Collect; Future  -     TSH, 3rd generation; Future  -     T3, free; Future  -     T4, free; Future    Hypothyroidism, unspecified type  -     Testosterone 30 MG/ACT SOLN; 2 pumps daily  -     Testosterone, free, total; Future  -     Comprehensive metabolic panel Lab Collect; Future  -     CBC and Platelet- Lab Collect; Future  -     Sex Hormone Binding Globulin- Lab Collect; Future  -     Cholecalciferol 125 MCG (5000 UT) TABS; 1 tab daily  -     PTH, intact- Lab Collect; Future  -     Vitamin D 25 hydroxy Lab Collect; Future  -     TSH, 3rd generation; Future  -     T3, free; Future  -     T4, free; Future    Testicular hypogonadism  -     Testosterone 30 MG/ACT SOLN; 2 pumps daily  -     Testosterone, free, total; Future  -     Comprehensive metabolic panel Lab Collect; Future  -     CBC and Platelet- Lab Collect; Future  -     Sex Hormone Binding Globulin- Lab Collect; Future  -     Cholecalciferol 125 MCG (5000 UT) TABS; 1 tab daily  -     PTH, intact- Lab Collect; Future  -     Vitamin D 25 hydroxy Lab Collect; Future  -     TSH, 3rd generation; Future  -     T3, free; Future  -     T4, free; Future        Assessment/Plan:  #1 hypogonadotropic hypogonadism: Biochemically and clinically he remains hypogonadal even on the higher dose of Androderm patch  He may not be absorbing this sufficiently  We will switch and trial a different transdermal formulation with Axiron 2 pumps total daily and repeat labs in 1-months    We will call with the results  2   Vitamin D deficiency: Increase supplementation to 5000 units daily and repeat labs  3   Hypothyroidism: Continue current regimen and monitor lab work over time  CC: Follow-up    History of Present Illness     HPI: Piotr Jorgensen is a 65y o  year old male who presents for a follow-up appointment of hypogonadism and hypothyroidism as well as vitamin D deficiency  He has had hypothyroidism for over 20 years  At his office visit in May 2021 he noted fatigue, unexpected weight gain, arthralgias, muscle weakness  Work-up included normal suppressed after dexamethasone, normal growth hormone, normal prolactin  Pituitary MRI did not disclose any pituitary lesion  He does have a history of mildly elevated parathyroid hormone in the setting of vitamin D deficiency  He was started on 2000 units of vitamin D daily at that time  Hypothyroidism continues on levothyroxine 125 mcg tablets and takes 2 tablets daily as well as liothyronine 5 mcg daily  For hypogonadism he was recently increased to Androderm 4 mg daily  Overall feeling okay  He does note ongoing concerns regarding fatigue, weakness particularly in right lower extremity and is seeing neurology in the near future  Review of Systems   Constitutional: Positive for fatigue  HENT: Negative for trouble swallowing and voice change  Eyes: Negative for visual disturbance  Respiratory: Negative for shortness of breath  Cardiovascular: Negative for palpitations and leg swelling  Gastrointestinal: Negative for abdominal pain, nausea and vomiting  Endocrine: Negative for polydipsia and polyuria  Musculoskeletal: Positive for myalgias  Negative for arthralgias  Skin: Negative for rash  Neurological: Positive for weakness  Negative for dizziness and tremors  Hematological: Negative for adenopathy  Psychiatric/Behavioral: Negative for agitation and confusion         Historical Information   Past Medical History:   Diagnosis Date • Anemia     last assessed: 09/03/15   • Cholelithiasis    • Disease of thyroid gland    • GERD (gastroesophageal reflux disease)    • Hyperlipidemia    • Hypersecretion of testicular hormones    • Insomnia    • Kidney stone    • Low testosterone    • Male erectile dysfunction    • Osteoarthritis, hip, bilateral    • Thrombosed external hemorrhoids      Past Surgical History:   Procedure Laterality Date   • CHOLECYSTECTOMY     • COLONOSCOPY     • IL COLONOSCOPY FLX DX W/COLLJ SPEC WHEN PFRMD N/A 2017    Procedure: EGD AND COLONOSCOPY;  Surgeon: Aaron Vallejo MD;  [facility];   Service: Gastroenterology   • TOTAL HIP ARTHROPLASTY Left 2020     Social History   Social History     Substance and Sexual Activity   Alcohol Use Yes   • Alcohol/week: 4 0 standard drinks   • Types: 4 Glasses of wine per week    Comment: socially     Social History     Substance and Sexual Activity   Drug Use Never     Social History     Tobacco Use   Smoking Status Former   • Packs/day: 1 00   • Years: 10 00   • Pack years: 10 00   • Types: Cigarettes   • Start date: 6/15/1980   • Quit date: 6/15/1990   • Years since quittin 6   Smokeless Tobacco Never   Tobacco Comments    QUIT / SECOND HAND      Family History:   Family History   Problem Relation Age of Onset   • Arthritis Mother    • Heart disease Father    • Hypertension Father    • Diabetes Father    • Asthma Father    • Diabetes type II Father    • Hypothyroidism Father    • Cancer Paternal Grandmother    • Cancer Family    • Heart disease Family        Meds/Allergies   Current Outpatient Medications   Medication Sig Dispense Refill   • Cholecalciferol 125 MCG (5000 UT) TABS 1 tab daily     • doxycycline monohydrate (MONODOX) 100 mg capsule Take 1 capsule (100 mg total) by mouth 2 (two) times a day for 10 days 20 capsule 0   • Ferrous Sulfate (IRON SUPPLEMENT PO) Take 1 tablet by mouth daily     • levothyroxine 125 mcg tablet 2 TABS DAILY 1 TIME A  tablet 1   • liothyronine (CYTOMEL) 5 mcg tablet TAKE 1 TABLET BY MOUTH EVERY DAY 90 tablet 1   • Magnesium 500 MG CAPS Take 500 mg by mouth daily     • Testosterone 30 MG/ACT SOLN 2 pumps daily 60 Act 5   • triamcinolone (KENALOG) 0 1 % cream Apply topically 2 (two) times a day 45 g 1     No current facility-administered medications for this visit  No Known Allergies    Objective   Vitals: Blood pressure 138/74, pulse 65, height 6' 2" (1 88 m), weight 128 kg (282 lb 6 4 oz)  Invasive Devices     None                 Physical Exam  Vitals reviewed  Constitutional:       General: He is not in acute distress  Appearance: He is well-developed  He is not diaphoretic  HENT:      Head: Normocephalic and atraumatic  Eyes:      Conjunctiva/sclera: Conjunctivae normal       Pupils: Pupils are equal, round, and reactive to light  Neck:      Thyroid: No thyromegaly  Cardiovascular:      Rate and Rhythm: Normal rate and regular rhythm  Pulmonary:      Effort: Pulmonary effort is normal  No respiratory distress  Breath sounds: Normal breath sounds  Abdominal:      General: Bowel sounds are normal       Palpations: Abdomen is soft  Musculoskeletal:         General: Normal range of motion  Cervical back: Normal range of motion and neck supple  Skin:     General: Skin is warm and dry  Findings: No rash  Neurological:      Mental Status: He is alert and oriented to person, place, and time  Motor: No abnormal muscle tone  Psychiatric:         Behavior: Behavior normal          The history was obtained from the review of the chart and from the patient      Lab Results:        Component      Latest Ref Rng & Units 2/4/2023 2/6/2023   Sodium      135 - 147 mmol/L 142    Potassium      3 5 - 5 3 mmol/L 4 5    Chloride      96 - 108 mmol/L 107    CO2      21 - 32 mmol/L 28    Anion Gap      4 - 13 mmol/L 7    BUN      5 - 25 mg/dL 17    Creatinine      0 60 - 1 30 mg/dL 0 90 Glucose, Random      65 - 140 mg/dL 103    Calcium      8 3 - 10 1 mg/dL 9 3    AST      5 - 45 U/L 31    ALT      12 - 78 U/L 46    Alkaline Phosphatase      46 - 116 U/L 79    Total Protein      6 4 - 8 4 g/dL 7 9    Albumin      3 5 - 5 0 g/dL 4 0    TOTAL BILIRUBIN      0 20 - 1 00 mg/dL 0 49    eGFR      ml/min/1 73sq m 91    WBC      4 31 - 10 16 Thousand/uL 5 00    Red Blood Cell Count      3 88 - 5 62 Million/uL 3 50 (L)    Hemoglobin      12 0 - 17 0 g/dL 12 5    HCT      36 5 - 49 3 % 38 1    MCV      82 - 98 fL 109 (H)    MCH      26 8 - 34 3 pg 35 7 (H)    MCHC      31 4 - 37 4 g/dL 32 8    RDW      11 6 - 15 1 % 14 6    Platelet Count      075 - 390 Thousands/uL 131 (L)    MPV      8 9 - 12 7 fL 11 3    TESTOSTERONE FREE      6 6 - 18 1 pg/mL  7 9   Testosterone, Total, LC/MS      264 - 916 ng/dL  195 (L)   Vit D, 25-Hydroxy      30 0 - 100 0 ng/mL 27 0 (L)    PARATHYROID HORMONE      18 4 - 80 1 pg/mL 88 7 (H)    Cortisol - AM      4 2 - 22 4 ug/dL  15 2       Future Appointments   Date Time Provider Clay Ocasioi   2/9/2023 11:45 AM SAMIR Rose NEURO [facility]   2/16/2023  4:00 PM Holly Malik DO PAL [facility]   3/24/2023  8:00 PM QU SLEEP ROOM 01 QU Sleep lab [facility]   6/14/2023 10:30 AM Elaine Carney DO NEURO ALL [facility]   8/10/2023  2:00 PM SAMIR Underwood DIAB [facility]   8/14/2023  2:40 PM Federico Lloyd MD HEM ONC [facility]       Portions of the record may have been created with voice recognition software  Occasional wrong word or "sound a like" substitutions may have occurred due to the inherent limitations of voice recognition software  Read the chart carefully and recognize, using context, where substitutions have occurred

## 2023-02-09 ENCOUNTER — OFFICE VISIT (OUTPATIENT)
Dept: NEUROLOGY | Facility: CLINIC | Age: 63
End: 2023-02-09

## 2023-02-09 VITALS
BODY MASS INDEX: 36.06 KG/M2 | DIASTOLIC BLOOD PRESSURE: 67 MMHG | SYSTOLIC BLOOD PRESSURE: 155 MMHG | WEIGHT: 281 LBS | HEART RATE: 60 BPM | HEIGHT: 74 IN | TEMPERATURE: 98 F | RESPIRATION RATE: 20 BRPM

## 2023-02-09 DIAGNOSIS — R73.03 PREDIABETES: ICD-10-CM

## 2023-02-09 DIAGNOSIS — G62.9 NEUROPATHY: Primary | ICD-10-CM

## 2023-02-09 NOTE — PROGRESS NOTES
Patient ID: Vivian Cross is a 65 y o  male  Assessment/Plan:  Patient Instructions:  Continue with plans for sleep study  Continue with home leg exercises/therapy  Complete labs-you can do this anytime  Let us know if pain worsens in any way    We discussed causes of neuropathy as well as causes of muscle pain/weakness (likely related to his back and recovering from his hip surgery)  He will complete lab workup to help identify a cause for his neuropathy  We discussed importance of healthy lifestyle and continued follow up with primary care and other providers  He will follow up in the office in 6 months; he is encouraged to call the office with new symptoms prior  Diagnoses and all orders for this visit:    Neuropathy  -     Protein electrophoresis, serum; Future  -     HEMOGLOBIN A1C W/ EAG ESTIMATION; Future    Prediabetes  -     HEMOGLOBIN A1C W/ EAG ESTIMATION; Future         Subjective:    DEDRA  Daisy Taylor is a 65year old male with past medical history of osteoarthritis (bilateral hip replacement- left in 2020 and right 3/2022), prediabetes (last a1c 6 1), axonal neuropathy on emg, thrombocytopenia (controlled), low testosterone, sleep difficulty for many years (problems staying asleep-has upcoming sleep study in March) hypothyroidism who presents for continued leg weakness  Since last visit he has received one more lumbar injection which he states has been helpful but isnt as long lasting as he would have liked; he is considering getting a 3rd injection  He continues with occasional (tolerable) right thigh pain and difficulty walking  This does affect his work at times-he is a [employer]  He is able to continue with home therapy exercises  He mentions he did have a long recovery from the first hip surgery and this may be happening again after his second hip surgery  He has been working on losing weight   He states he continues with sleep problems; he states he tried marijuana and this was helpful and is considering use of medical marijuana in the future  He does not use tobacco, alcohol, or other drugs  He did not complete labs since last visit  Previous testing:  An EMG LEs x2  was completed on 11/03/2022 and demonstrated: electrodiagnostic 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  No electrodiagnostic evidence of a lumbar radiculopathy or a myopathy was demonstrated  MRI brain 8/13/2022:  IMPRESSION:  No mass effect, acute intracranial hemorrhage or evidence of recent infarction  No abnormal parenchymal or leptomeningeal enhancement  No discrete sellar or parasellar mass lesions identified    MRI L spine 9/23/2022:  IMPRESSION:  1  New small broad-based left paracentral/foraminal disc protrusion at L5-S1 abutting the traversing left S1 nerve root  Moderate bilateral L5-S1 neural foraminal narrowing, progressed, with encroachment of the exiting L5 nerve roots  2  New small central disc protrusion at L4-5 without central canal narrowing  The following portions of the patient's history were reviewed and updated as appropriate: allergies, current medications, past family history, past medical history, past social history, past surgical history and problem list          Objective:    Blood pressure 155/67, pulse 60, temperature 98 °F (36 7 °C), temperature source Temporal, resp  rate 20, height 6' 2" (1 88 m), weight 127 kg (281 lb)  Physical Exam  Vitals reviewed  Constitutional:       General: He is not in acute distress  Appearance: He is obese  He is not ill-appearing, toxic-appearing or diaphoretic  HENT:      Head: Normocephalic and atraumatic  Right Ear: External ear normal       Left Ear: External ear normal       Nose: Nose normal       Mouth/Throat:      Mouth: Mucous membranes are moist       Pharynx: Oropharynx is clear  Eyes:      Extraocular Movements: Extraocular movements intact        Pupils: Pupils are equal, round, and reactive to light  Cardiovascular:      Rate and Rhythm: Normal rate  Pulses:           Dorsalis pedis pulses are 1+ on the right side  Posterior tibial pulses are 1+ on the right side  Pulmonary:      Effort: Pulmonary effort is normal       Breath sounds: Normal breath sounds  Abdominal:      General: There is no distension  Musculoskeletal:      Cervical back: Normal range of motion  No tenderness  Comments: Mild nonpitting lower extremity edema and color change suggestive of venous insufficiency   Feet:      Right foot:      Skin integrity: No skin breakdown  Skin:     Capillary Refill: Capillary refill takes less than 2 seconds  Findings: No rash  Neurological:      Mental Status: He is alert  Mental status is at baseline  Motor: Weakness present  Coordination: Romberg sign negative  Deep Tendon Reflexes:      Reflex Scores:       Brachioradialis reflexes are 1+ on the right side and 1+ on the left side  Patellar reflexes are 1+ on the right side and 1+ on the left side  Psychiatric:         Mood and Affect: Mood normal          Speech: Speech normal          Neurological Exam  Mental Status  Alert  Oriented to person, place and time  Speech is normal  Language is fluent with no aphasia  Cranial Nerves  CN II: Right visual acuity: counts fingers  Left visual acuity: counts fingers  CN III, IV, VI: Extraocular movements intact bilaterally  Pupils equal round and reactive to light bilaterally  CN V: Facial sensation is normal   CN VII: Full and symmetric facial movement  CN VIII: Hearing is normal   CN IX, X: Palate elevates symmetrically  CN XI: Shoulder shrug strength is normal   CN XII: Tongue midline without atrophy or fasciculations  Motor   Strength is 5/5 in all four extremities except as noted  4/5 right hip flexion/4+/5 right knee extension; otherwise 5/5 strength everywhere      Sensory  Pinprick abnormality: Temperature abnormality: Vibration abnormality:   Decreased sensation below ankle on right (only side tested for sensation today)  Reflexes                                            Right                      Left  Brachioradialis                    1+                         1+  Patellar                                1+                         1+  Achilles                                Tr                         Tr    Coordination  Right: Rapid alternating movement normal Left: Rapid alternating movement normal     Gait  Casual gait: Wide stance  Antalgic gait  Romberg is absent  Able to rise from chair without using arms  ROS:    Review of Systems   Constitutional: Negative  Negative for appetite change and fever  HENT: Negative  Negative for hearing loss, tinnitus, trouble swallowing and voice change  Eyes: Negative  Negative for photophobia, pain and visual disturbance  Respiratory: Negative  Negative for shortness of breath  Cardiovascular: Negative  Negative for palpitations  Gastrointestinal: Negative  Negative for nausea and vomiting  Endocrine: Negative  Negative for cold intolerance  Genitourinary: Negative  Negative for dysuria, frequency and urgency  Musculoskeletal: Negative  Negative for gait problem, myalgias and neck pain  Skin: Negative  Negative for rash  Allergic/Immunologic: Negative  Neurological: Positive for weakness (Legs)  Negative for dizziness, tremors, seizures, syncope, facial asymmetry, speech difficulty, light-headedness, numbness and headaches  Hematological: Negative  Does not bruise/bleed easily  Psychiatric/Behavioral: Negative  Negative for confusion, hallucinations and sleep disturbance     ROS was reviewed and updated as appropriate

## 2023-02-16 ENCOUNTER — OFFICE VISIT (OUTPATIENT)
Dept: FAMILY MEDICINE CLINIC | Facility: CLINIC | Age: 63
End: 2023-02-16

## 2023-02-16 VITALS
HEART RATE: 64 BPM | OXYGEN SATURATION: 97 % | SYSTOLIC BLOOD PRESSURE: 134 MMHG | BODY MASS INDEX: 34.92 KG/M2 | TEMPERATURE: 98.4 F | HEIGHT: 74 IN | WEIGHT: 272.12 LBS | DIASTOLIC BLOOD PRESSURE: 84 MMHG

## 2023-02-16 DIAGNOSIS — E03.9 HYPOTHYROIDISM, UNSPECIFIED TYPE: ICD-10-CM

## 2023-02-16 DIAGNOSIS — E29.1 TESTICULAR HYPOGONADISM: ICD-10-CM

## 2023-02-16 DIAGNOSIS — Z12.11 ENCOUNTER FOR SCREENING COLONOSCOPY: ICD-10-CM

## 2023-02-16 DIAGNOSIS — R63.5 WEIGHT GAIN: Primary | ICD-10-CM

## 2023-02-16 DIAGNOSIS — E78.2 MIXED HYPERLIPIDEMIA: ICD-10-CM

## 2023-02-16 RX ORDER — PHENTERMINE HYDROCHLORIDE 37.5 MG/1
37.5 TABLET ORAL DAILY
Qty: 30 TABLET | Refills: 1 | Status: SHIPPED | OUTPATIENT
Start: 2023-02-16

## 2023-02-16 NOTE — PROGRESS NOTES
Subjective:   Chief Complaint   Patient presents with   • Follow-up     3 month f/u - Pt still has weakness of both lower extremities  Wants to lose weight and discuss steps to go  Feels getting a little stronger, but quad muscles still hurt  Slight pain in both legs but mostly right  Back bothers him but back feels better from when first had herniated disc  Patient ID: Vivian Cross is a 65 y o  male  Patient is here to discuss weight gain  He has a strong family history of diabetes and wants to avoid the complications  He has been talking to friends and wants to discuss some type of appetite suppressant  He like to do this for short period of time along with some dietary modifications  He is resistant to any injectable medications  The following portions of the patient's history were reviewed and updated as appropriate: allergies, current medications, past family history, past medical history, past social history, past surgical history and problem list     Review of Systems   Constitutional: Positive for unexpected weight change  Negative for activity change, appetite change, chills, diaphoresis and fatigue  HENT: Negative for congestion, ear discharge, ear pain, hearing loss, nosebleeds and rhinorrhea  Eyes: Negative for pain, redness, itching and visual disturbance  Respiratory: Negative for cough, choking, chest tightness and shortness of breath  Cardiovascular: Negative for chest pain and leg swelling  Gastrointestinal: Negative for abdominal pain, blood in stool, constipation, diarrhea and nausea  Endocrine: Negative for cold intolerance, polydipsia and polyphagia  Genitourinary: Negative for dysuria, frequency, hematuria and urgency  Musculoskeletal: Positive for gait problem  Negative for arthralgias, back pain, joint swelling, neck pain and neck stiffness  Skin: Negative for color change and rash     Allergic/Immunologic: Negative for environmental allergies and food allergies  Neurological: Positive for weakness  Negative for dizziness, tremors, seizures, speech difficulty, numbness and headaches  Hematological: Negative for adenopathy  Does not bruise/bleed easily  Psychiatric/Behavioral: Negative for behavioral problems, dysphoric mood, hallucinations and self-injury  Objective:  Vitals:    02/16/23 1550   BP: 136/88   Pulse: 64   Temp: 98 4 °F (36 9 °C)   SpO2: 97%   Weight: 123 kg (272 lb 1 9 oz)   Height: 6' 2" (1 88 m)      Physical Exam  Constitutional:       General: He is not in acute distress  Appearance: He is well-developed  He is not diaphoretic  HENT:      Head: Normocephalic and atraumatic  Right Ear: External ear normal       Left Ear: External ear normal       Nose: Nose normal       Mouth/Throat:      Pharynx: No oropharyngeal exudate  Eyes:      General: No scleral icterus  Right eye: No discharge  Left eye: No discharge  Conjunctiva/sclera: Conjunctivae normal       Pupils: Pupils are equal, round, and reactive to light  Neck:      Thyroid: No thyromegaly  Cardiovascular:      Rate and Rhythm: Normal rate and regular rhythm  Heart sounds: Normal heart sounds  No murmur heard  Pulmonary:      Effort: Pulmonary effort is normal       Breath sounds: Normal breath sounds  No wheezing or rales  Abdominal:      General: Bowel sounds are normal       Palpations: Abdomen is soft  There is no mass  Tenderness: There is no abdominal tenderness  There is no guarding  Musculoskeletal:         General: No tenderness  Normal range of motion  Cervical back: Normal range of motion and neck supple  Lymphadenopathy:      Cervical: No cervical adenopathy  Skin:     General: Skin is warm and dry  Neurological:      Mental Status: He is alert and oriented to person, place, and time  Deep Tendon Reflexes: Reflexes are normal and symmetric  Psychiatric:         Thought Content:  Thought content normal          Judgment: Judgment normal            Assessment/Plan:    No problem-specific Assessment & Plan notes found for this encounter  Diagnoses and all orders for this visit:    Weight gain  -     phentermine (ADIPEX-P) 37 5 MG tablet;  Take 1 tablet (37 5 mg total) by mouth in the morning    Encounter for screening colonoscopy  -     Ambulatory referral for colonoscopy    Testicular hypogonadism    Hypothyroidism, unspecified type    Mixed hyperlipidemia        Begin Adipex

## 2023-03-05 DIAGNOSIS — E03.9 HYPOTHYROIDISM, UNSPECIFIED TYPE: ICD-10-CM

## 2023-03-05 RX ORDER — LIOTHYRONINE SODIUM 5 UG/1
TABLET ORAL
Qty: 90 TABLET | Refills: 1 | Status: SHIPPED | OUTPATIENT
Start: 2023-03-05

## 2023-03-06 ENCOUNTER — TELEPHONE (OUTPATIENT)
Dept: PAIN MEDICINE | Facility: MEDICAL CENTER | Age: 63
End: 2023-03-06

## 2023-03-06 NOTE — TELEPHONE ENCOUNTER
Please call and find out if any changes to his health?  Is his pain same or different?  Where is it located?  Any changes to his health or medications

## 2023-03-06 NOTE — TELEPHONE ENCOUNTER
Caller: Merrill Coelho     Doctor: Dr Freitas    Reason for call: Patient calling asking to schedule procedure please advise.    Call back#: [phone]

## 2023-03-07 NOTE — TELEPHONE ENCOUNTER
S/w pt, c/o pain in his low back, right side, into his R lateral hip and down the front of his thigh stopping around his knee. Pt c/o increase in pain since saturday with no obvious cause. Pt stated that he has been doing some stretches and taking some ibuprofen to try to address the pain. Pt stated that his is where is pain usually is - no new pain. Pt stated that there have been no changes in his health - he has lost a few lbs. Pt denied any changes in his medication. Per pt, no blood thinners however, his low platelets are managed by Dr. Pelaez. Advised pt, the writer will d/w REGINALD / RACHEL and cb to advise of the next steps, possibly tomorrow. Pt verbalized understanding and appreciation.     Forwarding to REGINALD and RACHEL for review

## 2023-03-07 NOTE — TELEPHONE ENCOUNTER
S/W pt and offered suggestion from SL. Pt is agreeable and would like Rx sent to his pharmacy on file

## 2023-03-07 NOTE — TELEPHONE ENCOUNTER
Caller: Merrill Coelho      Doctor: Dr Freitas     Reason for call: Patient returning call from clinical - transferred to clinical

## 2023-03-08 DIAGNOSIS — M54.16 LUMBAR RADICULOPATHY: Primary | ICD-10-CM

## 2023-03-08 RX ORDER — METHYLPREDNISOLONE 4 MG/1
TABLET ORAL
Qty: 21 TABLET | Refills: 0 | Status: SHIPPED | OUTPATIENT
Start: 2023-03-08 | End: 2023-03-30 | Stop reason: ALTCHOICE

## 2023-03-24 ENCOUNTER — TELEPHONE (OUTPATIENT)
Dept: HEMATOLOGY ONCOLOGY | Facility: CLINIC | Age: 63
End: 2023-03-24

## 2023-03-24 PROBLEM — J01.00 ACUTE NON-RECURRENT MAXILLARY SINUSITIS: Status: RESOLVED | Noted: 2023-01-23 | Resolved: 2023-03-24

## 2023-03-24 PROBLEM — H10.31 ACUTE BACTERIAL CONJUNCTIVITIS OF RIGHT EYE: Status: RESOLVED | Noted: 2023-01-23 | Resolved: 2023-03-24

## 2023-03-24 NOTE — TELEPHONE ENCOUNTER
[place]  [facility]                        Patient appointment rescheduled due to Transfer of care      Speaking with   Patient   If you not speaking with the patient, is the person listed on patients Medical Communication Consent? N/A   Physician patient is established with Dr Aaliyah Diaz appointment date and time 08/14/2023 @2:40    Appointment Location [facility]   Physician patient is transferring care to   [facility], [address] [facility] appointment date and time 08/15/2023 @11:30AM    Reason for BROOKE GILBERT  Provider is leaving network

## 2023-03-30 ENCOUNTER — OFFICE VISIT (OUTPATIENT)
Dept: FAMILY MEDICINE CLINIC | Facility: CLINIC | Age: 63
End: 2023-03-30

## 2023-03-30 VITALS
TEMPERATURE: 97.6 F | BODY MASS INDEX: 33.09 KG/M2 | DIASTOLIC BLOOD PRESSURE: 84 MMHG | HEART RATE: 58 BPM | OXYGEN SATURATION: 96 % | SYSTOLIC BLOOD PRESSURE: 128 MMHG | HEIGHT: 74 IN | WEIGHT: 257.8 LBS

## 2023-03-30 DIAGNOSIS — E29.1 TESTICULAR HYPOGONADISM: Primary | ICD-10-CM

## 2023-03-30 DIAGNOSIS — E03.9 HYPOTHYROIDISM, UNSPECIFIED TYPE: ICD-10-CM

## 2023-03-30 DIAGNOSIS — M16.0 PRIMARY OSTEOARTHRITIS OF BOTH HIPS: ICD-10-CM

## 2023-03-30 DIAGNOSIS — M51.16 INTERVERTEBRAL DISC DISORDER WITH RADICULOPATHY OF LUMBAR REGION: ICD-10-CM

## 2023-05-11 ENCOUNTER — OFFICE VISIT (OUTPATIENT)
Dept: FAMILY MEDICINE CLINIC | Facility: CLINIC | Age: 63
End: 2023-05-11

## 2023-05-11 VITALS
HEART RATE: 52 BPM | HEIGHT: 74 IN | BODY MASS INDEX: 31.06 KG/M2 | DIASTOLIC BLOOD PRESSURE: 68 MMHG | OXYGEN SATURATION: 97 % | WEIGHT: 242 LBS | SYSTOLIC BLOOD PRESSURE: 126 MMHG | TEMPERATURE: 97.9 F

## 2023-05-11 DIAGNOSIS — E29.1 TESTICULAR HYPOGONADISM: ICD-10-CM

## 2023-05-11 DIAGNOSIS — M51.16 INTERVERTEBRAL DISC DISORDER WITH RADICULOPATHY OF LUMBAR REGION: Primary | ICD-10-CM

## 2023-05-11 DIAGNOSIS — E03.9 HYPOTHYROIDISM, UNSPECIFIED TYPE: ICD-10-CM

## 2023-05-11 RX ORDER — AMOXICILLIN 500 MG/1
CAPSULE ORAL
COMMUNITY
Start: 2023-04-15

## 2023-05-11 NOTE — PROGRESS NOTES
Subjective:   Chief Complaint   Patient presents with   • Follow-up     6 weeks follow up, weakness in lower extremities slowly getting better  Pt not taking weight loss med, only healthy eating, diet changes, stretching  On 3/30 was 258lbs, 2/8/23 Pt was 282 lbs, today Pt was 242 lbs  Pt wants to start testosterone again         Patient ID: Ann Marie Murdock is a 59 y o  male  Patient is here for 6-week follow-up  He is unable to take any appetite suppressants because it affected his urinary pattern  He has lost 15 pounds by following a careful diet of high-protein low carbohydrate no processed foods  This is over the last 6 weeks and he had lost some prior to that  He feels full and is satisfied with his progress so far      The following portions of the patient's history were reviewed and updated as appropriate: allergies, current medications, past family history, past medical history, past social history, past surgical history and problem list     Review of Systems   Constitutional: Negative for activity change, appetite change, chills, diaphoresis, fatigue and unexpected weight change  HENT: Negative for congestion, ear discharge, ear pain, hearing loss, nosebleeds and rhinorrhea  Eyes: Negative for pain, redness, itching and visual disturbance  Respiratory: Negative for cough, choking, chest tightness and shortness of breath  Cardiovascular: Negative for chest pain and leg swelling  Gastrointestinal: Negative for abdominal pain, blood in stool, constipation, diarrhea and nausea  Endocrine: Negative for cold intolerance, polydipsia and polyphagia  Genitourinary: Negative for dysuria, frequency, hematuria and urgency  Musculoskeletal: Negative for arthralgias, back pain, gait problem, joint swelling, neck pain and neck stiffness  Skin: Negative for color change and rash  Allergic/Immunologic: Negative for environmental allergies and food allergies     Neurological: Negative for "dizziness, tremors, seizures, speech difficulty, numbness and headaches  Hematological: Negative for adenopathy  Does not bruise/bleed easily  Psychiatric/Behavioral: Negative for behavioral problems, dysphoric mood, hallucinations and self-injury  Objective:  Vitals:    05/11/23 1353   BP: 126/68   Pulse: (!) 52   Temp: 97 9 °F (36 6 °C)   SpO2: 97%   Weight: 110 kg (242 lb)   Height: 6' 2\" (1 88 m)      Physical Exam  Constitutional:       General: He is not in acute distress  Appearance: He is well-developed  He is not diaphoretic  HENT:      Head: Normocephalic and atraumatic  Right Ear: External ear normal       Left Ear: External ear normal       Nose: Nose normal       Mouth/Throat:      Pharynx: No oropharyngeal exudate  Eyes:      General: No scleral icterus  Right eye: No discharge  Left eye: No discharge  Conjunctiva/sclera: Conjunctivae normal       Pupils: Pupils are equal, round, and reactive to light  Neck:      Thyroid: No thyromegaly  Cardiovascular:      Rate and Rhythm: Normal rate and regular rhythm  Heart sounds: Normal heart sounds  No murmur heard  Pulmonary:      Effort: Pulmonary effort is normal       Breath sounds: Normal breath sounds  No wheezing or rales  Abdominal:      General: Bowel sounds are normal       Palpations: Abdomen is soft  There is no mass  Tenderness: There is no abdominal tenderness  There is no guarding  Musculoskeletal:         General: No tenderness  Normal range of motion  Cervical back: Normal range of motion and neck supple  Lymphadenopathy:      Cervical: No cervical adenopathy  Skin:     General: Skin is warm and dry  Neurological:      Mental Status: He is alert and oriented to person, place, and time  Deep Tendon Reflexes: Reflexes are normal and symmetric  Psychiatric:         Thought Content:  Thought content normal          Judgment: Judgment normal        " Assessment/Plan:    No problem-specific Assessment & Plan notes found for this encounter  Diagnoses and all orders for this visit:    Intervertebral disc disorder with radiculopathy of lumbar region    Testicular hypogonadism    Hypothyroidism, unspecified type    Other orders  -     amoxicillin (AMOXIL) 500 mg capsule; 4 CAPSULES ONE TIME ORALLY 1 HOURS PRIOR TO DENTAL APPOINTMENT 1 DAYS        Continue with healthy diet and portion control    Recheck again for weight check in 6 weeks

## 2023-06-02 ENCOUNTER — TELEPHONE (OUTPATIENT)
Dept: NEUROLOGY | Facility: CLINIC | Age: 63
End: 2023-06-02

## 2023-06-02 NOTE — TELEPHONE ENCOUNTER
Tried calling to patient to remind him of his appt for 6/14/23 @ 10:30 with Dr Kvng Bansal in the [facility] [place] [facility]  also to complete his labs  Mailbox is full unable to leave a message

## 2023-06-22 ENCOUNTER — OFFICE VISIT (OUTPATIENT)
Dept: FAMILY MEDICINE CLINIC | Facility: CLINIC | Age: 63
End: 2023-06-22
Payer: COMMERCIAL

## 2023-06-22 VITALS
HEIGHT: 74 IN | DIASTOLIC BLOOD PRESSURE: 62 MMHG | WEIGHT: 234 LBS | HEART RATE: 51 BPM | OXYGEN SATURATION: 100 % | TEMPERATURE: 97.6 F | SYSTOLIC BLOOD PRESSURE: 124 MMHG | BODY MASS INDEX: 30.03 KG/M2

## 2023-06-22 DIAGNOSIS — E29.1 TESTICULAR HYPOGONADISM: Primary | ICD-10-CM

## 2023-06-22 DIAGNOSIS — R29.898 WEAKNESS OF BOTH LOWER EXTREMITIES: ICD-10-CM

## 2023-06-22 PROCEDURE — 99213 OFFICE O/P EST LOW 20 MIN: CPT | Performed by: FAMILY MEDICINE

## 2023-06-22 RX ORDER — TESTOSTERONE CYPIONATE 200 MG/ML
200 INJECTION, SOLUTION INTRAMUSCULAR
Qty: 10 ML | Refills: 0 | Status: SHIPPED | OUTPATIENT
Start: 2023-06-22

## 2023-06-22 NOTE — PROGRESS NOTES
Subjective:   Chief Complaint   Patient presents with   • Follow-up     6 wk f/u  Discuss testosterone, declining since been on MyChart  Discuss weight loss, natural  Watch what eating, not exercising much b/c weak leg msucles  Can't strengthen legs, can't get up floor  Pt stays physical and stretches  Legs still feel weak  Wants to get Testosterone injection, discuss if an optn        Patient ID: Victoriano Bland is a 59 y o  male  Patient continues to do well with his weight loss program   He has been very careful with his diet although his weight loss is starting to slow down  He continues with weakness in his legs  We reviewed his past lab work including his testosterone levels  He has tried topical testosterone but never injections and he is interested in trying that  The following portions of the patient's history were reviewed and updated as appropriate: allergies, current medications, past family history, past medical history, past social history, past surgical history and problem list     Review of Systems   Constitutional: Negative for activity change, appetite change, chills, diaphoresis, fatigue and unexpected weight change  HENT: Negative for congestion, ear discharge, ear pain, hearing loss, nosebleeds and rhinorrhea  Eyes: Negative for pain, redness, itching and visual disturbance  Respiratory: Negative for cough, choking, chest tightness and shortness of breath  Cardiovascular: Negative for chest pain and leg swelling  Gastrointestinal: Negative for abdominal pain, blood in stool, constipation, diarrhea and nausea  Endocrine: Negative for cold intolerance, polydipsia and polyphagia  Genitourinary: Negative for dysuria, frequency, hematuria and urgency  Musculoskeletal: Negative for arthralgias, back pain, gait problem, joint swelling, neck pain and neck stiffness  Skin: Negative for color change and rash     Allergic/Immunologic: Negative for environmental allergies and "food allergies  Neurological: Positive for weakness  Negative for dizziness, tremors, seizures, speech difficulty, numbness and headaches  Hematological: Negative for adenopathy  Does not bruise/bleed easily  Psychiatric/Behavioral: Negative for behavioral problems, dysphoric mood, hallucinations and self-injury  Objective:  Vitals:    06/22/23 1502   BP: 124/62   Pulse: (!) 51   Temp: 97 6 °F (36 4 °C)   SpO2: 100%   Weight: 106 kg (234 lb)   Height: 6' 2\" (1 88 m)      Physical Exam  Constitutional:       General: He is not in acute distress  Appearance: He is well-developed  He is not diaphoretic  HENT:      Head: Normocephalic and atraumatic  Right Ear: External ear normal       Left Ear: External ear normal       Nose: Nose normal       Mouth/Throat:      Pharynx: No oropharyngeal exudate  Eyes:      General: No scleral icterus  Right eye: No discharge  Left eye: No discharge  Conjunctiva/sclera: Conjunctivae normal       Pupils: Pupils are equal, round, and reactive to light  Neck:      Thyroid: No thyromegaly  Cardiovascular:      Rate and Rhythm: Normal rate and regular rhythm  Heart sounds: Normal heart sounds  No murmur heard  Pulmonary:      Effort: Pulmonary effort is normal       Breath sounds: Normal breath sounds  No wheezing or rales  Abdominal:      General: Bowel sounds are normal       Palpations: Abdomen is soft  There is no mass  Tenderness: There is no abdominal tenderness  There is no guarding  Musculoskeletal:         General: No tenderness  Normal range of motion  Cervical back: Normal range of motion and neck supple  Lymphadenopathy:      Cervical: No cervical adenopathy  Skin:     General: Skin is warm and dry  Neurological:      Mental Status: He is alert and oriented to person, place, and time  Deep Tendon Reflexes: Reflexes are normal and symmetric  Psychiatric:         Thought Content:  Thought " content normal          Judgment: Judgment normal            Assessment/Plan:    No problem-specific Assessment & Plan notes found for this encounter         Diagnoses and all orders for this visit:    Testicular hypogonadism  -     testosterone cypionate (DEPO-TESTOSTERONE) 200 mg/mL SOLN; Inject 1 mL (200 mg total) into a muscle every 28 days    Weakness of both lower extremities        We will begin testosterone injections 200 mg/mL every 28 days and have follow-up lab work

## 2023-06-23 ENCOUNTER — TELEPHONE (OUTPATIENT)
Dept: FAMILY MEDICINE CLINIC | Facility: CLINIC | Age: 63
End: 2023-06-23

## 2023-06-24 NOTE — TELEPHONE ENCOUNTER
Submitted prior auth through [employer] for patient's testosterone cyprionate solution  Awaiting determinaton

## 2023-06-29 NOTE — TELEPHONE ENCOUNTER
Patient's Testosterone was approved as follows:      We’re pleased to let you know that we’ve approved your or your doctor’s request for coverage  for Testosterone Cypionate 200MG/ML IM SOLN  You can now fill your prescription, and it will  be covered according to your plan  As long as you remain covered by your prescription drug plan and there are no changes to your  plan benefits, this request is approved from 06/29/2023 to 06/28/2026  Patient aware

## 2023-07-03 ENCOUNTER — CLINICAL SUPPORT (OUTPATIENT)
Dept: FAMILY MEDICINE CLINIC | Facility: CLINIC | Age: 63
End: 2023-07-03
Payer: COMMERCIAL

## 2023-07-03 DIAGNOSIS — E29.1 TESTICULAR HYPOGONADISM: Primary | ICD-10-CM

## 2023-07-03 PROCEDURE — 96372 THER/PROPH/DIAG INJ SC/IM: CPT

## 2023-07-03 RX ORDER — TESTOSTERONE CYPIONATE 200 MG/ML
200 INJECTION, SOLUTION INTRAMUSCULAR ONCE
Status: COMPLETED | OUTPATIENT
Start: 2023-07-03 | End: 2023-07-03

## 2023-07-03 RX ADMIN — TESTOSTERONE CYPIONATE 200 MG: 200 INJECTION, SOLUTION INTRAMUSCULAR at 17:07

## 2023-07-31 ENCOUNTER — CLINICAL SUPPORT (OUTPATIENT)
Dept: FAMILY MEDICINE CLINIC | Facility: CLINIC | Age: 63
End: 2023-07-31
Payer: COMMERCIAL

## 2023-07-31 ENCOUNTER — TELEPHONE (OUTPATIENT)
Dept: NEUROLOGY | Facility: CLINIC | Age: 63
End: 2023-07-31

## 2023-07-31 DIAGNOSIS — E34.9 TESTOSTERONE DEFICIENCY: ICD-10-CM

## 2023-07-31 PROCEDURE — 96372 THER/PROPH/DIAG INJ SC/IM: CPT | Performed by: FAMILY MEDICINE

## 2023-07-31 PROCEDURE — 96372 THER/PROPH/DIAG INJ SC/IM: CPT

## 2023-07-31 RX ORDER — TESTOSTERONE CYPIONATE 200 MG/ML
50 INJECTION, SOLUTION INTRAMUSCULAR ONCE
Status: COMPLETED | OUTPATIENT
Start: 2023-07-31 | End: 2023-07-31

## 2023-07-31 RX ADMIN — TESTOSTERONE CYPIONATE 50 MG: 200 INJECTION, SOLUTION INTRAMUSCULAR at 17:32

## 2023-07-31 NOTE — TELEPHONE ENCOUNTER
Called and left a voicemail for patient - Please call back to confirm upcoming appointment with [facility]. Provided patient with apt date, time and location. Informed patient that check in is at least 15 minutes prior to apt time.   Reminded patient of labs pending

## 2023-08-09 ENCOUNTER — APPOINTMENT (OUTPATIENT)
Dept: LAB | Facility: HOSPITAL | Age: 63
End: 2023-08-09
Payer: COMMERCIAL

## 2023-08-09 DIAGNOSIS — E55.9 VITAMIN D DEFICIENCY: ICD-10-CM

## 2023-08-09 DIAGNOSIS — E29.1 TESTICULAR HYPOGONADISM: ICD-10-CM

## 2023-08-09 DIAGNOSIS — E03.9 HYPOTHYROIDISM, UNSPECIFIED TYPE: ICD-10-CM

## 2023-08-09 LAB
25(OH)D3 SERPL-MCNC: 36.1 NG/ML (ref 30–100)
ALBUMIN SERPL BCP-MCNC: 4 G/DL (ref 3.5–5)
ALP SERPL-CCNC: 76 U/L (ref 46–116)
ALT SERPL W P-5'-P-CCNC: 31 U/L (ref 12–78)
ANION GAP SERPL CALCULATED.3IONS-SCNC: 5 MMOL/L
AST SERPL W P-5'-P-CCNC: 22 U/L (ref 5–45)
BILIRUB SERPL-MCNC: 0.69 MG/DL (ref 0.2–1)
BUN SERPL-MCNC: 15 MG/DL (ref 5–25)
CALCIUM SERPL-MCNC: 9.1 MG/DL (ref 8.3–10.1)
CHLORIDE SERPL-SCNC: 112 MMOL/L (ref 96–108)
CO2 SERPL-SCNC: 23 MMOL/L (ref 21–32)
CREAT SERPL-MCNC: 0.95 MG/DL (ref 0.6–1.3)
ERYTHROCYTE [DISTWIDTH] IN BLOOD BY AUTOMATED COUNT: 15.5 % (ref 11.6–15.1)
GFR SERPL CREATININE-BSD FRML MDRD: 85 ML/MIN/1.73SQ M
GLUCOSE P FAST SERPL-MCNC: 111 MG/DL (ref 65–99)
HCT VFR BLD AUTO: 40.3 % (ref 36.5–49.3)
HGB BLD-MCNC: 13.5 G/DL (ref 12–17)
MCH RBC QN AUTO: 36.4 PG (ref 26.8–34.3)
MCHC RBC AUTO-ENTMCNC: 33.5 G/DL (ref 31.4–37.4)
MCV RBC AUTO: 109 FL (ref 82–98)
PLATELET # BLD AUTO: 126 THOUSANDS/UL (ref 149–390)
PMV BLD AUTO: 11.8 FL (ref 8.9–12.7)
POTASSIUM SERPL-SCNC: 3.9 MMOL/L (ref 3.5–5.3)
PROT SERPL-MCNC: 7.8 G/DL (ref 6.4–8.4)
PTH-INTACT SERPL-MCNC: 60.3 PG/ML (ref 12–88)
RBC # BLD AUTO: 3.71 MILLION/UL (ref 3.88–5.62)
SODIUM SERPL-SCNC: 140 MMOL/L (ref 135–147)
T3FREE SERPL-MCNC: 3.44 PG/ML (ref 2.5–3.9)
T4 FREE SERPL-MCNC: 1.35 NG/DL (ref 0.61–1.12)
TSH SERPL DL<=0.05 MIU/L-ACNC: 1.04 UIU/ML (ref 0.45–4.5)
WBC # BLD AUTO: 3.93 THOUSAND/UL (ref 4.31–10.16)

## 2023-08-09 PROCEDURE — 82306 VITAMIN D 25 HYDROXY: CPT

## 2023-08-09 PROCEDURE — 84481 FREE ASSAY (FT-3): CPT

## 2023-08-09 PROCEDURE — 84439 ASSAY OF FREE THYROXINE: CPT

## 2023-08-09 PROCEDURE — 84403 ASSAY OF TOTAL TESTOSTERONE: CPT

## 2023-08-09 PROCEDURE — 84270 ASSAY OF SEX HORMONE GLOBUL: CPT

## 2023-08-09 PROCEDURE — 83970 ASSAY OF PARATHORMONE: CPT

## 2023-08-09 PROCEDURE — 84402 ASSAY OF FREE TESTOSTERONE: CPT

## 2023-08-09 PROCEDURE — 36415 COLL VENOUS BLD VENIPUNCTURE: CPT

## 2023-08-09 PROCEDURE — 80053 COMPREHEN METABOLIC PANEL: CPT

## 2023-08-09 PROCEDURE — 84443 ASSAY THYROID STIM HORMONE: CPT

## 2023-08-09 PROCEDURE — 85027 COMPLETE CBC AUTOMATED: CPT

## 2023-08-10 ENCOUNTER — OFFICE VISIT (OUTPATIENT)
Dept: ENDOCRINOLOGY | Facility: CLINIC | Age: 63
End: 2023-08-10
Payer: COMMERCIAL

## 2023-08-10 VITALS
SYSTOLIC BLOOD PRESSURE: 120 MMHG | WEIGHT: 241 LBS | HEIGHT: 74 IN | BODY MASS INDEX: 30.93 KG/M2 | HEART RATE: 60 BPM | DIASTOLIC BLOOD PRESSURE: 72 MMHG

## 2023-08-10 DIAGNOSIS — E29.1 TESTICULAR HYPOGONADISM: Primary | ICD-10-CM

## 2023-08-10 DIAGNOSIS — E55.9 VITAMIN D DEFICIENCY: ICD-10-CM

## 2023-08-10 DIAGNOSIS — E03.9 HYPOTHYROIDISM, UNSPECIFIED TYPE: ICD-10-CM

## 2023-08-10 DIAGNOSIS — G47.19 EXCESSIVE DAYTIME SLEEPINESS: ICD-10-CM

## 2023-08-10 LAB
SHBG SERPL-SCNC: 27.2 NMOL/L (ref 19.3–76.4)
TESTOST FREE SERPL-MCNC: 18.4 PG/ML (ref 6.6–18.1)
TESTOST SERPL-MCNC: 460 NG/DL (ref 264–916)

## 2023-08-10 PROCEDURE — 99214 OFFICE O/P EST MOD 30 MIN: CPT

## 2023-08-10 NOTE — ASSESSMENT & PLAN NOTE
Slightly low- 36.1  - he has plans to increase to 5,000 IU daily. Once he does this, I have asked him to repeat his vitamin D level in 3 months.  Order placed

## 2023-08-10 NOTE — ASSESSMENT & PLAN NOTE
Testosterone level is pending. Will call patient once results are available  - discussed switching to weekly IM dosing for more consistent absorption. He is agreeable. Will discuss further once results are available. We also discussed other sequale associated with low testosterone in males including low bone density. I have ordered a DEXA to check his bone density.

## 2023-08-10 NOTE — ASSESSMENT & PLAN NOTE
He should be evaluated for sleep apnea for his reports of excessive daytime sleepiness and nighttime snoring. I have placed the referral to sleep medicine specialist  - his iron level is on higher end of normal. I have asked him to follow up with hematology about this. He most likely needs to take his iron supplement once every other day. - vitamin D level adequate but he may benefit from a level > 40 for the fatigue. He will be increasing to 5,000 IU/day and repeat lab work in 3 months.

## 2023-08-10 NOTE — PROGRESS NOTES
Established Patient Progress Note    CC: Follow up for hypogonadism, hypothyroidism, vitamin D deficiency    Impression & Plan:    Problem List Items Addressed This Visit        Endocrine    Hypothyroidism     Stable. No complaints today from this stand point  - continue with current regimen         Relevant Orders    TSH, 3rd generation    T4, free    Testicular hypogonadism - Primary     Testosterone level is pending. Will call patient once results are available  - discussed switching to weekly IM dosing for more consistent absorption. He is agreeable. Will discuss further once results are available. We also discussed other sequale associated with low testosterone in males including low bone density. I have ordered a DEXA to check his bone density. Relevant Orders    DXA bone density spine hip and pelvis    Testosterone, free, total- Lab Collect    PSA, total screen Lab Collect    Hemoglobin and hematocrit, blood Lab Collect       Other    Vitamin D deficiency     Slightly low- 36.1  - he has plans to increase to 5,000 IU daily. Once he does this, I have asked him to repeat his vitamin D level in 3 months. Order placed         Relevant Orders    Vitamin D 25 hydroxy Lab Collect    Excessive daytime sleepiness     He should be evaluated for sleep apnea for his reports of excessive daytime sleepiness and nighttime snoring. I have placed the referral to sleep medicine specialist  - his iron level is on higher end of normal. I have asked him to follow up with hematology about this. He most likely needs to take his iron supplement once every other day. - vitamin D level adequate but he may benefit from a level > 40 for the fatigue. He will be increasing to 5,000 IU/day and repeat lab work in 3 months.          Relevant Orders    Ambulatory referral to Sleep Medicine       Orders Placed This Encounter   Procedures   • DXA bone density spine hip and pelvis     Standing Status:   Future     Standing Expiration Date:   8/10/2027     Scheduling Instructions:      Please do not wear jewelry on the day of the exam. Please wear comfortable clothing with no metal buttons, zippers, snaps or an underwire bra. Do not take any calcium supplements or multi-vitamins 24 hours prior to your test. Please bring your physician order, insurance cards, a form of photo ID and a list of your medications with you. Arrive 5-10 minutes prior to your appointment time in order to register. Your study cannot be performed if you take your calcium supplement or multi-vitamin within 24 hours of your scheduled Dexa scan examination. To schedule this appointment, please contact Central Scheduling at [phone]. • Vitamin D 25 hydroxy Lab Collect     Standing Status:   Future     Standing Expiration Date:   8/10/2024   • TSH, 3rd generation     This is a patient instruction: This test is non-fasting. Please drink two glasses of water morning of bloodwork. Standing Status:   Future     Standing Expiration Date:   8/10/2024   • T4, free     Standing Status:   Future     Standing Expiration Date:   8/10/2024   • Testosterone, free, total- Lab Collect     This is a patient instruction: Fasting preferred. Collections for men not undergoing treatment must be completed between 7am-9am ONLY. Collection time restrictions are not applicable to women or men already undergoing treatment. Standing Status:   Future     Standing Expiration Date:   8/10/2024   • PSA, total screen Lab Collect     This is a patient instruction: This test is non-fasting. Please drink two glasses of water morning of bloodwork.         Standing Status:   Future     Standing Expiration Date:   8/10/2024   • Hemoglobin and hematocrit, blood Lab Collect     Standing Status:   Future     Standing Expiration Date:   8/10/2024   • Ambulatory referral to Sleep Medicine     Standing Status:   Future     Standing Expiration Date:   8/10/2024 Referral Priority:   Routine     Referral Type:   Consult - AMB     Referral Reason:   Specialty Services Required     Requested Specialty:   Sleep Medicine     Number of Visits Requested:   1     Expiration Date:   8/10/2024       History of Present Illness:   Giovanny Solo is a 59 y.o. male with a history of hypothyroidism, hypogonadism and vitamin D deficiency. His PCP started him on testosterone injections. He is receiving 200 mg every 28 days. He states he felt well after the first dose. He had the second dose on July 31, 2023 and did not feel a difference. He reports excessive daytime fatigue, low libido, decreased muscle mass, weakness. Note: he was previously on the Androderm patch which was switched to the Axiron pump which was not covered by his insurance.     Patient Active Problem List   Diagnosis   • Hematochezia   • Fatty liver   • Hyperlipidemia   • Hypothyroidism   • Macrocytic anemia   • Osteoarthritis of both hips   • Splenomegaly   • Testicular hypogonadism   • Thrombocytopenia ([address])   • Ureteric stone   • Vitamin D deficiency   • Leucopenia   • Iron deficiency anemia due to chronic blood loss   • B12 deficiency   • Venous ulcer (HCC)   • Malignant (primary) neoplasm, unspecified (HCC)   • Atherosclerosis of arteries of extremities (HCC)   • History of total left hip replacement   • Polyneuropathy   • Weakness of both lower extremities   • Intervertebral disc disorder with radiculopathy of lumbar region   • Gait disturbance   • Primary erectile dysfunction   • Impaired glucose tolerance   • Kidney stone   • Nocturia   • Excessive daytime sleepiness      Past Medical History:   Diagnosis Date   • Anemia     last assessed: 09/03/15   • Cholelithiasis    • Disease of thyroid gland    • GERD (gastroesophageal reflux disease)    • Hyperlipidemia    • Hypersecretion of testicular hormones    • Insomnia    • Kidney stone    • Low testosterone    • Male erectile dysfunction    • Osteoarthritis, hip, bilateral    • Thrombosed external hemorrhoids       Past Surgical History:   Procedure Laterality Date   • CHOLECYSTECTOMY     • COLONOSCOPY     • [place] COLONOSCOPY FLX DX W/COLLJ SPEC WHEN PFRMD N/A 2017    Procedure: EGD AND COLONOSCOPY;  Surgeon: Donal Cobb MD;  Location: [facility]; Service: Gastroenterology   • TOTAL HIP ARTHROPLASTY Left 2020      Family History   Problem Relation Age of Onset   • Arthritis Mother    • Heart disease Father    • Hypertension Father    • Diabetes Father    • Asthma Father    • Diabetes type II Father    • Hypothyroidism Father    • Cancer Paternal Grandmother    • Cancer Family    • Heart disease Family      Social History     Tobacco Use   • Smoking status: Former     Packs/day: 1.00     Years: 10.00     Total pack years: 10.00     Types: Cigarettes     Start date: 6/15/1980     Quit date: 6/15/1990     Years since quittin.1   • Smokeless tobacco: Never   • Tobacco comments:     QUIT 2002/ SECOND HAND    Substance Use Topics   • Alcohol use: Yes     Alcohol/week: 4.0 standard drinks of alcohol     Types: 4 Glasses of wine per week     Comment: socially     No Known Allergies      Current Outpatient Medications:   •  amoxicillin (AMOXIL) 500 mg capsule, 4 CAPSULES ONE TIME ORALLY 1 HOURS PRIOR TO DENTAL APPOINTMENT 1 DAYS, Disp: , Rfl:   •  Cholecalciferol 125 MCG (5000 UT) TABS, 1 tab daily, Disp: , Rfl:   •  Ferrous Sulfate (IRON SUPPLEMENT PO), Take 1 tablet by mouth daily, Disp: , Rfl:   •  levothyroxine 125 mcg tablet, 2 TABS DAILY 1 TIME A DAY, Disp: 180 tablet, Rfl: 1  •  liothyronine (CYTOMEL) 5 mcg tablet, TAKE 1 TABLET BY MOUTH EVERY DAY, Disp: 90 tablet, Rfl: 1  •  Magnesium 500 MG CAPS, Take 500 mg by mouth daily, Disp: , Rfl:   •  testosterone cypionate (DEPO-TESTOSTERONE) 200 mg/mL SOLN, Inject 1 mL (200 mg total) into a muscle every 28 days, Disp: 10 mL, Rfl: 0    Review of Systems   Constitutional: Positive for fatigue.  Negative for chills and fever. HENT: Negative for ear pain and sore throat. Eyes: Negative for pain and visual disturbance. Respiratory: Negative for cough and shortness of breath. Cardiovascular: Negative for chest pain and palpitations. Gastrointestinal: Negative for abdominal pain and vomiting. Genitourinary: Negative for dysuria and hematuria. Musculoskeletal: Negative for arthralgias and back pain. Skin: Negative for color change and rash. Neurological: Negative for seizures and syncope. All other systems reviewed and are negative. Physical Exam:  Body mass index is 30.94 kg/m². /72 (BP Location: Left arm, Patient Position: Sitting, Cuff Size: Large)   Pulse 60   Ht 6' 2" (1.88 m)   Wt 109 kg (241 lb)   BMI 30.94 kg/m²    Wt Readings from Last 3 Encounters:   08/10/23 109 kg (241 lb)   06/22/23 106 kg (234 lb)   05/11/23 110 kg (242 lb)       Physical Exam  Vitals reviewed. Constitutional:       Appearance: Normal appearance. HENT:      Head: Normocephalic and atraumatic. Cardiovascular:      Rate and Rhythm: Normal rate. Heart sounds: Normal heart sounds. Pulmonary:      Effort: Pulmonary effort is normal.      Breath sounds: Normal breath sounds. Musculoskeletal:      Cervical back: Neck supple. No tenderness. Lymphadenopathy:      Cervical: No cervical adenopathy. Neurological:      Mental Status: He is alert and oriented to person, place, and time.    Psychiatric:         Mood and Affect: Mood normal.         Behavior: Behavior normal.         Labs:   No results found for: "HGBA1C"  Lab Results   Component Value Date    CREATININE 0.95 08/09/2023    CREATININE 0.90 02/04/2023    CREATININE 0.99 12/09/2022    BUN 15 08/09/2023     11/29/2017    K 3.9 08/09/2023     (H) 08/09/2023    CO2 23 08/09/2023     eGFR   Date Value Ref Range Status   08/09/2023 85 ml/min/1.73sq m Final     Lab Results   Component Value Date    CHOL 232 (H) 04/22/2017    HDL 41 07/09/2022 TRIG 117 07/09/2022     Lab Results   Component Value Date    ALT 31 08/09/2023    AST 22 08/09/2023    ALKPHOS 76 08/09/2023    BILITOT 0.4 11/29/2017     Lab Results   Component Value Date    NTC0YOQHPHJL 1.045 08/09/2023    RQJ9MLTOCIHR 1.242 07/09/2022    DFM9FJJWQRCW 0.592 11/16/2021     Lab Results   Component Value Date    FREET4 1.35 (H) 08/09/2023         There are no Patient Instructions on file for this visit. Discussed with the patient and all questioned fully answered. He will call me if any problems arise.

## 2023-08-28 ENCOUNTER — CLINICAL SUPPORT (OUTPATIENT)
Dept: FAMILY MEDICINE CLINIC | Facility: CLINIC | Age: 63
End: 2023-08-28
Payer: COMMERCIAL

## 2023-08-28 DIAGNOSIS — E29.1 TESTICULAR HYPOGONADISM: ICD-10-CM

## 2023-08-28 PROCEDURE — 96372 THER/PROPH/DIAG INJ SC/IM: CPT | Performed by: NURSE PRACTITIONER

## 2023-08-28 RX ORDER — TESTOSTERONE CYPIONATE 200 MG/ML
200 INJECTION, SOLUTION INTRAMUSCULAR
Qty: 10 ML | Refills: 0 | Status: CANCELLED | OUTPATIENT
Start: 2023-08-28

## 2023-08-29 RX ORDER — TESTOSTERONE CYPIONATE 200 MG/ML
50 INJECTION, SOLUTION INTRAMUSCULAR ONCE
Status: DISCONTINUED | OUTPATIENT
Start: 2023-08-28 | End: 2023-08-29

## 2023-08-29 RX ORDER — TESTOSTERONE CYPIONATE 200 MG/ML
200 INJECTION, SOLUTION INTRAMUSCULAR ONCE
Status: COMPLETED | OUTPATIENT
Start: 2023-08-28 | End: 2023-08-29

## 2023-08-29 RX ADMIN — TESTOSTERONE CYPIONATE 200 MG: 200 INJECTION, SOLUTION INTRAMUSCULAR at 08:05

## 2023-09-02 DIAGNOSIS — E03.9 HYPOTHYROIDISM, UNSPECIFIED TYPE: ICD-10-CM

## 2023-09-02 RX ORDER — LIOTHYRONINE SODIUM 5 UG/1
TABLET ORAL
Qty: 90 TABLET | Refills: 1 | Status: SHIPPED | OUTPATIENT
Start: 2023-09-02

## 2023-09-25 ENCOUNTER — CLINICAL SUPPORT (OUTPATIENT)
Dept: FAMILY MEDICINE CLINIC | Facility: CLINIC | Age: 63
End: 2023-09-25
Payer: COMMERCIAL

## 2023-09-25 DIAGNOSIS — E34.9 TESTOSTERONE DEFICIENCY: ICD-10-CM

## 2023-09-25 DIAGNOSIS — E29.1 TESTICULAR HYPOGONADISM: Primary | ICD-10-CM

## 2023-09-25 PROCEDURE — 96372 THER/PROPH/DIAG INJ SC/IM: CPT

## 2023-09-25 PROCEDURE — 96372 THER/PROPH/DIAG INJ SC/IM: CPT | Performed by: FAMILY MEDICINE

## 2023-09-25 RX ORDER — TESTOSTERONE CYPIONATE 200 MG/ML
200 INJECTION, SOLUTION INTRAMUSCULAR
Status: SHIPPED | OUTPATIENT
Start: 2023-09-25

## 2023-09-25 RX ADMIN — TESTOSTERONE CYPIONATE 200 MG: 200 INJECTION, SOLUTION INTRAMUSCULAR at 16:55

## 2023-10-08 DIAGNOSIS — E55.9 VITAMIN D DEFICIENCY: ICD-10-CM

## 2023-10-08 DIAGNOSIS — E03.9 HYPOTHYROIDISM, UNSPECIFIED TYPE: ICD-10-CM

## 2023-10-08 DIAGNOSIS — E29.1 TESTICULAR HYPOGONADISM: ICD-10-CM

## 2023-10-09 RX ORDER — LEVOTHYROXINE SODIUM 0.12 MG/1
TABLET ORAL
Qty: 180 TABLET | Refills: 1 | Status: SHIPPED | OUTPATIENT
Start: 2023-10-09

## 2023-10-23 ENCOUNTER — CLINICAL SUPPORT (OUTPATIENT)
Dept: FAMILY MEDICINE CLINIC | Facility: CLINIC | Age: 63
End: 2023-10-23
Payer: COMMERCIAL

## 2023-10-23 DIAGNOSIS — E29.1 TESTICULAR HYPOGONADISM: Primary | ICD-10-CM

## 2023-10-23 PROCEDURE — 96372 THER/PROPH/DIAG INJ SC/IM: CPT | Performed by: FAMILY MEDICINE

## 2023-10-23 RX ADMIN — TESTOSTERONE CYPIONATE 200 MG: 200 INJECTION, SOLUTION INTRAMUSCULAR at 13:03

## 2023-11-27 ENCOUNTER — CLINICAL SUPPORT (OUTPATIENT)
Dept: FAMILY MEDICINE CLINIC | Facility: CLINIC | Age: 63
End: 2023-11-27
Payer: COMMERCIAL

## 2023-11-27 DIAGNOSIS — E29.1 TESTICULAR HYPOGONADISM: Primary | ICD-10-CM

## 2023-11-27 PROCEDURE — 96372 THER/PROPH/DIAG INJ SC/IM: CPT | Performed by: FAMILY MEDICINE

## 2023-11-27 RX ADMIN — TESTOSTERONE CYPIONATE 200 MG: 200 INJECTION, SOLUTION INTRAMUSCULAR at 15:27

## 2023-12-26 ENCOUNTER — CLINICAL SUPPORT (OUTPATIENT)
Dept: FAMILY MEDICINE CLINIC | Facility: CLINIC | Age: 63
End: 2023-12-26
Payer: COMMERCIAL

## 2023-12-26 DIAGNOSIS — E29.1 TESTICULAR HYPOGONADISM: Primary | ICD-10-CM

## 2023-12-26 PROCEDURE — 36415 COLL VENOUS BLD VENIPUNCTURE: CPT | Performed by: FAMILY MEDICINE

## 2023-12-26 PROCEDURE — 96372 THER/PROPH/DIAG INJ SC/IM: CPT

## 2023-12-26 RX ADMIN — TESTOSTERONE CYPIONATE 200 MG: 200 INJECTION, SOLUTION INTRAMUSCULAR at 16:29

## 2023-12-29 ENCOUNTER — TELEPHONE (OUTPATIENT)
Age: 63
End: 2023-12-29

## 2024-01-23 ENCOUNTER — CLINICAL SUPPORT (OUTPATIENT)
Dept: FAMILY MEDICINE CLINIC | Facility: CLINIC | Age: 64
End: 2024-01-23
Payer: COMMERCIAL

## 2024-01-23 DIAGNOSIS — E29.1 TESTICULAR HYPOGONADISM: Primary | ICD-10-CM

## 2024-01-23 PROCEDURE — 96372 THER/PROPH/DIAG INJ SC/IM: CPT

## 2024-01-23 RX ADMIN — TESTOSTERONE CYPIONATE 200 MG: 200 INJECTION, SOLUTION INTRAMUSCULAR at 15:25

## 2024-02-16 ENCOUNTER — APPOINTMENT (OUTPATIENT)
Dept: LAB | Facility: HOSPITAL | Age: 64
End: 2024-02-16
Payer: COMMERCIAL

## 2024-02-16 DIAGNOSIS — E29.1 TESTICULAR HYPOGONADISM: ICD-10-CM

## 2024-02-16 DIAGNOSIS — E03.9 HYPOTHYROIDISM, UNSPECIFIED TYPE: ICD-10-CM

## 2024-02-16 DIAGNOSIS — E55.9 VITAMIN D DEFICIENCY: ICD-10-CM

## 2024-02-16 LAB
25(OH)D3 SERPL-MCNC: 44.9 NG/ML (ref 30–100)
HCT VFR BLD AUTO: 42.4 % (ref 36.5–49.3)
HGB BLD-MCNC: 14.1 G/DL (ref 12–17)
PSA SERPL-MCNC: 1.24 NG/ML (ref 0–4)
T4 FREE SERPL-MCNC: 1.09 NG/DL (ref 0.61–1.12)
TSH SERPL DL<=0.05 MIU/L-ACNC: 0.57 UIU/ML (ref 0.45–4.5)

## 2024-02-16 PROCEDURE — 82306 VITAMIN D 25 HYDROXY: CPT

## 2024-02-16 PROCEDURE — 84439 ASSAY OF FREE THYROXINE: CPT

## 2024-02-16 PROCEDURE — G0103 PSA SCREENING: HCPCS

## 2024-02-16 PROCEDURE — 85018 HEMOGLOBIN: CPT

## 2024-02-16 PROCEDURE — 84403 ASSAY OF TOTAL TESTOSTERONE: CPT

## 2024-02-16 PROCEDURE — 84402 ASSAY OF FREE TESTOSTERONE: CPT

## 2024-02-16 PROCEDURE — 84443 ASSAY THYROID STIM HORMONE: CPT

## 2024-02-16 PROCEDURE — 85014 HEMATOCRIT: CPT

## 2024-02-16 PROCEDURE — 36415 COLL VENOUS BLD VENIPUNCTURE: CPT

## 2024-02-17 LAB
TESTOST FREE SERPL-MCNC: 4.2 PG/ML (ref 6.6–18.1)
TESTOST SERPL-MCNC: 141 NG/DL (ref 264–916)

## 2024-02-20 ENCOUNTER — OFFICE VISIT (OUTPATIENT)
Dept: ENDOCRINOLOGY | Facility: CLINIC | Age: 64
End: 2024-02-20
Payer: COMMERCIAL

## 2024-02-20 ENCOUNTER — CLINICAL SUPPORT (OUTPATIENT)
Dept: FAMILY MEDICINE CLINIC | Facility: CLINIC | Age: 64
End: 2024-02-20
Payer: COMMERCIAL

## 2024-02-20 VITALS
SYSTOLIC BLOOD PRESSURE: 144 MMHG | HEART RATE: 56 BPM | BODY MASS INDEX: 32.55 KG/M2 | HEIGHT: 74 IN | WEIGHT: 253.6 LBS | DIASTOLIC BLOOD PRESSURE: 80 MMHG

## 2024-02-20 DIAGNOSIS — E29.1 TESTICULAR HYPOGONADISM: Primary | ICD-10-CM

## 2024-02-20 DIAGNOSIS — E29.1 TESTICULAR HYPOGONADISM: ICD-10-CM

## 2024-02-20 DIAGNOSIS — E03.9 HYPOTHYROIDISM, UNSPECIFIED TYPE: Primary | ICD-10-CM

## 2024-02-20 DIAGNOSIS — E34.9 TESTOSTERONE DEFICIENCY: ICD-10-CM

## 2024-02-20 DIAGNOSIS — Z12.11 ENCOUNTER FOR SCREENING COLONOSCOPY: ICD-10-CM

## 2024-02-20 PROCEDURE — 99214 OFFICE O/P EST MOD 30 MIN: CPT

## 2024-02-20 PROCEDURE — 96372 THER/PROPH/DIAG INJ SC/IM: CPT

## 2024-02-20 RX ORDER — TESTOSTERONE CYPIONATE 200 MG/ML
50 INJECTION, SOLUTION INTRAMUSCULAR WEEKLY
Qty: 3.2 ML | Refills: 0 | Status: SHIPPED | OUTPATIENT
Start: 2024-02-20 | End: 2024-02-22 | Stop reason: SDUPTHER

## 2024-02-20 RX ORDER — TESTOSTERONE CYPIONATE 200 MG/ML
50 INJECTION, SOLUTION INTRAMUSCULAR ONCE
Status: COMPLETED | OUTPATIENT
Start: 2024-02-20 | End: 2024-02-20

## 2024-02-20 RX ADMIN — TESTOSTERONE CYPIONATE 50 MG: 200 INJECTION, SOLUTION INTRAMUSCULAR at 18:59

## 2024-02-20 NOTE — PROGRESS NOTES
"Established Patient Progress Note    CC: Follow up for hypothyroidism and hypogonadism     Impression & Plan:    Problem List Items Addressed This Visit       Hypothyroidism - Primary     Most recent thyroid levels are normal. Continue current regimen         Relevant Orders    TSH, 3rd generation    T4, free    Testicular hypogonadism     Most recent testosterone levels are low. He is due for his monthly injection with his PCP. He does complain of fatigue, lack of energy. He does not feel his current regimen is working for him.     Recommend:  - patient switch to weekly dosing. I have ordered 50 mg once/week for him which he should start when he is due for his next injection in 4 weeks. Repeat testosterone levels in 3 months from starting. He should also schedule a nurse visit to learn how to inject.    Note: PSA slightly increased from prior. Advise patient to continue with PCP for ongoing monitoring. He has no personal or family history of prostate cancer.         Relevant Medications    testosterone cypionate (DEPO-TESTOSTERONE) 200 mg/mL SOLN    NEEDLE, DISP, 23 G 23G X 1-1/4\" MISC    Syringe 1 ML MISC    Other Relevant Orders    Testosterone, free, total    Hemoglobin and hematocrit, blood     Other Visit Diagnoses       Encounter for screening colonoscopy        Relevant Orders    Ambulatory Referral to Gastroenterology            Orders Placed This Encounter   Procedures    Testosterone, free, total     This is a patient instruction: Fasting preferred. Collections for men not undergoing treatment must be completed between 7am-9am ONLY. Collection time restrictions are not applicable to women or men already undergoing treatment.       Standing Status:   Future     Standing Expiration Date:   2/20/2025    Hemoglobin and hematocrit, blood     Standing Status:   Future     Standing Expiration Date:   2/20/2025    TSH, 3rd generation     This is a patient instruction: This test is non-fasting. Please drink two " glasses of water morning of bloodwork.        Standing Status:   Future     Standing Expiration Date:   2/20/2025    T4, free     Standing Status:   Future     Standing Expiration Date:   2/20/2025    Ambulatory Referral to Gastroenterology     Standing Status:   Future     Standing Expiration Date:   2/20/2025     Referral Priority:   Routine     Referral Type:   Consult - AMB     Referral Reason:   Specialty Services Required     Requested Specialty:   Gastroenterology     Number of Visits Requested:   1     Expiration Date:   2/20/2025       History of Present Illness:   Nacho Coelho is a 63 y.o. male with a history of hypothyroidism, hypogonadism and vitamin D deficiency.     He is receiving 200 mg testosterone every 28 days through his PCP. He does not feel this is helping. He reports lack of energy, fatigue. 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     Note: he was previously on the Androderm patch which was switched to the Axiron pump which was not covered by his insurance.      Patient Active Problem List   Diagnosis    Hematochezia    Fatty liver    Hyperlipidemia    Hypothyroidism    Macrocytic anemia    Osteoarthritis of both hips    Splenomegaly    Testicular hypogonadism    Thrombocytopenia (HCC)    Ureteric stone    Vitamin D deficiency    Leucopenia    Iron deficiency anemia due to chronic blood loss    B12 deficiency    Venous ulcer (HCC)    Malignant (primary) neoplasm, unspecified (HCC)    Atherosclerosis of arteries of extremities (HCC)    History of total left hip replacement    Polyneuropathy    Weakness of both lower extremities    Intervertebral disc disorder with radiculopathy of lumbar region    Gait disturbance    Primary erectile dysfunction    Impaired glucose tolerance    Kidney stone    Nocturia    Excessive daytime sleepiness      Past Medical History:   Diagnosis Date    Anemia     last assessed: 09/03/15    Cholelithiasis     Disease of thyroid  gland     GERD (gastroesophageal reflux disease)     Hyperlipidemia     Hypersecretion of testicular hormones     Insomnia     Kidney stone     Low testosterone     Male erectile dysfunction     Osteoarthritis, hip, bilateral     Thrombosed external hemorrhoids       Past Surgical History:   Procedure Laterality Date    CHOLECYSTECTOMY      COLONOSCOPY      [place] COLONOSCOPY FLX DX W/COLLJ SPEC WHEN PFRMD N/A 2017    Procedure: EGD AND COLONOSCOPY;  Surgeon: Tavia Hinton MD;  Location: [facility];  Service: Gastroenterology    TOTAL HIP ARTHROPLASTY Left 2020      Family History   Problem Relation Age of Onset    Arthritis Mother     Heart disease Father     Hypertension Father     Diabetes Father     Asthma Father     Diabetes type II Father     Hypothyroidism Father     Cancer Paternal Grandmother     Cancer Family     Heart disease Family      Social History     Tobacco Use    Smoking status: Former     Current packs/day: 0.00     Average packs/day: 1.8 packs/day for 16.7 years (30.0 ttl pk-yrs)     Types: Cigarettes     Start date: 6/15/1980     Quit date: 6/15/1990     Years since quittin.7    Smokeless tobacco: Never    Tobacco comments:     QUIT / SECOND HAND    Substance Use Topics    Alcohol use: Yes     Alcohol/week: 4.0 standard drinks of alcohol     Types: 4 Glasses of wine per week     Comment: socially     No Known Allergies      Current Outpatient Medications:     amoxicillin (AMOXIL) 500 mg capsule, 4 CAPSULES ONE TIME ORALLY 1 HOURS PRIOR TO DENTAL APPOINTMENT 1 DAYS, Disp: , Rfl:     Cholecalciferol 125 MCG (5000 UT) TABS, 1 tab daily, Disp: , Rfl:     Ferrous Sulfate (IRON SUPPLEMENT PO), Take 1 tablet by mouth daily, Disp: , Rfl:     levothyroxine 125 mcg tablet, 2 TABS DAILY 1 TIME A DAY, Disp: 180 tablet, Rfl: 1    liothyronine (CYTOMEL) 5 mcg tablet, TAKE 1 TABLET BY MOUTH EVERY DAY, Disp: 90 tablet, Rfl: 1    Magnesium 500 MG CAPS, Take 500 mg by mouth daily, Disp: ,  "Rfl:     NEEDLE, DISP, 23 G 23G X 1-1/4\" MISC, Use once a week, Disp: 15 each, Rfl: 0    Syringe 1 ML MISC, Use once a week, Disp: 15 each, Rfl: 2    testosterone cypionate (DEPO-TESTOSTERONE) 200 mg/mL SOLN, Inject 0.25 mL (50 mg total) into a muscle once a week, Disp: 3.2 mL, Rfl: 0  No current facility-administered medications for this visit.    Review of Systems   Constitutional:  Positive for fatigue. Negative for chills and fever.   HENT:  Negative for ear pain and sore throat.    Eyes:  Negative for pain and visual disturbance.   Respiratory:  Negative for cough and shortness of breath.    Cardiovascular:  Negative for chest pain and palpitations.   Gastrointestinal:  Negative for abdominal pain and vomiting.   Genitourinary:  Negative for dysuria and hematuria.   Musculoskeletal:  Negative for arthralgias and back pain.   Skin:  Negative for color change and rash.   Neurological:  Negative for seizures and syncope.   All other systems reviewed and are negative.      Physical Exam:  Body mass index is 32.56 kg/m².  /80   Pulse 56   Ht 6' 2\" (1.88 m)   Wt 115 kg (253 lb 9.6 oz)   BMI 32.56 kg/m²    Wt Readings from Last 3 Encounters:   02/20/24 115 kg (253 lb 9.6 oz)   08/10/23 109 kg (241 lb)   06/22/23 106 kg (234 lb)       Physical Exam    Labs:   No results found for: \"HGBA1C\"  Lab Results   Component Value Date    CREATININE 0.95 08/09/2023    CREATININE 0.90 02/04/2023    CREATININE 0.99 12/09/2022    BUN 15 08/09/2023     11/29/2017    K 3.9 08/09/2023     (H) 08/09/2023    CO2 23 08/09/2023     eGFR   Date Value Ref Range Status   08/09/2023 85 ml/min/1.73sq m Final     Lab Results   Component Value Date    CHOL 232 (H) 04/22/2017    HDL 41 07/09/2022    TRIG 117 07/09/2022     Lab Results   Component Value Date    ALT 31 08/09/2023    AST 22 08/09/2023    ALKPHOS 76 08/09/2023    BILITOT 0.4 11/29/2017     Lab Results   Component Value Date    OMN4ZMCHAMAR 0.569 02/16/2024    " [id number] 1.045 08/09/2023    [id number] 1.242 07/09/2022     Lab Results   Component Value Date    FREET4 1.09 02/16/2024         There are no Patient Instructions on file for this visit.      Discussed with the patient and all questioned fully answered. He will call me if any problems arise.

## 2024-02-20 NOTE — ASSESSMENT & PLAN NOTE
Most recent testosterone levels are low. He is due for his monthly injection with his PCP. He does complain of fatigue, lack of energy. He does not feel his current regimen is working for him.     Recommend:  - patient switch to weekly dosing. I have ordered 50 mg once/week for him which he should start when he is due for his next injection in 4 weeks. Repeat testosterone levels in 3 months from starting. He should also schedule a nurse visit to learn how to inject.    Note: PSA slightly increased from prior. Advise patient to continue with PCP for ongoing monitoring. He has no personal or family history of prostate cancer.

## 2024-02-22 DIAGNOSIS — E29.1 TESTICULAR HYPOGONADISM: ICD-10-CM

## 2024-02-22 RX ORDER — TESTOSTERONE CYPIONATE 200 MG/ML
50 INJECTION, SOLUTION INTRAMUSCULAR WEEKLY
Qty: 3.2 ML | Refills: 0 | Status: SHIPPED | OUTPATIENT
Start: 2024-02-22

## 2024-03-05 DIAGNOSIS — E03.9 HYPOTHYROIDISM, UNSPECIFIED TYPE: ICD-10-CM

## 2024-03-05 RX ORDER — LIOTHYRONINE SODIUM 5 UG/1
TABLET ORAL
Qty: 90 TABLET | Refills: 1 | Status: SHIPPED | OUTPATIENT
Start: 2024-03-05

## 2024-03-19 ENCOUNTER — CLINICAL SUPPORT (OUTPATIENT)
Dept: FAMILY MEDICINE CLINIC | Facility: CLINIC | Age: 64
End: 2024-03-19
Payer: COMMERCIAL

## 2024-03-19 DIAGNOSIS — E34.9 TESTOSTERONE DEFICIENCY: Primary | ICD-10-CM

## 2024-03-19 DIAGNOSIS — E29.1 TESTICULAR HYPOGONADISM: Primary | ICD-10-CM

## 2024-03-19 PROCEDURE — 96372 THER/PROPH/DIAG INJ SC/IM: CPT

## 2024-03-19 RX ORDER — TESTOSTERONE CYPIONATE 200 MG/ML
50 INJECTION, SOLUTION INTRAMUSCULAR ONCE
Status: CANCELLED | OUTPATIENT
Start: 2024-03-19 | End: 2024-03-19

## 2024-03-19 RX ORDER — TESTOSTERONE CYPIONATE 1000 MG/10ML
100 INJECTION, SOLUTION INTRAMUSCULAR ONCE
Status: CANCELLED | OUTPATIENT
Start: 2024-03-19 | End: 2024-03-19

## 2024-03-19 RX ORDER — TESTOSTERONE CYPIONATE 200 MG/ML
200 INJECTION, SOLUTION INTRAMUSCULAR
Status: SHIPPED | OUTPATIENT
Start: 2024-03-19

## 2024-03-20 RX ADMIN — TESTOSTERONE CYPIONATE 200 MG: 200 INJECTION, SOLUTION INTRAMUSCULAR at 08:02

## 2024-04-03 DIAGNOSIS — E03.9 HYPOTHYROIDISM, UNSPECIFIED TYPE: ICD-10-CM

## 2024-04-03 DIAGNOSIS — E29.1 TESTICULAR HYPOGONADISM: ICD-10-CM

## 2024-04-03 DIAGNOSIS — E55.9 VITAMIN D DEFICIENCY: ICD-10-CM

## 2024-04-03 RX ORDER — LEVOTHYROXINE SODIUM 0.12 MG/1
TABLET ORAL
Qty: 180 TABLET | Refills: 1 | Status: SHIPPED | OUTPATIENT
Start: 2024-04-03

## 2024-05-30 ENCOUNTER — OFFICE VISIT (OUTPATIENT)
Dept: FAMILY MEDICINE CLINIC | Facility: CLINIC | Age: 64
End: 2024-05-30
Payer: COMMERCIAL

## 2024-05-30 VITALS
BODY MASS INDEX: 33.62 KG/M2 | WEIGHT: 262 LBS | SYSTOLIC BLOOD PRESSURE: 140 MMHG | DIASTOLIC BLOOD PRESSURE: 76 MMHG | TEMPERATURE: 98.5 F | HEIGHT: 74 IN | HEART RATE: 60 BPM | OXYGEN SATURATION: 98 %

## 2024-05-30 DIAGNOSIS — H61.22 IMPACTED CERUMEN OF LEFT EAR: Primary | ICD-10-CM

## 2024-05-30 PROCEDURE — 69210 REMOVE IMPACTED EAR WAX UNI: CPT | Performed by: FAMILY MEDICINE

## 2024-05-30 PROCEDURE — 99213 OFFICE O/P EST LOW 20 MIN: CPT | Performed by: FAMILY MEDICINE

## 2024-05-30 NOTE — PROGRESS NOTES
Subjective:   Chief Complaint   Patient presents with   • Earache     Ear pain been 2 week feel like fluids and hurts         Patient ID: Nacho Coelho is a 64 y.o. male.    Patient here with left ear pain.  He notes muffled hearing and has been using Q-tips.  He feels like there is fluid in his left ear    Earache   Associated symptoms include hearing loss. Pertinent negatives include no abdominal pain, coughing, diarrhea, ear discharge, headaches, neck pain, rash or rhinorrhea.       The following portions of the patient's history were reviewed and updated as appropriate: allergies, current medications, past family history, past medical history, past social history, past surgical history and problem list.    Review of Systems   Constitutional:  Negative for activity change, appetite change, chills, diaphoresis, fatigue and unexpected weight change.   HENT:  Positive for ear pain and hearing loss. Negative for congestion, ear discharge, nosebleeds and rhinorrhea.    Eyes:  Negative for pain, redness, itching and visual disturbance.   Respiratory:  Negative for cough, choking, chest tightness and shortness of breath.    Cardiovascular:  Negative for chest pain and leg swelling.   Gastrointestinal:  Negative for abdominal pain, blood in stool, constipation, diarrhea and nausea.   Endocrine: Negative for cold intolerance, polydipsia and polyphagia.   Genitourinary:  Negative for dysuria, frequency, hematuria and urgency.   Musculoskeletal:  Negative for arthralgias, back pain, gait problem, joint swelling, neck pain and neck stiffness.   Skin:  Negative for color change and rash.   Allergic/Immunologic: Negative for environmental allergies and food allergies.   Neurological:  Negative for dizziness, tremors, seizures, speech difficulty, numbness and headaches.   Hematological:  Negative for adenopathy. Does not bruise/bleed easily.   Psychiatric/Behavioral:  Negative for behavioral problems, dysphoric mood,  "hallucinations and self-injury.              Objective:  Vitals:    05/30/24 1507   BP: 140/76   Pulse: 60   Temp: 98.5 °F (36.9 °C)   TempSrc: Tympanic   SpO2: 98%   Weight: 119 kg (262 lb)   Height: 6' 2\" (1.88 m)      Physical Exam  Constitutional:       General: He is not in acute distress.     Appearance: He is well-developed. He is not diaphoretic.   HENT:      Head: Normocephalic and atraumatic.      Right Ear: External ear normal.      Left Ear: There is impacted cerumen.      Nose: Nose normal.      Mouth/Throat:      Pharynx: No oropharyngeal exudate.   Eyes:      General: No scleral icterus.        Right eye: No discharge.         Left eye: No discharge.      Conjunctiva/sclera: Conjunctivae normal.      Pupils: Pupils are equal, round, and reactive to light.   Neck:      Thyroid: No thyromegaly.   Cardiovascular:      Rate and Rhythm: Normal rate and regular rhythm.      Heart sounds: Normal heart sounds. No murmur heard.  Pulmonary:      Effort: Pulmonary effort is normal.      Breath sounds: Normal breath sounds. No wheezing or rales.   Abdominal:      General: Bowel sounds are normal.      Palpations: Abdomen is soft. There is no mass.      Tenderness: There is no abdominal tenderness. There is no guarding.   Musculoskeletal:         General: No tenderness. Normal range of motion.      Cervical back: Normal range of motion and neck supple.   Lymphadenopathy:      Cervical: No cervical adenopathy.   Skin:     General: Skin is warm and dry.   Neurological:      Mental Status: He is alert and oriented to person, place, and time.      Deep Tendon Reflexes: Reflexes are normal and symmetric.   Psychiatric:         Thought Content: Thought content normal.         Judgment: Judgment normal.       Ear cerumen removal    Date/Time: 5/30/2024 3:15 PM    Performed by: Jose Mirza DO  Authorized by: Jose Mirza DO  Universal Protocol:  Consent: Verbal consent obtained.  Risks and benefits: risks, benefits " and alternatives were discussed  Consent given by: patient  Patient understanding: patient states understanding of the procedure being performed  Patient consent: the patient's understanding of the procedure matches consent given  Procedure consent: procedure consent matches procedure scheduled  Relevant documents: relevant documents not present or verified  Test results: test results not available  Site marked: the operative site was not marked  Radiology Images displayed and confirmed. If images not available, report reviewed: imaging studies not available  Patient identity confirmed: verbally with patient    Patient location:  Clinic  Procedure details:     Local anesthetic:  None    Location:  L ear    Procedure type: irrigation with instrumentation      Approach:  External  Post-procedure details:     Complication:  None    Hearing quality:  Improved    Patient tolerance of procedure:  Tolerated well, no immediate complications   Assessment/Plan:    No problem-specific Assessment & Plan notes found for this encounter.       Diagnoses and all orders for this visit:    Impacted cerumen of left ear

## 2024-06-07 ENCOUNTER — APPOINTMENT (OUTPATIENT)
Dept: LAB | Facility: HOSPITAL | Age: 64
End: 2024-06-07
Payer: COMMERCIAL

## 2024-06-07 DIAGNOSIS — E29.1 TESTICULAR HYPOGONADISM: ICD-10-CM

## 2024-06-07 LAB
HCT VFR BLD AUTO: 41.6 % (ref 36.5–49.3)
HGB BLD-MCNC: 13.7 G/DL (ref 12–17)

## 2024-06-07 PROCEDURE — 84403 ASSAY OF TOTAL TESTOSTERONE: CPT

## 2024-06-07 PROCEDURE — 36415 COLL VENOUS BLD VENIPUNCTURE: CPT

## 2024-06-07 PROCEDURE — 85014 HEMATOCRIT: CPT

## 2024-06-07 PROCEDURE — 85018 HEMOGLOBIN: CPT

## 2024-06-07 PROCEDURE — 84402 ASSAY OF FREE TESTOSTERONE: CPT

## 2024-06-08 LAB
TESTOST FREE SERPL-MCNC: 10.9 PG/ML (ref 6.6–18.1)
TESTOST SERPL-MCNC: 420 NG/DL (ref 264–916)

## 2024-06-10 ENCOUNTER — TELEPHONE (OUTPATIENT)
Dept: ENDOCRINOLOGY | Facility: HOSPITAL | Age: 64
End: 2024-06-10

## 2024-06-10 DIAGNOSIS — E29.1 TESTICULAR HYPOGONADISM: ICD-10-CM

## 2024-06-10 NOTE — TELEPHONE ENCOUNTER
Patient calling back to see if Carlos DAWSON reviewed the message from Ayesha. Patient said his testosterone is low, and he would like it to be higher. He stated he still does not feel the best. He said he was doubling the dose of his testosterone, and it is still low. He wants to see if she can increase his dose of testosterone, and send the script to the pharmacy because he is all out of his medication.

## 2024-06-10 NOTE — TELEPHONE ENCOUNTER
Patient called- his testosterone level is in the normal range, but he would like it higher. He said he would like it around 900. He said he was accidentally taking double the dose of what he was prescribed, so 100 mL weekly and it still is too low for him. Can his dose be above the 100 mL weekly? Please advise.

## 2024-06-11 DIAGNOSIS — E29.1 TESTICULAR HYPOGONADISM: Primary | ICD-10-CM

## 2024-06-11 DIAGNOSIS — E29.1 TESTICULAR HYPOGONADISM: ICD-10-CM

## 2024-06-11 RX ORDER — TESTOSTERONE CYPIONATE 200 MG/ML
60 INJECTION, SOLUTION INTRAMUSCULAR WEEKLY
Qty: 4 ML | Refills: 0 | Status: SHIPPED | OUTPATIENT
Start: 2024-06-11 | End: 2024-06-14 | Stop reason: SDUPTHER

## 2024-06-12 NOTE — TELEPHONE ENCOUNTER
Patient called back, informed him of provider's reply. He verbalized understanding, no questions or concerns.

## 2024-06-13 ENCOUNTER — OFFICE VISIT (OUTPATIENT)
Dept: FAMILY MEDICINE CLINIC | Facility: CLINIC | Age: 64
End: 2024-06-13
Payer: COMMERCIAL

## 2024-06-13 VITALS
HEIGHT: 74 IN | WEIGHT: 260 LBS | OXYGEN SATURATION: 97 % | SYSTOLIC BLOOD PRESSURE: 130 MMHG | DIASTOLIC BLOOD PRESSURE: 72 MMHG | HEART RATE: 60 BPM | BODY MASS INDEX: 33.37 KG/M2 | TEMPERATURE: 98.2 F

## 2024-06-13 DIAGNOSIS — C80.1 MALIGNANT (PRIMARY) NEOPLASM, UNSPECIFIED (HCC): ICD-10-CM

## 2024-06-13 DIAGNOSIS — Z00.00 WELL ADULT EXAM: Primary | ICD-10-CM

## 2024-06-13 DIAGNOSIS — Z12.11 ENCOUNTER FOR SCREENING FOR MALIGNANT NEOPLASM OF COLON: ICD-10-CM

## 2024-06-13 PROCEDURE — 99396 PREV VISIT EST AGE 40-64: CPT | Performed by: FAMILY MEDICINE

## 2024-06-13 NOTE — PROGRESS NOTES
"     Subjective:   Chief Complaint   Patient presents with    Physical Exam        Patient ID: Nacho Coelho is a 64 y.o. male.    Here for well exam        The following portions of the patient's history were reviewed and updated as appropriate: allergies, current medications, past family history, past medical history, past social history, past surgical history and problem list.    Review of Systems   Constitutional:  Negative for activity change, appetite change, chills, diaphoresis, fatigue and unexpected weight change.   HENT:  Negative for congestion, ear discharge, ear pain, hearing loss, nosebleeds and rhinorrhea.    Eyes:  Negative for pain, redness, itching and visual disturbance.   Respiratory:  Negative for cough, choking, chest tightness and shortness of breath.    Cardiovascular:  Negative for chest pain and leg swelling.   Gastrointestinal:  Negative for abdominal pain, blood in stool, constipation, diarrhea and nausea.   Endocrine: Negative for cold intolerance, polydipsia and polyphagia.   Genitourinary:  Negative for dysuria, frequency, hematuria and urgency.   Musculoskeletal:  Negative for arthralgias, back pain, gait problem, joint swelling, neck pain and neck stiffness.   Skin:  Negative for color change and rash.   Allergic/Immunologic: Negative for environmental allergies and food allergies.   Neurological:  Negative for dizziness, tremors, seizures, speech difficulty, numbness and headaches.   Hematological:  Negative for adenopathy. Does not bruise/bleed easily.   Psychiatric/Behavioral:  Negative for behavioral problems, dysphoric mood, hallucinations and self-injury.              Objective:  Vitals:    06/13/24 1458   BP: 130/72   BP Location: Left arm   Patient Position: Sitting   Cuff Size: Standard   Pulse: 60   Temp: 98.2 °F (36.8 °C)   TempSrc: Tympanic   SpO2: 97%   Weight: 118 kg (260 lb)   Height: 6' 2\" (1.88 m)      Physical Exam  Constitutional:       General: He is not in acute " distress.     Appearance: He is well-developed. He is not diaphoretic.   HENT:      Head: Normocephalic and atraumatic.      Right Ear: External ear normal.      Left Ear: External ear normal.      Nose: Nose normal.      Mouth/Throat:      Pharynx: No oropharyngeal exudate.   Eyes:      General: No scleral icterus.        Right eye: No discharge.         Left eye: No discharge.      Conjunctiva/sclera: Conjunctivae normal.      Pupils: Pupils are equal, round, and reactive to light.   Neck:      Thyroid: No thyromegaly.   Cardiovascular:      Rate and Rhythm: Normal rate and regular rhythm.      Heart sounds: Normal heart sounds. No murmur heard.  Pulmonary:      Effort: Pulmonary effort is normal.      Breath sounds: Normal breath sounds. No wheezing or rales.   Abdominal:      General: Bowel sounds are normal.      Palpations: Abdomen is soft. There is no mass.      Tenderness: There is no abdominal tenderness. There is no guarding.   Musculoskeletal:         General: No tenderness. Normal range of motion.      Cervical back: Normal range of motion and neck supple.   Lymphadenopathy:      Cervical: No cervical adenopathy.   Skin:     General: Skin is warm and dry.   Neurological:      Mental Status: He is alert and oriented to person, place, and time.      Deep Tendon Reflexes: Reflexes are normal and symmetric.   Psychiatric:         Thought Content: Thought content normal.         Judgment: Judgment normal.           Assessment/Plan:    No problem-specific Assessment & Plan notes found for this encounter.       Diagnoses and all orders for this visit:    Well adult exam    Encounter for screening for malignant neoplasm of colon  -     Ambulatory Referral to Gastroenterology; Future    Malignant (primary) neoplasm, unspecified (HCC)        Continue under the care of the endocrinologist and keep on the testosterone supplement.  Follow through lab work.

## 2024-06-14 ENCOUNTER — TELEPHONE (OUTPATIENT)
Dept: PEDIATRIC ENDOCRINOLOGY CLINIC | Facility: CLINIC | Age: 64
End: 2024-06-14

## 2024-06-14 DIAGNOSIS — E29.1 TESTICULAR HYPOGONADISM: ICD-10-CM

## 2024-06-14 RX ORDER — TESTOSTERONE CYPIONATE 200 MG/ML
60 INJECTION, SOLUTION INTRAMUSCULAR WEEKLY
Qty: 4 ML | Refills: 1 | Status: SHIPPED | OUTPATIENT
Start: 2024-06-14

## 2024-06-14 NOTE — TELEPHONE ENCOUNTER
Patient called the RX Refill Line. Message is being forwarded to the office.     Patient is requesting testosterone cypionate (DEPO-TESTOSTERONE) 200 mg/mL SOLN   According to the Patient the dosage was suppose to be change per   Carlos Knight. Patient stated it was never changed or called in.    Please contact patient at [phone]

## 2024-06-14 NOTE — TELEPHONE ENCOUNTER
It was sent on 6/11 to [facility]/pharmacy #[id number] - [place] - [address]- I will send again.

## 2024-07-02 DIAGNOSIS — E29.1 TESTICULAR HYPOGONADISM: ICD-10-CM

## 2024-07-02 DIAGNOSIS — E55.9 VITAMIN D DEFICIENCY: ICD-10-CM

## 2024-07-02 DIAGNOSIS — E03.9 HYPOTHYROIDISM, UNSPECIFIED TYPE: ICD-10-CM

## 2024-07-02 RX ORDER — LEVOTHYROXINE SODIUM 0.12 MG/1
TABLET ORAL
Qty: 180 TABLET | Refills: 1 | Status: SHIPPED | OUTPATIENT
Start: 2024-07-02

## 2024-07-02 RX ORDER — LEVOTHYROXINE SODIUM 0.12 MG/1
TABLET ORAL
Qty: 180 TABLET | Refills: 1 | Status: SHIPPED | OUTPATIENT
Start: 2024-07-02 | End: 2024-07-02

## 2024-07-02 NOTE — TELEPHONE ENCOUNTER
Reviewed prior notes patient takes 125mcg-- 2 tabs daily at the same time so a total of 250mcg. Both 125mcg pills taken at the same time.

## 2024-08-28 ENCOUNTER — APPOINTMENT (OUTPATIENT)
Dept: LAB | Facility: HOSPITAL | Age: 64
End: 2024-08-28
Payer: COMMERCIAL

## 2024-08-28 ENCOUNTER — TELEPHONE (OUTPATIENT)
Age: 64
End: 2024-08-28

## 2024-08-28 DIAGNOSIS — E03.9 HYPOTHYROIDISM, UNSPECIFIED TYPE: ICD-10-CM

## 2024-08-28 DIAGNOSIS — E29.1 TESTICULAR HYPOGONADISM: ICD-10-CM

## 2024-08-28 LAB
HCT VFR BLD AUTO: 37.7 % (ref 36.5–49.3)
HGB BLD-MCNC: 12.4 G/DL (ref 12–17)
T4 FREE SERPL-MCNC: 1.43 NG/DL (ref 0.61–1.12)
TSH SERPL DL<=0.05 MIU/L-ACNC: 0.33 UIU/ML (ref 0.45–4.5)

## 2024-08-28 PROCEDURE — 36415 COLL VENOUS BLD VENIPUNCTURE: CPT

## 2024-08-28 PROCEDURE — 84402 ASSAY OF FREE TESTOSTERONE: CPT

## 2024-08-28 PROCEDURE — 84443 ASSAY THYROID STIM HORMONE: CPT

## 2024-08-28 PROCEDURE — 85018 HEMOGLOBIN: CPT

## 2024-08-28 PROCEDURE — 84439 ASSAY OF FREE THYROXINE: CPT

## 2024-08-28 PROCEDURE — 85014 HEMATOCRIT: CPT

## 2024-08-28 PROCEDURE — 84403 ASSAY OF TOTAL TESTOSTERONE: CPT

## 2024-08-28 NOTE — TELEPHONE ENCOUNTER
Patient called to make provider aware he had labs drawn this morning and he will need a refill of testosterone. States he believes it will be increased and it can be sent to [facility]. Also, scheduled patient a f/u appt and placed on waiting list. Please advise when labs are results. Thank you.

## 2024-08-29 DIAGNOSIS — E03.9 HYPOTHYROIDISM, UNSPECIFIED TYPE: ICD-10-CM

## 2024-08-29 DIAGNOSIS — E29.1 TESTICULAR HYPOGONADISM: ICD-10-CM

## 2024-08-29 DIAGNOSIS — E55.9 VITAMIN D DEFICIENCY: ICD-10-CM

## 2024-08-29 LAB
TESTOST FREE SERPL-MCNC: 7.9 PG/ML (ref 6.6–18.1)
TESTOST SERPL-MCNC: 465 NG/DL (ref 264–916)

## 2024-08-29 RX ORDER — LEVOTHYROXINE SODIUM 125 UG/1
TABLET ORAL
Qty: 180 TABLET | Refills: 1 | Status: SHIPPED | OUTPATIENT
Start: 2024-08-29

## 2024-08-29 NOTE — TELEPHONE ENCOUNTER
I was able to speak to the patient and he stated that he took his medication the day before and the day of the lab draw to make you aware.  I also left him know that we will be calling him back to let him know about the testosterone results when they come in.

## 2024-08-30 DIAGNOSIS — E03.9 HYPOTHYROIDISM, UNSPECIFIED TYPE: ICD-10-CM

## 2024-08-30 DIAGNOSIS — E29.1 TESTICULAR HYPOGONADISM: ICD-10-CM

## 2024-08-30 RX ORDER — TESTOSTERONE CYPIONATE 200 MG/ML
60 INJECTION, SOLUTION INTRAMUSCULAR WEEKLY
Qty: 4 ML | Refills: 1 | Status: SHIPPED | OUTPATIENT
Start: 2024-08-30

## 2024-08-30 RX ORDER — LIOTHYRONINE SODIUM 5 UG/1
TABLET ORAL
Qty: 90 TABLET | Refills: 1 | Status: SHIPPED | OUTPATIENT
Start: 2024-08-30

## 2024-09-03 ENCOUNTER — TELEPHONE (OUTPATIENT)
Dept: ENDOCRINOLOGY | Facility: CLINIC | Age: 64
End: 2024-09-03

## 2024-09-03 NOTE — TELEPHONE ENCOUNTER
----- Message from SAMIR Hernandez sent at 8/29/2024  2:08 PM EDT -----  H&H normal. Testosterone is normal. He should continue same dose. Will send in refill   Reviewed thyroid labs again. Would advise he reduce to 1.5 tablets daily and repeat lab work in 6 weeks. I put in orders

## 2024-09-23 NOTE — PATIENT INSTRUCTIONS
Continue with plans for sleep study  Continue with home leg exercises/therapy  Complete labs-you can do this anytime  Let us know if pain worsens in any way    Peripheral Neuropathy   AMBULATORY CARE:   Peripheral neuropathy  is a condition that affects nerves in your arms, legs, hands, or feet  It also may affect your organs, such as your lungs, stomach, bladder, or genitals  Peripheral neuropathy can affect the nerves that allow you to move or to feel objects  It also may affect nerves that control your body functions, such as digestion or urination  This condition may go away on its own, or you may always have it  Common signs and symptoms include the following:   Pain or tingling in your legs, feet, arms, or hands that may feel sharp, stabbing, or burning    Trouble walking or keeping your balance    Weakness or difficulty holding things    Loss of your sense of touch or numbness    Bruising easily    Trouble controlling your bladder or bowels or having sex    Call your local emergency number (911 in the [address] [place] [address],[address]) if:   You cannot walk at all  Seek care immediately if:   You fall  Call your doctor or neurologist if:   Your pain is severe  You cannot control your bladder  You have trouble having sex  You have questions or concerns about your condition or care  Treatment  may help relieve your pain and help you function in your daily activities  Treatment of the condition causing the peripheral neuropathy may improve your symptoms  You may need the following:  Medicines  may be given to help decrease nerve pain  Physical and occupational therapists  may help you exercise your arms, legs, and hands  They may teach you new ways to do things at home  Transcutaneous electrical nerve stimulation (TENS)  stimulates your nerves and may decrease your pain  Wires are attached to pads  The pads are attached to your skin  The wires send a mild current through your nerves   Do not have TENS if you have a pacemaker or are pregnant  A brace or splint  helps support or hold an affected area still  Manage peripheral neuropathy:   Go to physical or occupational therapy as directed  Physical and occupational therapists may help you exercise your arms, legs, and hands  They may teach you new ways to do things at home  Wear a brace or splint, if directed  You may need a device that supports or holds a body part still  For example, if you have carpal tunnel syndrome, you may need to wear a wrist brace  Prevent falls  Move with care and stand up slowly  Wear shoes that support your feet, and do not go barefoot  Ask about walking aids, such as a cane or walker  You may want to install railings or nonslip pads in your home, especially in the bathroom  Ask for more information on how to avoid falls  Check your skin daily  Sores can form where your skin makes contact with chairs, beds, or other body parts  They also can form under splints  Keep your skin clean, and check your skin daily for sores  Exercise as directed  Ask about the best exercise plan for you  Physical activity may increase your balance and strength and may decrease your pain  It is best to start exercising slowly and do more as you get stronger  Follow up with your doctor or neurologist as directed:  Write down your questions so you remember to ask them during your visits  © Copyright [employer] 2022 Information is for End User's use only and may not be sold, redistributed or otherwise used for commercial purposes  All illustrations and images included in CareNotes® are the copyrighted property of A D A M [employer] Inc  or [employer]urali [employer]   The above information is an  only  It is not intended as medical advice for individual conditions or treatments  Talk to your doctor, nurse or pharmacist before following any medical regimen to see if it is safe and effective for you  Resulted

## 2024-10-24 DIAGNOSIS — E29.1 TESTICULAR HYPOGONADISM: ICD-10-CM

## 2024-10-24 RX ORDER — TESTOSTERONE CYPIONATE 200 MG/ML
60 INJECTION, SOLUTION INTRAMUSCULAR WEEKLY
Qty: 4 ML | Refills: 1 | Status: SHIPPED | OUTPATIENT
Start: 2024-10-24

## 2024-10-24 NOTE — TELEPHONE ENCOUNTER
Patient Id Prescription # Filled Written Drug Label Qty Days Strength MME** Prescriber Pharmacy Payment REFILL #/Auth State Detail  1 [id number] 09/27/2024 08/30/2024 Testosterone Cypionate (Oil) 4.0 28 200 MG/1 ML [facility] Commercial Insurance 1 / 1 [place]   1 [id number] 08/30/2024 08/30/2024 Testosterone Cypionate (Oil) 4.0 28 200 MG/1 ML [facility] Commercial Insurance 0 / 1 [place]   1 [id number] 06/14/2024 06/14/2024 Testosterone Cypionate (Oil) 4.0 28 200 MG/1 ML [facility] Commercial Insurance 0 / 1 [place]   1 [id number] 02/22/2024 02/22/2024 Testosterone Cypionate (Oil) 3.0 84 200 MG/1 ML [facility] Commercial Insurance 0 / 0 [place]

## 2024-11-15 ENCOUNTER — TELEPHONE (OUTPATIENT)
Age: 64
End: 2024-11-15

## 2024-11-15 DIAGNOSIS — E29.1 TESTICULAR HYPOGONADISM: Primary | ICD-10-CM

## 2024-11-15 DIAGNOSIS — E03.9 HYPOTHYROIDISM, UNSPECIFIED TYPE: ICD-10-CM

## 2024-11-15 DIAGNOSIS — R25.2 LEG CRAMPS: ICD-10-CM

## 2024-11-15 DIAGNOSIS — E61.1 IRON DEFICIENCY: ICD-10-CM

## 2024-11-15 DIAGNOSIS — E55.9 VITAMIN D DEFICIENCY: ICD-10-CM

## 2024-11-15 NOTE — TELEPHONE ENCOUNTER
Patient called stating he has an appt 11/22 and wants to know if he can get additional labs besides the TSH. He said he would like the testosterone level checked, magnesium, iron, and anything that can be related to leg cramping. Please advise and call patient when additional lab orders are placed.

## 2024-11-19 ENCOUNTER — APPOINTMENT (OUTPATIENT)
Dept: LAB | Facility: HOSPITAL | Age: 64
End: 2024-11-19
Payer: COMMERCIAL

## 2024-11-19 DIAGNOSIS — E29.1 TESTICULAR HYPOGONADISM: ICD-10-CM

## 2024-11-19 DIAGNOSIS — E03.9 HYPOTHYROIDISM, UNSPECIFIED TYPE: ICD-10-CM

## 2024-11-19 DIAGNOSIS — E61.1 IRON DEFICIENCY: ICD-10-CM

## 2024-11-19 DIAGNOSIS — E55.9 VITAMIN D DEFICIENCY: ICD-10-CM

## 2024-11-19 DIAGNOSIS — R25.2 LEG CRAMPS: ICD-10-CM

## 2024-11-19 LAB
25(OH)D3 SERPL-MCNC: 38.1 NG/ML (ref 30–100)
ALBUMIN SERPL BCG-MCNC: 4.5 G/DL (ref 3.5–5)
ALP SERPL-CCNC: 54 U/L (ref 34–104)
ALT SERPL W P-5'-P-CCNC: 28 U/L (ref 7–52)
ANION GAP SERPL CALCULATED.3IONS-SCNC: 8 MMOL/L (ref 4–13)
AST SERPL W P-5'-P-CCNC: 28 U/L (ref 13–39)
BASOPHILS # BLD AUTO: 0.01 THOUSANDS/ÂΜL (ref 0–0.1)
BASOPHILS NFR BLD AUTO: 0 % (ref 0–1)
BILIRUB SERPL-MCNC: 0.61 MG/DL (ref 0.2–1)
BUN SERPL-MCNC: 18 MG/DL (ref 5–25)
CALCIUM SERPL-MCNC: 8.9 MG/DL (ref 8.4–10.2)
CHLORIDE SERPL-SCNC: 104 MMOL/L (ref 96–108)
CO2 SERPL-SCNC: 28 MMOL/L (ref 21–32)
CREAT SERPL-MCNC: 0.91 MG/DL (ref 0.6–1.3)
EOSINOPHIL # BLD AUTO: 0.04 THOUSAND/ÂΜL (ref 0–0.61)
EOSINOPHIL NFR BLD AUTO: 1 % (ref 0–6)
ERYTHROCYTE [DISTWIDTH] IN BLOOD BY AUTOMATED COUNT: 15.3 % (ref 11.6–15.1)
FERRITIN SERPL-MCNC: 208 NG/ML (ref 24–336)
GFR SERPL CREATININE-BSD FRML MDRD: 89 ML/MIN/1.73SQ M
GLUCOSE P FAST SERPL-MCNC: 111 MG/DL (ref 65–99)
HCT VFR BLD AUTO: 42.1 % (ref 36.5–49.3)
HGB BLD-MCNC: 13.7 G/DL (ref 12–17)
IMM GRANULOCYTES # BLD AUTO: 0.02 THOUSAND/UL (ref 0–0.2)
IMM GRANULOCYTES NFR BLD AUTO: 1 % (ref 0–2)
IRON SATN MFR SERPL: 39 % (ref 15–50)
IRON SERPL-MCNC: 128 UG/DL (ref 50–212)
LYMPHOCYTES # BLD AUTO: 1.59 THOUSANDS/ÂΜL (ref 0.6–4.47)
LYMPHOCYTES NFR BLD AUTO: 44 % (ref 14–44)
MAGNESIUM SERPL-MCNC: 2.3 MG/DL (ref 1.9–2.7)
MCH RBC QN AUTO: 36.4 PG (ref 26.8–34.3)
MCHC RBC AUTO-ENTMCNC: 32.5 G/DL (ref 31.4–37.4)
MCV RBC AUTO: 112 FL (ref 82–98)
MONOCYTES # BLD AUTO: 0.35 THOUSAND/ÂΜL (ref 0.17–1.22)
MONOCYTES NFR BLD AUTO: 10 % (ref 4–12)
NEUTROPHILS # BLD AUTO: 1.62 THOUSANDS/ÂΜL (ref 1.85–7.62)
NEUTS SEG NFR BLD AUTO: 44 % (ref 43–75)
NRBC BLD AUTO-RTO: 0 /100 WBCS
PLATELET # BLD AUTO: 106 THOUSANDS/UL (ref 149–390)
PMV BLD AUTO: 11.6 FL (ref 8.9–12.7)
POTASSIUM SERPL-SCNC: 4.3 MMOL/L (ref 3.5–5.3)
PROT SERPL-MCNC: 6.9 G/DL (ref 6.4–8.4)
RBC # BLD AUTO: 3.76 MILLION/UL (ref 3.88–5.62)
SODIUM SERPL-SCNC: 140 MMOL/L (ref 135–147)
T4 FREE SERPL-MCNC: 1.2 NG/DL (ref 0.61–1.12)
TIBC SERPL-MCNC: 328 UG/DL (ref 250–450)
TSH SERPL DL<=0.05 MIU/L-ACNC: 0.93 UIU/ML (ref 0.45–4.5)
UIBC SERPL-MCNC: 200 UG/DL (ref 155–355)
WBC # BLD AUTO: 3.63 THOUSAND/UL (ref 4.31–10.16)

## 2024-11-19 PROCEDURE — 84402 ASSAY OF FREE TESTOSTERONE: CPT

## 2024-11-19 PROCEDURE — 84403 ASSAY OF TOTAL TESTOSTERONE: CPT

## 2024-11-19 PROCEDURE — 85025 COMPLETE CBC W/AUTO DIFF WBC: CPT

## 2024-11-19 PROCEDURE — 83550 IRON BINDING TEST: CPT

## 2024-11-19 PROCEDURE — 82306 VITAMIN D 25 HYDROXY: CPT

## 2024-11-19 PROCEDURE — 84439 ASSAY OF FREE THYROXINE: CPT

## 2024-11-19 PROCEDURE — 36415 COLL VENOUS BLD VENIPUNCTURE: CPT

## 2024-11-19 PROCEDURE — 82728 ASSAY OF FERRITIN: CPT

## 2024-11-19 PROCEDURE — 83540 ASSAY OF IRON: CPT

## 2024-11-19 PROCEDURE — 83735 ASSAY OF MAGNESIUM: CPT

## 2024-11-19 PROCEDURE — 80053 COMPREHEN METABOLIC PANEL: CPT

## 2024-11-19 PROCEDURE — 84443 ASSAY THYROID STIM HORMONE: CPT

## 2024-11-20 LAB
TESTOST FREE SERPL-MCNC: 26.1 PG/ML (ref 6.6–18.1)
TESTOST SERPL-MCNC: 799 NG/DL (ref 264–916)

## 2024-11-21 ENCOUNTER — RESULTS FOLLOW-UP (OUTPATIENT)
Dept: ENDOCRINOLOGY | Facility: CLINIC | Age: 64
End: 2024-11-21

## 2024-11-22 ENCOUNTER — OFFICE VISIT (OUTPATIENT)
Dept: ENDOCRINOLOGY | Facility: CLINIC | Age: 64
End: 2024-11-22
Payer: COMMERCIAL

## 2024-11-22 VITALS
DIASTOLIC BLOOD PRESSURE: 70 MMHG | HEIGHT: 74 IN | SYSTOLIC BLOOD PRESSURE: 130 MMHG | WEIGHT: 264 LBS | BODY MASS INDEX: 33.88 KG/M2

## 2024-11-22 DIAGNOSIS — Z13.1 DIABETES MELLITUS SCREENING: Primary | ICD-10-CM

## 2024-11-22 DIAGNOSIS — E29.1 TESTICULAR HYPOGONADISM: ICD-10-CM

## 2024-11-22 PROCEDURE — 99214 OFFICE O/P EST MOD 30 MIN: CPT | Performed by: INTERNAL MEDICINE

## 2024-11-22 RX ORDER — TESTOSTERONE CYPIONATE 200 MG/ML
INJECTION, SOLUTION INTRAMUSCULAR
Qty: 4 ML | Refills: 1 | Status: SHIPPED | OUTPATIENT
Start: 2024-11-22

## 2024-11-22 NOTE — PROGRESS NOTES
11/22/2024    Assessment & Plan      Diagnoses and all orders for this visit:    Diabetes mellitus screening  -     Hemoglobin A1C; Future    Testicular hypogonadism  -     testosterone cypionate (DEPO-TESTOSTERONE) 200 mg/mL SOLN; 70 mg weekly. Dose change  -     Testosterone, free, total; Future  -     CBC; Future  -     Comprehensive metabolic panel; Future  -     PSA, Total Screen; Future  -     Sedimentation rate, automated; Future        Assessment/Plan:  Hypothyroidism: Overall doing well on current regimen.  We will continue current regimen and arrange follow-up appointment.  Hypogonadism: Recommended reducing his testosterone dose of 70 mg weekly and repeat labs in 1-2 months.      CC: Follow-up    History of Present Illness     HPI: Nacho Coelho is a 64 y.o. year old male who presents for a follow-up appointment for history of hypogonadism as well as hypothyroidism and vitamin D deficiency.  Hypothyroidism was diagnosed over 20 years ago.  Additionally during evaluation for fatigue, unintentional weight gain and arthralgias and muscle weakness, he had a normal 1 mg overnight dexamethasone suppression test, growth hormone evaluation and normal prolactin.  Pituitary MRI did not disclose any pituitary lesion.  He does have a history of mildly elevated PTH level in the setting of vitamin D deficiency.  He was started on 2000 units of vitamin D daily.  For hypothyroidism he continues on levothyroxine 125 mcg tablets and takes 1.5 tablets daily as well as liothyronine 5 mcg daily.  For his history of hypogonadism he continues on testosterone cypionate 60 mg weekly.  He has actually been receiving 80 mg weekly as a family member helps him inject.  He overall feels well but notes persistent lower extremity weakness which has been ongoing for some time.    Review of Systems   Constitutional:  Negative for fatigue.   HENT:  Negative for trouble swallowing and voice change.    Eyes:  Negative for visual disturbance.    Respiratory:  Negative for shortness of breath.    Cardiovascular:  Negative for palpitations and leg swelling.   Gastrointestinal:  Negative for abdominal pain, nausea and vomiting.   Endocrine: Negative for polydipsia and polyuria.   Musculoskeletal:  Negative for arthralgias and myalgias.   Skin:  Negative for rash.   Neurological:  Positive for weakness. Negative for dizziness and tremors.   Hematological:  Negative for adenopathy.   Psychiatric/Behavioral:  Negative for agitation and confusion.        Historical Information   Past Medical History:   Diagnosis Date    Anemia     last assessed: 09/03/15    Cholelithiasis     Disease of thyroid gland     GERD (gastroesophageal reflux disease)     Hyperlipidemia     Hypersecretion of testicular hormones     Insomnia     Kidney stone     Low testosterone     Male erectile dysfunction     Osteoarthritis, hip, bilateral     Thrombosed external hemorrhoids      Past Surgical History:   Procedure Laterality Date    CHOLECYSTECTOMY      COLONOSCOPY      CA COLONOSCOPY FLX DX W/COLLJ SPEC WHEN PFRMD N/A 2017    Procedure: EGD AND COLONOSCOPY;  Surgeon: Tavia Hinton MD;  Location: [facility];  Service: Gastroenterology    TOTAL HIP ARTHROPLASTY Left 2020     Social History   Social History     Substance and Sexual Activity   Alcohol Use Yes    Alcohol/week: 4.0 standard drinks of alcohol    Types: 4 Glasses of wine per week    Comment: socially     Social History     Substance and Sexual Activity   Drug Use Never     Social History     Tobacco Use   Smoking Status Former    Current packs/day: 0.00    Average packs/day: 1.8 packs/day for 16.7 years (30.1 ttl pk-yrs)    Types: Cigarettes    Start date: 6/15/1980    Quit date: 6/15/1990    Years since quittin.4   Smokeless Tobacco Never   Tobacco Comments    QUIT 2002/ SECOND HAND      Family History:   Family History   Problem Relation Age of Onset    Arthritis Mother     Heart disease Father      "Hypertension Father     Diabetes Father     Asthma Father     Diabetes type II Father     Hypothyroidism Father     Cancer Paternal Grandmother     Cancer Family     Heart disease Family        Meds/Allergies   Current Outpatient Medications   Medication Sig Dispense Refill    amoxicillin (AMOXIL) 500 mg capsule       Cholecalciferol 125 MCG (5000 UT) TABS 1 tab daily      Ferrous Sulfate (IRON SUPPLEMENT PO) Take 1 tablet by mouth daily      levothyroxine 125 mcg tablet TAKE 1.5 TABLETS DAILY 180 tablet 1    liothyronine (CYTOMEL) 5 mcg tablet TAKE 1 TABLET BY MOUTH EVERY DAY 90 tablet 1    Magnesium 500 MG CAPS Take 500 mg by mouth daily      NEEDLE, DISP, 23 G 23G X 1-1/4\" MISC Use once a week 15 each 0    Syringe 1 ML MISC Use once a week 15 each 2    testosterone cypionate (DEPO-TESTOSTERONE) 200 mg/mL SOLN 70 mg weekly. Dose change 4 mL 1     No current facility-administered medications for this visit.     No Known Allergies    Objective   Vitals: Blood pressure 130/70, height 6' 2\" (1.88 m), weight 120 kg (264 lb).  Invasive Devices       None                   Physical Exam  Vitals reviewed.   Constitutional:       General: He is not in acute distress.     Appearance: He is well-developed. He is not diaphoretic.   HENT:      Head: Normocephalic and atraumatic.   Eyes:      Conjunctiva/sclera: Conjunctivae normal.      Pupils: Pupils are equal, round, and reactive to light.   Neck:      Thyroid: No thyromegaly.   Cardiovascular:      Rate and Rhythm: Normal rate and regular rhythm.   Pulmonary:      Effort: Pulmonary effort is normal. No respiratory distress.      Breath sounds: Normal breath sounds.   Abdominal:      General: Bowel sounds are normal.      Palpations: Abdomen is soft.   Musculoskeletal:         General: Normal range of motion.      Cervical back: Normal range of motion and neck supple.   Skin:     General: Skin is warm and dry.      Findings: No rash.   Neurological:      Mental Status: He is " alert and oriented to person, place, and time.      Motor: No abnormal muscle tone.   Psychiatric:         Behavior: Behavior normal.         The history was obtained from the review of the chart and from the patient.    Lab Results:      Component      Latest Ref Rng 11/19/2024   WBC      4.31 - 10.16 Thousand/uL 3.63 (L)    RBC      3.88 - 5.62 Million/uL 3.76 (L)    Hemoglobin      12.0 - 17.0 g/dL 13.7    Hematocrit      36.5 - 49.3 % 42.1    MCV      82 - 98 fL 112 (H)    MCH      26.8 - 34.3 pg 36.4 (H)    MCHC      31.4 - 37.4 g/dL 32.5    RDW      11.6 - 15.1 % 15.3 (H)    MPV      8.9 - 12.7 fL 11.6    Platelet Count      149 - 390 Thousands/uL 106 (L)    nRBC      /100 WBCs 0    Segmented %      43 - 75 % 44    Immature Grans %      0 - 2 % 1    Lymphocytes %      14 - 44 % 44    Monocytes %      4 - 12 % 10    Eosinophils %      0 - 6 % 1    Basophils %      0 - 1 % 0    Absolute Neutrophils      1.85 - 7.62 Thousands/µL 1.62 (L)    Absolute Immature Grans      0.00 - 0.20 Thousand/uL 0.02    Absolute Lymphocytes      0.60 - 4.47 Thousands/µL 1.59    Absolute Monocytes      0.17 - 1.22 Thousand/µL 0.35    Absolute Eosinophils      0.00 - 0.61 Thousand/µL 0.04    Absolute Basophils      0.00 - 0.10 Thousands/µL 0.01    Sodium      135 - 147 mmol/L 140    Potassium      3.5 - 5.3 mmol/L 4.3    Chloride      96 - 108 mmol/L 104    Carbon Dioxide      21 - 32 mmol/L 28    ANION GAP      4 - 13 mmol/L 8    BUN      5 - 25 mg/dL 18    Creatinine      0.60 - 1.30 mg/dL 0.91    GLUCOSE, FASTING      65 - 99 mg/dL 111 (H)    Calcium      8.4 - 10.2 mg/dL 8.9    AST      13 - 39 U/L 28    ALT      7 - 52 U/L 28    ALK PHOS      34 - 104 U/L 54    Total Protein      6.4 - 8.4 g/dL 6.9    Albumin      3.5 - 5.0 g/dL 4.5    Total Bilirubin      0.20 - 1.00 mg/dL 0.61    GFR, Calculated      ml/min/1.73sq m 89    Iron Saturation      15 - 50 % 39    TIBC      250 - 450 ug/dL 328    Iron      50 - 212 ug/dL 128   "  UIBC      155 - 355 ug/dL 200    TESTOSTERONE FREE      6.6 - 18.1 pg/mL 26.1 (H)    Testosterone, Total, LC/MS      264 - 916 ng/dL 799    VITAMIN D, 25-HYDROXY      30.0 - 100.0 ng/mL 38.1    MAGNESIUM      1.9 - 2.7 mg/dL 2.3    TSH 3RD GENERATON      0.450 - 4.500 uIU/mL 0.927    FREE T4      0.61 - 1.12 ng/dL 1.20 (H)    FERRITIN      24 - 336 ng/mL 208       Legend:  (L) Low  (H) High    Future Appointments   Date Time Provider Department Center   6/13/2025  2:30 PM SAMIR [facility]       Portions of the record may have been created with voice recognition software. Occasional wrong word or \"sound a like\" substitutions may have occurred due to the inherent limitations of voice recognition software. Read the chart carefully and recognize, using context, where substitutions have occurred.    "

## 2025-01-19 DIAGNOSIS — E03.9 HYPOTHYROIDISM, UNSPECIFIED TYPE: ICD-10-CM

## 2025-01-19 DIAGNOSIS — E29.1 TESTICULAR HYPOGONADISM: ICD-10-CM

## 2025-01-19 DIAGNOSIS — E55.9 VITAMIN D DEFICIENCY: ICD-10-CM

## 2025-01-20 RX ORDER — LEVOTHYROXINE SODIUM 125 UG/1
TABLET ORAL
Qty: 180 TABLET | Refills: 1 | Status: SHIPPED | OUTPATIENT
Start: 2025-01-20

## 2025-01-22 DIAGNOSIS — E29.1 TESTICULAR HYPOGONADISM: ICD-10-CM

## 2025-01-22 RX ORDER — TESTOSTERONE CYPIONATE 200 MG/ML
INJECTION, SOLUTION INTRAMUSCULAR
Qty: 4 ML | Refills: 1 | Status: SHIPPED | OUTPATIENT
Start: 2025-01-22

## 2025-02-06 ENCOUNTER — TELEPHONE (OUTPATIENT)
Age: 65
End: 2025-02-06

## 2025-02-06 NOTE — TELEPHONE ENCOUNTER
Patient called he said his R hand is swollen, warm, itches.  Started about 3 days ago after itching his hand.  He said it did scab but he scratched off the scab yesterday.  No available appointments today, scheduled for next available for tomorrow.  He wanted to make sure he is ok to wait until tomorrow to be seen or if he can get medication today.    Thank you

## 2025-02-07 ENCOUNTER — OFFICE VISIT (OUTPATIENT)
Dept: FAMILY MEDICINE CLINIC | Facility: CLINIC | Age: 65
End: 2025-02-07
Payer: COMMERCIAL

## 2025-02-07 VITALS
WEIGHT: 259 LBS | OXYGEN SATURATION: 95 % | BODY MASS INDEX: 33.24 KG/M2 | HEART RATE: 91 BPM | DIASTOLIC BLOOD PRESSURE: 64 MMHG | HEIGHT: 74 IN | SYSTOLIC BLOOD PRESSURE: 136 MMHG | TEMPERATURE: 96.3 F

## 2025-02-07 DIAGNOSIS — Z12.11 ENCOUNTER FOR SCREENING COLONOSCOPY: ICD-10-CM

## 2025-02-07 DIAGNOSIS — L03.113 CELLULITIS OF RIGHT UPPER EXTREMITY: Primary | ICD-10-CM

## 2025-02-07 PROCEDURE — 87205 SMEAR GRAM STAIN: CPT

## 2025-02-07 PROCEDURE — 87070 CULTURE OTHR SPECIMN AEROBIC: CPT

## 2025-02-07 PROCEDURE — 99213 OFFICE O/P EST LOW 20 MIN: CPT

## 2025-02-07 PROCEDURE — 87186 SC STD MICRODIL/AGAR DIL: CPT

## 2025-02-07 PROCEDURE — 87077 CULTURE AEROBIC IDENTIFY: CPT

## 2025-02-07 RX ORDER — SULFAMETHOXAZOLE AND TRIMETHOPRIM 800; 160 MG/1; MG/1
1 TABLET ORAL 2 TIMES DAILY
Qty: 14 TABLET | Refills: 0 | Status: SHIPPED | OUTPATIENT
Start: 2025-02-07 | End: 2025-02-14

## 2025-02-07 NOTE — PROGRESS NOTES
"Name: Nacho Coelho      : 1960      MRN: [id number]  Encounter Provider: Ale Avila DO  Encounter Date: 2025   Encounter department: [facility]  :  Assessment & Plan  Cellulitis of right upper extremity  -wound culture collected today  -bactrim BID x7 days   -return to office/ED precautions reviewed if redness/swelling/pain worsens over the weekend.  Orders:    sulfamethoxazole-trimethoprim (BACTRIM DS) 800-160 mg per tablet; Take 1 tablet by mouth 2 (two) times a day for 7 days    Wound culture and Gram stain    Encounter for screening colonoscopy    Orders:    Ambulatory Referral to Gastroenterology; Future           History of Present Illness   Patient presents for acute visit. Right hand had been itching a few days ago and patient scratched it causing an open laceration, the following day hand became red and swollen. Today the swelling is improved from yesterday, but left hand is still red and painful. No fever/chills, streaking of redness up his arm.      Review of Systems   Constitutional:  Negative for chills and fever.   HENT:  Negative for ear pain and sore throat.    Eyes:  Negative for pain and visual disturbance.   Respiratory:  Negative for cough and shortness of breath.    Cardiovascular:  Negative for chest pain and palpitations.   Gastrointestinal:  Negative for abdominal pain and vomiting.   Genitourinary:  Negative for dysuria and hematuria.   Musculoskeletal:  Negative for arthralgias and back pain.   Skin:  Positive for rash and wound. Negative for color change.   Neurological:  Negative for seizures and syncope.   All other systems reviewed and are negative.      Objective   /64   Pulse 91   Temp (!) 96.3 °F (35.7 °C) (Tympanic)   Ht 6' 2\" (1.88 m)   Wt 117 kg (259 lb)   SpO2 95%   BMI 33.25 kg/m²      Physical Exam  Constitutional:       General: He is not in acute distress.     Appearance: Normal appearance. He is not ill-appearing or " toxic-appearing.   HENT:      Head: Normocephalic and atraumatic.      Right Ear: External ear normal.      Left Ear: External ear normal.      Nose: Nose normal.   Eyes:      Conjunctiva/sclera: Conjunctivae normal.   Cardiovascular:      Rate and Rhythm: Normal rate and regular rhythm.      Heart sounds: Normal heart sounds. No murmur heard.  Pulmonary:      Effort: No respiratory distress.      Breath sounds: Normal breath sounds. No wheezing, rhonchi or rales.   Musculoskeletal:      Right hand: Swelling present.   Skin:     General: Skin is warm and dry.      Findings: Erythema (Laceration on right hand with surrounding erythema, warmth, tenderness. No drainage from laceration) and signs of injury present.   Neurological:      General: No focal deficit present.      Mental Status: He is alert and oriented to person, place, and time.   Psychiatric:         Mood and Affect: Mood normal.         Behavior: Behavior normal.

## 2025-02-09 LAB
BACTERIA WND AEROBE CULT: ABNORMAL
GRAM STN SPEC: ABNORMAL

## 2025-02-10 ENCOUNTER — RESULTS FOLLOW-UP (OUTPATIENT)
Dept: FAMILY MEDICINE CLINIC | Facility: CLINIC | Age: 65
End: 2025-02-10

## 2025-02-28 DIAGNOSIS — E03.9 HYPOTHYROIDISM, UNSPECIFIED TYPE: ICD-10-CM

## 2025-02-28 RX ORDER — LIOTHYRONINE SODIUM 5 UG/1
5 TABLET ORAL DAILY
Qty: 90 TABLET | Refills: 1 | Status: SHIPPED | OUTPATIENT
Start: 2025-02-28

## 2025-04-03 DIAGNOSIS — E29.1 TESTICULAR HYPOGONADISM: ICD-10-CM

## 2025-04-03 RX ORDER — TESTOSTERONE CYPIONATE 200 MG/ML
INJECTION, SOLUTION INTRAMUSCULAR
Qty: 4 ML | Refills: 1 | Status: SHIPPED | OUTPATIENT
Start: 2025-04-03

## 2025-04-03 NOTE — TELEPHONE ENCOUNTER
Requested medication(s) are due for refill today: Yes  Patient has already received a courtesy refill: Yes  Other reason request has been forwarded to provider: failed protocol

## 2025-05-01 ENCOUNTER — TELEPHONE (OUTPATIENT)
Dept: FAMILY MEDICINE CLINIC | Facility: CLINIC | Age: 65
End: 2025-05-01

## 2025-05-06 ENCOUNTER — NURSE TRIAGE (OUTPATIENT)
Age: 65
End: 2025-05-06

## 2025-05-06 ENCOUNTER — OFFICE VISIT (OUTPATIENT)
Dept: URGENT CARE | Facility: CLINIC | Age: 65
End: 2025-05-06
Payer: COMMERCIAL

## 2025-05-06 VITALS
SYSTOLIC BLOOD PRESSURE: 150 MMHG | OXYGEN SATURATION: 96 % | HEIGHT: 74 IN | TEMPERATURE: 99.8 F | WEIGHT: 258.2 LBS | DIASTOLIC BLOOD PRESSURE: 86 MMHG | BODY MASS INDEX: 33.14 KG/M2 | RESPIRATION RATE: 22 BRPM | HEART RATE: 83 BPM

## 2025-05-06 DIAGNOSIS — M79.89 FOOT SWELLING: Primary | ICD-10-CM

## 2025-05-06 PROCEDURE — 99213 OFFICE O/P EST LOW 20 MIN: CPT | Performed by: PREVENTIVE MEDICINE

## 2025-05-06 NOTE — PATIENT INSTRUCTIONS
I believe this might be gout.  Take 2 Aleve now and at bedtime.  Tomorrow take 2 Aleve 3 times a day.  And then on Thursday 2 Aleve twice a day.  If this is not markedly improving in 48 hours you must come back for recheck.  Elevate the foot is much as possible and use ice for small

## 2025-05-06 NOTE — TELEPHONE ENCOUNTER
"FOLLOW UP: None needed at this time - patient decided to go to  for same-day evaluation. Will follow-up with office as directed    REASON FOR CONVERSATION: Spider Bite    SYMPTOMS: Patient woke this morning with redness/swelling to his left foot. Unknown cause, small red \"dot\" in center, between toes, no obvious open skin/wound. Patient suspects spider bite as hit foot was fine before bed. Mild redness/swelling extending up to top of foot. Painful 7/10, worsens when trying to walk, pain shoots into ankle.    OTHER: Due to history of hip replacements, patient is concerned with waiting until tomorrow to be seen. Discussed options, patient chose to go to [facility] in [place].    DISPOSITION: See Today or Tomorrow in Office      Reason for Disposition   Patient wants to be seen    Answer Assessment - Initial Assessment Questions  1. TYPE of INSECT: \"What type of insect was it?\"       Unsure - suspecting spider  2. ONSET: \"When did you get bitten?\"       Unsure - sometime overnight  3. LOCATION: \"Where is the insect bite located?\"       Left foot between two middle toes  4. REDNESS: \"Is the area red or pink?\" If Yes, ask: \"What size is area of redness?\" (inches or cm). \"When did the redness start?\"      Yes, slight redness spreading to top of foot  5. PAIN: \"Is there any pain?\" If Yes, ask: \"How bad is it?\"  (Scale 1-10; or mild, moderate, severe)      Yes - 7/10, worse when trying to walk  6. ITCHING: \"Does it itch?\" If Yes, ask: \"How bad is the itch?\"       Denies  7. SWELLING: \"How big is the swelling?\" (inches, cm, or compare to coins)      Mild swelling  8. OTHER SYMPTOMS: \"Do you have any other symptoms?\"  (e.g., difficulty breathing, hives)      Denies    Protocols used: Insect Bite-Adult-[place]    "

## 2025-05-06 NOTE — PROGRESS NOTES
"  [facility]        NAME: Nacho Coelho is a 65 y.o. male  : 1960    MRN: [id number]  DATE: May 6, 2025  TIME: 5:03 PM    Assessment and Plan   Foot swelling [M79.89]  1. Foot swelling              Patient Instructions       Follow up with PCP in 3-5 days.  Proceed to  ER if symptoms worsen.    If tests have been performed at [facility], our office will contact you with results if changes need to be made to the care plan discussed with you at the visit.  You can review your full results on [facility] MyChart.    Chief Complaint     Chief Complaint   Patient presents with    Foot Swelling     Pt here for left foot swelling  since this morning  started  by second and third toe   and now spreading up foot,   pain  7/10,   throbbing pain.   Pt used ice.          History of Present Illness       Painful left foot swelling.  No history of trauma.  No history of insect bite.        Review of Systems   Review of Systems   Musculoskeletal:  Positive for arthralgias and gait problem.         Current Medications       Current Outpatient Medications:     amoxicillin (AMOXIL) 500 mg capsule, , Disp: , Rfl:     Cholecalciferol 125 MCG (5000 UT) TABS, 1 tab daily, Disp: , Rfl:     Ferrous Sulfate (IRON SUPPLEMENT PO), Take 1 tablet by mouth daily, Disp: , Rfl:     levothyroxine 125 mcg tablet, TAKE 2 TABLETS BY MOUTH DAILY IN THE MORNING AS SINGLE DOSE, Disp: 180 tablet, Rfl: 1    liothyronine (CYTOMEL) 5 mcg tablet, TAKE 1 TABLET BY MOUTH EVERY DAY, Disp: 90 tablet, Rfl: 1    Magnesium 500 MG CAPS, Take 500 mg by mouth daily, Disp: , Rfl:     NEEDLE, DISP, 23 G 23G X 1-\" MISC, Use once a week, Disp: 15 each, Rfl: 0    Syringe 1 ML MISC, Use once a week, Disp: 15 each, Rfl: 2    testosterone cypionate (DEPO-TESTOSTERONE) 200 mg/mL SOLN, INJECT 70 MG (0.35ML) INTRAMUSUCLAR WEEKLY. DOSE CHANGE, Disp: 4 mL, Rfl: 1    Current Allergies     Allergies as of 2025    (No Known Allergies)            The following " "portions of the patient's history were reviewed and updated as appropriate: allergies, current medications, past family history, past medical history, past social history, past surgical history and problem list.     Past Medical History:   Diagnosis Date    Anemia     last assessed: 09/03/15    Cholelithiasis     Disease of thyroid gland     GERD (gastroesophageal reflux disease)     Hyperlipidemia     Hypersecretion of testicular hormones     Insomnia     Kidney stone     Low testosterone     Male erectile dysfunction     Osteoarthritis, hip, bilateral     Thrombosed external hemorrhoids        Past Surgical History:   Procedure Laterality Date    CHOLECYSTECTOMY      COLONOSCOPY      ID COLONOSCOPY FLX DX W/COLLJ SPEC WHEN PFRMD N/A 11/6/2017    Procedure: EGD AND COLONOSCOPY;  Surgeon: Tavia Hinton MD;  Location: [facility];  Service: Gastroenterology    TOTAL HIP ARTHROPLASTY Left 11/20/2020       Family History   Problem Relation Age of Onset    Arthritis Mother     Heart disease Father     Hypertension Father     Diabetes Father     Asthma Father     Diabetes type II Father     Hypothyroidism Father     Cancer Paternal Grandmother     Cancer Family     Heart disease Family          Medications have been verified.        Objective   /86 (Patient Position: Sitting, Cuff Size: Large)   Pulse 83   Temp 99.8 °F (37.7 °C) (Tympanic)   Resp 22   Ht 6' 2\" (1.88 m)   Wt 117 kg (258 lb 3.2 oz)   SpO2 96%   BMI 33.15 kg/m²   No LMP for male patient.       Physical Exam     Physical Exam  Musculoskeletal:      Comments: The left foot is swollen particularly across the dorsum it is mildly warm.  It is not red.  There is no red streaking.  No obvious areas of skin breakage or areas of insect bite.                     "

## 2025-06-03 ENCOUNTER — OFFICE VISIT (OUTPATIENT)
Dept: ENDOCRINOLOGY | Facility: CLINIC | Age: 65
End: 2025-06-03
Payer: COMMERCIAL

## 2025-06-03 VITALS
HEIGHT: 74 IN | HEART RATE: 51 BPM | WEIGHT: 268.2 LBS | OXYGEN SATURATION: 95 % | BODY MASS INDEX: 34.42 KG/M2 | SYSTOLIC BLOOD PRESSURE: 142 MMHG | DIASTOLIC BLOOD PRESSURE: 72 MMHG

## 2025-06-03 DIAGNOSIS — E29.1 TESTICULAR HYPOGONADISM: Primary | ICD-10-CM

## 2025-06-03 DIAGNOSIS — G47.00 INSOMNIA, UNSPECIFIED TYPE: ICD-10-CM

## 2025-06-03 DIAGNOSIS — E03.9 HYPOTHYROIDISM, UNSPECIFIED TYPE: ICD-10-CM

## 2025-06-03 PROCEDURE — 99214 OFFICE O/P EST MOD 30 MIN: CPT

## 2025-06-03 NOTE — PROGRESS NOTES
Name: Nacho Coelho      : 1960      MRN: [id number]  Encounter Provider: SAMIR Abraham  Encounter Date: 6/3/2025   Encounter department: [facility] [place]    Chief Complaint   Patient presents with    Hypothyroidism    Hypogonadism     Assessment & Plan  Hypothyroidism- update thyroid lab work. Continue current regimen for now  Hypogonadism- update testosterone levels along with PSA. Continue 70 mg/week   Suspected sleep apnea- encouraged patient to schedule visit with sleep medicine specialist    History of Present Illness  Nacho Coelho is a 63 y.o. male with a history of hypothyroidism, hypogonadism and vitamin D deficiency.     Hypothyroidism: currently taking levothyroxine 125 mcg daily and cytomel 5 mcg daily.      Hypogonadism: Patient's dosage of testosterone was reduced to 70 mg/week after his last set of lab work revealed elevated testosterone levels.  He is due for repeat lab work     Patient continues to report fatigue. He was recommended to see sleep medicine specialist in the past but has not done so. Reports he awakens in the middle of the night with trouble breathing. Reports difficulty sleeping- took daughters hydroxyzine which resulted in difficulty breathing at night.      Patient reports difficulty initiating urine stream. He reports weak stream. He reports inability to maintain stream.   No personal of family history of prostate cancer in the family    He also reports weight gain. Chart review reveals 10 pound weight gain from May 2025 to 2025.         Review of Systems   Constitutional:  Positive for fatigue. Negative for chills and fever.   HENT:  Negative for ear pain and sore throat.    Eyes:  Negative for pain and visual disturbance.   Respiratory:  Negative for cough and shortness of breath.    Cardiovascular:  Negative for chest pain and palpitations.   Gastrointestinal:  Negative for abdominal pain and vomiting.  "  Genitourinary:  Positive for difficulty urinating. Negative for dysuria and hematuria.   Musculoskeletal:  Negative for arthralgias and back pain.   Skin:  Negative for color change and rash.   Neurological:  Negative for seizures and syncope.   All other systems reviewed and are negative.   as per HPI       Medical History Reviewed by provider this encounter:     .    Objective   /72   Pulse (!) 51   Ht 6' 2\" (1.88 m)   Wt 122 kg (268 lb 3.2 oz)   SpO2 95%   BMI 34.43 kg/m²      Body mass index is 34.43 kg/m².  Wt Readings from Last 3 Encounters:   06/03/25 122 kg (268 lb 3.2 oz)   05/06/25 117 kg (258 lb 3.2 oz)   02/07/25 117 kg (259 lb)     Physical Exam    Physical Exam  Vitals reviewed.   HENT:      Head: Normocephalic and atraumatic.     Cardiovascular:      Rate and Rhythm: Bradycardia present.   Pulmonary:      Effort: Pulmonary effort is normal.     Neurological:      Mental Status: He is alert.           Results    Labs:   No results found for: \"HGBA1C\"  Lab Results   Component Value Date    CREATININE 0.91 11/19/2024    CREATININE 0.95 08/09/2023    CREATININE 0.90 02/04/2023    BUN 18 11/19/2024     11/29/2017    K 4.3 11/19/2024     11/19/2024    CO2 28 11/19/2024     eGFR   Date Value Ref Range Status   11/19/2024 89 ml/min/1.73sq m Final     Lab Results   Component Value Date    CHOL 232 (H) 04/22/2017    HDL 41 07/09/2022    TRIG 117 07/09/2022     Lab Results   Component Value Date    ALT 28 11/19/2024    AST 28 11/19/2024    ALKPHOS 54 11/19/2024    BILITOT 0.4 11/29/2017     Lab Results   Component Value Date    MJT5QQAHCSFJ 0.927 11/19/2024    YHQ3EDZBLTLT 0.334 (L) 08/28/2024    [id number] 0.569 02/16/2024       There are no Patient Instructions on file for this visit.    Discussed with the patient and all questioned fully answered. He will call me if any problems arise.  "

## 2025-06-04 DIAGNOSIS — E29.1 TESTICULAR HYPOGONADISM: ICD-10-CM

## 2025-06-05 RX ORDER — TESTOSTERONE CYPIONATE 200 MG/ML
INJECTION, SOLUTION INTRAMUSCULAR
Qty: 4 ML | Refills: 1 | Status: SHIPPED | OUTPATIENT
Start: 2025-06-05

## 2025-06-12 ENCOUNTER — HOSPITAL ENCOUNTER (OUTPATIENT)
Dept: SLEEP CENTER | Facility: CLINIC | Age: 65
Discharge: HOME | End: 2025-06-12
Attending: INTERNAL MEDICINE
Payer: COMMERCIAL

## 2025-06-12 ENCOUNTER — OFFICE VISIT (OUTPATIENT)
Dept: SLEEP CENTER | Facility: CLINIC | Age: 65
End: 2025-06-12
Payer: COMMERCIAL

## 2025-06-12 VITALS
BODY MASS INDEX: 33.5 KG/M2 | WEIGHT: 261 LBS | HEART RATE: 52 BPM | OXYGEN SATURATION: 96 % | HEIGHT: 74 IN | SYSTOLIC BLOOD PRESSURE: 142 MMHG | DIASTOLIC BLOOD PRESSURE: 62 MMHG

## 2025-06-12 DIAGNOSIS — E66.9 OBESITY (BMI 30-39.9): ICD-10-CM

## 2025-06-12 DIAGNOSIS — G47.19 EXCESSIVE DAYTIME SLEEPINESS: ICD-10-CM

## 2025-06-12 DIAGNOSIS — E29.1 TESTICULAR HYPOGONADISM: ICD-10-CM

## 2025-06-12 DIAGNOSIS — R06.89 GASPING FOR BREATH: ICD-10-CM

## 2025-06-12 DIAGNOSIS — R06.83 SNORING: Primary | ICD-10-CM

## 2025-06-12 DIAGNOSIS — F45.8 BRUXISM: ICD-10-CM

## 2025-06-12 DIAGNOSIS — R06.83 SNORING: ICD-10-CM

## 2025-06-12 PROCEDURE — 99244 OFF/OP CNSLTJ NEW/EST MOD 40: CPT | Performed by: INTERNAL MEDICINE

## 2025-06-12 PROCEDURE — G0399 HOME SLEEP TEST/TYPE 3 PORTA: HCPCS

## 2025-06-12 NOTE — PATIENT INSTRUCTIONS
What is TATYANA?   Obstructive sleep apnea is a common and serious sleep disorder that causes you to stop breathing during sleep. The airway repeatedly becomes blocked, limiting the amount of air that reaches your lungs. When this happens, you may snore loudly or making choking noises as you try to breathe. Your brain and body becomes oxygen deprived and you may wake up. This may happen a few times a night, or in more severe cases, several hundred times a night.   Sleep apnea can make you wake up in the morning feeling tired or unrefreshed even though you have had a full night of sleep. During the day, you may feel fatigued, have difficulty concentrating or you may even unintentionally fall asleep. This is because your body is waking up numerous times throughout the night, even though you might not be conscious of each awakening.  The lack of oxygen your body receives can have negative long-term consequences for your health. This includes:  High blood pressure  Heart disease  Irregular heart rhythms  Stroke  Pre-diabetes and diabetes  Depression  Testing  An objective evaluation of your sleep may be needed before your board certified sleep physician can make a diagnosis. Options include:   In-lab overnight sleep study  This type of sleep study requires you to stay overnight at a sleep center, in a bed that may resemble a hotel room. You will sleep with sensors hooked up to various parts of your body. These sensors record your brain waves, heartbeat, breathing and movement. An overnight sleep study also provides your doctor with the most complete information about your sleep. Learn more about an overnight sleep study.   Home sleep apnea test  Some patients with high risk factors for obstructive sleep apnea and no other medical disorders may be candidates for a home sleep apnea test. The testing equipment differs in that it is less complicated than what is used in an overnight sleep study. As such, does not give all the  data an in-lab will and if negative, may not mean you do not have the problem.  Treatment for sleep apnea includes using a continuous positive airway pressure (CPAP) machine to keep your airway open during sleep. A mask is placed over your nose and mouth, or just your nose. The mask is hooked to the CPAP machine that blows a gentle stream of air into the mask when you breathe. This helps keep your airway open so you can breathe more regularly. Extra oxygen may be given to you through the machine. You may be given a mouth device. It looks like a mouth guard or dental retainer and stops your tongue and mouth tissues from blocking your throat while you sleep. Surgery may be needed to remove extra tissues that block your mouth, throat, or nose.  Manage sleep apnea:   Do not smoke. Nicotine and other chemicals in cigarettes and cigars can cause lung damage. Ask your healthcare provider for information if you currently smoke and need help to quit. E-cigarettes or smokeless tobacco still contain nicotine. Talk to your healthcare provider before you use these products.  Do not drink alcohol or take sedative medicine before you go to sleep. Alcohol and sedatives can relax the muscles and tissues around your throat. This can block the airflow to your lungs.  Maintain a healthy weight. Excess tissue around your throat may restrict your breathing. Ask your healthcare provider for information if you need to lose weight.  Sleep on your side or use pillows designed to prevent sleep apnea. This prevents your tongue or other tissues from blocking your throat. You can also raise the head of your bed.  Driving Safety. Refrain from driving when drowsy.   Follow up with your healthcare provider as directed: Write down your questions so you remember to ask them during your visits.  Go to [employer]ASM website for more information: Sleepeducation.org  What is TATYANA?   Obstructive sleep apnea is a common and serious sleep disorder that causes you to  stop breathing during sleep. The airway repeatedly becomes blocked, limiting the amount of air that reaches your lungs. When this happens, you may snore loudly or making choking noises as you try to breathe. Your brain and body becomes oxygen deprived and you may wake up. This may happen a few times a night, or in more severe cases, several hundred times a night.   Sleep apnea can make you wake up in the morning feeling tired or unrefreshed even though you have had a full night of sleep. During the day, you may feel fatigued, have difficulty concentrating or you may even unintentionally fall asleep. This is because your body is waking up numerous times throughout the night, even though you might not be conscious of each awakening.  The lack of oxygen your body receives can have negative long-term consequences for your health. This includes:  High blood pressure  Heart disease  Irregular heart rhythms  Stroke  Pre-diabetes and diabetes  Depression  Testing  An objective evaluation of your sleep may be needed before your board certified sleep physician can make a diagnosis. Options include:   In-lab overnight sleep study  This type of sleep study requires you to stay overnight at a sleep center, in a bed that may resemble a hotel room. You will sleep with sensors hooked up to various parts of your body. These sensors record your brain waves, heartbeat, breathing and movement. An overnight sleep study also provides your doctor with the most complete information about your sleep. Learn more about an overnight sleep study.   Home sleep apnea test  Some patients with high risk factors for obstructive sleep apnea and no other medical disorders may be candidates for a home sleep apnea test. The testing equipment differs in that it is less complicated than what is used in an overnight sleep study. As such, does not give all the data an in-lab will and if negative, may not mean you do not have the problem.  Treatment for  sleep apnea includes using a continuous positive airway pressure (CPAP) machine to keep your airway open during sleep. A mask is placed over your nose and mouth, or just your nose. The mask is hooked to the CPAP machine that blows a gentle stream of air into the mask when you breathe. This helps keep your airway open so you can breathe more regularly. Extra oxygen may be given to you through the machine. You may be given a mouth device. It looks like a mouth guard or dental retainer and stops your tongue and mouth tissues from blocking your throat while you sleep. Surgery may be needed to remove extra tissues that block your mouth, throat, or nose.  Manage sleep apnea:   Do not smoke. Nicotine and other chemicals in cigarettes and cigars can cause lung damage. Ask your healthcare provider for information if you currently smoke and need help to quit. E-cigarettes or smokeless tobacco still contain nicotine. Talk to your healthcare provider before you use these products.  Do not drink alcohol or take sedative medicine before you go to sleep. Alcohol and sedatives can relax the muscles and tissues around your throat. This can block the airflow to your lungs.  Maintain a healthy weight. Excess tissue around your throat may restrict your breathing. Ask your healthcare provider for information if you need to lose weight.  Sleep on your side or use pillows designed to prevent sleep apnea. This prevents your tongue or other tissues from blocking your throat. You can also raise the head of your bed.  Driving Safety. Refrain from driving when drowsy.   Follow up with your healthcare provider as directed: Write down your questions so you remember to ask them during your visits.  Go to AASM website for more information: Sleepeducation.org    Nursing Support:  When: Monday through Friday 7:30A-4:30PM except holidays  Where: Our direct line is [phone]  *3  *1.      If you are having a true emergency please call 911.  In the  event that the line is busy or it is after hours please leave a voice message and we will return your call.  Please speak clearly, leaving your full name, birth date, best number to reach you and the reason for your call.   Medication refills: We will need the name of the medication, the dosage, the ordering provider, whether you get a 30 or 90 day refill, and the pharmacy name and address.  Medications will be ordered by the provider only.  Nurses cannot call in prescriptions.  Please allow 7 days for medication refills.  Physician requested updates: If your provider requested that you call with an update after starting medication, please be ready to provide us the medication and dosage, what time you take your medication, the time you attempt to fall asleep, time you fall asleep, when you wake up, and what time you get out of bed.  Sleep Study Results: We will contact you with sleep study results and/or next steps after the physician has reviewed your testing.

## 2025-06-12 NOTE — PROGRESS NOTES
Name: Nacho Coelho      : 1960      MRN: [id number]  Encounter Provider: Steven Lombardi MD  Encounter Date: 2025   Encounter department: [facility]  :  Assessment & Plan  Snoring    Orders:    Home Sleep Study; Future    Excessive daytime sleepiness    Orders:    Home Sleep Study; Future    Gasping for breath    Orders:    Home Sleep Study; Future    Bruxism         Testicular hypogonadism         Obesity (BMI 30-39.9)                PLAN:   Problems & Comorbidities Addressed this Visit as listed.  Stable/controlled conditions reviewed in notes.  With respect to above conditions, comprehensive counseling provided on pathophysiology; effects on symptoms and comorbidities, diagnostic strategies & limitations; treatment options; risks or no treament; risks & benefits of the various therapeutic options; costs and insurance aspects.     I advised weight reduction, avoiding sleeping supine, using alcohol or sedating medications close to bed time and on safe driving practices.    Further evaluation is indicated and a sleep study will be scheduled.  Patient understands limitations of home sleep testing and in lab study may be necessary.  Patient is willing to try Positive airway pressure therapy and will be scheduled for a titration study if indicated.  Follow-up to be scheduled after testing/initiation of therapy to review results, further details of treatment options and to adjust therapy.    History of Present Illness   HPI           Consultation - Sleep Center   Nacho Coelho  65 y.o. male  :1960  MRN:[id number]  DOS:2025    Physician Requesting Consult: Carlos Knight CRNP             Reason for Consult : At your kind request I saw Nacho Coelho for initial sleep evaluation today. The patient is here to evaluate for suspected Obstructive Sleep Apnea.      PFSH, Problem List, Medications & Allergies were reviewed in EMR.    Nacho  has a past medical history of Anemia,  "Cholelithiasis, Disease of thyroid gland, GERD (gastroesophageal reflux disease), Hyperlipidemia, Hypersecretion of testicular hormones, Insomnia, Kidney stone, Low testosterone, Male erectile dysfunction, Osteoarthritis, hip, bilateral, and Thrombosed external hemorrhoids.      He has a current medication list which includes the following prescription(s): cholecalciferol, ferrous sulfate, levothyroxine, liothyronine, magnesium, needle (disp) 23 g, syringe, testosterone cypionate, and amoxicillin.      HPI: Patient's presents with complaints of \"I am tired all the time \"of several years duration and irrespective of sleep.  Nacho is un aware of snoring that is reported by his wife and is loud to the extent that he is no longer able to share a room.  Recently he has been experiencing episodes of awakening with gasping and feeling short of breath.  Other complaints:(Patient-Rptd) History of previous trauma, Decreased concentration, Chronic pain . Restless Leg Syndrome: (Patient-Rptd) History of iron deficiency reports no suggestive symptoms.  .  Parasomnia: (Patient-Rptd) No symptoms reports teeth grinding during sleep;   Sleep Routine (averaged): Typical Bedtime: (Patient-Rptd) 9:00 pm.  Gets OOB: 4:30 AM. TIB:7.5 hrs.   Sleep latency: (Patient-Rptd) 0-15 minutes; Sleep Interruptions:  3-4, not always sure of the cause and struggles to fall back asleep. Awakens: Spontaneously  Upon awakening: (Patient-Rptd) Never never feels rested.  He estimates getting (Patient-Rptd) 5hrs sleep.  Daytime Function:Nacho reports excessive daytime sleepiness feels like napping but does not have the opportunity but dozes off when sedentary at home. He rated himself at Total score: (Patient-Rptd) 19 /24 on the Campbell Sleepiness Scale.     Habits:   reports that he quit smoking about 35 years ago. His smoking use included cigarettes. He started smoking about 45 years ago. He has a 30.1 pack-year smoking history. He has never used smokeless " "tobacco.;  reports that he does not currently use alcohol after a past usage of about 4.0 standard drinks of alcohol per week.; Reports no history of drug use.;  E-Cigarette/Vaping  Never User; Caffeine use:limited until (Patient-Rptd) Before 3:00 pm; Exercise routine: none but is physically active in his job.    Occupation: (Patient-Rptd) Work Full Time [employer] License: (Patient-Rptd) No  Family History: Father had obstructive sleep apnea  ROS: Significant for some intentional weight reduction in the past 2 weeks..     EXAM:  /62   Pulse (!) 52   Ht 6' 2\" (1.88 m)   Wt 118 kg (261 lb)   SpO2 96%   BMI 33.51 kg/m²    General: Well groomed male, well appearing, in no apparent distress.   Neurological: Alert and orientated; cooperative; Cranial nerves intact;    Psychiatric: Speech: Clear and coherent; normal mood, affect & thought   Skin: Warm and dry; Color& Hydration good; no facial rashes or lesions   HEENT:  Craniofacial anatomy: normal Sinuses: Non-tender. TMJ: Normal    Eyes: EOM's intact; conjunctiva/corneas clear   Ears: Appear normal     Nasal Airway: airflow is reduced Septum: Intact; Turbinates: Normal; Rhinorrhea: None  Mouth: Lips: Normal posture; Dentition: worn down. Mucosa: Moist; Hard Palate:normal    Oropharryx: crowded and AP narrowing Tongue: Mallampati:Class IV and MobileSoft Palate:  redundant  Tonsils: absent  Neck: Is thick; neck Circumference: 16.5 \"; [no abnormal masses; Supple; no abnormal masses; Thyroid: Normal. Trachea: Central.    Heart: S1,S2 normal; RRR; no gallop; no murmur   Lungs: Respiratory Effort: Normal. Air entry good bilaterally.  No wheezes.  No rales  Abdomen: Obese, soft.   Extremities: No pedal edema.  No clubbing or cyanosis.    Musculoskeletal:  Motor normal; Gait: Normal.       Lab Results   Component Value Date     11/29/2017    SODIUM 140 11/19/2024    K 4.3 11/19/2024     11/19/2024    CO2 28 11/19/2024    AGAP 8 11/19/2024    BUN 18 11/19/2024    " "CREATININE 0.91 11/19/2024    GLUC 103 02/04/2023    GLUF 111 (H) 11/19/2024    CALCIUM 8.9 11/19/2024    AST 28 11/19/2024    ALT 28 11/19/2024    ALKPHOS 54 11/19/2024    PROT 6.6 11/29/2017    TP 6.9 11/19/2024    BILITOT 0.4 11/29/2017    TBILI 0.61 11/19/2024    EGFR 89 11/19/2024   ;   Lab Results   Component Value Date    WBC 3.63 (L) 11/19/2024    HGB 13.7 11/19/2024    HCT 42.1 11/19/2024     (H) 11/19/2024     (L) 11/19/2024   : [unfilled]    Sincerely,      Authenticated electronically on 06/12/25   Board Certified Specialist     Portions of the record may have been created with voice recognition software. Occasional wrong word or \"sound a like\" substitutions may have occurred due to the inherent limitations of voice recognition software. There may also be notations and random deletions of words or characters from malfunctioning software. Read the chart carefully and recognize, using context, where substitutions/deletions have occurred.          Review of Systems           "

## 2025-06-13 NOTE — PROGRESS NOTES
Home Sleep Study Documentation    HOME STUDY DEVICE: Noxturnal no                                           Anna G3 yes      Pre-Sleep Home Study:    Set-up and instructions performed by: LYNN    Technician performed demonstration for Patient: yes    Return demonstration performed by Patient: yes    Written instructions provided to Patient: yes    Patient signed consent form: yes          Post-Sleep Home Study:    Post Test Questionnaire Data: Pending    Additional comments by Patient: pending    Home Sleep Study Failed:pending    Failure reason: pending    Reported or Detected: pending    Scored by: pending

## 2025-06-17 DIAGNOSIS — G47.33 SEVERE OBSTRUCTIVE SLEEP APNEA: Primary | ICD-10-CM

## 2025-06-17 PROCEDURE — 95806 SLEEP STUDY UNATT&RESP EFFT: CPT | Performed by: INTERNAL MEDICINE

## 2025-06-19 ENCOUNTER — APPOINTMENT (OUTPATIENT)
Dept: LAB | Facility: HOSPITAL | Age: 65
End: 2025-06-19
Payer: COMMERCIAL

## 2025-06-19 ENCOUNTER — RESULTS FOLLOW-UP (OUTPATIENT)
Dept: ENDOCRINOLOGY | Facility: CLINIC | Age: 65
End: 2025-06-19

## 2025-06-19 DIAGNOSIS — E29.1 TESTICULAR HYPOGONADISM: Primary | ICD-10-CM

## 2025-06-19 DIAGNOSIS — Z13.1 DIABETES MELLITUS SCREENING: ICD-10-CM

## 2025-06-19 DIAGNOSIS — E29.1 TESTICULAR HYPOGONADISM: ICD-10-CM

## 2025-06-19 DIAGNOSIS — E03.9 HYPOTHYROIDISM, UNSPECIFIED TYPE: ICD-10-CM

## 2025-06-19 LAB
ALBUMIN SERPL BCG-MCNC: 4.6 G/DL (ref 3.5–5)
ALP SERPL-CCNC: 60 U/L (ref 34–104)
ALT SERPL W P-5'-P-CCNC: 28 U/L (ref 7–52)
ANION GAP SERPL CALCULATED.3IONS-SCNC: 7 MMOL/L (ref 4–13)
AST SERPL W P-5'-P-CCNC: 24 U/L (ref 13–39)
BASOPHILS # BLD AUTO: 0.01 THOUSANDS/ÂΜL (ref 0–0.1)
BASOPHILS NFR BLD AUTO: 0 % (ref 0–1)
BILIRUB SERPL-MCNC: 0.79 MG/DL (ref 0.2–1)
BUN SERPL-MCNC: 18 MG/DL (ref 5–25)
CALCIUM SERPL-MCNC: 9.2 MG/DL (ref 8.4–10.2)
CHLORIDE SERPL-SCNC: 101 MMOL/L (ref 96–108)
CO2 SERPL-SCNC: 29 MMOL/L (ref 21–32)
CREAT SERPL-MCNC: 0.98 MG/DL (ref 0.6–1.3)
EOSINOPHIL # BLD AUTO: 0.04 THOUSAND/ÂΜL (ref 0–0.61)
EOSINOPHIL NFR BLD AUTO: 1 % (ref 0–6)
ERYTHROCYTE [DISTWIDTH] IN BLOOD BY AUTOMATED COUNT: 15.4 % (ref 11.6–15.1)
ERYTHROCYTE [SEDIMENTATION RATE] IN BLOOD: 8 MM/HOUR (ref 0–19)
EST. AVERAGE GLUCOSE BLD GHB EST-MCNC: 126 MG/DL
GFR SERPL CREATININE-BSD FRML MDRD: 81 ML/MIN/1.73SQ M
GLUCOSE P FAST SERPL-MCNC: 102 MG/DL (ref 65–99)
HBA1C MFR BLD: 6 %
HCT VFR BLD AUTO: 45.7 % (ref 36.5–49.3)
HGB BLD-MCNC: 15 G/DL (ref 12–17)
IMM GRANULOCYTES # BLD AUTO: 0.03 THOUSAND/UL (ref 0–0.2)
IMM GRANULOCYTES NFR BLD AUTO: 1 % (ref 0–2)
LYMPHOCYTES # BLD AUTO: 1.59 THOUSANDS/ÂΜL (ref 0.6–4.47)
LYMPHOCYTES NFR BLD AUTO: 41 % (ref 14–44)
MCH RBC QN AUTO: 36.6 PG (ref 26.8–34.3)
MCHC RBC AUTO-ENTMCNC: 32.8 G/DL (ref 31.4–37.4)
MCV RBC AUTO: 112 FL (ref 82–98)
MONOCYTES # BLD AUTO: 0.37 THOUSAND/ÂΜL (ref 0.17–1.22)
MONOCYTES NFR BLD AUTO: 9 % (ref 4–12)
NEUTROPHILS # BLD AUTO: 1.88 THOUSANDS/ÂΜL (ref 1.85–7.62)
NEUTS SEG NFR BLD AUTO: 48 % (ref 43–75)
NRBC BLD AUTO-RTO: 0 /100 WBCS
PLATELET # BLD AUTO: 148 THOUSANDS/UL (ref 149–390)
PMV BLD AUTO: 11.8 FL (ref 8.9–12.7)
POTASSIUM SERPL-SCNC: 4.6 MMOL/L (ref 3.5–5.3)
PROT SERPL-MCNC: 7.3 G/DL (ref 6.4–8.4)
PSA SERPL-MCNC: 2.61 NG/ML (ref 0–4)
RBC # BLD AUTO: 4.1 MILLION/UL (ref 3.88–5.62)
SODIUM SERPL-SCNC: 137 MMOL/L (ref 135–147)
T4 FREE SERPL-MCNC: 1.19 NG/DL (ref 0.61–1.12)
TSH SERPL DL<=0.05 MIU/L-ACNC: 3.17 UIU/ML (ref 0.45–4.5)
WBC # BLD AUTO: 3.92 THOUSAND/UL (ref 4.31–10.16)

## 2025-06-19 PROCEDURE — 84443 ASSAY THYROID STIM HORMONE: CPT

## 2025-06-19 PROCEDURE — 83036 HEMOGLOBIN GLYCOSYLATED A1C: CPT

## 2025-06-19 PROCEDURE — 84403 ASSAY OF TOTAL TESTOSTERONE: CPT

## 2025-06-19 PROCEDURE — G0103 PSA SCREENING: HCPCS

## 2025-06-19 PROCEDURE — 85652 RBC SED RATE AUTOMATED: CPT

## 2025-06-19 PROCEDURE — 84439 ASSAY OF FREE THYROXINE: CPT

## 2025-06-19 PROCEDURE — 36415 COLL VENOUS BLD VENIPUNCTURE: CPT

## 2025-06-19 PROCEDURE — 85025 COMPLETE CBC W/AUTO DIFF WBC: CPT

## 2025-06-19 PROCEDURE — 84402 ASSAY OF FREE TESTOSTERONE: CPT

## 2025-06-19 PROCEDURE — 80053 COMPREHEN METABOLIC PANEL: CPT

## 2025-06-20 ENCOUNTER — OFFICE VISIT (OUTPATIENT)
Dept: FAMILY MEDICINE CLINIC | Facility: CLINIC | Age: 65
End: 2025-06-20
Payer: COMMERCIAL

## 2025-06-20 VITALS
BODY MASS INDEX: 34.33 KG/M2 | DIASTOLIC BLOOD PRESSURE: 60 MMHG | WEIGHT: 259 LBS | OXYGEN SATURATION: 97 % | SYSTOLIC BLOOD PRESSURE: 122 MMHG | HEIGHT: 73 IN | HEART RATE: 57 BPM | TEMPERATURE: 97.7 F

## 2025-06-20 DIAGNOSIS — R73.03 PRE-DIABETES: ICD-10-CM

## 2025-06-20 DIAGNOSIS — G47.33 OSA (OBSTRUCTIVE SLEEP APNEA): ICD-10-CM

## 2025-06-20 DIAGNOSIS — E29.1 TESTICULAR HYPOGONADISM: ICD-10-CM

## 2025-06-20 DIAGNOSIS — F32.1 MODERATE MAJOR DEPRESSION (HCC): ICD-10-CM

## 2025-06-20 DIAGNOSIS — E66.811 CLASS 1 OBESITY WITH SERIOUS COMORBIDITY AND BODY MASS INDEX (BMI) OF 34.0 TO 34.9 IN ADULT, UNSPECIFIED OBESITY TYPE: ICD-10-CM

## 2025-06-20 DIAGNOSIS — E03.9 HYPOTHYROIDISM, UNSPECIFIED TYPE: ICD-10-CM

## 2025-06-20 DIAGNOSIS — Z00.00 ANNUAL PHYSICAL EXAM: Primary | ICD-10-CM

## 2025-06-20 PROBLEM — C80.1 MALIGNANT (PRIMARY) NEOPLASM, UNSPECIFIED (HCC): Status: RESOLVED | Noted: 2022-09-15 | Resolved: 2025-06-20

## 2025-06-20 LAB
TESTOST FREE SERPL-MCNC: 31.1 PG/ML (ref 6.6–18.1)
TESTOST SERPL-MCNC: 990 NG/DL (ref 264–916)

## 2025-06-20 PROCEDURE — 99396 PREV VISIT EST AGE 40-64: CPT | Performed by: NURSE PRACTITIONER

## 2025-06-20 RX ORDER — TESTOSTERONE CYPIONATE 200 MG/ML
50 INJECTION, SOLUTION INTRAMUSCULAR ONCE
COMMUNITY

## 2025-06-20 NOTE — ASSESSMENT & PLAN NOTE
Pre diabetic  Would benefit from weight loss and continued dietary changes  No contraindicaitions to zepbound will prescribe  Orders:    tirzepatide (Zepbound) 2.5 mg/0.5 mL auto-injector; Inject 0.5 mL (2.5 mg total) under the skin once a week for 28 days

## 2025-06-20 NOTE — ASSESSMENT & PLAN NOTE
PSA double and his testosterone levels were elevated  Advised to hold his testosterone and schedule with urology    Orders:    tirzepatide (Zepbound) 2.5 mg/0.5 mL auto-injector; Inject 0.5 mL (2.5 mg total) under the skin once a week for 28 days

## 2025-06-20 NOTE — ASSESSMENT & PLAN NOTE
Stable continue levothyroxine and cytomel  Orders:    tirzepatide (Zepbound) 2.5 mg/0.5 mL auto-injector; Inject 0.5 mL (2.5 mg total) under the skin once a week for 28 days

## 2025-06-20 NOTE — PROGRESS NOTES
Adult Annual Physical  Name: Nacho Coelho      : 1960      MRN: [id number]  Encounter Provider: SAMIR Shahid  Encounter Date: 2025   Encounter department: [facility]    :  Assessment & Plan  Annual physical exam  Schedule colonoscopy  Labs up to date       TATYANA (obstructive sleep apnea)  Will be getting cpap, needs in lab cpap tiration study  DARSHAN 35  Lowest O2 80%  Severe obstructive sleep apnea, significant hypoxic burden  Will prescribe zepbound given TATYANA and obesity would benefit from weight loss  No contraindications to medication  Orders:    tirzepatide (Zepbound) 2.5 mg/0.5 mL auto-injector; Inject 0.5 mL (2.5 mg total) under the skin once a week for 28 days    Hypothyroidism, unspecified type  Stable continue levothyroxine and cytomel  Orders:    tirzepatide (Zepbound) 2.5 mg/0.5 mL auto-injector; Inject 0.5 mL (2.5 mg total) under the skin once a week for 28 days    Testicular hypogonadism  PSA double and his testosterone levels were elevated  Advised to hold his testosterone and schedule with urology    Orders:    tirzepatide (Zepbound) 2.5 mg/0.5 mL auto-injector; Inject 0.5 mL (2.5 mg total) under the skin once a week for 28 days    Pre-diabetes  Pre diabetic  Would benefit from weight loss and continued dietary changes  No contraindicaitions to zepbound will prescribe  Orders:    tirzepatide (Zepbound) 2.5 mg/0.5 mL auto-injector; Inject 0.5 mL (2.5 mg total) under the skin once a week for 28 days    Moderate major depression (HCC)  Depression Screening Follow-up Plan: Patient's depression screening was positive with a PHQ-2 score of 3. Their PHQ-9 score was 13. Patient assessed for underlying major depression. They have no active suicidal ideations. Brief counseling provided and recommend additional follow-up/re-evaluation next office visit. Patient's depressive symptoms likely due to other medical condition. Would recommend treatment of underlying condition.  Will continue to monitor at next office visit.  Suspect secondary to sleep issues, he is not sleeping well, not feeling well, significant impact on QOL    Orders:    tirzepatide (Zepbound) 2.5 mg/0.5 mL auto-injector; Inject 0.5 mL (2.5 mg total) under the skin once a week for 28 days    Class 1 obesity with serious comorbidity and body mass index (BMI) of 34.0 to 34.9 in adult, unspecified obesity type  Prior Authorization Clinical Questions for Weight Management Pharmacotherapy    1. Does the patient have a contrainidcation to medication prescribed for weight management?: No  2. Does the patient have a diagnosis of obesity, confirmed by a BMI greater than or equal to 30 kg/m^2?: Yes  3. Does the patient have a BMI of greater than or equal to 27 kg/m^2 with at least one weight-related comorbidity/risk factor/complication (e.g. diabetes, dyslipidemia, coronary artery disease)?: Yes  4. Weight-related co-morbidities/risk factors: prediabetes, dyslipidemia, male hypogonadism, obstructive sleep apnea (TATYANA), depression  6. ZEPBOUND TATYANA Indication: Does patient have documented TATYANA diagnosed via sleep study (insurance will require copy of sleep study results for approval)?: Yes  7. Has the patient been on a weight loss regimen of low-calorie diet, increased physical activity, and lifestyle modifications for a minimum of 6 months?: Yes  8. Has the patient completed a comprehensive weight loss program (ie, Weight Watchers, Noom, Bariatrics, other hue on phone)? If so, what?: No  9. Does the patient have a history of type 2 diabetes?: No  10. Has the member tried and failed other weight loss medication within the past 12 months?: No  11. Will the member use requested medication in combination with another GLP agonist or weight loss drug?: No  12. Is the medication a controlled substance?: No     Baseline weight (in pounds): 259 lbs  Current weight (in pounds): 259 lbs  Weight loss percentage: 0%     No contraindications to  proposed GLP1 Zepbound has   Severe TATYANA DARSHAN 35  Will start on zepbound 2.5mg increasing dose every 4 weeks as tolerated  Will continue to monitor labs once started    Orders:    tirzepatide (Zepbound) 2.5 mg/0.5 mL auto-injector; Inject 0.5 mL (2.5 mg total) under the skin once a week for 28 days        Preventive Screenings:  - Diabetes Screening: screening up-to-date  - Cholesterol Screening: screening not indicated and has hyperlipidemia   - Hepatitis C screening: screening up-to-date   - Colon cancer screening: screening up-to-date   - Lung cancer screening: screening not indicated   - Prostate cancer screening: screening up-to-date     Immunizations:  - Immunizations due: Zoster (Shingrix)    Counseling/Anticipatory Guidance:    - Dental health: discussed importance of regular tooth brushing, flossing, and dental visits.   - Sexual health: discussed sexually transmitted diseases, partner selection, use of condoms, avoidance of unintended pregnancy, and contraceptive alternatives.   - Diet: discussed recommendations for a healthy/well-balanced diet.   - Exercise: the importance of regular exercise/physical activity was discussed. Recommend exercise 3-5 times per week for at least 30 minutes.   - Injury prevention: discussed safety/seat belts, safety helmets, smoke detectors, carbon monoxide detectors, and smoking near bedding or upholstery.       Depression Screening and Follow-up Plan: Patient's depression screening was positive with a PHQ-2 score of 3. Their PHQ-9 score was 13.   Patient assessed for underlying major depression. Brief counseling provided and recommend additional follow-up/re-evaluation next office visit. Depression likely due to other medical condition. Will treat underlying condition.         History of Present Illness     Adult Annual Physical:  Patient presents for annual physical. Here today for annual physical  Following with sleep medicine- home study showed severe sleep apnea needs in  lab cpap titration scheduled for end July- he is on cancellation list  He is fatigued, he can fall asleep sitting up.    He is following a calorie deficit diet and has lost about 9lbs in 3 weeks    He is on testosterone therapy 60mg on Saturday nights  Repeat labs on 6/19/25 testosterone is elevated from his baseline. Follows with endocrinology every 6 months    His PSA 2.610 which doubled from Februray 2/16/24  He has been referred to urology.   And was advised to stop testosterone therapy at this time    No contrainidicatins to GLP1= no personal or family history of thyroid cancer, neuroendocrine cancers, pancreatitis  He does have sleep apnea, pre diabetes, hypogonadism, hypothyroidism  .     Diet and Physical Activity:  - Diet/Nutrition: low calorie diet. Lost about 9 pounds in the past 3 weeks  - Exercise: no formal exercise.    Depression Screening:  - PHQ-2 Score: 3  - PHQ-9 Score: 13    General Health:  - Sleep: sleeps poorly. Haven’t had a good night sleep in a long time , years  - Hearing: normal hearing right ear, normal hearing left ear and normal hearing bilateral ears.  - Vision: no vision problems.  - Dental: regular dental visits.    /GYN Health:  - Follows with GYN: no.   - History of STDs: no     Health:  - History of STDs: no.   - Urinary symptoms: erectile dysfunction, post-void dribbling and weak urinary stream.     Advanced Care Planning:  - Has an advanced directive?: no    - Has a durable medical POA?: no      Review of Systems   Constitutional:  Positive for fatigue. Negative for activity change, appetite change, chills, diaphoresis and unexpected weight change.   HENT:  Negative for congestion, ear discharge, ear pain, hearing loss, nosebleeds and rhinorrhea.    Eyes:  Negative for pain, redness, itching and visual disturbance.   Respiratory:  Positive for apnea. Negative for cough, choking, chest tightness and shortness of breath.    Cardiovascular:  Negative for chest pain and leg  "swelling.   Gastrointestinal:  Negative for abdominal pain, blood in stool, constipation, diarrhea and nausea.   Endocrine: Negative for cold intolerance, polydipsia and polyphagia.   Genitourinary:  Negative for dysuria, frequency, hematuria and urgency.        Stream weaker, 3x nocturia   Musculoskeletal:  Positive for gait problem. Negative for arthralgias, back pain, joint swelling, neck pain and neck stiffness.   Skin:  Negative for color change and rash.   Allergic/Immunologic: Negative for environmental allergies and food allergies.   Neurological:  Positive for weakness. Negative for dizziness, tremors, seizures, speech difficulty, numbness and headaches.   Hematological:  Negative for adenopathy. Does not bruise/bleed easily.   Psychiatric/Behavioral:  Negative for behavioral problems, dysphoric mood, hallucinations and self-injury.          Objective   /60 (BP Location: Left arm, Patient Position: Sitting, Cuff Size: Large)   Pulse 57   Temp 97.7 °F (36.5 °C) (Tympanic)   Ht 6' 1\" (1.854 m)   Wt 117 kg (259 lb)   SpO2 97%   BMI 34.17 kg/m²     Physical Exam  Vitals and nursing note reviewed.   Constitutional:       Appearance: Normal appearance. He is well-developed. He is obese.   HENT:      Head: Normocephalic and atraumatic.      Right Ear: Ear canal normal.      Left Ear: Ear canal normal.     Eyes:      Conjunctiva/sclera: Conjunctivae normal.       Cardiovascular:      Rate and Rhythm: Normal rate and regular rhythm.      Pulses:           Radial pulses are 2+ on the right side and 2+ on the left side.      Heart sounds: Normal heart sounds. No murmur heard.  Pulmonary:      Effort: Pulmonary effort is normal. No respiratory distress.      Breath sounds: Normal breath sounds.   Abdominal:      General: Bowel sounds are normal.      Palpations: Abdomen is soft.      Tenderness: There is no abdominal tenderness.     Musculoskeletal:      Cervical back: Neck supple.      Right lower leg: No " edema.      Left lower leg: No edema.     Skin:     General: Skin is warm and dry.      Findings: No rash.     Neurological:      Mental Status: He is alert and oriented to person, place, and time.     Psychiatric:         Mood and Affect: Mood normal.         Behavior: Behavior normal.

## 2025-06-20 NOTE — PATIENT INSTRUCTIONS
Schedule with urology- [phone]   Referral given today for you  Stop testosterone until seen by urology and cleared to resume    Schedule colonoscopy

## 2025-06-20 NOTE — ASSESSMENT & PLAN NOTE
Will be getting cpap, needs in lab cpap tiration study  DARSHAN 35  Lowest O2 80%  Severe obstructive sleep apnea, significant hypoxic burden  Will prescribe zepbound given TATYANA and obesity would benefit from weight loss  No contraindications to medication  Orders:    tirzepatide (Zepbound) 2.5 mg/0.5 mL auto-injector; Inject 0.5 mL (2.5 mg total) under the skin once a week for 28 days

## 2025-06-30 RX ORDER — TIRZEPATIDE 2.5 MG/.5ML
2.5 INJECTION, SOLUTION SUBCUTANEOUS WEEKLY
Qty: 2 ML | Refills: 0 | Status: SHIPPED | OUTPATIENT
Start: 2025-06-30 | End: 2025-07-07 | Stop reason: CLARIF

## 2025-07-01 ENCOUNTER — TELEPHONE (OUTPATIENT)
Age: 65
End: 2025-07-01

## 2025-07-01 NOTE — TELEPHONE ENCOUNTER
PA for Zepbound 2.5mg SUBMITTED to [facility]    via    []CMM-KEY:   [x]Surescripts-Case ID #: [id number]   []Availity-Auth ID #   []Faxed to plan   []Other website   []Phone call Case ID #     []PA sent as URGENT    All office notes, labs and other pertaining documents and studies sent. Clinical questions answered. Awaiting determination from insurance company.     Turnaround time for your insurance to make a decision on your Prior Authorization can take 7-21 business days.

## 2025-07-03 NOTE — TELEPHONE ENCOUNTER
PA for Zepbound 2.5mg RESUBMITTED to [facility]     via    [x]CMM-KEY: [id number]  []Surescripts-Case ID #   []Availity-Auth ID #   []Faxed to plan   []Other website  []Phone call Case ID #     [x]PA sent as URGENT    All office notes, labs and other pertaining documents and studies sent. Clinical questions answered. Awaiting determination from insurance company.     Turnaround time for your insurance to make a decision on your Prior Authorization can take 7-21 business days.

## 2025-07-07 ENCOUNTER — TELEPHONE (OUTPATIENT)
Age: 65
End: 2025-07-07

## 2025-07-07 DIAGNOSIS — E03.9 HYPOTHYROIDISM, UNSPECIFIED TYPE: ICD-10-CM

## 2025-07-07 DIAGNOSIS — I70.209 ATHEROSCLEROSIS OF ARTERIES OF EXTREMITIES (HCC): ICD-10-CM

## 2025-07-07 DIAGNOSIS — E66.811 CLASS 1 OBESITY WITH SERIOUS COMORBIDITY AND BODY MASS INDEX (BMI) OF 34.0 TO 34.9 IN ADULT, UNSPECIFIED OBESITY TYPE: ICD-10-CM

## 2025-07-07 DIAGNOSIS — G47.33 OSA (OBSTRUCTIVE SLEEP APNEA): Primary | ICD-10-CM

## 2025-07-07 DIAGNOSIS — R73.03 PRE-DIABETES: ICD-10-CM

## 2025-07-07 RX ORDER — SEMAGLUTIDE 0.25 MG/.5ML
INJECTION, SOLUTION SUBCUTANEOUS
Qty: 2 ML | Refills: 0 | Status: SHIPPED | OUTPATIENT
Start: 2025-07-07

## 2025-07-07 NOTE — TELEPHONE ENCOUNTER
PA for Wegovy 0.25mg SUBMITTED to [facility]    via    []CMM-KEY:   [x]Surescripts-Case ID #: [id number]   []Availity-Auth ID #   []Faxed to plan   []Other website   []Phone call Case ID #     []PA sent as URGENT    All office notes, labs and other pertaining documents and studies sent. Clinical questions answered. Awaiting determination from insurance company.     Turnaround time for your insurance to make a decision on your Prior Authorization can take 7-21 business days.

## 2025-07-07 NOTE — TELEPHONE ENCOUNTER
PA for Zepbound 2.5mg DENIED    Reason:(Screenshot if applicable)        Message sent to office clinical pool Yes    Denial letter scanned into Media Yes    We can gladly do an appeal but the process can take about 30-60 days to provide determination. Please have the office staff schedule a Peer to Peer at phone  . If an appeal is truly warranted please have Provider send clinical documentation to the PA department to support the appeal.     **Please follow up with your patient regarding denial and next steps**

## 2025-07-09 NOTE — TELEPHONE ENCOUNTER
PA for Wegovy 0.25mg APPROVED     Date(s) approved July 8, 2025 to February 8, 2026     Case #: [id number]     Patient advised by      - Patient aware and med picked up 7/8    []MyChart Message  []Phone call   []LMOM  []L/M to call office as no active Communication consent on file  []Unable to leave detailed message as VM not approved on Communication consent       Pharmacy advised by    []Fax  [x]Phone call- Paid claim received- Med picked up 7/8  []Secure Chat         Approval letter scanned into Media Yes

## 2025-07-20 DIAGNOSIS — E03.9 HYPOTHYROIDISM, UNSPECIFIED TYPE: ICD-10-CM

## 2025-07-20 DIAGNOSIS — E55.9 VITAMIN D DEFICIENCY: ICD-10-CM

## 2025-07-20 DIAGNOSIS — E29.1 TESTICULAR HYPOGONADISM: ICD-10-CM

## 2025-07-21 RX ORDER — LEVOTHYROXINE SODIUM 125 UG/1
TABLET ORAL
Qty: 180 TABLET | Refills: 1 | Status: SHIPPED | OUTPATIENT
Start: 2025-07-21

## 2025-07-30 ENCOUNTER — HOSPITAL ENCOUNTER (OUTPATIENT)
Dept: SLEEP CENTER | Facility: CLINIC | Age: 65
Discharge: HOME | End: 2025-07-30
Payer: COMMERCIAL

## 2025-07-30 DIAGNOSIS — G47.33 SEVERE OBSTRUCTIVE SLEEP APNEA: ICD-10-CM

## 2025-07-30 PROCEDURE — 95811 POLYSOM 6/>YRS CPAP 4/> PARM: CPT | Performed by: INTERNAL MEDICINE

## 2025-07-30 PROCEDURE — 95811 POLYSOM 6/>YRS CPAP 4/> PARM: CPT

## 2025-07-31 DIAGNOSIS — G47.33 OSA (OBSTRUCTIVE SLEEP APNEA): Primary | ICD-10-CM

## 2025-08-01 ENCOUNTER — TELEPHONE (OUTPATIENT)
Dept: SLEEP CENTER | Facility: CLINIC | Age: 65
End: 2025-08-01

## 2025-08-03 DIAGNOSIS — E03.9 HYPOTHYROIDISM, UNSPECIFIED TYPE: ICD-10-CM

## 2025-08-03 DIAGNOSIS — R73.03 PRE-DIABETES: ICD-10-CM

## 2025-08-03 DIAGNOSIS — G47.33 OSA (OBSTRUCTIVE SLEEP APNEA): ICD-10-CM

## 2025-08-03 DIAGNOSIS — I70.209 ATHEROSCLEROSIS OF ARTERIES OF EXTREMITIES (HCC): ICD-10-CM

## 2025-08-03 DIAGNOSIS — E66.811 CLASS 1 OBESITY WITH SERIOUS COMORBIDITY AND BODY MASS INDEX (BMI) OF 34.0 TO 34.9 IN ADULT, UNSPECIFIED OBESITY TYPE: ICD-10-CM

## 2025-08-04 ENCOUNTER — TELEPHONE (OUTPATIENT)
Dept: SLEEP CENTER | Facility: CLINIC | Age: 65
End: 2025-08-04

## 2025-08-04 DIAGNOSIS — G47.33 OSA (OBSTRUCTIVE SLEEP APNEA): Primary | ICD-10-CM

## 2025-08-04 DIAGNOSIS — E66.811 CLASS 1 OBESITY WITH SERIOUS COMORBIDITY AND BODY MASS INDEX (BMI) OF 34.0 TO 34.9 IN ADULT, UNSPECIFIED OBESITY TYPE: ICD-10-CM

## 2025-08-04 DIAGNOSIS — R73.03 PRE-DIABETES: ICD-10-CM

## 2025-08-04 RX ORDER — SEMAGLUTIDE 0.5 MG/.5ML
INJECTION, SOLUTION SUBCUTANEOUS
Qty: 2 ML | Refills: 0 | Status: SHIPPED | OUTPATIENT
Start: 2025-08-04

## 2025-08-05 RX ORDER — SEMAGLUTIDE 0.25 MG/.5ML
0.25 INJECTION, SOLUTION SUBCUTANEOUS WEEKLY
OUTPATIENT
Start: 2025-08-05

## 2025-08-07 LAB
